# Patient Record
Sex: FEMALE | Race: WHITE | Employment: FULL TIME | URBAN - METROPOLITAN AREA
[De-identification: names, ages, dates, MRNs, and addresses within clinical notes are randomized per-mention and may not be internally consistent; named-entity substitution may affect disease eponyms.]

---

## 2017-01-13 ENCOUNTER — ANTI-COAG VISIT (OUTPATIENT)
Dept: INTERNAL MEDICINE CLINIC | Facility: CLINIC | Age: 42
End: 2017-01-13

## 2017-01-13 DIAGNOSIS — I73.9 PVD (PERIPHERAL VASCULAR DISEASE) (HCC): Primary | ICD-10-CM

## 2017-01-13 LAB — INR: 2.2 (ref 0.8–1.2)

## 2017-01-13 PROCEDURE — 85610 PROTHROMBIN TIME: CPT | Performed by: INTERNAL MEDICINE

## 2017-01-13 PROCEDURE — 36416 COLLJ CAPILLARY BLOOD SPEC: CPT | Performed by: INTERNAL MEDICINE

## 2017-01-27 ENCOUNTER — ANTI-COAG VISIT (OUTPATIENT)
Dept: INTERNAL MEDICINE CLINIC | Facility: CLINIC | Age: 42
End: 2017-01-27

## 2017-01-27 DIAGNOSIS — I73.9 PVD (PERIPHERAL VASCULAR DISEASE) (HCC): Primary | ICD-10-CM

## 2017-01-27 LAB — INR: 2.7 (ref 0.8–1.2)

## 2017-01-27 PROCEDURE — 85610 PROTHROMBIN TIME: CPT | Performed by: INTERNAL MEDICINE

## 2017-01-27 PROCEDURE — 36416 COLLJ CAPILLARY BLOOD SPEC: CPT | Performed by: INTERNAL MEDICINE

## 2017-02-10 ENCOUNTER — TELEPHONE (OUTPATIENT)
Dept: DERMATOLOGY CLINIC | Facility: CLINIC | Age: 42
End: 2017-02-10

## 2017-02-11 NOTE — TELEPHONE ENCOUNTER
Yes for acne--on since 5/2016 with improved control in acne.   Medically necessary form letter also with pa--in my box

## 2017-02-24 ENCOUNTER — ANTI-COAG VISIT (OUTPATIENT)
Dept: INTERNAL MEDICINE CLINIC | Facility: CLINIC | Age: 42
End: 2017-02-24

## 2017-02-24 DIAGNOSIS — I73.9 PVD (PERIPHERAL VASCULAR DISEASE) (HCC): Primary | ICD-10-CM

## 2017-02-24 LAB — INR: 2.9 (ref 0.8–1.2)

## 2017-02-24 PROCEDURE — 85610 PROTHROMBIN TIME: CPT | Performed by: INTERNAL MEDICINE

## 2017-02-24 PROCEDURE — 36416 COLLJ CAPILLARY BLOOD SPEC: CPT | Performed by: INTERNAL MEDICINE

## 2017-03-15 ENCOUNTER — HOSPITAL ENCOUNTER (OUTPATIENT)
Dept: ULTRASOUND IMAGING | Facility: HOSPITAL | Age: 42
Discharge: HOME | End: 2017-03-15
Attending: SURGERY
Payer: COMMERCIAL

## 2017-03-15 DIAGNOSIS — I70.221 ATHEROSCLEROSIS OF NATIVE ARTERIES OF EXTREMITIES WITH REST PAIN, RIGHT LEG (HCC): ICD-10-CM

## 2017-03-15 PROCEDURE — 93926 LOWER EXTREMITY STUDY: CPT

## 2017-03-24 ENCOUNTER — TELEPHONE (OUTPATIENT)
Dept: INTERNAL MEDICINE CLINIC | Facility: CLINIC | Age: 42
End: 2017-03-24

## 2017-03-24 ENCOUNTER — ANTI-COAG VISIT (OUTPATIENT)
Dept: INTERNAL MEDICINE CLINIC | Facility: CLINIC | Age: 42
End: 2017-03-24

## 2017-03-24 DIAGNOSIS — I73.9 PVD (PERIPHERAL VASCULAR DISEASE) (HCC): Primary | ICD-10-CM

## 2017-03-24 DIAGNOSIS — I37.9 PULMONIC VALVE DISEASE: Primary | ICD-10-CM

## 2017-03-24 LAB — INR: 2.4 (ref 0.8–1.2)

## 2017-03-24 PROCEDURE — 85610 PROTHROMBIN TIME: CPT | Performed by: INTERNAL MEDICINE

## 2017-03-24 PROCEDURE — 36416 COLLJ CAPILLARY BLOOD SPEC: CPT | Performed by: INTERNAL MEDICINE

## 2017-03-24 NOTE — PROGRESS NOTES
Pt. here today for INR. The Inr today was 2.4. Informed pt of Dr. Allyn Farris instructions and when to return to recheck. Pt verbalized she understand.

## 2017-03-24 NOTE — TELEPHONE ENCOUNTER
Pt requesting referral for cardiology Dr Odell Pitts.  Please mail a new referral to pt's home address

## 2017-04-14 ENCOUNTER — PRIOR ORIGINAL RECORDS (OUTPATIENT)
Dept: OTHER | Age: 42
End: 2017-04-14

## 2017-04-21 ENCOUNTER — ANTI-COAG VISIT (OUTPATIENT)
Dept: INTERNAL MEDICINE CLINIC | Facility: CLINIC | Age: 42
End: 2017-04-21

## 2017-04-21 DIAGNOSIS — I73.9 PVD (PERIPHERAL VASCULAR DISEASE) (HCC): Primary | ICD-10-CM

## 2017-04-21 PROCEDURE — 85610 PROTHROMBIN TIME: CPT | Performed by: INTERNAL MEDICINE

## 2017-04-21 PROCEDURE — 36416 COLLJ CAPILLARY BLOOD SPEC: CPT | Performed by: INTERNAL MEDICINE

## 2017-04-21 NOTE — PROGRESS NOTES
Pt here today for monthly INR check. Pt denies complaints of bleeding or any discomfort. Condition stable no complaints voiced. Discussed Dr. Narciso Cox instructions.

## 2017-05-24 ENCOUNTER — ANTI-COAG VISIT (OUTPATIENT)
Dept: INTERNAL MEDICINE CLINIC | Facility: CLINIC | Age: 42
End: 2017-05-24

## 2017-05-24 DIAGNOSIS — I73.9 PVD (PERIPHERAL VASCULAR DISEASE) (HCC): Primary | ICD-10-CM

## 2017-05-24 PROCEDURE — 36416 COLLJ CAPILLARY BLOOD SPEC: CPT | Performed by: INTERNAL MEDICINE

## 2017-05-24 PROCEDURE — 85610 PROTHROMBIN TIME: CPT | Performed by: INTERNAL MEDICINE

## 2017-06-23 ENCOUNTER — ANTI-COAG VISIT (OUTPATIENT)
Dept: INTERNAL MEDICINE CLINIC | Facility: CLINIC | Age: 42
End: 2017-06-23

## 2017-06-23 DIAGNOSIS — I73.9 PVD (PERIPHERAL VASCULAR DISEASE) (HCC): Primary | ICD-10-CM

## 2017-06-23 PROCEDURE — 36416 COLLJ CAPILLARY BLOOD SPEC: CPT | Performed by: INTERNAL MEDICINE

## 2017-06-23 PROCEDURE — 85610 PROTHROMBIN TIME: CPT | Performed by: INTERNAL MEDICINE

## 2017-06-23 NOTE — PROGRESS NOTES
INR today 3.4. Rechecked INR  With same results. Patient states taking Coumadin 1 mg, 1.5 mg, 1.5 mg and repeating cycle. Results reported to Dr. Hilary Glass. Instructions received for patient to hold Coumadin today.  Tomorrow(6/24/17) start 1 mg, 1 mg, 1.5

## 2017-07-07 ENCOUNTER — ANTI-COAG VISIT (OUTPATIENT)
Dept: INTERNAL MEDICINE CLINIC | Facility: CLINIC | Age: 42
End: 2017-07-07

## 2017-07-07 DIAGNOSIS — I73.9 PVD (PERIPHERAL VASCULAR DISEASE) (HCC): Primary | ICD-10-CM

## 2017-07-07 LAB — INR: 2.4 (ref 0.8–1.2)

## 2017-07-07 PROCEDURE — 85610 PROTHROMBIN TIME: CPT | Performed by: INTERNAL MEDICINE

## 2017-07-07 PROCEDURE — 36416 COLLJ CAPILLARY BLOOD SPEC: CPT | Performed by: INTERNAL MEDICINE

## 2017-07-10 ENCOUNTER — TELEPHONE (OUTPATIENT)
Dept: INTERNAL MEDICINE CLINIC | Facility: CLINIC | Age: 42
End: 2017-07-10

## 2017-07-10 DIAGNOSIS — Z79.01 ENCOUNTER FOR MONITORING COUMADIN THERAPY: Primary | ICD-10-CM

## 2017-07-10 DIAGNOSIS — Z51.81 ENCOUNTER FOR MONITORING COUMADIN THERAPY: Primary | ICD-10-CM

## 2017-07-11 ENCOUNTER — PATIENT MESSAGE (OUTPATIENT)
Dept: INTERNAL MEDICINE CLINIC | Facility: CLINIC | Age: 42
End: 2017-07-11

## 2017-07-11 DIAGNOSIS — I73.9 PVD (PERIPHERAL VASCULAR DISEASE) (HCC): Primary | ICD-10-CM

## 2017-07-11 NOTE — TELEPHONE ENCOUNTER
pls call pt; I had sent her mychart result note for her inr 3 days ago and not read it. Pt should continue same dose and recheck in 4 weeeks.

## 2017-07-13 NOTE — TELEPHONE ENCOUNTER
From: Angelique Figueroa  To:  Shane Hutchinson MD  Sent: 7/11/2017 8:24 AM CDT  Subject: Non-Urgent Medical Question    I have a question about Prothrombin time resulted on 7/7/17, 2:43 PM.    I was told that I would be most likely be making an appointment a

## 2017-07-14 NOTE — TELEPHONE ENCOUNTER
We are in process of transitioning my coumadin patients to the coumadin clinic since we dont have the staff to continue doing the inr test in the clinic. We will have to refer our patient to our coumadin clinic.  We can give her referral to go to the couma

## 2017-07-17 NOTE — TELEPHONE ENCOUNTER
Advised patient of Dr. Donny Breaux note. Patient verbalized understanding. Email sent to patient with coumadin clinic phone number.

## 2017-07-17 NOTE — TELEPHONE ENCOUNTER
Hi Dr. Williams Duong,    If you would like pt to have INR managed through [facility], please sign and fill out the requirements of anticoagulation clinic order. Thanks.

## 2017-07-18 NOTE — TELEPHONE ENCOUNTER
KHALIDA Oconnell,    If you would be able to fax us the form that I had to fill out. We will be moving our patients on coumadin to the coumadin clinic since we are not able to do them now due to lack of staff.  We will start faxing patients info so they can start

## 2017-08-08 ENCOUNTER — TELEPHONE (OUTPATIENT)
Dept: INTERNAL MEDICINE CLINIC | Facility: CLINIC | Age: 42
End: 2017-08-08

## 2017-08-08 ENCOUNTER — ANTI-COAG VISIT (OUTPATIENT)
Dept: INTERNAL MEDICINE CLINIC | Facility: CLINIC | Age: 42
End: 2017-08-08

## 2017-08-08 DIAGNOSIS — I73.9 PVD (PERIPHERAL VASCULAR DISEASE) (HCC): ICD-10-CM

## 2017-08-08 LAB — INR: 1.8 (ref 2–3)

## 2017-08-08 PROCEDURE — 85610 PROTHROMBIN TIME: CPT

## 2017-08-08 PROCEDURE — 99211 OFF/OP EST MAY X REQ PHY/QHP: CPT

## 2017-08-08 PROCEDURE — 36416 COLLJ CAPILLARY BLOOD SPEC: CPT

## 2017-08-08 NOTE — TELEPHONE ENCOUNTER
Pt has been taking 1 mg, 1 mg, 1.5 mg,..  I am having pt take 1.5 mg, no 1 mg as she should. She will be tested in 2 weeks. She is a new pt from Dr. Agustin Craft.  Would you like me to do any thing different.   Thanks tiffany

## 2017-08-22 ENCOUNTER — ANTI-COAG VISIT (OUTPATIENT)
Dept: INTERNAL MEDICINE CLINIC | Facility: CLINIC | Age: 42
End: 2017-08-22

## 2017-08-22 DIAGNOSIS — I73.9 PVD (PERIPHERAL VASCULAR DISEASE) (HCC): ICD-10-CM

## 2017-08-22 LAB — INR: 2 (ref 2–3)

## 2017-08-22 PROCEDURE — 36416 COLLJ CAPILLARY BLOOD SPEC: CPT

## 2017-08-22 PROCEDURE — 85610 PROTHROMBIN TIME: CPT

## 2017-08-29 NOTE — TELEPHONE ENCOUNTER
WARFARIN SOD 1 MG TABS #100  TAKE 1 TAB PO FOR 1 DAY, 1 1/2 TABS FOR 2 DAYS THEN 1 TAB, REPEAT CYCLE.

## 2017-08-30 NOTE — TELEPHONE ENCOUNTER
Dr Ramona Vazquez to advise on pended warfarin 1 mg refill request.     Refill Protocol Appointment Criteria  · Appointment scheduled in the past 6 months or in the next 3 months  Recent Outpatient Visits            8 months ago Annual physical exam    [place]

## 2017-08-31 RX ORDER — WARFARIN SODIUM 1 MG/1
TABLET ORAL
Qty: 120 TABLET | Refills: 2 | Status: SHIPPED | OUTPATIENT
Start: 2017-08-31 | End: 2018-05-04 | Stop reason: SDUPTHER

## 2017-08-31 RX ORDER — WARFARIN SODIUM 1 MG/1
TABLET ORAL
Qty: 100 TABLET | Refills: 2 | Status: SHIPPED | OUTPATIENT
Start: 2017-08-31 | End: 2017-08-31

## 2017-09-19 ENCOUNTER — ANTI-COAG VISIT (OUTPATIENT)
Dept: INTERNAL MEDICINE CLINIC | Facility: CLINIC | Age: 42
End: 2017-09-19

## 2017-09-19 DIAGNOSIS — I73.9 PVD (PERIPHERAL VASCULAR DISEASE) (HCC): ICD-10-CM

## 2017-09-19 LAB — INR: 2.1 (ref 2–3)

## 2017-09-19 PROCEDURE — 85610 PROTHROMBIN TIME: CPT

## 2017-09-19 PROCEDURE — 36416 COLLJ CAPILLARY BLOOD SPEC: CPT

## 2017-10-03 ENCOUNTER — HOSPITAL ENCOUNTER (OUTPATIENT)
Dept: ULTRASOUND IMAGING | Facility: HOSPITAL | Age: 42
Discharge: HOME | End: 2017-10-03
Attending: SURGERY
Payer: COMMERCIAL

## 2017-10-03 DIAGNOSIS — I70.221 ATHEROSCLEROSIS OF NATIVE ARTERIES OF EXTREMITIES WITH REST PAIN, RIGHT LEG (HCC): ICD-10-CM

## 2017-10-03 DIAGNOSIS — Z98.890 STATUS POST PERIPHERAL ARTERY BYPASS: ICD-10-CM

## 2017-10-03 PROCEDURE — 93926 LOWER EXTREMITY STUDY: CPT | Performed by: SURGERY

## 2017-10-19 ENCOUNTER — ANTI-COAG VISIT (OUTPATIENT)
Dept: INTERNAL MEDICINE CLINIC | Facility: CLINIC | Age: 42
End: 2017-10-19

## 2017-10-19 DIAGNOSIS — I73.9 PVD (PERIPHERAL VASCULAR DISEASE) (HCC): ICD-10-CM

## 2017-10-19 PROCEDURE — 36416 COLLJ CAPILLARY BLOOD SPEC: CPT

## 2017-10-19 PROCEDURE — 85610 PROTHROMBIN TIME: CPT

## 2017-11-16 ENCOUNTER — ANTI-COAG VISIT (OUTPATIENT)
Dept: INTERNAL MEDICINE CLINIC | Facility: CLINIC | Age: 42
End: 2017-11-16

## 2017-11-16 DIAGNOSIS — I73.9 PVD (PERIPHERAL VASCULAR DISEASE) (HCC): ICD-10-CM

## 2017-11-16 PROCEDURE — 36416 COLLJ CAPILLARY BLOOD SPEC: CPT

## 2017-11-16 PROCEDURE — 99211 OFF/OP EST MAY X REQ PHY/QHP: CPT

## 2017-11-16 PROCEDURE — 85610 PROTHROMBIN TIME: CPT

## 2017-11-19 ENCOUNTER — HOSPITAL ENCOUNTER (OUTPATIENT)
Dept: MAMMOGRAPHY | Facility: HOSPITAL | Age: 42
Discharge: HOME | End: 2017-11-19
Attending: OBSTETRICS & GYNECOLOGY
Payer: COMMERCIAL

## 2017-11-19 DIAGNOSIS — Z12.31 VISIT FOR SCREENING MAMMOGRAM: ICD-10-CM

## 2017-11-19 PROCEDURE — 77067 SCR MAMMO BI INCL CAD: CPT | Performed by: OBSTETRICS & GYNECOLOGY

## 2017-11-19 PROCEDURE — 77063 BREAST TOMOSYNTHESIS BI: CPT | Performed by: OBSTETRICS & GYNECOLOGY

## 2017-12-05 ENCOUNTER — TELEPHONE (OUTPATIENT)
Dept: OTHER | Age: 42
End: 2017-12-05

## 2017-12-05 DIAGNOSIS — Z79.01 CHRONIC ANTICOAGULATION: Primary | ICD-10-CM

## 2017-12-05 NOTE — TELEPHONE ENCOUNTER
Patient is asking Dr. Arabella Kim call her cell phone at  or sent via [employer],    Patient is having an Ablation on 12/14/17 and needs her Warfarin, Aspirin and Cilostrazol to be addressed. Needs to be off 7 days prior to the procedure.

## 2017-12-05 NOTE — TELEPHONE ENCOUNTER
Anti-coagulation clinic has noted date of procedure.      However, [facility] cannot give any recommendations regarding holding  Warfarin, this will need to be determined by MD.

## 2017-12-06 ENCOUNTER — LAB ENCOUNTER (OUTPATIENT)
Dept: LAB | Facility: HOSPITAL | Age: 42
End: 2017-12-06
Attending: INTERNAL MEDICINE
Payer: COMMERCIAL

## 2017-12-06 DIAGNOSIS — Z79.01 CHRONIC ANTICOAGULATION: ICD-10-CM

## 2017-12-06 PROCEDURE — 85025 COMPLETE CBC W/AUTO DIFF WBC: CPT

## 2017-12-06 PROCEDURE — 36415 COLL VENOUS BLD VENIPUNCTURE: CPT

## 2017-12-06 PROCEDURE — 80048 BASIC METABOLIC PNL TOTAL CA: CPT

## 2017-12-06 RX ORDER — ENOXAPARIN SODIUM 100 MG/ML
INJECTION SUBCUTANEOUS
Qty: 10 SYRINGE | Refills: 0 | Status: SHIPPED | OUTPATIENT
Start: 2017-12-06 | End: 2017-12-12

## 2017-12-06 NOTE — TELEPHONE ENCOUNTER
Called pt and given instructions for holding coumadin 5 days before surgery and starting lovenox briding at 50mg SQ q 12 2 days after stopping coumadin; will hold lovenox the evening dose the night before surgery day.  Restart lovenox and coumadin once ok w

## 2017-12-08 ENCOUNTER — TELEPHONE (OUTPATIENT)
Dept: INTERNAL MEDICINE CLINIC | Facility: CLINIC | Age: 42
End: 2017-12-08

## 2017-12-08 DIAGNOSIS — R94.4 DECREASED GFR: Primary | ICD-10-CM

## 2017-12-09 ENCOUNTER — APPOINTMENT (OUTPATIENT)
Dept: LAB | Age: 42
End: 2017-12-09
Attending: INTERNAL MEDICINE
Payer: COMMERCIAL

## 2017-12-09 ENCOUNTER — PRIOR ORIGINAL RECORDS (OUTPATIENT)
Dept: OTHER | Age: 42
End: 2017-12-09

## 2017-12-09 DIAGNOSIS — R94.4 DECREASED GFR: ICD-10-CM

## 2017-12-09 PROCEDURE — 36415 COLL VENOUS BLD VENIPUNCTURE: CPT

## 2017-12-09 PROCEDURE — 80048 BASIC METABOLIC PNL TOTAL CA: CPT

## 2017-12-12 ENCOUNTER — TELEPHONE (OUTPATIENT)
Dept: OTHER | Age: 42
End: 2017-12-12

## 2017-12-12 NOTE — TELEPHONE ENCOUNTER
I  Presume that it was her gyne who wanted to keep her on lovenox till the 22nd  So as not to restart and then stop again coumadin before surgery;  I am ok with it and she will need to continue same dose of lovenox as discussed before.  She will need furthe

## 2017-12-12 NOTE — TELEPHONE ENCOUNTER
Pt calling us today to inform you that her surgery has been post poned to Dec 22,2017 as she got her cycle.  She mentioned that she started on Lovenox yesterday and she was to be on it for three days but they now want her to stay on the Lovenox

## 2017-12-13 RX ORDER — ENOXAPARIN SODIUM 100 MG/ML
INJECTION SUBCUTANEOUS
Qty: 14 SYRINGE | Refills: 0 | Status: SHIPPED | OUTPATIENT
Start: 2017-12-13 | End: 2017-12-13

## 2017-12-13 RX ORDER — ENOXAPARIN SODIUM 100 MG/ML
INJECTION SUBCUTANEOUS
Qty: 30 SYRINGE | Refills: 0 | Status: SHIPPED | OUTPATIENT
Start: 2017-12-13 | End: 2017-12-29

## 2017-12-13 NOTE — TELEPHONE ENCOUNTER
We gave her 10 syringes initially which should last for  5days.  We can give her additional 30 syringes to cover at least [id number] days which I think will be enough to cover days she will need to be on lovenox. (pls change the previous script of 14 syri

## 2017-12-13 NOTE — TELEPHONE ENCOUNTER
Pt was inform of your message below and she stated that if you still want her to be on lovenox  1 week after surgery she will need more then 14 syringes as she is using 2 syringes a day. Sig states every 12 HR.  Please Advise

## 2017-12-21 NOTE — H&P
[facility]    PATIENT'S NAME: Naima Bruce   ATTENDING PHYSICIAN: Annelise Higgins MD   PATIENT ACCOUNT#:   [id number]    LOCATION:  [facility]  MEDICAL RECORD #:   [id number]       YOB: 1975  ADMISSION DATE:       12/22/2017    HIS degree laceration,  section, and bilateral tubal ligation.     MEDICATIONS:  Patient had been on Coumadin, however, was switched to Lovenox 1 week ago, which she has been taking daily and then was instructed to stop her Lovenox 24 hours prior to pro

## 2017-12-22 ENCOUNTER — ANESTHESIA (OUTPATIENT)
Dept: SURGERY | Facility: HOSPITAL | Age: 42
End: 2017-12-22
Payer: COMMERCIAL

## 2017-12-22 ENCOUNTER — ANESTHESIA EVENT (OUTPATIENT)
Dept: SURGERY | Facility: HOSPITAL | Age: 42
End: 2017-12-22
Payer: COMMERCIAL

## 2017-12-22 ENCOUNTER — SURGERY (OUTPATIENT)
Age: 42
End: 2017-12-22

## 2017-12-22 ENCOUNTER — HOSPITAL ENCOUNTER (OUTPATIENT)
Facility: HOSPITAL | Age: 42
Setting detail: HOSPITAL OUTPATIENT SURGERY
Discharge: HOME | End: 2017-12-22
Attending: OBSTETRICS & GYNECOLOGY | Admitting: OBSTETRICS & GYNECOLOGY
Payer: COMMERCIAL

## 2017-12-22 VITALS
WEIGHT: 111.88 LBS | HEIGHT: 59 IN | SYSTOLIC BLOOD PRESSURE: 92 MMHG | DIASTOLIC BLOOD PRESSURE: 51 MMHG | OXYGEN SATURATION: 100 % | RESPIRATION RATE: 16 BRPM | TEMPERATURE: 98 F | BODY MASS INDEX: 22.56 KG/M2 | HEART RATE: 64 BPM

## 2017-12-22 DIAGNOSIS — N93.9 ABNORMAL UTERINE BLEEDING: Primary | ICD-10-CM

## 2017-12-22 PROBLEM — N92.0 MENORRHAGIA: Status: ACTIVE | Noted: 2017-12-22

## 2017-12-22 PROCEDURE — 36415 COLL VENOUS BLD VENIPUNCTURE: CPT | Performed by: OBSTETRICS & GYNECOLOGY

## 2017-12-22 PROCEDURE — 0UDB7ZZ EXTRACTION OF ENDOMETRIUM, VIA NATURAL OR ARTIFICIAL OPENING: ICD-10-PCS | Performed by: OBSTETRICS & GYNECOLOGY

## 2017-12-22 PROCEDURE — 88305 TISSUE EXAM BY PATHOLOGIST: CPT | Performed by: OBSTETRICS & GYNECOLOGY

## 2017-12-22 PROCEDURE — 0UJD8ZZ INSPECTION OF UTERUS AND CERVIX, VIA NATURAL OR ARTIFICIAL OPENING ENDOSCOPIC: ICD-10-PCS | Performed by: OBSTETRICS & GYNECOLOGY

## 2017-12-22 PROCEDURE — 85610 PROTHROMBIN TIME: CPT | Performed by: OBSTETRICS & GYNECOLOGY

## 2017-12-22 PROCEDURE — 81025 URINE PREGNANCY TEST: CPT

## 2017-12-22 RX ORDER — ONDANSETRON 2 MG/ML
INJECTION INTRAMUSCULAR; INTRAVENOUS AS NEEDED
Status: DISCONTINUED | OUTPATIENT
Start: 2017-12-22 | End: 2017-12-22 | Stop reason: SURG

## 2017-12-22 RX ORDER — MIDAZOLAM HYDROCHLORIDE 1 MG/ML
INJECTION INTRAMUSCULAR; INTRAVENOUS AS NEEDED
Status: DISCONTINUED | OUTPATIENT
Start: 2017-12-22 | End: 2017-12-22 | Stop reason: SURG

## 2017-12-22 RX ORDER — MORPHINE SULFATE 4 MG/ML
4 INJECTION, SOLUTION INTRAMUSCULAR; INTRAVENOUS EVERY 10 MIN PRN
Status: DISCONTINUED | OUTPATIENT
Start: 2017-12-22 | End: 2017-12-22

## 2017-12-22 RX ORDER — DEXAMETHASONE SODIUM PHOSPHATE 4 MG/ML
VIAL (ML) INJECTION AS NEEDED
Status: DISCONTINUED | OUTPATIENT
Start: 2017-12-22 | End: 2017-12-22 | Stop reason: SURG

## 2017-12-22 RX ORDER — FAMOTIDINE 20 MG/1
20 TABLET ORAL ONCE
Status: DISCONTINUED | OUTPATIENT
Start: 2017-12-22 | End: 2017-12-22 | Stop reason: HOSPADM

## 2017-12-22 RX ORDER — SODIUM CHLORIDE, SODIUM LACTATE, POTASSIUM CHLORIDE, CALCIUM CHLORIDE 600; 310; 30; 20 MG/100ML; MG/100ML; MG/100ML; MG/100ML
INJECTION, SOLUTION INTRAVENOUS CONTINUOUS
Status: DISCONTINUED | OUTPATIENT
Start: 2017-12-22 | End: 2017-12-22

## 2017-12-22 RX ORDER — MORPHINE SULFATE 10 MG/ML
6 INJECTION, SOLUTION INTRAMUSCULAR; INTRAVENOUS EVERY 10 MIN PRN
Status: DISCONTINUED | OUTPATIENT
Start: 2017-12-22 | End: 2017-12-22

## 2017-12-22 RX ORDER — SCOLOPAMINE TRANSDERMAL SYSTEM 1 MG/1
1 PATCH, EXTENDED RELEASE TRANSDERMAL
Status: DISCONTINUED | OUTPATIENT
Start: 2017-12-22 | End: 2017-12-22 | Stop reason: HOSPADM

## 2017-12-22 RX ORDER — SODIUM CHLORIDE 0.9 % (FLUSH) 0.9 %
10 SYRINGE (ML) INJECTION AS NEEDED
Status: DISCONTINUED | OUTPATIENT
Start: 2017-12-22 | End: 2017-12-22 | Stop reason: HOSPADM

## 2017-12-22 RX ORDER — METOCLOPRAMIDE 10 MG/1
10 TABLET ORAL ONCE
Status: DISCONTINUED | OUTPATIENT
Start: 2017-12-22 | End: 2017-12-22 | Stop reason: HOSPADM

## 2017-12-22 RX ORDER — HYDROMORPHONE HYDROCHLORIDE 1 MG/ML
0.6 INJECTION, SOLUTION INTRAMUSCULAR; INTRAVENOUS; SUBCUTANEOUS EVERY 5 MIN PRN
Status: DISCONTINUED | OUTPATIENT
Start: 2017-12-22 | End: 2017-12-22

## 2017-12-22 RX ORDER — HYDROCODONE BITARTRATE AND ACETAMINOPHEN 5; 325 MG/1; MG/1
1 TABLET ORAL AS NEEDED
Status: DISCONTINUED | OUTPATIENT
Start: 2017-12-22 | End: 2017-12-22

## 2017-12-22 RX ORDER — ACETAMINOPHEN 500 MG
1000 TABLET ORAL ONCE
Status: COMPLETED | OUTPATIENT
Start: 2017-12-22 | End: 2017-12-22

## 2017-12-22 RX ORDER — DIPHENHYDRAMINE HYDROCHLORIDE 50 MG/ML
12.5 INJECTION INTRAMUSCULAR; INTRAVENOUS EVERY 4 HOURS PRN
Status: CANCELLED | OUTPATIENT
Start: 2017-12-22

## 2017-12-22 RX ORDER — ONDANSETRON 2 MG/ML
4 INJECTION INTRAMUSCULAR; INTRAVENOUS ONCE AS NEEDED
Status: DISCONTINUED | OUTPATIENT
Start: 2017-12-22 | End: 2017-12-22

## 2017-12-22 RX ORDER — HYDROMORPHONE HYDROCHLORIDE 1 MG/ML
0.4 INJECTION, SOLUTION INTRAMUSCULAR; INTRAVENOUS; SUBCUTANEOUS EVERY 5 MIN PRN
Status: DISCONTINUED | OUTPATIENT
Start: 2017-12-22 | End: 2017-12-22

## 2017-12-22 RX ORDER — HALOPERIDOL 5 MG/ML
0.25 INJECTION INTRAMUSCULAR ONCE AS NEEDED
Status: DISCONTINUED | OUTPATIENT
Start: 2017-12-22 | End: 2017-12-22

## 2017-12-22 RX ORDER — LIDOCAINE HYDROCHLORIDE 10 MG/ML
INJECTION, SOLUTION EPIDURAL; INFILTRATION; INTRACAUDAL; PERINEURAL AS NEEDED
Status: DISCONTINUED | OUTPATIENT
Start: 2017-12-22 | End: 2017-12-22 | Stop reason: SURG

## 2017-12-22 RX ORDER — HYDROCODONE BITARTRATE AND ACETAMINOPHEN 5; 325 MG/1; MG/1
2 TABLET ORAL AS NEEDED
Status: DISCONTINUED | OUTPATIENT
Start: 2017-12-22 | End: 2017-12-22

## 2017-12-22 RX ORDER — HYDROMORPHONE HYDROCHLORIDE 1 MG/ML
0.2 INJECTION, SOLUTION INTRAMUSCULAR; INTRAVENOUS; SUBCUTANEOUS EVERY 5 MIN PRN
Status: DISCONTINUED | OUTPATIENT
Start: 2017-12-22 | End: 2017-12-22

## 2017-12-22 RX ORDER — MORPHINE SULFATE 2 MG/ML
2 INJECTION, SOLUTION INTRAMUSCULAR; INTRAVENOUS EVERY 10 MIN PRN
Status: DISCONTINUED | OUTPATIENT
Start: 2017-12-22 | End: 2017-12-22

## 2017-12-22 RX ORDER — NALOXONE HYDROCHLORIDE 0.4 MG/ML
80 INJECTION, SOLUTION INTRAMUSCULAR; INTRAVENOUS; SUBCUTANEOUS AS NEEDED
Status: DISCONTINUED | OUTPATIENT
Start: 2017-12-22 | End: 2017-12-22

## 2017-12-22 RX ADMIN — LIDOCAINE HYDROCHLORIDE 40 MG: 10 INJECTION, SOLUTION EPIDURAL; INFILTRATION; INTRACAUDAL; PERINEURAL at 07:36:00

## 2017-12-22 RX ADMIN — SODIUM CHLORIDE, SODIUM LACTATE, POTASSIUM CHLORIDE, CALCIUM CHLORIDE: 600; 310; 30; 20 INJECTION, SOLUTION INTRAVENOUS at 07:32:00

## 2017-12-22 RX ADMIN — MIDAZOLAM HYDROCHLORIDE 2 MG: 1 INJECTION INTRAMUSCULAR; INTRAVENOUS at 07:33:00

## 2017-12-22 RX ADMIN — DEXAMETHASONE SODIUM PHOSPHATE 4 MG: 4 MG/ML VIAL (ML) INJECTION at 07:41:00

## 2017-12-22 RX ADMIN — SODIUM CHLORIDE, SODIUM LACTATE, POTASSIUM CHLORIDE, CALCIUM CHLORIDE: 600; 310; 30; 20 INJECTION, SOLUTION INTRAVENOUS at 08:22:00

## 2017-12-22 RX ADMIN — ONDANSETRON 4 MG: 2 INJECTION INTRAMUSCULAR; INTRAVENOUS at 07:41:00

## 2017-12-22 NOTE — ANESTHESIA POSTPROCEDURE EVALUATION
Patient:  Kristofer Diggs    Procedure Summary     Date:  12/22/17 Room / Location:  [address] [place] [address] MAIN OR  / [address] [place] [address] MAIN OR    Anesthesia Start:  0732 Anesthesia Stop:  0945    Procedure:  ENDOMETRIAL ABLATION (N/A ) Diagnosis:  (Abnormal uterine bleeding )    Surgeon:

## 2017-12-22 NOTE — ANESTHESIA PREPROCEDURE EVALUATION
Anesthesia PreOp Note    HPI:     Awilda Velasquez is a 40year old female who presents for preoperative consultation requested by: Jamal Sanchez MD    Date of Surgery: 12/22/2017    Procedure(s):  ENDOMETRIAL ABLATION  Indication: Abnormal uterine bleeding right leg arterial bypass / (fasciotomy)  1995: OTHER SURGICAL HISTORY      Comment: SX at 27 y/o / valvuloplasty (congenital                Pulmonic stenosis)      Prescriptions Prior to Admission:  Enoxaparin Sodium (LOVENOX) 60 MG/0.6ML Subcutaneous Sol mcg Intravenous Q5 Min PRN Alberteen Ganser, DO    fentaNYL citrate (SUBLIMAZE) 0.05 MG/ML injection 50 mcg 50 mcg Intravenous Q5 Min PRN Alberteen Ganser, DO    HYDROmorphone HCl PF (DILAUDID) 1 MG/ML injection 0.2 mg 0.2 mg Intravenous Q5 Min PRN Alberteen Ganser Sexual activity: Not on file     Other Topics Concern    Caffeine Concern Yes    Comment: coffee, 1-2 cups daily    Pt has a pacemaker No    Pt has a defibrillator No    Reaction to local anesthetic No     Social History Narrative   None on file       Avai analgesics  Informed Consent Plan and Risks Discussed With:  Patient  Discussed plan with:  CRNA      I have informed Mehul Aaron  of the nature of the anesthetic plan, benefits, risks, major complications, and any alternative forms of anesthetic managemen

## 2017-12-22 NOTE — INTERVAL H&P NOTE
Pre-op Diagnosis: Abnormal uterine bleeding     The above referenced H&P was reviewed by Ivy vEans MD on 12/22/2017, the patient was examined and no significant changes have occurred in the patient's condition since the H&P was performed.   I discussed w

## 2017-12-22 NOTE — BRIEF OP NOTE
Pre-Operative Diagnosis: Abnormal uterine bleeding      Post-Operative Diagnosis: Abnormal uterine bleeding      Procedure Performed:   Procedure(s):  diagnostic hysteroscopy AND CURETTAGE    Surgeon(s) and Role:     Leon Thompson MD - Primary    Assi

## 2017-12-22 NOTE — DISCHARGE SUMMARY
Outpatient Surgery Brief Discharge Summary         Patient ID:  Zora Leigh  [id number]  40year old  9/16/1975    Discharge Diagnoses: Abnormal uterine bleeding     Procedures:diagnostic hysteroscopy, D&C  Discharged Condition: stable    Disposition: home

## 2017-12-23 NOTE — OPERATIVE REPORT
[facility]    PATIENT'S NAME: Thi Whitley   ATTENDING PHYSICIAN: Annelise Rodriguez MD   OPERATING PHYSICIAN: Juliana Bentley.  Cassandra Rodriguez MD   PATIENT ACCOUNT#:   [id number]    LOCATION:  [address]  MEDICAL RECORD #:   [id number]       DATE OF BIRTH: visualized with certainty, the HTA was not performed. Curettage was done. There was some breakdown of adhesions. All instruments removed. The patient tolerated the procedure well and was sent to the recovery room in good condition.       Dictated By Tere Rodriguez

## 2017-12-27 ENCOUNTER — ANTI-COAG VISIT (OUTPATIENT)
Dept: INTERNAL MEDICINE CLINIC | Facility: CLINIC | Age: 42
End: 2017-12-27

## 2017-12-27 DIAGNOSIS — I73.9 PVD (PERIPHERAL VASCULAR DISEASE) (HCC): ICD-10-CM

## 2017-12-27 PROCEDURE — 85610 PROTHROMBIN TIME: CPT

## 2017-12-27 PROCEDURE — 36416 COLLJ CAPILLARY BLOOD SPEC: CPT

## 2017-12-29 ENCOUNTER — TELEPHONE (OUTPATIENT)
Dept: OTHER | Age: 42
End: 2017-12-29

## 2017-12-29 ENCOUNTER — ANTI-COAG VISIT (OUTPATIENT)
Dept: INTERNAL MEDICINE CLINIC | Facility: CLINIC | Age: 42
End: 2017-12-29

## 2017-12-29 DIAGNOSIS — I73.9 PVD (PERIPHERAL VASCULAR DISEASE) (HCC): ICD-10-CM

## 2017-12-29 PROCEDURE — 99211 OFF/OP EST MAY X REQ PHY/QHP: CPT

## 2017-12-29 PROCEDURE — 85610 PROTHROMBIN TIME: CPT

## 2017-12-29 PROCEDURE — 36416 COLLJ CAPILLARY BLOOD SPEC: CPT

## 2017-12-29 PROCEDURE — 99212 OFFICE O/P EST SF 10 MIN: CPT

## 2017-12-29 RX ORDER — ENOXAPARIN SODIUM 100 MG/ML
INJECTION SUBCUTANEOUS
Qty: 10 SYRINGE | Refills: 0 | Status: ON HOLD | OUTPATIENT
Start: 2017-12-29 | End: 2018-10-18

## 2017-12-29 NOTE — TELEPHONE ENCOUNTER
pt stated that she was at the coumadin office today and because her INR is not over 2 she will need to be on the Lovenox for a few more days. Her INR level today  is 1.2 . She will come back to the coumadin clinic on Tuesday to recheck the Pt/

## 2017-12-30 NOTE — TELEPHONE ENCOUNTER
Spoke with patient (identified name and ) advised that medication lovenox is ordered, verbalized understanding.

## 2018-01-02 ENCOUNTER — NURSE TRIAGE (OUTPATIENT)
Dept: OTHER | Age: 43
End: 2018-01-02

## 2018-01-02 ENCOUNTER — LAB ENCOUNTER (OUTPATIENT)
Dept: LAB | Age: 43
End: 2018-01-02
Attending: INTERNAL MEDICINE
Payer: COMMERCIAL

## 2018-01-02 DIAGNOSIS — I73.9 PVD (PERIPHERAL VASCULAR DISEASE) (HCC): ICD-10-CM

## 2018-01-02 LAB
INR BLD: 2.1 (ref 0.9–1.2)
PROTHROMBIN TIME: 22.7 SECONDS (ref 11.8–14.5)

## 2018-01-02 PROCEDURE — 85610 PROTHROMBIN TIME: CPT

## 2018-01-02 PROCEDURE — 36415 COLL VENOUS BLD VENIPUNCTURE: CPT

## 2018-01-03 ENCOUNTER — ANTI-COAG VISIT (OUTPATIENT)
Dept: INTERNAL MEDICINE CLINIC | Facility: CLINIC | Age: 43
End: 2018-01-03

## 2018-01-03 DIAGNOSIS — I73.9 PVD (PERIPHERAL VASCULAR DISEASE) (HCC): ICD-10-CM

## 2018-01-03 NOTE — TELEPHONE ENCOUNTER
Spoke with patient and she states she is very frustrated she has not heard back from the Coumadin Clinic today. Spoke with Mily at Coumadin Clinic and she states she will call patient back within the next 15-30 minutes.     Relayed message above to verónica

## 2018-01-03 NOTE — TELEPHONE ENCOUNTER
Patient indicated that she did get Dr Jennifer Robertson email. She took 2mg of coumadin last night and did not take the lovenox. Patient will contact the coumadin clinic when they open at 10am today.

## 2018-01-03 NOTE — TELEPHONE ENCOUNTER
Linda Madrigal MD   to Sumit Dameon          1/2/18 10:30 PM   KHALIDA Garsia,     Take coumadin 2mg tonight and then  Call coumadin clinic tomorrow so they can continue to manage your coumadin. You can stop your lovenox now. Thanks.  Dr Nathan De Luna read by

## 2018-01-03 NOTE — TELEPHONE ENCOUNTER
Patient did her PT/INR today and has not heard back from the coumadin clinic. PT 22.7, INR 2.1. Patient on lovenox 50mg 2 times daily and coumadin 2mg on 1/1/18, 2mg on 12/31/17, 3mg on 12/30/17, 3mg on 12/29/17, and 1mg on 12/28/17.  Trying to bridge back

## 2018-01-08 ENCOUNTER — ANTI-COAG VISIT (OUTPATIENT)
Dept: INTERNAL MEDICINE CLINIC | Facility: CLINIC | Age: 43
End: 2018-01-08

## 2018-01-08 DIAGNOSIS — I73.9 PVD (PERIPHERAL VASCULAR DISEASE) (HCC): ICD-10-CM

## 2018-01-08 LAB — INR: 2.1 (ref 2–3)

## 2018-01-08 PROCEDURE — 93793 ANTICOAG MGMT PT WARFARIN: CPT

## 2018-01-08 PROCEDURE — 85610 PROTHROMBIN TIME: CPT

## 2018-01-23 ENCOUNTER — ANTI-COAG VISIT (OUTPATIENT)
Dept: INTERNAL MEDICINE CLINIC | Facility: CLINIC | Age: 43
End: 2018-01-23

## 2018-01-23 DIAGNOSIS — I73.9 PVD (PERIPHERAL VASCULAR DISEASE) (HCC): ICD-10-CM

## 2018-01-23 LAB — INR: 1.5 (ref 2–3)

## 2018-01-23 PROCEDURE — 85610 PROTHROMBIN TIME: CPT

## 2018-01-23 PROCEDURE — 93793 ANTICOAG MGMT PT WARFARIN: CPT

## 2018-02-02 ENCOUNTER — ANTI-COAG VISIT (OUTPATIENT)
Dept: INTERNAL MEDICINE CLINIC | Facility: CLINIC | Age: 43
End: 2018-02-02

## 2018-02-02 DIAGNOSIS — I73.9 PVD (PERIPHERAL VASCULAR DISEASE) (HCC): ICD-10-CM

## 2018-02-02 LAB — INR: 1.7 (ref 2–3)

## 2018-02-02 PROCEDURE — 36416 COLLJ CAPILLARY BLOOD SPEC: CPT

## 2018-02-02 PROCEDURE — 85610 PROTHROMBIN TIME: CPT

## 2018-02-02 PROCEDURE — 93793 ANTICOAG MGMT PT WARFARIN: CPT

## 2018-02-13 ENCOUNTER — ANTI-COAG VISIT (OUTPATIENT)
Dept: INTERNAL MEDICINE CLINIC | Facility: CLINIC | Age: 43
End: 2018-02-13

## 2018-02-13 DIAGNOSIS — I73.9 PVD (PERIPHERAL VASCULAR DISEASE) (HCC): ICD-10-CM

## 2018-02-13 LAB — INR: 2.4 (ref 2–3)

## 2018-02-13 PROCEDURE — 36416 COLLJ CAPILLARY BLOOD SPEC: CPT

## 2018-02-13 PROCEDURE — 85610 PROTHROMBIN TIME: CPT

## 2018-02-19 ENCOUNTER — ANTI-COAG VISIT (OUTPATIENT)
Dept: INTERNAL MEDICINE CLINIC | Facility: CLINIC | Age: 43
End: 2018-02-19

## 2018-02-19 DIAGNOSIS — I73.9 PVD (PERIPHERAL VASCULAR DISEASE) (HCC): ICD-10-CM

## 2018-02-21 ENCOUNTER — ANTI-COAG VISIT (OUTPATIENT)
Dept: INTERNAL MEDICINE CLINIC | Facility: CLINIC | Age: 43
End: 2018-02-21

## 2018-02-21 DIAGNOSIS — I73.9 PVD (PERIPHERAL VASCULAR DISEASE) (HCC): ICD-10-CM

## 2018-02-21 LAB — INR: 2.4 (ref 2–3)

## 2018-02-21 PROCEDURE — 93793 ANTICOAG MGMT PT WARFARIN: CPT

## 2018-02-21 PROCEDURE — 36416 COLLJ CAPILLARY BLOOD SPEC: CPT

## 2018-02-21 PROCEDURE — 85610 PROTHROMBIN TIME: CPT

## 2018-02-22 ENCOUNTER — TELEPHONE (OUTPATIENT)
Dept: INTERNAL MEDICINE CLINIC | Facility: CLINIC | Age: 43
End: 2018-02-22

## 2018-02-22 NOTE — TELEPHONE ENCOUNTER
Form received from [employer] home monitoring [employer], INR physician form, placed in Dr. Osiel Singleton to review and complete.

## 2018-02-22 NOTE — TELEPHONE ENCOUNTER
Familia Costello from [facility] is calling to follow up on forms       Please advise      [phone] ext [phone]

## 2018-03-13 ENCOUNTER — ANTI-COAG VISIT (OUTPATIENT)
Dept: INTERNAL MEDICINE CLINIC | Facility: CLINIC | Age: 43
End: 2018-03-13

## 2018-03-13 DIAGNOSIS — I73.9 PVD (PERIPHERAL VASCULAR DISEASE) (HCC): ICD-10-CM

## 2018-03-13 LAB — INR: 2.8 (ref 2–3)

## 2018-03-13 PROCEDURE — 36416 COLLJ CAPILLARY BLOOD SPEC: CPT

## 2018-03-13 PROCEDURE — 93793 ANTICOAG MGMT PT WARFARIN: CPT

## 2018-03-13 PROCEDURE — 85610 PROTHROMBIN TIME: CPT

## 2018-03-15 ENCOUNTER — TELEPHONE (OUTPATIENT)
Dept: INTERNAL MEDICINE CLINIC | Facility: CLINIC | Age: 43
End: 2018-03-15

## 2018-03-15 DIAGNOSIS — Z79.01 LONG TERM (CURRENT) USE OF ANTICOAGULANTS: Primary | ICD-10-CM

## 2018-03-15 NOTE — TELEPHONE ENCOUNTER
Ryan contacted requesting PT INR meter and supplies. Pended referral.  Please review lelandois and sign off if you approve. Thank you!  Fred Section [phone] [facility]

## 2018-03-23 ENCOUNTER — TELEPHONE (OUTPATIENT)
Dept: OTHER | Age: 43
End: 2018-03-23

## 2018-03-23 NOTE — TELEPHONE ENCOUNTER
[facility] did not receive faxed referral.  Spoke with [facility] to clarify fax number.   Referral faxed a second time to [phone]

## 2018-04-06 ENCOUNTER — ANTI-COAG VISIT (OUTPATIENT)
Dept: INTERNAL MEDICINE CLINIC | Facility: CLINIC | Age: 43
End: 2018-04-06

## 2018-04-06 DIAGNOSIS — I73.9 PVD (PERIPHERAL VASCULAR DISEASE) (HCC): ICD-10-CM

## 2018-04-10 ENCOUNTER — OFFICE VISIT (OUTPATIENT)
Dept: INTERNAL MEDICINE CLINIC | Facility: CLINIC | Age: 43
End: 2018-04-10

## 2018-04-10 VITALS
RESPIRATION RATE: 12 BRPM | HEIGHT: 60 IN | DIASTOLIC BLOOD PRESSURE: 63 MMHG | SYSTOLIC BLOOD PRESSURE: 93 MMHG | TEMPERATURE: 98 F | WEIGHT: 113 LBS | HEART RATE: 71 BPM | BODY MASS INDEX: 22.19 KG/M2

## 2018-04-10 DIAGNOSIS — I73.9 PVD (PERIPHERAL VASCULAR DISEASE) (HCC): ICD-10-CM

## 2018-04-10 DIAGNOSIS — I37.9 PULMONIC VALVE DISEASE: Primary | ICD-10-CM

## 2018-04-10 PROCEDURE — 99212 OFFICE O/P EST SF 10 MIN: CPT | Performed by: INTERNAL MEDICINE

## 2018-04-10 PROCEDURE — 99214 OFFICE O/P EST MOD 30 MIN: CPT | Performed by: INTERNAL MEDICINE

## 2018-04-10 NOTE — PROGRESS NOTES
HPI:    Patient ID: Awilda Velasquez is a 40year old female.     Patient also here for ffup of her PVD of the lower extremity; she had been ff by Dr Jaguar Delaney vascular surgeon for the few years but he isnt available anymore for pt to ffup with so pt asking to see e External ear normal.   Left Ear: External ear normal.   Nose: Nose normal.   Mouth/Throat: No oropharyngeal exudate. Eyes: Conjunctivae are normal. Right eye exhibits no discharge. Left eye exhibits no discharge. No scleral icterus. Neck: Neck supple.

## 2018-04-19 ENCOUNTER — ANTI-COAG VISIT (OUTPATIENT)
Dept: INTERNAL MEDICINE CLINIC | Facility: CLINIC | Age: 43
End: 2018-04-19

## 2018-04-19 DIAGNOSIS — I73.9 PVD (PERIPHERAL VASCULAR DISEASE) (HCC): ICD-10-CM

## 2018-04-20 ENCOUNTER — MYAURORA ACCOUNT LINK (OUTPATIENT)
Dept: OTHER | Age: 43
End: 2018-04-20

## 2018-04-20 ENCOUNTER — PRIOR ORIGINAL RECORDS (OUTPATIENT)
Dept: OTHER | Age: 43
End: 2018-04-20

## 2018-04-20 LAB
BUN: 11 MG/DL
CALCIUM: 9.1 MG/DL
CHLORIDE: 106 MEQ/L
CREATININE, SERUM: 0.84 MG/DL
GLUCOSE: 80 MG/DL
POTASSIUM, SERUM: 3.5 MEQ/L
SODIUM: 138 MEQ/L

## 2018-04-24 ENCOUNTER — APPOINTMENT (OUTPATIENT)
Dept: LAB | Facility: HOSPITAL | Age: 43
End: 2018-04-24
Attending: CLINICAL NURSE SPECIALIST
Payer: COMMERCIAL

## 2018-04-24 ENCOUNTER — HOSPITAL ENCOUNTER (OUTPATIENT)
Dept: ULTRASOUND IMAGING | Facility: HOSPITAL | Age: 43
Discharge: HOME | End: 2018-04-24
Attending: CLINICAL NURSE SPECIALIST
Payer: COMMERCIAL

## 2018-04-24 ENCOUNTER — TELEPHONE (OUTPATIENT)
Dept: INTERNAL MEDICINE CLINIC | Facility: CLINIC | Age: 43
End: 2018-04-24

## 2018-04-24 DIAGNOSIS — I73.9 PVD (PERIPHERAL VASCULAR DISEASE) (HCC): ICD-10-CM

## 2018-04-24 DIAGNOSIS — I73.9 PAD (PERIPHERAL ARTERY DISEASE) (HCC): ICD-10-CM

## 2018-04-24 PROCEDURE — 80061 LIPID PANEL: CPT

## 2018-04-24 PROCEDURE — 36415 COLL VENOUS BLD VENIPUNCTURE: CPT

## 2018-04-24 PROCEDURE — 93926 LOWER EXTREMITY STUDY: CPT | Performed by: CLINICAL NURSE SPECIALIST

## 2018-04-24 PROCEDURE — 80053 COMPREHEN METABOLIC PANEL: CPT

## 2018-04-26 ENCOUNTER — TELEPHONE (OUTPATIENT)
Dept: INTERNAL MEDICINE CLINIC | Facility: CLINIC | Age: 43
End: 2018-04-26

## 2018-04-26 NOTE — TELEPHONE ENCOUNTER
4/26/18 Left message that parking placard forms are completed. Asked patient to call back and let  Staff  Know if she is going to   Form or prefers to have them mailed. Paperwork is being held at nurses station in [employer], [place].

## 2018-05-01 ENCOUNTER — TELEPHONE (OUTPATIENT)
Dept: INTERNAL MEDICINE CLINIC | Facility: CLINIC | Age: 43
End: 2018-05-01

## 2018-05-03 ENCOUNTER — TELEPHONE (OUTPATIENT)
Dept: INTERNAL MEDICINE CLINIC | Facility: CLINIC | Age: 43
End: 2018-05-03

## 2018-05-03 ENCOUNTER — ANTI-COAG VISIT (OUTPATIENT)
Dept: INTERNAL MEDICINE CLINIC | Facility: CLINIC | Age: 43
End: 2018-05-03

## 2018-05-03 DIAGNOSIS — I73.9 PVD (PERIPHERAL VASCULAR DISEASE) (HCC): ICD-10-CM

## 2018-05-03 NOTE — TELEPHONE ENCOUNTER
Pt would like to be switched from Warfarin back to Coumadin because pt states that she is having a hard time keeping her PT INR stable. Pt also states that this was a suggestion by the coumadin clinic.  Please advise

## 2018-05-04 ENCOUNTER — ANTI-COAG VISIT (OUTPATIENT)
Dept: INTERNAL MEDICINE CLINIC | Facility: CLINIC | Age: 43
End: 2018-05-04

## 2018-05-04 DIAGNOSIS — I73.9 PVD (PERIPHERAL VASCULAR DISEASE) (HCC): ICD-10-CM

## 2018-05-04 RX ORDER — WARFARIN SODIUM 1 MG
TABLET ORAL
Qty: 100 TABLET | Refills: 3 | Status: SHIPPED | OUTPATIENT
Start: 2018-05-04

## 2018-05-04 RX ORDER — WARFARIN SODIUM 1 MG/1
TABLET ORAL
Qty: 135 TABLET | Refills: 1 | Status: SHIPPED | OUTPATIENT
Start: 2018-05-04 | End: 2018-05-04 | Stop reason: DRUGHIGH

## 2018-05-04 NOTE — TELEPHONE ENCOUNTER
We are still the prescribing MD. erx sent for branded coumadin.  Coumadin clinic still managing her dosing as before

## 2018-05-04 NOTE — TELEPHONE ENCOUNTER
Pt informed of Dr message. pt agreeable to plan of care  Please advise who is ordering PCP / coumadin clinic? Please order and pt requesting a message in response via [employer].   Thank you

## 2018-05-17 ENCOUNTER — ANTI-COAG VISIT (OUTPATIENT)
Dept: INTERNAL MEDICINE CLINIC | Facility: CLINIC | Age: 43
End: 2018-05-17

## 2018-05-17 DIAGNOSIS — I73.9 PVD (PERIPHERAL VASCULAR DISEASE) (HCC): ICD-10-CM

## 2018-05-18 ENCOUNTER — MED REC SCAN ONLY (OUTPATIENT)
Dept: CARDIOLOGY CLINIC | Facility: CLINIC | Age: 43
End: 2018-05-18

## 2018-05-25 ENCOUNTER — TELEPHONE (OUTPATIENT)
Dept: OTHER | Age: 43
End: 2018-05-25

## 2018-05-25 NOTE — TELEPHONE ENCOUNTER
Patient calling requesting that RN reach out to Department of Review and Appeals for her prescription coverage provider at [phone] in regards to her Coumadin co-payment being $130 out of pocket.      Patient stated she is currently taking Warfarin and

## 2018-05-29 NOTE — TELEPHONE ENCOUNTER
Spoke with [facility] pharmacist to obtain insurance information to proceed with PA. Pharmacist states patient picked up the coumadin 1 mg tab, 90 day supply. Insurance is [address] [place] [address] ID# [id number].      A non-formulary coverage reveiw for Co

## 2018-05-31 ENCOUNTER — ANTI-COAG VISIT (OUTPATIENT)
Dept: INTERNAL MEDICINE CLINIC | Facility: CLINIC | Age: 43
End: 2018-05-31

## 2018-05-31 DIAGNOSIS — I73.9 PVD (PERIPHERAL VASCULAR DISEASE) (HCC): ICD-10-CM

## 2018-06-14 ENCOUNTER — TELEPHONE (OUTPATIENT)
Dept: OTHER | Age: 43
End: 2018-06-14

## 2018-06-14 ENCOUNTER — ANTI-COAG VISIT (OUTPATIENT)
Dept: INTERNAL MEDICINE CLINIC | Facility: CLINIC | Age: 43
End: 2018-06-14

## 2018-06-14 DIAGNOSIS — I73.9 PVD (PERIPHERAL VASCULAR DISEASE) (HCC): ICD-10-CM

## 2018-06-14 NOTE — TELEPHONE ENCOUNTER
Received call from pharmacy who reports they received the rx for coumadin, but patient's insurance will not cover the generic form of the medication. Pharmacy would like to know if rx can be switched to the Brand name. Routed to provider for review.      Pl

## 2018-06-14 NOTE — TELEPHONE ENCOUNTER
[facility] pharmacist called indicated that has patient at the pharmacy now and the brand coumadin is costing $120 and generic is only $20. Patient would like to go back to warfarin. Advised [facility] pharmacist ok to go back to warfarin.

## 2018-06-15 NOTE — TELEPHONE ENCOUNTER
Spoke with rep King at [facility] and medication was denied. A letter of medical necessity can be faxed to [phone] along with clinical documentation. Please advise.

## 2018-07-02 ENCOUNTER — ANTI-COAG VISIT (OUTPATIENT)
Dept: INTERNAL MEDICINE CLINIC | Facility: CLINIC | Age: 43
End: 2018-07-02

## 2018-07-02 DIAGNOSIS — I73.9 PVD (PERIPHERAL VASCULAR DISEASE) (HCC): ICD-10-CM

## 2018-07-02 LAB
INR: 2.7 (ref 2–3)
INR: 2.7 (ref 2–3)

## 2018-07-03 ENCOUNTER — MED REC SCAN ONLY (OUTPATIENT)
Dept: CARDIOLOGY CLINIC | Facility: CLINIC | Age: 43
End: 2018-07-03

## 2018-07-09 ENCOUNTER — ANTI-COAG VISIT (OUTPATIENT)
Dept: INTERNAL MEDICINE CLINIC | Facility: CLINIC | Age: 43
End: 2018-07-09

## 2018-07-09 DIAGNOSIS — I73.9 PVD (PERIPHERAL VASCULAR DISEASE) (HCC): ICD-10-CM

## 2018-07-09 LAB — INR: 3.9 (ref 2–3)

## 2018-07-16 LAB — INR: 2.6 (ref 2–3)

## 2018-07-18 ENCOUNTER — ANTI-COAG VISIT (OUTPATIENT)
Dept: CARDIOLOGY CLINIC | Facility: CLINIC | Age: 43
End: 2018-07-18

## 2018-07-18 ENCOUNTER — TELEPHONE (OUTPATIENT)
Dept: CARDIOLOGY CLINIC | Facility: CLINIC | Age: 43
End: 2018-07-18

## 2018-07-18 DIAGNOSIS — I73.9 PVD (PERIPHERAL VASCULAR DISEASE) (HCC): ICD-10-CM

## 2018-07-25 ENCOUNTER — ANTI-COAG VISIT (OUTPATIENT)
Dept: CARDIOLOGY CLINIC | Facility: CLINIC | Age: 43
End: 2018-07-25

## 2018-07-25 DIAGNOSIS — I73.9 PVD (PERIPHERAL VASCULAR DISEASE) (HCC): ICD-10-CM

## 2018-07-25 LAB — INR: 2.5 (ref 2–3)

## 2018-08-15 ENCOUNTER — MED REC SCAN ONLY (OUTPATIENT)
Dept: CARDIOLOGY CLINIC | Facility: CLINIC | Age: 43
End: 2018-08-15

## 2018-08-15 LAB — INR: 3.2 (ref 2–3)

## 2018-08-17 ENCOUNTER — ANTI-COAG VISIT (OUTPATIENT)
Dept: INTERNAL MEDICINE CLINIC | Facility: CLINIC | Age: 43
End: 2018-08-17

## 2018-08-17 ENCOUNTER — TELEPHONE (OUTPATIENT)
Dept: INTERNAL MEDICINE CLINIC | Facility: CLINIC | Age: 43
End: 2018-08-17

## 2018-08-17 DIAGNOSIS — I73.9 PVD (PERIPHERAL VASCULAR DISEASE) (HCC): ICD-10-CM

## 2018-08-29 ENCOUNTER — TELEPHONE (OUTPATIENT)
Dept: INTERNAL MEDICINE CLINIC | Facility: CLINIC | Age: 43
End: 2018-08-29

## 2018-08-29 ENCOUNTER — ANTI-COAG VISIT (OUTPATIENT)
Dept: INTERNAL MEDICINE CLINIC | Facility: CLINIC | Age: 43
End: 2018-08-29

## 2018-08-29 DIAGNOSIS — I73.9 PERIPHERAL VASCULAR DISEASE, UNSPECIFIED (HCC): Primary | ICD-10-CM

## 2018-08-29 DIAGNOSIS — I73.9 PVD (PERIPHERAL VASCULAR DISEASE) (HCC): ICD-10-CM

## 2018-08-29 LAB — INR: 3.8 (ref 2–3)

## 2018-09-06 LAB — INR: 2.3 (ref 2–3)

## 2018-09-12 ENCOUNTER — ANTI-COAG VISIT (OUTPATIENT)
Dept: INTERNAL MEDICINE CLINIC | Facility: CLINIC | Age: 43
End: 2018-09-12

## 2018-09-12 DIAGNOSIS — I73.9 PERIPHERAL VASCULAR DISEASE, UNSPECIFIED (HCC): ICD-10-CM

## 2018-09-12 DIAGNOSIS — I73.9 PVD (PERIPHERAL VASCULAR DISEASE) (HCC): ICD-10-CM

## 2018-09-13 ENCOUNTER — ANTI-COAG VISIT (OUTPATIENT)
Dept: INTERNAL MEDICINE CLINIC | Facility: CLINIC | Age: 43
End: 2018-09-13

## 2018-09-13 DIAGNOSIS — I73.9 PERIPHERAL VASCULAR DISEASE, UNSPECIFIED (HCC): ICD-10-CM

## 2018-09-13 DIAGNOSIS — I73.9 PVD (PERIPHERAL VASCULAR DISEASE) (HCC): ICD-10-CM

## 2018-09-13 LAB — INR: 2 (ref 2–3)

## 2018-09-27 LAB — INR: 1.7 (ref 2–3)

## 2018-09-28 ENCOUNTER — ANTI-COAG VISIT (OUTPATIENT)
Dept: INTERNAL MEDICINE CLINIC | Facility: CLINIC | Age: 43
End: 2018-09-28

## 2018-09-28 DIAGNOSIS — I73.9 PVD (PERIPHERAL VASCULAR DISEASE) (HCC): ICD-10-CM

## 2018-09-28 DIAGNOSIS — I73.9 PERIPHERAL VASCULAR DISEASE, UNSPECIFIED (HCC): ICD-10-CM

## 2018-10-03 ENCOUNTER — TELEPHONE (OUTPATIENT)
Dept: OTHER | Age: 43
End: 2018-10-03

## 2018-10-03 NOTE — TELEPHONE ENCOUNTER
Pt calling to report she is having a surgery 10/18/18 for Vag Hyst.    Pt was calling to find out if she needs OV for pre op clearance and also what are the instructions for Coumadin.     Please advise

## 2018-10-05 ENCOUNTER — TELEPHONE (OUTPATIENT)
Dept: FAMILY MEDICINE CLINIC | Facility: CLINIC | Age: 43
End: 2018-10-05

## 2018-10-05 ENCOUNTER — ANTI-COAG VISIT (OUTPATIENT)
Dept: INTERNAL MEDICINE CLINIC | Facility: CLINIC | Age: 43
End: 2018-10-05

## 2018-10-05 ENCOUNTER — OFFICE VISIT (OUTPATIENT)
Dept: INTERNAL MEDICINE CLINIC | Facility: CLINIC | Age: 43
End: 2018-10-05

## 2018-10-05 ENCOUNTER — LAB ENCOUNTER (OUTPATIENT)
Dept: LAB | Age: 43
End: 2018-10-05
Attending: INTERNAL MEDICINE
Payer: COMMERCIAL

## 2018-10-05 VITALS
DIASTOLIC BLOOD PRESSURE: 64 MMHG | WEIGHT: 115 LBS | HEART RATE: 75 BPM | SYSTOLIC BLOOD PRESSURE: 106 MMHG | TEMPERATURE: 98 F | BODY MASS INDEX: 22.58 KG/M2 | RESPIRATION RATE: 12 BRPM | HEIGHT: 60 IN

## 2018-10-05 DIAGNOSIS — Z01.818 PREOP GENERAL PHYSICAL EXAM: Primary | ICD-10-CM

## 2018-10-05 DIAGNOSIS — I73.9 PVD (PERIPHERAL VASCULAR DISEASE) (HCC): ICD-10-CM

## 2018-10-05 DIAGNOSIS — I73.9 PERIPHERAL VASCULAR DISEASE, UNSPECIFIED (HCC): ICD-10-CM

## 2018-10-05 DIAGNOSIS — Z01.818 PREOP GENERAL PHYSICAL EXAM: ICD-10-CM

## 2018-10-05 PROCEDURE — 99214 OFFICE O/P EST MOD 30 MIN: CPT | Performed by: INTERNAL MEDICINE

## 2018-10-05 PROCEDURE — 90686 IIV4 VACC NO PRSV 0.5 ML IM: CPT | Performed by: INTERNAL MEDICINE

## 2018-10-05 PROCEDURE — 99212 OFFICE O/P EST SF 10 MIN: CPT | Performed by: INTERNAL MEDICINE

## 2018-10-05 PROCEDURE — 85025 COMPLETE CBC W/AUTO DIFF WBC: CPT

## 2018-10-05 PROCEDURE — 90471 IMMUNIZATION ADMIN: CPT | Performed by: INTERNAL MEDICINE

## 2018-10-05 PROCEDURE — 80053 COMPREHEN METABOLIC PANEL: CPT

## 2018-10-05 PROCEDURE — 36415 COLL VENOUS BLD VENIPUNCTURE: CPT

## 2018-10-05 RX ORDER — ENOXAPARIN SODIUM 100 MG/ML
1 INJECTION SUBCUTANEOUS 2 TIMES DAILY
Qty: 30 SYRINGE | Refills: 0 | Status: ON HOLD | OUTPATIENT
Start: 2018-10-05 | End: 2018-10-28

## 2018-10-05 NOTE — TELEPHONE ENCOUNTER
Pt called in to provide her recent INR results. Pt is taking 1 1/2 mg of coumadin yesterday and will take 1 mg this evening. Pt stated that she is bridging the LOVENOX as she is scheduled for surgery on 10/18.     INR = 2.1

## 2018-10-05 NOTE — PROGRESS NOTES
HPI:    Patient ID: Awilda Velasquez is a 44year old female. Patient presents today for preoperative physical as requested by Dr Rosemary johnson for planned lap hysterectomy to be at [facility] on Oct 18, 2018.  Patient is on chronic anticoagulation with coumadin Mouth/Throat: Oropharynx is clear and moist. No oropharyngeal exudate. Eyes: Conjunctivae are normal. Right eye exhibits no discharge. Left eye exhibits no discharge. No scleral icterus. Neck: Normal range of motion. Neck supple.  No thyromegaly prese encounter       Imaging & Referrals:  None       #[id number]

## 2018-10-08 ENCOUNTER — TELEPHONE (OUTPATIENT)
Dept: INTERNAL MEDICINE CLINIC | Facility: CLINIC | Age: 43
End: 2018-10-08

## 2018-10-08 DIAGNOSIS — Z79.01 LONG TERM (CURRENT) USE OF ANTICOAGULANTS: ICD-10-CM

## 2018-10-08 DIAGNOSIS — I73.9 PERIPHERAL VASCULAR DISEASE, UNSPECIFIED (HCC): Primary | ICD-10-CM

## 2018-10-08 NOTE — TELEPHONE ENCOUNTER
Received a fax from [facility] requesting supplies. Please sign off on request if you agree.      Thank you, Blake

## 2018-10-11 ENCOUNTER — LAB ENCOUNTER (OUTPATIENT)
Dept: LAB | Facility: HOSPITAL | Age: 43
End: 2018-10-11
Attending: SURGERY
Payer: COMMERCIAL

## 2018-10-11 ENCOUNTER — HOSPITAL ENCOUNTER (OUTPATIENT)
Dept: ULTRASOUND IMAGING | Facility: HOSPITAL | Age: 43
Discharge: HOME | End: 2018-10-11
Attending: SURGERY
Payer: COMMERCIAL

## 2018-10-11 DIAGNOSIS — I70.221 ATHEROSCLEROSIS OF NATIVE ARTERIES OF EXTREMITIES WITH REST PAIN, RIGHT LEG (HCC): ICD-10-CM

## 2018-10-11 DIAGNOSIS — Z01.818 PRE-OP TESTING: ICD-10-CM

## 2018-10-11 PROCEDURE — 36415 COLL VENOUS BLD VENIPUNCTURE: CPT

## 2018-10-11 PROCEDURE — 86901 BLOOD TYPING SEROLOGIC RH(D): CPT

## 2018-10-11 PROCEDURE — 86900 BLOOD TYPING SEROLOGIC ABO: CPT

## 2018-10-11 PROCEDURE — 86850 RBC ANTIBODY SCREEN: CPT

## 2018-10-11 PROCEDURE — 93926 LOWER EXTREMITY STUDY: CPT | Performed by: SURGERY

## 2018-10-15 NOTE — H&P
[facility]    PATIENT'S NAME: Georoney Damonyesi   ATTENDING PHYSICIAN: Annelise Fleming MD   PATIENT ACCOUNT#:   [id number]    LOCATION:  [facility]  MEDICAL RECORD #:   [id number]       YOB: 1975  ADMISSION DATE:       10/18/2018    HIS bypass. Other surgical history includes HTA endometrial ablation in , appendectomy, valvuloplasty in ,  section, and bilateral tubal ligation. PAST OBSTETRICAL HISTORY:  The patient is a G3, P2, AB1.   Normal vaginal delivery x1, which

## 2018-10-17 ENCOUNTER — PRIOR ORIGINAL RECORDS (OUTPATIENT)
Dept: OTHER | Age: 43
End: 2018-10-17

## 2018-10-18 ENCOUNTER — ANESTHESIA EVENT (OUTPATIENT)
Dept: SURGERY | Facility: HOSPITAL | Age: 43
End: 2018-10-18
Payer: COMMERCIAL

## 2018-10-18 ENCOUNTER — ANESTHESIA (OUTPATIENT)
Dept: SURGERY | Facility: HOSPITAL | Age: 43
End: 2018-10-18
Payer: COMMERCIAL

## 2018-10-18 ENCOUNTER — HOSPITAL ENCOUNTER (OUTPATIENT)
Facility: HOSPITAL | Age: 43
Discharge: HOME | End: 2018-10-19
Attending: OBSTETRICS & GYNECOLOGY | Admitting: OBSTETRICS & GYNECOLOGY
Payer: COMMERCIAL

## 2018-10-18 ENCOUNTER — PRIOR ORIGINAL RECORDS (OUTPATIENT)
Dept: OTHER | Age: 43
End: 2018-10-18

## 2018-10-18 DIAGNOSIS — Z01.818 PRE-OP TESTING: Primary | ICD-10-CM

## 2018-10-18 PROBLEM — Z90.710 S/P LAPAROSCOPIC ASSISTED VAGINAL HYSTERECTOMY (LAVH): Status: ACTIVE | Noted: 2018-10-18

## 2018-10-18 PROCEDURE — 0UT74ZZ RESECTION OF BILATERAL FALLOPIAN TUBES, PERCUTANEOUS ENDOSCOPIC APPROACH: ICD-10-PCS | Performed by: OBSTETRICS & GYNECOLOGY

## 2018-10-18 PROCEDURE — 99214 OFFICE O/P EST MOD 30 MIN: CPT | Performed by: HOSPITALIST

## 2018-10-18 PROCEDURE — 0UT94ZZ RESECTION OF UTERUS, PERCUTANEOUS ENDOSCOPIC APPROACH: ICD-10-PCS | Performed by: OBSTETRICS & GYNECOLOGY

## 2018-10-18 RX ORDER — SODIUM CHLORIDE 0.9 % (FLUSH) 0.9 %
10 SYRINGE (ML) INJECTION AS NEEDED
Status: DISCONTINUED | OUTPATIENT
Start: 2018-10-18 | End: 2018-10-19

## 2018-10-18 RX ORDER — HYDROCODONE BITARTRATE AND ACETAMINOPHEN 5; 325 MG/1; MG/1
1 TABLET ORAL AS NEEDED
Status: DISCONTINUED | OUTPATIENT
Start: 2018-10-18 | End: 2018-10-18 | Stop reason: HOSPADM

## 2018-10-18 RX ORDER — FAMOTIDINE 20 MG/1
20 TABLET ORAL ONCE
Status: DISCONTINUED | OUTPATIENT
Start: 2018-10-18 | End: 2018-10-18 | Stop reason: HOSPADM

## 2018-10-18 RX ORDER — MORPHINE SULFATE 4 MG/ML
4 INJECTION, SOLUTION INTRAMUSCULAR; INTRAVENOUS EVERY 10 MIN PRN
Status: DISCONTINUED | OUTPATIENT
Start: 2018-10-18 | End: 2018-10-18 | Stop reason: HOSPADM

## 2018-10-18 RX ORDER — DOCUSATE SODIUM 100 MG/1
100 CAPSULE, LIQUID FILLED ORAL 2 TIMES DAILY
Status: DISCONTINUED | OUTPATIENT
Start: 2018-10-18 | End: 2018-10-19

## 2018-10-18 RX ORDER — DEXAMETHASONE SODIUM PHOSPHATE 4 MG/ML
VIAL (ML) INJECTION AS NEEDED
Status: DISCONTINUED | OUTPATIENT
Start: 2018-10-18 | End: 2018-10-18 | Stop reason: SURG

## 2018-10-18 RX ORDER — SODIUM CHLORIDE, SODIUM LACTATE, POTASSIUM CHLORIDE, CALCIUM CHLORIDE 600; 310; 30; 20 MG/100ML; MG/100ML; MG/100ML; MG/100ML
INJECTION, SOLUTION INTRAVENOUS CONTINUOUS
Status: DISCONTINUED | OUTPATIENT
Start: 2018-10-18 | End: 2018-10-18 | Stop reason: HOSPADM

## 2018-10-18 RX ORDER — MORPHINE SULFATE 4 MG/ML
1 INJECTION, SOLUTION INTRAMUSCULAR; INTRAVENOUS EVERY 2 HOUR PRN
Status: CANCELLED | OUTPATIENT
Start: 2018-10-18

## 2018-10-18 RX ORDER — ENOXAPARIN SODIUM 100 MG/ML
52 INJECTION SUBCUTANEOUS 2 TIMES DAILY
Status: DISCONTINUED | OUTPATIENT
Start: 2018-10-18 | End: 2018-10-19

## 2018-10-18 RX ORDER — METOCLOPRAMIDE 10 MG/1
10 TABLET ORAL ONCE
Status: DISCONTINUED | OUTPATIENT
Start: 2018-10-18 | End: 2018-10-18 | Stop reason: HOSPADM

## 2018-10-18 RX ORDER — GLYCOPYRROLATE 0.2 MG/ML
INJECTION INTRAMUSCULAR; INTRAVENOUS AS NEEDED
Status: DISCONTINUED | OUTPATIENT
Start: 2018-10-18 | End: 2018-10-18 | Stop reason: SURG

## 2018-10-18 RX ORDER — ONDANSETRON 2 MG/ML
INJECTION INTRAMUSCULAR; INTRAVENOUS AS NEEDED
Status: DISCONTINUED | OUTPATIENT
Start: 2018-10-18 | End: 2018-10-18 | Stop reason: SURG

## 2018-10-18 RX ORDER — MORPHINE SULFATE 4 MG/ML
4 INJECTION, SOLUTION INTRAMUSCULAR; INTRAVENOUS EVERY 2 HOUR PRN
Status: CANCELLED | OUTPATIENT
Start: 2018-10-18

## 2018-10-18 RX ORDER — ACETAMINOPHEN 500 MG
1000 TABLET ORAL ONCE
Status: DISCONTINUED | OUTPATIENT
Start: 2018-10-18 | End: 2018-10-18 | Stop reason: HOSPADM

## 2018-10-18 RX ORDER — CEFAZOLIN SODIUM/WATER 2 G/20 ML
SYRINGE (ML) INTRAVENOUS AS NEEDED
Status: DISCONTINUED | OUTPATIENT
Start: 2018-10-18 | End: 2018-10-18 | Stop reason: SURG

## 2018-10-18 RX ORDER — MORPHINE SULFATE 4 MG/ML
2 INJECTION, SOLUTION INTRAMUSCULAR; INTRAVENOUS EVERY 2 HOUR PRN
Status: CANCELLED | OUTPATIENT
Start: 2018-10-18

## 2018-10-18 RX ORDER — HALOPERIDOL 5 MG/ML
0.25 INJECTION INTRAMUSCULAR ONCE AS NEEDED
Status: DISCONTINUED | OUTPATIENT
Start: 2018-10-18 | End: 2018-10-18 | Stop reason: HOSPADM

## 2018-10-18 RX ORDER — MORPHINE SULFATE 10 MG/ML
6 INJECTION, SOLUTION INTRAMUSCULAR; INTRAVENOUS EVERY 10 MIN PRN
Status: DISCONTINUED | OUTPATIENT
Start: 2018-10-18 | End: 2018-10-18 | Stop reason: HOSPADM

## 2018-10-18 RX ORDER — ROCURONIUM BROMIDE 10 MG/ML
INJECTION, SOLUTION INTRAVENOUS AS NEEDED
Status: DISCONTINUED | OUTPATIENT
Start: 2018-10-18 | End: 2018-10-18 | Stop reason: SURG

## 2018-10-18 RX ORDER — BUPIVACAINE HYDROCHLORIDE AND EPINEPHRINE 2.5; 5 MG/ML; UG/ML
INJECTION, SOLUTION INFILTRATION; PERINEURAL AS NEEDED
Status: DISCONTINUED | OUTPATIENT
Start: 2018-10-18 | End: 2018-10-18 | Stop reason: HOSPADM

## 2018-10-18 RX ORDER — MORPHINE SULFATE 2 MG/ML
1 INJECTION, SOLUTION INTRAMUSCULAR; INTRAVENOUS EVERY 2 HOUR PRN
Status: DISCONTINUED | OUTPATIENT
Start: 2018-10-18 | End: 2018-10-19

## 2018-10-18 RX ORDER — CEFAZOLIN SODIUM/WATER 2 G/20 ML
2 SYRINGE (ML) INTRAVENOUS ONCE
Status: DISCONTINUED | OUTPATIENT
Start: 2018-10-18 | End: 2018-10-18 | Stop reason: HOSPADM

## 2018-10-18 RX ORDER — DEXTROSE, SODIUM CHLORIDE, SODIUM LACTATE, POTASSIUM CHLORIDE, AND CALCIUM CHLORIDE 5; .6; .31; .03; .02 G/100ML; G/100ML; G/100ML; G/100ML; G/100ML
INJECTION, SOLUTION INTRAVENOUS CONTINUOUS
Status: DISPENSED | OUTPATIENT
Start: 2018-10-18 | End: 2018-10-19

## 2018-10-18 RX ORDER — HYDROCODONE BITARTRATE AND ACETAMINOPHEN 5; 325 MG/1; MG/1
2 TABLET ORAL AS NEEDED
Status: DISCONTINUED | OUTPATIENT
Start: 2018-10-18 | End: 2018-10-18 | Stop reason: HOSPADM

## 2018-10-18 RX ORDER — MORPHINE SULFATE 4 MG/ML
2 INJECTION, SOLUTION INTRAMUSCULAR; INTRAVENOUS EVERY 10 MIN PRN
Status: DISCONTINUED | OUTPATIENT
Start: 2018-10-18 | End: 2018-10-18 | Stop reason: HOSPADM

## 2018-10-18 RX ORDER — HYDROCODONE BITARTRATE AND ACETAMINOPHEN 5; 325 MG/1; MG/1
1 TABLET ORAL EVERY 4 HOURS PRN
Status: DISCONTINUED | OUTPATIENT
Start: 2018-10-18 | End: 2018-10-19

## 2018-10-18 RX ORDER — HYDROCODONE BITARTRATE AND ACETAMINOPHEN 5; 325 MG/1; MG/1
2 TABLET ORAL EVERY 4 HOURS PRN
Status: DISCONTINUED | OUTPATIENT
Start: 2018-10-18 | End: 2018-10-19

## 2018-10-18 RX ORDER — SCOLOPAMINE TRANSDERMAL SYSTEM 1 MG/1
1 PATCH, EXTENDED RELEASE TRANSDERMAL
Status: DISCONTINUED | OUTPATIENT
Start: 2018-10-18 | End: 2018-10-21 | Stop reason: HOSPADM

## 2018-10-18 RX ORDER — NEOSTIGMINE METHYLSULFATE 0.5 MG/ML
INJECTION INTRAVENOUS AS NEEDED
Status: DISCONTINUED | OUTPATIENT
Start: 2018-10-18 | End: 2018-10-18 | Stop reason: SURG

## 2018-10-18 RX ORDER — ACETAMINOPHEN 325 MG/1
650 TABLET ORAL EVERY 4 HOURS PRN
Status: DISCONTINUED | OUTPATIENT
Start: 2018-10-18 | End: 2018-10-19

## 2018-10-18 RX ORDER — NALOXONE HYDROCHLORIDE 0.4 MG/ML
80 INJECTION, SOLUTION INTRAMUSCULAR; INTRAVENOUS; SUBCUTANEOUS AS NEEDED
Status: DISCONTINUED | OUTPATIENT
Start: 2018-10-18 | End: 2018-10-18 | Stop reason: HOSPADM

## 2018-10-18 RX ORDER — MORPHINE SULFATE 2 MG/ML
2 INJECTION, SOLUTION INTRAMUSCULAR; INTRAVENOUS EVERY 2 HOUR PRN
Status: DISCONTINUED | OUTPATIENT
Start: 2018-10-18 | End: 2018-10-19

## 2018-10-18 RX ORDER — LIDOCAINE HYDROCHLORIDE 10 MG/ML
INJECTION, SOLUTION EPIDURAL; INFILTRATION; INTRACAUDAL; PERINEURAL AS NEEDED
Status: DISCONTINUED | OUTPATIENT
Start: 2018-10-18 | End: 2018-10-18 | Stop reason: SURG

## 2018-10-18 RX ORDER — MIDAZOLAM HYDROCHLORIDE 1 MG/ML
INJECTION INTRAMUSCULAR; INTRAVENOUS AS NEEDED
Status: DISCONTINUED | OUTPATIENT
Start: 2018-10-18 | End: 2018-10-18 | Stop reason: SURG

## 2018-10-18 RX ORDER — ONDANSETRON 2 MG/ML
4 INJECTION INTRAMUSCULAR; INTRAVENOUS ONCE AS NEEDED
Status: DISCONTINUED | OUTPATIENT
Start: 2018-10-18 | End: 2018-10-18 | Stop reason: HOSPADM

## 2018-10-18 RX ORDER — METOCLOPRAMIDE HYDROCHLORIDE 5 MG/ML
10 INJECTION INTRAMUSCULAR; INTRAVENOUS EVERY 8 HOURS PRN
Status: DISCONTINUED | OUTPATIENT
Start: 2018-10-18 | End: 2018-10-19

## 2018-10-18 RX ADMIN — ROCURONIUM BROMIDE 10 MG: 10 INJECTION, SOLUTION INTRAVENOUS at 08:31:00

## 2018-10-18 RX ADMIN — NEOSTIGMINE METHYLSULFATE 3 MG: 0.5 INJECTION INTRAVENOUS at 09:37:00

## 2018-10-18 RX ADMIN — ONDANSETRON 4 MG: 2 INJECTION INTRAMUSCULAR; INTRAVENOUS at 09:18:00

## 2018-10-18 RX ADMIN — LIDOCAINE HYDROCHLORIDE 50 MG: 10 INJECTION, SOLUTION EPIDURAL; INFILTRATION; INTRACAUDAL; PERINEURAL at 07:37:00

## 2018-10-18 RX ADMIN — SODIUM CHLORIDE, SODIUM LACTATE, POTASSIUM CHLORIDE, CALCIUM CHLORIDE: 600; 310; 30; 20 INJECTION, SOLUTION INTRAVENOUS at 07:32:00

## 2018-10-18 RX ADMIN — MIDAZOLAM HYDROCHLORIDE 2 MG: 1 INJECTION INTRAMUSCULAR; INTRAVENOUS at 07:30:00

## 2018-10-18 RX ADMIN — DEXAMETHASONE SODIUM PHOSPHATE 8 MG: 4 MG/ML VIAL (ML) INJECTION at 07:50:00

## 2018-10-18 RX ADMIN — SODIUM CHLORIDE, SODIUM LACTATE, POTASSIUM CHLORIDE, CALCIUM CHLORIDE: 600; 310; 30; 20 INJECTION, SOLUTION INTRAVENOUS at 09:18:00

## 2018-10-18 RX ADMIN — CEFAZOLIN SODIUM/WATER 2 G: 2 G/20 ML SYRINGE (ML) INTRAVENOUS at 07:45:00

## 2018-10-18 RX ADMIN — ROCURONIUM BROMIDE 40 MG: 10 INJECTION, SOLUTION INTRAVENOUS at 07:37:00

## 2018-10-18 RX ADMIN — GLYCOPYRROLATE 0.4 MG: 0.2 INJECTION INTRAMUSCULAR; INTRAVENOUS at 09:37:00

## 2018-10-18 RX ADMIN — ROCURONIUM BROMIDE 10 MG: 10 INJECTION, SOLUTION INTRAVENOUS at 09:00:00

## 2018-10-18 NOTE — DISCHARGE SUMMARY
[facility]    Discharge Summary    Vandana Acuna Patient Status:  Outpatient in a Bed    1975 MRN [id number]   Location [address] [facility] Attending Otis Dejesus MD   Hosp Day # 0 PCP Damon Ibrahim

## 2018-10-18 NOTE — INTERVAL H&P NOTE
Pre-op Diagnosis: Abnormal uterine bleeding  Currently on 3rd day of menses.   The above referenced H&P was reviewed by Susan Junior MD on 10/18/2018, the patient was examined and no significant changes have occurred in the patient's condition since the H&P

## 2018-10-18 NOTE — ANESTHESIA POSTPROCEDURE EVALUATION
Patient:  Kaylie Wyatt    Procedure Summary     Date:  10/18/18 Room / Location:  [address] [place] [address] MAIN OR 01 / [address] [facility] MAIN OR    Anesthesia Start:  4900 Anesthesia Stop:  1000    Procedure:  LAPAROSCOPIC ASSISTED VAGINAL HYSTERECTOMY (Bilateral Uterus) Diagnosis:  (Abnorma

## 2018-10-18 NOTE — PROGRESS NOTES
[facility]    Progress Note    Kalpana Dennis Patient Status:  Outpatient in a Bed    1975 MRN [id number]   Location [facility] 4W/SW/SE Attending Tati Werner MD   Hosp Day # 0 PCP MD Cheryl Long AND STENT, S/P BYPASS. RESUME HOME MEDS WHEN OK WITH SARAHY NOEL.              Results:     Lab Results   Component Value Date    WBC 7.4 10/05/2018    HGB 14.5 10/05/2018    HCT 42.9 10/05/2018     10/05/2018    CREATSERUM 0.84 10/05/2018    BUN 16 1

## 2018-10-18 NOTE — OPERATIVE REPORT
[facility]    PATIENT'S NAME: Isai Albright   ATTENDING PHYSICIAN: Annelise Esparza MD   OPERATING PHYSICIAN: Zurdo Knight.  Faby Esparza MD   PATIENT ACCOUNT#:   [id number]    LOCATION:  [address] [place] [address] RECORD #:   [id number]       DATE OF BIRTH:  [birth year] was confirmed by the saline drop test, and low intraabdominal pressure with low-flow gas. The abdominal cavity was then insufflated with approximately 2.2 L of carbon dioxide gas, and the Veress needle was removed.   The incision was extended and a #5 troc this was cauterized with the LigaSure device. Next, the gas was allowed to escape the abdominal cavity. The instruments were removed keeping the ports in place, and the procedure continued vaginally.   A weighted speculum was placed in the posterior porti Debmarinaah Lasha Mckeon MD  d: 10/18/2018 10:52:34  t: 10/18/2018 12:26:16  Meir Kirkpatrick [id number]  [facility]/

## 2018-10-18 NOTE — ANESTHESIA PREPROCEDURE EVALUATION
Anesthesia PreOp Note    HPI:     Angelique Figueroa is a 44year old female who presents for preoperative consultation requested by: Terri Hamilton MD    Date of Surgery: 10/18/2018    Procedure(s):  LAPAROSCOPIC ASSISTED VAGINAL HYSTERECTOMY  Indication: Chata Hamilton 12/22/2017    Performed by Luzma Nowak MD at [address] [facility] MAIN OR   • LEG/ANKLE SURGERY [address] UNLISTED  2001    right leg arterial bypass / (fasciotomy)   • OTHER SURGICAL HISTORY  1995    SX at 27 y/o / valvuloplasty (congenital Pulmonic stenosis)         Medicat file    Social Needs      Financial resource strain: Not on file      Food insecurity - worry: Not on file      Food insecurity - inability: Not on file      Transportation needs - medical: Not on file      Transportation needs - non-medical: Not on file INR 2.1 (A) 10/05/2018       Vital Signs: Body mass index is 22.46 kg/m². height is 1.524 m (5') and weight is 52.2 kg (115 lb).     10/11/18  1138   Weight: 52.2 kg (115 lb)   Height: 1.524 m (5')        Anesthesia ROS/Med Hx and Physical Exam     Pa

## 2018-10-18 NOTE — BRIEF OP NOTE
Pre-Operative Diagnosis: Abnormal uterine bleeding     Post-Operative Diagnosis: Abnormal uterine bleeding      Procedure Performed:   Procedure(s):  Laparoscopic assisted vaginal hysterectomy, bilateral salpingectomy, lysis of adhesions    Surgeon(s) and

## 2018-10-18 NOTE — ANESTHESIA PROCEDURE NOTES
ANESTHESIA INTUBATION  Date/Time: 10/18/2018 7:42 AM  Urgency: elective    Airway not difficult    General Information and Staff    Patient location during procedure: OR  Anesthesiologist: Laura Bernheim, MD  Resident/CRNA: LATISHA Remy

## 2018-10-19 ENCOUNTER — APPOINTMENT (OUTPATIENT)
Dept: ULTRASOUND IMAGING | Facility: HOSPITAL | Age: 43
End: 2018-10-19
Attending: OBSTETRICS & GYNECOLOGY
Payer: COMMERCIAL

## 2018-10-19 VITALS
OXYGEN SATURATION: 98 % | WEIGHT: 118 LBS | SYSTOLIC BLOOD PRESSURE: 95 MMHG | DIASTOLIC BLOOD PRESSURE: 59 MMHG | BODY MASS INDEX: 23.79 KG/M2 | HEART RATE: 71 BPM | TEMPERATURE: 98 F | HEIGHT: 59 IN | RESPIRATION RATE: 18 BRPM

## 2018-10-19 PROCEDURE — 76705 ECHO EXAM OF ABDOMEN: CPT | Performed by: OBSTETRICS & GYNECOLOGY

## 2018-10-19 RX ORDER — PSEUDOEPHEDRINE HCL 30 MG
100 TABLET ORAL 2 TIMES DAILY
Qty: 60 CAPSULE | Refills: 0 | Status: SHIPPED | OUTPATIENT
Start: 2018-10-19 | End: 2019-12-10 | Stop reason: ALTCHOICE

## 2018-10-19 RX ORDER — HYDROCODONE BITARTRATE AND ACETAMINOPHEN 5; 325 MG/1; MG/1
1 TABLET ORAL EVERY 4 HOURS PRN
Qty: 20 TABLET | Refills: 0 | Status: ON HOLD | OUTPATIENT
Start: 2018-10-19 | End: 2018-10-28

## 2018-10-19 RX ORDER — SIMETHICONE 80 MG
40 TABLET,CHEWABLE ORAL
Status: DISCONTINUED | OUTPATIENT
Start: 2018-10-19 | End: 2018-10-19

## 2018-10-19 RX ORDER — SIMETHICONE 80 MG
80 TABLET,CHEWABLE ORAL
Status: DISCONTINUED | OUTPATIENT
Start: 2018-10-19 | End: 2018-10-19

## 2018-10-19 NOTE — PROGRESS NOTES
[facility]    OB/GYNE Progress Note      Nathanael Dumont Patient Status:  Outpatient in a Bed    1975 MRN [id number]   Location [facility] 4W/SW/SE Attending Ofe Watson MD   Hosp Day # 0 PCP Hawa Ott MD quad and Incision appearance: clean, dry and intact     Incision/Wound:  clean, dry and intact  Abd:  Tender RUQ, mild tenderness LUQ;  Lower quads non tender. Non distended.   tympanic    Cervical Papanicolaou: done within 1 year of admission    Results:

## 2018-10-19 NOTE — PLAN OF CARE
Problem: Patient Centered Care  Goal: Patient preferences are identified and integrated in the patient's plan of care  Interventions:  - What would you like us to know as we care for you?  I had complications with my other surgeries  - Provide timely, compl afebrile, on RA. IVF infusing. Norco Q6 prn to cover pain. Lovenox given. Peripad in place. Blanc to be pulled at 08am, per MDs verbal instructions. Plan is pt needs to void 2 X on own and tolerate diet to be DC'd.  Safety plan in place, calling appropriat

## 2018-10-20 NOTE — PROGRESS NOTES
[facility]    Progress Note    Jus Harman Patient Status:  Outpatient in a Bed    1975 MRN [id number]   Location [facility] 4W/SW/SE Attending Bernardino Henry MD   Hosp Day # 0 PCP MD Padmaja Segura 0.3 10/19/2018    TP 6.2 10/19/2018    AST 25 10/19/2018    ALT 19 10/19/2018    PTT 31.8 10/18/2018    INR 1.2 10/18/2018    PT 23.2 (H) 04/01/2016    TSH 1.01 11/10/2016                   Sergio Larson MD  10/19/2018

## 2018-10-23 ENCOUNTER — ANTI-COAG VISIT (OUTPATIENT)
Dept: INTERNAL MEDICINE CLINIC | Facility: CLINIC | Age: 43
End: 2018-10-23

## 2018-10-23 DIAGNOSIS — I73.9 PVD (PERIPHERAL VASCULAR DISEASE) (HCC): ICD-10-CM

## 2018-10-23 DIAGNOSIS — I73.9 PERIPHERAL VASCULAR DISEASE, UNSPECIFIED (HCC): ICD-10-CM

## 2018-10-24 ENCOUNTER — TELEPHONE (OUTPATIENT)
Dept: INTERNAL MEDICINE CLINIC | Facility: CLINIC | Age: 43
End: 2018-10-24

## 2018-10-24 ENCOUNTER — ANTI-COAG VISIT (OUTPATIENT)
Dept: INTERNAL MEDICINE CLINIC | Facility: CLINIC | Age: 43
End: 2018-10-24

## 2018-10-24 DIAGNOSIS — I73.9 PERIPHERAL VASCULAR DISEASE, UNSPECIFIED (HCC): ICD-10-CM

## 2018-10-24 DIAGNOSIS — I73.9 PVD (PERIPHERAL VASCULAR DISEASE) (HCC): ICD-10-CM

## 2018-10-24 PROCEDURE — 93793 ANTICOAG MGMT PT WARFARIN: CPT | Performed by: INTERNAL MEDICINE

## 2018-10-25 ENCOUNTER — ANTI-COAG VISIT (OUTPATIENT)
Dept: INTERNAL MEDICINE CLINIC | Facility: CLINIC | Age: 43
End: 2018-10-25

## 2018-10-25 DIAGNOSIS — I73.9 PERIPHERAL VASCULAR DISEASE, UNSPECIFIED (HCC): ICD-10-CM

## 2018-10-25 DIAGNOSIS — I73.9 PVD (PERIPHERAL VASCULAR DISEASE) (HCC): ICD-10-CM

## 2018-10-27 ENCOUNTER — ANESTHESIA EVENT (OUTPATIENT)
Dept: SURGERY | Facility: HOSPITAL | Age: 43
End: 2018-10-27
Payer: COMMERCIAL

## 2018-10-27 ENCOUNTER — HOSPITAL ENCOUNTER (OUTPATIENT)
Facility: HOSPITAL | Age: 43
Setting detail: OBSERVATION
Discharge: HOME | End: 2018-10-28
Attending: EMERGENCY MEDICINE | Admitting: OBSTETRICS & GYNECOLOGY
Payer: COMMERCIAL

## 2018-10-27 ENCOUNTER — ANESTHESIA (OUTPATIENT)
Dept: SURGERY | Facility: HOSPITAL | Age: 43
End: 2018-10-27
Payer: COMMERCIAL

## 2018-10-27 DIAGNOSIS — N99.820 POSTOPERATIVE VAGINAL BLEEDING FOLLOWING GENITOURINARY PROCEDURE: Primary | ICD-10-CM

## 2018-10-27 PROCEDURE — 96374 THER/PROPH/DIAG INJ IV PUSH: CPT

## 2018-10-27 PROCEDURE — 86850 RBC ANTIBODY SCREEN: CPT | Performed by: EMERGENCY MEDICINE

## 2018-10-27 PROCEDURE — 86900 BLOOD TYPING SEROLOGIC ABO: CPT | Performed by: EMERGENCY MEDICINE

## 2018-10-27 PROCEDURE — 99285 EMERGENCY DEPT VISIT HI MDM: CPT

## 2018-10-27 PROCEDURE — 86901 BLOOD TYPING SEROLOGIC RH(D): CPT | Performed by: EMERGENCY MEDICINE

## 2018-10-27 PROCEDURE — 85730 THROMBOPLASTIN TIME PARTIAL: CPT | Performed by: EMERGENCY MEDICINE

## 2018-10-27 PROCEDURE — 85610 PROTHROMBIN TIME: CPT | Performed by: EMERGENCY MEDICINE

## 2018-10-27 PROCEDURE — 85025 COMPLETE CBC W/AUTO DIFF WBC: CPT | Performed by: EMERGENCY MEDICINE

## 2018-10-27 PROCEDURE — 0UQG7ZZ REPAIR VAGINA, VIA NATURAL OR ARTIFICIAL OPENING: ICD-10-PCS | Performed by: OBSTETRICS & GYNECOLOGY

## 2018-10-27 RX ORDER — ACETAMINOPHEN 500 MG
500 TABLET ORAL EVERY 6 HOURS PRN
Status: CANCELLED | OUTPATIENT
Start: 2018-10-27

## 2018-10-27 RX ORDER — ONDANSETRON 2 MG/ML
4 INJECTION INTRAMUSCULAR; INTRAVENOUS ONCE AS NEEDED
Status: DISCONTINUED | OUTPATIENT
Start: 2018-10-27 | End: 2018-10-27 | Stop reason: HOSPADM

## 2018-10-27 RX ORDER — LIDOCAINE HYDROCHLORIDE 10 MG/ML
INJECTION, SOLUTION EPIDURAL; INFILTRATION; INTRACAUDAL; PERINEURAL AS NEEDED
Status: DISCONTINUED | OUTPATIENT
Start: 2018-10-27 | End: 2018-10-27 | Stop reason: SURG

## 2018-10-27 RX ORDER — DEXAMETHASONE SODIUM PHOSPHATE 4 MG/ML
VIAL (ML) INJECTION AS NEEDED
Status: DISCONTINUED | OUTPATIENT
Start: 2018-10-27 | End: 2018-10-27 | Stop reason: SURG

## 2018-10-27 RX ORDER — PSEUDOEPHEDRINE HCL 30 MG
100 TABLET ORAL 2 TIMES DAILY
Status: CANCELLED | OUTPATIENT
Start: 2018-10-27

## 2018-10-27 RX ORDER — CEFAZOLIN SODIUM/WATER 2 G/20 ML
2 SYRINGE (ML) INTRAVENOUS ONCE
Status: COMPLETED | OUTPATIENT
Start: 2018-10-27 | End: 2018-10-27

## 2018-10-27 RX ORDER — DEXTROSE, SODIUM CHLORIDE, SODIUM LACTATE, POTASSIUM CHLORIDE, AND CALCIUM CHLORIDE 5; .6; .31; .03; .02 G/100ML; G/100ML; G/100ML; G/100ML; G/100ML
INJECTION, SOLUTION INTRAVENOUS CONTINUOUS
Status: DISCONTINUED | OUTPATIENT
Start: 2018-10-28 | End: 2018-10-28

## 2018-10-27 RX ORDER — HYDROCODONE BITARTRATE AND ACETAMINOPHEN 5; 325 MG/1; MG/1
1 TABLET ORAL EVERY 4 HOURS PRN
Status: DISCONTINUED | OUTPATIENT
Start: 2018-10-27 | End: 2018-10-28

## 2018-10-27 RX ORDER — HALOPERIDOL 5 MG/ML
0.25 INJECTION INTRAMUSCULAR ONCE AS NEEDED
Status: DISCONTINUED | OUTPATIENT
Start: 2018-10-27 | End: 2018-10-27 | Stop reason: HOSPADM

## 2018-10-27 RX ORDER — SODIUM CHLORIDE 0.9 % (FLUSH) 0.9 %
10 SYRINGE (ML) INJECTION AS NEEDED
Status: DISCONTINUED | OUTPATIENT
Start: 2018-10-27 | End: 2018-10-28

## 2018-10-27 RX ORDER — DEXTROSE, SODIUM CHLORIDE, SODIUM LACTATE, POTASSIUM CHLORIDE, AND CALCIUM CHLORIDE 5; .6; .31; .03; .02 G/100ML; G/100ML; G/100ML; G/100ML; G/100ML
INJECTION, SOLUTION INTRAVENOUS CONTINUOUS
Status: CANCELLED | OUTPATIENT
Start: 2018-10-27

## 2018-10-27 RX ORDER — METOCLOPRAMIDE HYDROCHLORIDE 5 MG/ML
10 INJECTION INTRAMUSCULAR; INTRAVENOUS EVERY 8 HOURS PRN
Status: DISCONTINUED | OUTPATIENT
Start: 2018-10-27 | End: 2018-10-28

## 2018-10-27 RX ORDER — ONDANSETRON 2 MG/ML
INJECTION INTRAMUSCULAR; INTRAVENOUS AS NEEDED
Status: DISCONTINUED | OUTPATIENT
Start: 2018-10-27 | End: 2018-10-27 | Stop reason: SURG

## 2018-10-27 RX ORDER — HYDROCODONE BITARTRATE AND ACETAMINOPHEN 5; 325 MG/1; MG/1
2 TABLET ORAL AS NEEDED
Status: DISCONTINUED | OUTPATIENT
Start: 2018-10-27 | End: 2018-10-27 | Stop reason: HOSPADM

## 2018-10-27 RX ORDER — MORPHINE SULFATE 10 MG/ML
6 INJECTION, SOLUTION INTRAMUSCULAR; INTRAVENOUS EVERY 10 MIN PRN
Status: DISCONTINUED | OUTPATIENT
Start: 2018-10-27 | End: 2018-10-27 | Stop reason: HOSPADM

## 2018-10-27 RX ORDER — DOCUSATE SODIUM 100 MG/1
100 CAPSULE, LIQUID FILLED ORAL 2 TIMES DAILY
Status: DISCONTINUED | OUTPATIENT
Start: 2018-10-28 | End: 2018-10-28

## 2018-10-27 RX ORDER — HYDROCODONE BITARTRATE AND ACETAMINOPHEN 5; 325 MG/1; MG/1
1 TABLET ORAL AS NEEDED
Status: DISCONTINUED | OUTPATIENT
Start: 2018-10-27 | End: 2018-10-27 | Stop reason: HOSPADM

## 2018-10-27 RX ORDER — SODIUM CHLORIDE 9 MG/ML
INJECTION, SOLUTION INTRAVENOUS CONTINUOUS
Status: DISCONTINUED | OUTPATIENT
Start: 2018-10-27 | End: 2018-10-27

## 2018-10-27 RX ORDER — HYDROCODONE BITARTRATE AND ACETAMINOPHEN 5; 325 MG/1; MG/1
2 TABLET ORAL EVERY 4 HOURS PRN
Status: DISCONTINUED | OUTPATIENT
Start: 2018-10-27 | End: 2018-10-28

## 2018-10-27 RX ORDER — SODIUM CHLORIDE, SODIUM LACTATE, POTASSIUM CHLORIDE, CALCIUM CHLORIDE 600; 310; 30; 20 MG/100ML; MG/100ML; MG/100ML; MG/100ML
INJECTION, SOLUTION INTRAVENOUS CONTINUOUS PRN
Status: DISCONTINUED | OUTPATIENT
Start: 2018-10-27 | End: 2018-10-27 | Stop reason: SURG

## 2018-10-27 RX ORDER — SODIUM CHLORIDE 0.9 % (FLUSH) 0.9 %
10 SYRINGE (ML) INJECTION AS NEEDED
Status: CANCELLED | OUTPATIENT
Start: 2018-10-27

## 2018-10-27 RX ORDER — NALOXONE HYDROCHLORIDE 0.4 MG/ML
80 INJECTION, SOLUTION INTRAMUSCULAR; INTRAVENOUS; SUBCUTANEOUS AS NEEDED
Status: DISCONTINUED | OUTPATIENT
Start: 2018-10-27 | End: 2018-10-27 | Stop reason: HOSPADM

## 2018-10-27 RX ORDER — SODIUM CHLORIDE, SODIUM LACTATE, POTASSIUM CHLORIDE, CALCIUM CHLORIDE 600; 310; 30; 20 MG/100ML; MG/100ML; MG/100ML; MG/100ML
INJECTION, SOLUTION INTRAVENOUS CONTINUOUS
Status: DISCONTINUED | OUTPATIENT
Start: 2018-10-27 | End: 2018-10-27 | Stop reason: HOSPADM

## 2018-10-27 RX ORDER — ACETAMINOPHEN 325 MG/1
650 TABLET ORAL EVERY 4 HOURS PRN
Status: DISCONTINUED | OUTPATIENT
Start: 2018-10-27 | End: 2018-10-28

## 2018-10-27 RX ORDER — MORPHINE SULFATE 4 MG/ML
2 INJECTION, SOLUTION INTRAMUSCULAR; INTRAVENOUS EVERY 10 MIN PRN
Status: DISCONTINUED | OUTPATIENT
Start: 2018-10-27 | End: 2018-10-27 | Stop reason: HOSPADM

## 2018-10-27 RX ORDER — HYDROCODONE BITARTRATE AND ACETAMINOPHEN 5; 325 MG/1; MG/1
1 TABLET ORAL EVERY 4 HOURS PRN
Status: CANCELLED | OUTPATIENT
Start: 2018-10-27

## 2018-10-27 RX ORDER — MORPHINE SULFATE 4 MG/ML
4 INJECTION, SOLUTION INTRAMUSCULAR; INTRAVENOUS EVERY 10 MIN PRN
Status: DISCONTINUED | OUTPATIENT
Start: 2018-10-27 | End: 2018-10-27 | Stop reason: HOSPADM

## 2018-10-27 RX ADMIN — SODIUM CHLORIDE, SODIUM LACTATE, POTASSIUM CHLORIDE, CALCIUM CHLORIDE: 600; 310; 30; 20 INJECTION, SOLUTION INTRAVENOUS at 20:36:00

## 2018-10-27 RX ADMIN — SODIUM CHLORIDE, SODIUM LACTATE, POTASSIUM CHLORIDE, CALCIUM CHLORIDE: 600; 310; 30; 20 INJECTION, SOLUTION INTRAVENOUS at 21:20:00

## 2018-10-27 RX ADMIN — LIDOCAINE HYDROCHLORIDE 25 MG: 10 INJECTION, SOLUTION EPIDURAL; INFILTRATION; INTRACAUDAL; PERINEURAL at 20:41:00

## 2018-10-27 RX ADMIN — DEXAMETHASONE SODIUM PHOSPHATE 4 MG: 4 MG/ML VIAL (ML) INJECTION at 20:47:00

## 2018-10-27 RX ADMIN — ONDANSETRON 4 MG: 2 INJECTION INTRAMUSCULAR; INTRAVENOUS at 20:45:00

## 2018-10-27 NOTE — ED NOTES
Pt presents today with vaginal bleeding. Pt states she had vaginal partial hysterectomy on 10/18. Pt states she is on both Lovenox and Warfarin for hx of arterial blood clots and bypass graft in her right leg.  Pt states she called her doctor when the bleed

## 2018-10-27 NOTE — ED INITIAL ASSESSMENT (HPI)
Partial hysterectomy on 10/18. Pt states she began having vaginal bleeding today, denies any pain. States she has passed large clots today. Told by GYN to go to ER.

## 2018-10-27 NOTE — ED PROVIDER NOTES
Patient Seen in: [facility] AND [facility] Emergency Department    History   Patient presents with:  Eval-G (gynecologic)    Stated Complaint: vaginal bleeding    HPI    The patient is a 28-year-old female status post laparoscopic partial hysterectomy on 10/18 w at 27 y/o / valvuloplasty (congenital Pulmonic stenosis)           Social History    Tobacco Use      Smoking status: Never Smoker      Smokeless tobacco: Never Used    Alcohol use: No      Alcohol/week: 0.0 oz      Frequency: Never      Comment: rare    D All other components within normal limits   PTT, ACTIVATED - Abnormal; Notable for the following components:    PTT 41.1 (*)     All other components within normal limits   CBC W/ DIFFERENTIAL - Abnormal; Notable for the following components:    MCH 32.5 ( normal    Admission disposition: 10/27/2018  6:47 PM                 Disposition and Plan     Clinical Impression:  Postoperative vaginal bleeding following genitourinary procedure  (primary encounter diagnosis)    Disposition:  Admit  10/27/2018  6:47 pm

## 2018-10-28 VITALS
OXYGEN SATURATION: 97 % | SYSTOLIC BLOOD PRESSURE: 95 MMHG | DIASTOLIC BLOOD PRESSURE: 48 MMHG | TEMPERATURE: 98 F | RESPIRATION RATE: 16 BRPM | WEIGHT: 122.19 LBS | HEART RATE: 78 BPM | HEIGHT: 59 IN | BODY MASS INDEX: 24.63 KG/M2

## 2018-10-28 PROCEDURE — A4216 STERILE WATER/SALINE, 10 ML: HCPCS | Performed by: OBSTETRICS & GYNECOLOGY

## 2018-10-28 NOTE — ANESTHESIA POSTPROCEDURE EVALUATION
Patient:  Bobby Limb    Procedure Summary     Date:  10/27/18 Room / Location:  [address] [place] [address] MAIN OR 02 / 300 [facility] MAIN OR    Anesthesia Start:  2036 Anesthesia Stop:  2130    Procedure:  VAGINAL HYSTERECTOMY WITH BILATERAL [address] (N/A ) Diagnosis:       Episode of

## 2018-10-28 NOTE — PROGRESS NOTES
POD#1 s/p exploration vag cuff and repair. PT w/o c/o. Pad dry. Pack removed, few areas blood stained. Will observe for bleeding next few hours.   If stable then home

## 2018-10-28 NOTE — PLAN OF CARE
HEMATOLOGIC - ADULT    • Maintains hematologic stability Adequate for Discharge    • Free from bleeding injury Adequate for Discharge        PAIN - ADULT    • Verbalizes/displays adequate comfort level or patient's stated pain goal Adequate for Discharge

## 2018-10-28 NOTE — DISCHARGE SUMMARY
[facility]    Discharge Summary    G[place] Cassidy Patient Status:  Observation    1975 MRN [id number]   Location [facility] 4W/SW/SE Attending Ann Marie Mahoney MD   Hosp Day # 0 PCP Rafaela Malone MD     Admit Date: 10/27/2

## 2018-10-28 NOTE — BRIEF OP NOTE
Pre-Operative Diagnosis: Episode of heavy vaginal bleeding [N93.9]     Post-Operative Diagnosis: bleeding from vaginal cuff     Procedure Performed:   Procedure(s):  EXAM UNDER ANESTHESIA, REPAIR OF VAGINAL CUFF    Surgeon(s) and Role:     Lissy Bentley,

## 2018-10-28 NOTE — H&P
[facility] [address] Patient Status:  Emergency    1975 MRN [id number]   Location [address] Attending Kristi Townsend MD   Hosp Day # 0 PCP Yoon Guidry MD     Date impairment     glasses       Past Surgical History:  Past Surgical History:   Procedure Laterality Date   • APPENDECTOMY     •      • CATH BARE METAL STENT (BMS)      3 stents right leg   • ENDOMETRIAL ABLATION     • ENDOMETRIAL ABLATION N 10/05/2018     10/05/2018    CO2 25 10/05/2018    GLU 89 10/05/2018    CA 9.7 10/05/2018    ALB 3.4 (L) 10/19/2018    ALKPHO 46 10/19/2018    BILT 0.3 10/19/2018    TP 6.2 10/19/2018    AST 25 10/19/2018    ALT 19 10/19/2018    PTT 41.1 (H) 10/27/201

## 2018-10-28 NOTE — ANESTHESIA PROCEDURE NOTES
ANESTHESIA INTUBATION  Urgency: elective      General Information and Staff    Patient location during procedure: OR  Anesthesiologist: Alida Somers MD  Performed: anesthesiologist     Indications and Patient Condition  Indications for airway managemen

## 2018-10-28 NOTE — OPERATIVE REPORT
[facility]    PATIENT'S NAME: Edwardo Shaw   ATTENDING PHYSICIAN: Annelise Dolan MD   OPERATING PHYSICIAN: Etienne Hathaway.  Shaniqua Dolan MD   PATIENT ACCOUNT#:   [id number]    LOCATION:  [address] [place] [address] RECORD #:   [id number]       DATE OF BIRTH:  [birth year] tolerated the procedure well and sent to recovery room in good condition. Dictated By Sarah Almeida MD  d: 10/27/2018 22:08:50  t: 10/28/2018 08:43:37  [facility] [id number]  [facility]/

## 2018-10-28 NOTE — PLAN OF CARE
HEMATOLOGIC - ADULT    • Maintains hematologic stability Progressing    • Free from bleeding injury Progressing        PAIN - ADULT    • Verbalizes/displays adequate comfort level or patient's stated pain goal Progressing        Patient Centered Care    •

## 2018-10-28 NOTE — ED NOTES
Dr. Shaniqua Dolan with this RN and patient for vaginal packing at this time. Pt tolerated procedure well.

## 2018-10-28 NOTE — ANESTHESIA PREPROCEDURE EVALUATION
Anesthesia PreOp Note    HPI:     Kaley Hauser is a 44year old female who presents for preoperative consultation requested by: Luis Diaz MD    Date of Surgery: 10/27/2018    Procedure(s):  VAGINAL HYSTERECTOMY WITH BILATERAL SALPINGOOPH  Indication: Ep Laterality Date   • APPENDECTOMY     •      • CATH BARE METAL STENT (BMS)      3 stents right leg   • ENDOMETRIAL ABLATION     • ENDOMETRIAL ABLATION N/A 2017    Performed by Jeremiah Bunn MD at [place] [address] lung   • Other (Other) Maternal Grandmother         Fibrocystic Breast Disease   • Breast Cancer Maternal Aunt      Social History    Socioeconomic History      Marital status:       Spouse name: Not on file      Number of children: Not on fi 10/27/2018     10/27/2018    MPV 7.1 (L) 10/27/2018    URINEPREG Negative 10/18/2018     Lab Results   Component Value Date     10/05/2018    K 3.8 10/05/2018     10/05/2018    CO2 25 10/05/2018    BUN 16 10/05/2018    CREATSERUM 0.84 10

## 2018-10-29 ENCOUNTER — ANTI-COAG VISIT (OUTPATIENT)
Dept: INTERNAL MEDICINE CLINIC | Facility: CLINIC | Age: 43
End: 2018-10-29

## 2018-10-29 DIAGNOSIS — I73.9 PVD (PERIPHERAL VASCULAR DISEASE) (HCC): ICD-10-CM

## 2018-10-29 DIAGNOSIS — I73.9 PERIPHERAL VASCULAR DISEASE, UNSPECIFIED (HCC): ICD-10-CM

## 2018-10-29 PROCEDURE — 93793 ANTICOAG MGMT PT WARFARIN: CPT | Performed by: INTERNAL MEDICINE

## 2018-11-01 ENCOUNTER — ANTI-COAG VISIT (OUTPATIENT)
Dept: INTERNAL MEDICINE CLINIC | Facility: CLINIC | Age: 43
End: 2018-11-01

## 2018-11-01 DIAGNOSIS — I73.9 PERIPHERAL VASCULAR DISEASE, UNSPECIFIED (HCC): ICD-10-CM

## 2018-11-01 DIAGNOSIS — I73.9 PVD (PERIPHERAL VASCULAR DISEASE) (HCC): ICD-10-CM

## 2018-11-01 PROCEDURE — 93793 ANTICOAG MGMT PT WARFARIN: CPT | Performed by: INTERNAL MEDICINE

## 2018-11-08 ENCOUNTER — TELEPHONE (OUTPATIENT)
Dept: INTERNAL MEDICINE CLINIC | Facility: CLINIC | Age: 43
End: 2018-11-08

## 2018-11-08 ENCOUNTER — LAB ENCOUNTER (OUTPATIENT)
Dept: LAB | Age: 43
End: 2018-11-08
Attending: INTERNAL MEDICINE
Payer: COMMERCIAL

## 2018-11-08 ENCOUNTER — ANTI-COAG VISIT (OUTPATIENT)
Dept: INTERNAL MEDICINE CLINIC | Facility: CLINIC | Age: 43
End: 2018-11-08

## 2018-11-08 ENCOUNTER — TELEPHONE (OUTPATIENT)
Dept: OTHER | Age: 43
End: 2018-11-08

## 2018-11-08 DIAGNOSIS — I73.9 PERIPHERAL VASCULAR DISEASE, UNSPECIFIED (HCC): ICD-10-CM

## 2018-11-08 DIAGNOSIS — I73.9 PVD (PERIPHERAL VASCULAR DISEASE) (HCC): ICD-10-CM

## 2018-11-08 PROCEDURE — 85610 PROTHROMBIN TIME: CPT

## 2018-11-08 PROCEDURE — 36415 COLL VENOUS BLD VENIPUNCTURE: CPT

## 2018-11-08 NOTE — TELEPHONE ENCOUNTER
Addendum: patient has standing order for Pt/INR. Patient contacted and informed. Message will be routed to [facility]. Please reach out to patient and advise further.

## 2018-11-08 NOTE — TELEPHONE ENCOUNTER
Pt has a standing order for PT/INR in place. Orders need to be placed under Dr. Glenny Padilla.   Thanks [facility]

## 2018-11-08 NOTE — TELEPHONE ENCOUNTER
Dr. Megan Suarez for Arabella Connorslander:  Please advise if standing order can be done for PT/INR. Patient will need INR for accuracy. Patient uses [employer] [address] monitoring service and checks her INR at home. Patient's TEST STRIPS SHE HAS ARE RECALLED.     Last testing d

## 2018-11-09 ENCOUNTER — ANTI-COAG VISIT (OUTPATIENT)
Dept: INTERNAL MEDICINE CLINIC | Facility: CLINIC | Age: 43
End: 2018-11-09

## 2018-11-09 DIAGNOSIS — I73.9 PERIPHERAL VASCULAR DISEASE, UNSPECIFIED (HCC): ICD-10-CM

## 2018-11-09 DIAGNOSIS — I73.9 PVD (PERIPHERAL VASCULAR DISEASE) (HCC): ICD-10-CM

## 2018-11-09 PROCEDURE — 93793 ANTICOAG MGMT PT WARFARIN: CPT | Performed by: INTERNAL MEDICINE

## 2018-11-15 ENCOUNTER — LAB ENCOUNTER (OUTPATIENT)
Dept: LAB | Age: 43
End: 2018-11-15
Attending: INTERNAL MEDICINE
Payer: COMMERCIAL

## 2018-11-15 DIAGNOSIS — I73.9 PERIPHERAL VASCULAR DISEASE, UNSPECIFIED (HCC): ICD-10-CM

## 2018-11-15 PROCEDURE — 85610 PROTHROMBIN TIME: CPT

## 2018-11-15 PROCEDURE — 36415 COLL VENOUS BLD VENIPUNCTURE: CPT

## 2018-11-16 ENCOUNTER — ANTI-COAG VISIT (OUTPATIENT)
Dept: INTERNAL MEDICINE CLINIC | Facility: CLINIC | Age: 43
End: 2018-11-16

## 2018-11-16 DIAGNOSIS — I73.9 PERIPHERAL VASCULAR DISEASE, UNSPECIFIED (HCC): ICD-10-CM

## 2018-11-16 DIAGNOSIS — I73.9 PVD (PERIPHERAL VASCULAR DISEASE) (HCC): ICD-10-CM

## 2018-11-16 PROCEDURE — 93793 ANTICOAG MGMT PT WARFARIN: CPT | Performed by: INTERNAL MEDICINE

## 2018-11-26 ENCOUNTER — ANTI-COAG VISIT (OUTPATIENT)
Dept: INTERNAL MEDICINE CLINIC | Facility: CLINIC | Age: 43
End: 2018-11-26

## 2018-11-26 DIAGNOSIS — I73.9 PERIPHERAL VASCULAR DISEASE, UNSPECIFIED (HCC): ICD-10-CM

## 2018-11-26 DIAGNOSIS — I73.9 PVD (PERIPHERAL VASCULAR DISEASE) (HCC): ICD-10-CM

## 2018-11-26 PROCEDURE — 93793 ANTICOAG MGMT PT WARFARIN: CPT | Performed by: INTERNAL MEDICINE

## 2018-12-07 ENCOUNTER — ANTI-COAG VISIT (OUTPATIENT)
Dept: INTERNAL MEDICINE CLINIC | Facility: CLINIC | Age: 43
End: 2018-12-07

## 2018-12-07 DIAGNOSIS — I73.9 PERIPHERAL VASCULAR DISEASE, UNSPECIFIED (HCC): ICD-10-CM

## 2018-12-07 DIAGNOSIS — I73.9 PVD (PERIPHERAL VASCULAR DISEASE) (HCC): ICD-10-CM

## 2018-12-07 PROCEDURE — 93793 ANTICOAG MGMT PT WARFARIN: CPT | Performed by: INTERNAL MEDICINE

## 2018-12-21 ENCOUNTER — ANTI-COAG VISIT (OUTPATIENT)
Dept: INTERNAL MEDICINE CLINIC | Facility: CLINIC | Age: 43
End: 2018-12-21

## 2018-12-21 DIAGNOSIS — I73.9 PVD (PERIPHERAL VASCULAR DISEASE) (HCC): ICD-10-CM

## 2018-12-21 DIAGNOSIS — I73.9 PERIPHERAL VASCULAR DISEASE, UNSPECIFIED (HCC): ICD-10-CM

## 2018-12-21 PROCEDURE — 93793 ANTICOAG MGMT PT WARFARIN: CPT | Performed by: INTERNAL MEDICINE

## 2018-12-28 ENCOUNTER — ANTI-COAG VISIT (OUTPATIENT)
Dept: INTERNAL MEDICINE CLINIC | Facility: CLINIC | Age: 43
End: 2018-12-28

## 2018-12-28 ENCOUNTER — OFFICE VISIT (OUTPATIENT)
Dept: INTERNAL MEDICINE CLINIC | Facility: CLINIC | Age: 43
End: 2018-12-28

## 2018-12-28 ENCOUNTER — NURSE TRIAGE (OUTPATIENT)
Dept: OTHER | Age: 43
End: 2018-12-28

## 2018-12-28 VITALS
DIASTOLIC BLOOD PRESSURE: 77 MMHG | HEIGHT: 59 IN | TEMPERATURE: 99 F | WEIGHT: 118 LBS | RESPIRATION RATE: 12 BRPM | SYSTOLIC BLOOD PRESSURE: 100 MMHG | BODY MASS INDEX: 23.79 KG/M2 | HEART RATE: 69 BPM

## 2018-12-28 DIAGNOSIS — I73.9 PVD (PERIPHERAL VASCULAR DISEASE) (HCC): ICD-10-CM

## 2018-12-28 DIAGNOSIS — I73.9 PERIPHERAL VASCULAR DISEASE, UNSPECIFIED (HCC): ICD-10-CM

## 2018-12-28 DIAGNOSIS — R30.0 DYSURIA: Primary | ICD-10-CM

## 2018-12-28 DIAGNOSIS — M79.89 RIGHT LEG SWELLING: ICD-10-CM

## 2018-12-28 PROCEDURE — 99214 OFFICE O/P EST MOD 30 MIN: CPT | Performed by: INTERNAL MEDICINE

## 2018-12-28 PROCEDURE — 81003 URINALYSIS AUTO W/O SCOPE: CPT | Performed by: INTERNAL MEDICINE

## 2018-12-28 PROCEDURE — 93793 ANTICOAG MGMT PT WARFARIN: CPT | Performed by: INTERNAL MEDICINE

## 2018-12-28 PROCEDURE — 99212 OFFICE O/P EST SF 10 MIN: CPT | Performed by: INTERNAL MEDICINE

## 2018-12-28 NOTE — PROGRESS NOTES
HPI:    Patient ID: Awilda Velasquez is a 44year old female. Dysuria    This is a new problem. The current episode started in the past 7 days (4 to 5 days). The problem occurs intermittently. The problem has been waxing and waning.  The quality of the pain i Pulmonary/Chest: Effort normal and breath sounds normal. No respiratory distress. She has no wheezes. She has no rales. Abdominal: Soft. Bowel sounds are normal. She exhibits no distension and no mass. There is no tenderness.  There is no rebound and no

## 2018-12-28 NOTE — TELEPHONE ENCOUNTER
Action Requested: Summary for Provider     []  Critical Lab, Recommendations Needed  [] Need Additional Advice  []   FYI    []   Need Orders  [] Need Medications Sent to Pharmacy  []  Other     SUMMARY: sent to [facility] as no access w/ PCP  Onset 4 days ago o

## 2018-12-28 NOTE — TELEPHONE ENCOUNTER
Dr. Eddie Siegel, [facility] pt put on scheduled for today 12/28/18 at 1030 as you requested.

## 2018-12-30 ENCOUNTER — TELEPHONE (OUTPATIENT)
Dept: INTERNAL MEDICINE CLINIC | Facility: CLINIC | Age: 43
End: 2018-12-30

## 2018-12-30 RX ORDER — NITROFURANTOIN 25; 75 MG/1; MG/1
100 CAPSULE ORAL 2 TIMES DAILY
Qty: 14 CAPSULE | Refills: 0 | Status: SHIPPED | OUTPATIENT
Start: 2018-12-30 | End: 2019-03-19 | Stop reason: ALTCHOICE

## 2018-12-31 ENCOUNTER — TELEPHONE (OUTPATIENT)
Dept: INTERNAL MEDICINE CLINIC | Facility: CLINIC | Age: 43
End: 2018-12-31

## 2018-12-31 RX ORDER — CEPHALEXIN 500 MG/1
500 CAPSULE ORAL 4 TIMES DAILY
Qty: 28 CAPSULE | Refills: 0 | Status: SHIPPED | OUTPATIENT
Start: 2018-12-31 | End: 2019-03-19 | Stop reason: ALTCHOICE

## 2018-12-31 NOTE — TELEPHONE ENCOUNTER
Pt stts she started antibiotics yesterday,   Will be doing PT/INR Thursday unless it is needed sooner.

## 2019-01-02 ENCOUNTER — HOSPITAL ENCOUNTER (OUTPATIENT)
Dept: MAMMOGRAPHY | Age: 44
Discharge: HOME | End: 2019-01-02
Attending: OBSTETRICS & GYNECOLOGY
Payer: COMMERCIAL

## 2019-01-02 DIAGNOSIS — Z12.31 ENCOUNTER FOR SCREENING MAMMOGRAM FOR MALIGNANT NEOPLASM OF BREAST: ICD-10-CM

## 2019-01-02 PROCEDURE — 77067 SCR MAMMO BI INCL CAD: CPT | Performed by: OBSTETRICS & GYNECOLOGY

## 2019-01-02 PROCEDURE — 77063 BREAST TOMOSYNTHESIS BI: CPT | Performed by: OBSTETRICS & GYNECOLOGY

## 2019-01-02 NOTE — TELEPHONE ENCOUNTER
Message # [id number]         2018 01:31p   [[email or web address]]  To:  From:  CAROLINE Ruiz MD:  Phone#:  ----------------------------------------------------------------------  Amy Velazquez  [phone]   77  RE  HAS A FEVER   Paged at number :  PAG

## 2019-01-02 NOTE — TELEPHONE ENCOUNTER
Already addressed on same day; pt was switched to keflex and stop macrodantin; pt told to call back if fever persist.

## 2019-01-03 ENCOUNTER — TELEPHONE (OUTPATIENT)
Dept: OTHER | Age: 44
End: 2019-01-03

## 2019-01-03 ENCOUNTER — ANTI-COAG VISIT (OUTPATIENT)
Dept: INTERNAL MEDICINE CLINIC | Facility: CLINIC | Age: 44
End: 2019-01-03

## 2019-01-03 DIAGNOSIS — I73.9 PERIPHERAL VASCULAR DISEASE, UNSPECIFIED (HCC): ICD-10-CM

## 2019-01-03 DIAGNOSIS — I73.9 PVD (PERIPHERAL VASCULAR DISEASE) (HCC): ICD-10-CM

## 2019-01-03 LAB — INR: 2.5 (ref 2–3)

## 2019-01-03 PROCEDURE — 93793 ANTICOAG MGMT PT WARFARIN: CPT | Performed by: INTERNAL MEDICINE

## 2019-01-03 NOTE — TELEPHONE ENCOUNTER
Her fever appears to be resolving since max temp over the weekend was 102F; and now down to <100 F; however would want her to come back in clinic for reeval since she said she is coughing. Have her come in tomorrow to see me.

## 2019-01-03 NOTE — TELEPHONE ENCOUNTER
Patient calling with an update per Dr. Iris Santos request.  She wants to know if every thing is ok? The e patient stated she continues to have a fever. Today was 99.5 (tympanic) and 99.7 (temporal) and continues to be fatigued with the chills.       Has

## 2019-01-04 ENCOUNTER — OFFICE VISIT (OUTPATIENT)
Dept: INTERNAL MEDICINE CLINIC | Facility: CLINIC | Age: 44
End: 2019-01-04

## 2019-01-04 VITALS
BODY MASS INDEX: 23.16 KG/M2 | WEIGHT: 118 LBS | SYSTOLIC BLOOD PRESSURE: 113 MMHG | HEIGHT: 60 IN | TEMPERATURE: 98 F | RESPIRATION RATE: 12 BRPM | DIASTOLIC BLOOD PRESSURE: 75 MMHG

## 2019-01-04 DIAGNOSIS — N39.0 URINARY TRACT INFECTION WITHOUT HEMATURIA, SITE UNSPECIFIED: Primary | ICD-10-CM

## 2019-01-04 PROCEDURE — 99212 OFFICE O/P EST SF 10 MIN: CPT | Performed by: INTERNAL MEDICINE

## 2019-01-04 PROCEDURE — 99214 OFFICE O/P EST MOD 30 MIN: CPT | Performed by: INTERNAL MEDICINE

## 2019-01-04 NOTE — PROGRESS NOTES
HPI:    Patient ID: Easton Gonzalez is a 44year old female. Fever    This is a new problem. The current episode started in the past 7 days. The problem occurs intermittently.  The problem has been gradually improving (last time she had fever of 100 F was 5 HENT:   Head: Normocephalic and atraumatic. Right Ear: External ear normal.   Left Ear: External ear normal.   Nose: Nose normal.   Mouth/Throat: Oropharynx is clear and moist. No oropharyngeal exudate.    Eyes: Conjunctivae and EOM are normal. Pupils a

## 2019-01-07 ENCOUNTER — ANTI-COAG VISIT (OUTPATIENT)
Dept: INTERNAL MEDICINE CLINIC | Facility: CLINIC | Age: 44
End: 2019-01-07

## 2019-01-07 DIAGNOSIS — I73.9 PERIPHERAL VASCULAR DISEASE, UNSPECIFIED (HCC): ICD-10-CM

## 2019-01-07 DIAGNOSIS — I73.9 PVD (PERIPHERAL VASCULAR DISEASE) (HCC): ICD-10-CM

## 2019-01-07 LAB — INR: 3.1 (ref 2–3)

## 2019-01-07 PROCEDURE — 93793 ANTICOAG MGMT PT WARFARIN: CPT | Performed by: INTERNAL MEDICINE

## 2019-01-09 ENCOUNTER — MED REC SCAN ONLY (OUTPATIENT)
Dept: INTERNAL MEDICINE CLINIC | Facility: CLINIC | Age: 44
End: 2019-01-09

## 2019-01-10 LAB — INR: 2.9 (ref 2–3)

## 2019-01-11 ENCOUNTER — ANTI-COAG VISIT (OUTPATIENT)
Dept: INTERNAL MEDICINE CLINIC | Facility: CLINIC | Age: 44
End: 2019-01-11

## 2019-01-11 DIAGNOSIS — I73.9 PERIPHERAL VASCULAR DISEASE, UNSPECIFIED (HCC): ICD-10-CM

## 2019-01-11 DIAGNOSIS — I73.9 PVD (PERIPHERAL VASCULAR DISEASE) (HCC): ICD-10-CM

## 2019-01-11 PROCEDURE — 93793 ANTICOAG MGMT PT WARFARIN: CPT | Performed by: INTERNAL MEDICINE

## 2019-01-13 ENCOUNTER — HOSPITAL ENCOUNTER (OUTPATIENT)
Age: 44
Discharge: HOME | End: 2019-01-13
Attending: EMERGENCY MEDICINE
Payer: COMMERCIAL

## 2019-01-13 ENCOUNTER — APPOINTMENT (OUTPATIENT)
Dept: GENERAL RADIOLOGY | Age: 44
End: 2019-01-13
Attending: EMERGENCY MEDICINE
Payer: COMMERCIAL

## 2019-01-13 VITALS
BODY MASS INDEX: 23 KG/M2 | DIASTOLIC BLOOD PRESSURE: 65 MMHG | RESPIRATION RATE: 18 BRPM | TEMPERATURE: 99 F | OXYGEN SATURATION: 100 % | SYSTOLIC BLOOD PRESSURE: 104 MMHG | WEIGHT: 116.5 LBS | HEART RATE: 73 BPM

## 2019-01-13 DIAGNOSIS — S60.022A CONTUSION OF LEFT INDEX FINGER WITHOUT DAMAGE TO NAIL, INITIAL ENCOUNTER: Primary | ICD-10-CM

## 2019-01-13 PROCEDURE — 99213 OFFICE O/P EST LOW 20 MIN: CPT

## 2019-01-13 PROCEDURE — 99203 OFFICE O/P NEW LOW 30 MIN: CPT

## 2019-01-13 PROCEDURE — 73130 X-RAY EXAM OF HAND: CPT | Performed by: EMERGENCY MEDICINE

## 2019-01-13 NOTE — ED PROVIDER NOTES
Patient Seen in: [facility] Care In [address] [place] [address]    History   Patient presents with:  Upper Extremity Injury (musculoskeletal)    Stated Complaint: lt hand injury    HPI    Patient is a 42-year-old female complains of left hand pain after fa • OTHER SURGICAL HISTORY  1995    SX at 27 y/o / valvuloplasty (congenital Pulmonic stenosis)   • VAGINAL HYSTERECTOMY WITH BILATERAL [address] N/A 10/27/2018    Performed by Chris Hutton MD at [facility] MAIN OR       Family history reviewed and is not pert

## 2019-01-17 ENCOUNTER — HOSPITAL ENCOUNTER (OUTPATIENT)
Dept: ULTRASOUND IMAGING | Facility: HOSPITAL | Age: 44
Discharge: HOME | End: 2019-01-17
Attending: OBSTETRICS & GYNECOLOGY
Payer: COMMERCIAL

## 2019-01-17 ENCOUNTER — APPOINTMENT (OUTPATIENT)
Dept: LAB | Facility: HOSPITAL | Age: 44
End: 2019-01-17
Attending: OBSTETRICS & GYNECOLOGY
Payer: COMMERCIAL

## 2019-01-17 ENCOUNTER — HOSPITAL ENCOUNTER (OUTPATIENT)
Dept: MAMMOGRAPHY | Facility: HOSPITAL | Age: 44
Discharge: HOME | End: 2019-01-17
Attending: OBSTETRICS & GYNECOLOGY
Payer: COMMERCIAL

## 2019-01-17 DIAGNOSIS — R92.8 ABNORMAL MAMMOGRAM: ICD-10-CM

## 2019-01-17 DIAGNOSIS — N39.0 URINARY TRACT INFECTION WITHOUT HEMATURIA, SITE UNSPECIFIED: ICD-10-CM

## 2019-01-17 LAB
BILIRUB UR QL: NEGATIVE
CLARITY UR: CLEAR
COLOR UR: YELLOW
GLUCOSE UR-MCNC: NEGATIVE MG/DL
HGB UR QL STRIP.AUTO: NEGATIVE
KETONES UR-MCNC: NEGATIVE MG/DL
LEUKOCYTE ESTERASE UR QL STRIP.AUTO: NEGATIVE
NITRITE UR QL STRIP.AUTO: NEGATIVE
PH UR: 7 [PH] (ref 5–8)
PROT UR-MCNC: NEGATIVE MG/DL
SP GR UR STRIP: 1.01 (ref 1–1.03)
UROBILINOGEN UR STRIP-ACNC: <2
VIT C UR-MCNC: NEGATIVE MG/DL

## 2019-01-17 PROCEDURE — 77061 BREAST TOMOSYNTHESIS UNI: CPT | Performed by: OBSTETRICS & GYNECOLOGY

## 2019-01-17 PROCEDURE — 81003 URINALYSIS AUTO W/O SCOPE: CPT

## 2019-01-17 PROCEDURE — 77065 DX MAMMO INCL CAD UNI: CPT | Performed by: OBSTETRICS & GYNECOLOGY

## 2019-01-17 PROCEDURE — 76642 ULTRASOUND BREAST LIMITED: CPT | Performed by: OBSTETRICS & GYNECOLOGY

## 2019-01-20 NOTE — TELEPHONE ENCOUNTER
No Protocol on this med.      Requested Prescriptions     Pending Prescriptions Disp Refills   • Warfarin Sodium 1 MG Oral Tab [Pharmacy Med Name: WARFARIN SOD 1MG TABLETS (PINK)] 135 tablet 0     Sig: TAKE 1 AND 1/2 TABLETS BY MOUTH DAILY IN THE EVENING AS

## 2019-01-21 RX ORDER — WARFARIN SODIUM 1 MG/1
TABLET ORAL
Qty: 135 TABLET | Refills: 0 | Status: SHIPPED | OUTPATIENT
Start: 2019-01-21 | End: 2019-05-07

## 2019-01-24 ENCOUNTER — ANTI-COAG VISIT (OUTPATIENT)
Dept: INTERNAL MEDICINE CLINIC | Facility: CLINIC | Age: 44
End: 2019-01-24

## 2019-01-24 DIAGNOSIS — I73.9 PVD (PERIPHERAL VASCULAR DISEASE) (HCC): ICD-10-CM

## 2019-01-24 DIAGNOSIS — I73.9 PERIPHERAL VASCULAR DISEASE, UNSPECIFIED (HCC): ICD-10-CM

## 2019-01-24 LAB — INR: 3.5 (ref 2–3)

## 2019-02-07 ENCOUNTER — ANTI-COAG VISIT (OUTPATIENT)
Dept: INTERNAL MEDICINE CLINIC | Facility: CLINIC | Age: 44
End: 2019-02-07

## 2019-02-07 DIAGNOSIS — I73.9 PERIPHERAL VASCULAR DISEASE, UNSPECIFIED (HCC): ICD-10-CM

## 2019-02-07 DIAGNOSIS — I73.9 PVD (PERIPHERAL VASCULAR DISEASE) (HCC): ICD-10-CM

## 2019-02-07 LAB — INR: 2.8 (ref 2–3)

## 2019-02-07 PROCEDURE — 93793 ANTICOAG MGMT PT WARFARIN: CPT | Performed by: INTERNAL MEDICINE

## 2019-02-11 ENCOUNTER — MED REC SCAN ONLY (OUTPATIENT)
Dept: INTERNAL MEDICINE CLINIC | Facility: CLINIC | Age: 44
End: 2019-02-11

## 2019-02-15 ENCOUNTER — ANTI-COAG VISIT (OUTPATIENT)
Dept: INTERNAL MEDICINE CLINIC | Facility: CLINIC | Age: 44
End: 2019-02-15

## 2019-02-15 DIAGNOSIS — I73.9 PERIPHERAL VASCULAR DISEASE, UNSPECIFIED (HCC): ICD-10-CM

## 2019-02-15 DIAGNOSIS — I73.9 PVD (PERIPHERAL VASCULAR DISEASE) (HCC): ICD-10-CM

## 2019-02-15 LAB — INR: 2.6 (ref 2–3)

## 2019-02-15 PROCEDURE — 93793 ANTICOAG MGMT PT WARFARIN: CPT | Performed by: INTERNAL MEDICINE

## 2019-02-28 ENCOUNTER — ANTI-COAG VISIT (OUTPATIENT)
Dept: INTERNAL MEDICINE CLINIC | Facility: CLINIC | Age: 44
End: 2019-02-28

## 2019-02-28 VITALS
OXYGEN SATURATION: 98 % | WEIGHT: 113 LBS | HEIGHT: 62 IN | RESPIRATION RATE: 18 BRPM | HEART RATE: 83 BPM | DIASTOLIC BLOOD PRESSURE: 57 MMHG | SYSTOLIC BLOOD PRESSURE: 98 MMHG | BODY MASS INDEX: 20.8 KG/M2

## 2019-02-28 DIAGNOSIS — I73.9 PVD (PERIPHERAL VASCULAR DISEASE) (HCC): ICD-10-CM

## 2019-02-28 DIAGNOSIS — I73.9 PERIPHERAL VASCULAR DISEASE, UNSPECIFIED (HCC): ICD-10-CM

## 2019-02-28 LAB — INR: 2.3 (ref 2–3)

## 2019-02-28 PROCEDURE — 93793 ANTICOAG MGMT PT WARFARIN: CPT | Performed by: INTERNAL MEDICINE

## 2019-03-01 VITALS
WEIGHT: 113 LBS | BODY MASS INDEX: 20.8 KG/M2 | RESPIRATION RATE: 18 BRPM | HEIGHT: 62 IN | SYSTOLIC BLOOD PRESSURE: 110 MMHG | HEART RATE: 82 BPM | DIASTOLIC BLOOD PRESSURE: 60 MMHG

## 2019-03-14 ENCOUNTER — ANTI-COAG VISIT (OUTPATIENT)
Dept: INTERNAL MEDICINE CLINIC | Facility: CLINIC | Age: 44
End: 2019-03-14

## 2019-03-14 DIAGNOSIS — I73.9 PVD (PERIPHERAL VASCULAR DISEASE) (HCC): ICD-10-CM

## 2019-03-14 DIAGNOSIS — I73.9 PERIPHERAL VASCULAR DISEASE, UNSPECIFIED (HCC): ICD-10-CM

## 2019-03-14 LAB — INR: 2.6 (ref 2–3)

## 2019-03-14 PROCEDURE — 93793 ANTICOAG MGMT PT WARFARIN: CPT | Performed by: INTERNAL MEDICINE

## 2019-03-19 ENCOUNTER — NURSE TRIAGE (OUTPATIENT)
Dept: OTHER | Age: 44
End: 2019-03-19

## 2019-03-19 ENCOUNTER — OFFICE VISIT (OUTPATIENT)
Dept: INTERNAL MEDICINE CLINIC | Facility: CLINIC | Age: 44
End: 2019-03-19

## 2019-03-19 VITALS
SYSTOLIC BLOOD PRESSURE: 144 MMHG | BODY MASS INDEX: 21.6 KG/M2 | DIASTOLIC BLOOD PRESSURE: 74 MMHG | RESPIRATION RATE: 20 BRPM | TEMPERATURE: 100 F | HEART RATE: 105 BPM | HEIGHT: 60 IN | WEIGHT: 110 LBS

## 2019-03-19 DIAGNOSIS — J11.1 INFLUENZA-LIKE ILLNESS: Primary | ICD-10-CM

## 2019-03-19 LAB
FLUAV + FLUBV RNA SPEC NAA+PROBE: NEGATIVE
FLUAV + FLUBV RNA SPEC NAA+PROBE: NEGATIVE
FLUAV + FLUBV RNA SPEC NAA+PROBE: POSITIVE

## 2019-03-19 PROCEDURE — 99212 OFFICE O/P EST SF 10 MIN: CPT | Performed by: INTERNAL MEDICINE

## 2019-03-19 PROCEDURE — 99213 OFFICE O/P EST LOW 20 MIN: CPT | Performed by: INTERNAL MEDICINE

## 2019-03-19 RX ORDER — OSELTAMIVIR PHOSPHATE 75 MG/1
75 CAPSULE ORAL 2 TIMES DAILY
Qty: 10 CAPSULE | Refills: 0 | Status: SHIPPED | OUTPATIENT
Start: 2019-03-19 | End: 2019-03-24

## 2019-03-19 NOTE — TELEPHONE ENCOUNTER
Action Requested: Summary for Provider     []  Critical Lab, Recommendations Needed  [] Need Additional Advice  []   FYI    []   Need Orders  [] Need Medications Sent to Pharmacy  []  Other     SUMMARY: Pt stated that her s/sx started yesterday she started

## 2019-03-19 NOTE — PROGRESS NOTES
HPI:    Patient ID: Pablito Zuleta is a 44year old female. Chief Complaint: Cough (Patient present with cough and fever x 1 day, Headache and congestion - Tylenol 1 hour agoo)      Flu   This is a new problem. The current episode started yesterday.  The prob Past Medical History:     Past Medical History:   Diagnosis Date   • Congenital pulmonary stenosis     congenital pulmonary aa stenosis   • Congenital pulmonary stenosis     SX at 27 y/o / valvuloplasty 1995   • Disease of vein 2009    vein/artery disease cilostazol 100 MG Oral Tab Take 1 tablet (100 mg total) by mouth 2 (two) times daily.  Disp: 180 tablet Rfl: 4   COUMADIN 1 MG Oral Tab Take 1 1/2 tab for 2 days then 1 tab after, then repeat cycle (Patient taking differently: 1 mg. 1mg daily on Monday and Neurological: She is alert and oriented to person, place, and time. Skin: Skin is warm. She is not diaphoretic. Psychiatric: She has a normal mood and affect.          Assessment/Plan:   Diagnoses and all orders for this visit:    Influenza-like illness Influenza is also called the flu. It is a viral illness that affects the air passages of your lungs. It is different from the common cold. The flu can easily be passed from one to person to another.  It may be spread through the air by coughing and sneezing If you are age 72 or older, talk with your provider about getting a pneumococcal vaccine every 5 years. You should also get this vaccine if you have chronic asthma or COPD. All adults should get a flu vaccine every fall. Ask your provider about this.   When The flu is caused by a virus. The virus spreads through the air in droplets when someone who has the flu coughs, sneezes, laughs, or talks. You can become infected when you inhale these viruses directly.  You can also become infected when you touch a surfac · Wash your hands often, especially after coughing or sneezing. Or clean your hands with an alcohol-based hand  containing at least 60% alcohol. · Cough or sneeze into a tissue. Then throw the tissue away and wash your hands.  If you don’t have a ti · Use warm water and plenty of soap. Rub your hands together well. · Clean the whole hand, including under your nails, between your fingers, and up the wrists. · Wash for at least 15 seconds.   · Rinse, letting the water run down your fingers, not up your

## 2019-03-22 RX ORDER — WARFARIN SODIUM 1 MG/1
TABLET ORAL
COMMUNITY

## 2019-03-22 RX ORDER — CILOSTAZOL 100 MG/1
1 TABLET ORAL 2 TIMES DAILY
COMMUNITY
Start: 2012-03-23

## 2019-03-28 ENCOUNTER — ANTI-COAG VISIT (OUTPATIENT)
Dept: INTERNAL MEDICINE CLINIC | Facility: CLINIC | Age: 44
End: 2019-03-28

## 2019-03-28 DIAGNOSIS — I73.9 PVD (PERIPHERAL VASCULAR DISEASE) (HCC): ICD-10-CM

## 2019-03-28 DIAGNOSIS — I73.9 PERIPHERAL VASCULAR DISEASE, UNSPECIFIED (HCC): ICD-10-CM

## 2019-03-28 LAB — INR: 4.2 (ref 2–3)

## 2019-03-28 PROCEDURE — 93793 ANTICOAG MGMT PT WARFARIN: CPT | Performed by: INTERNAL MEDICINE

## 2019-04-03 ENCOUNTER — TELEPHONE (OUTPATIENT)
Dept: INTERNAL MEDICINE CLINIC | Facility: CLINIC | Age: 44
End: 2019-04-03

## 2019-04-03 DIAGNOSIS — Z79.01 ENCOUNTER FOR CURRENT LONG-TERM USE OF ANTICOAGULANTS: ICD-10-CM

## 2019-04-03 DIAGNOSIS — I73.9 PVD (PERIPHERAL VASCULAR DISEASE) (HCC): Primary | ICD-10-CM

## 2019-04-03 NOTE — TELEPHONE ENCOUNTER
Called [facility], need to find out pend the referral. Need to know the qty they need the referral for. What is being billed, is it for the strips= ?

## 2019-04-03 NOTE — TELEPHONE ENCOUNTER
Spoke with Reece Thomas who states they are requesting the following and requesting referral x 1 year. Both lancets and strips are billed under the same code.     AntiCoag strips [id number]- 1 strip per week asking for a year supply  Lancets - come in box of 16 a

## 2019-04-10 ENCOUNTER — ANTI-COAG VISIT (OUTPATIENT)
Dept: INTERNAL MEDICINE CLINIC | Facility: CLINIC | Age: 44
End: 2019-04-10

## 2019-04-10 DIAGNOSIS — I73.9 PVD (PERIPHERAL VASCULAR DISEASE) (HCC): ICD-10-CM

## 2019-04-10 DIAGNOSIS — I73.9 PERIPHERAL VASCULAR DISEASE, UNSPECIFIED (HCC): ICD-10-CM

## 2019-04-10 PROCEDURE — 93793 ANTICOAG MGMT PT WARFARIN: CPT | Performed by: FAMILY MEDICINE

## 2019-04-18 ENCOUNTER — HOSPITAL ENCOUNTER (OUTPATIENT)
Dept: ULTRASOUND IMAGING | Facility: HOSPITAL | Age: 44
Discharge: HOME | End: 2019-04-18
Attending: SURGERY
Payer: COMMERCIAL

## 2019-04-18 DIAGNOSIS — I73.9 PAD (PERIPHERAL ARTERY DISEASE) (HCC): ICD-10-CM

## 2019-04-18 PROCEDURE — 93925 LOWER EXTREMITY STUDY: CPT | Performed by: SURGERY

## 2019-04-19 ENCOUNTER — TELEPHONE (OUTPATIENT)
Dept: INTERNAL MEDICINE CLINIC | Facility: CLINIC | Age: 44
End: 2019-04-19

## 2019-04-19 DIAGNOSIS — I37.9 PULMONIC VALVE DISEASE: Primary | ICD-10-CM

## 2019-04-19 NOTE — TELEPHONE ENCOUNTER
Patient requesting new referral to Dr. Nayan Toribio for follow up visit on May 10th.     Please approve referral.    Thanks,    Matt

## 2019-04-23 ENCOUNTER — ANTI-COAG VISIT (OUTPATIENT)
Dept: INTERNAL MEDICINE CLINIC | Facility: CLINIC | Age: 44
End: 2019-04-23

## 2019-04-23 DIAGNOSIS — I73.9 PVD (PERIPHERAL VASCULAR DISEASE) (HCC): ICD-10-CM

## 2019-04-23 DIAGNOSIS — I73.9 PERIPHERAL VASCULAR DISEASE, UNSPECIFIED (HCC): ICD-10-CM

## 2019-05-06 ENCOUNTER — ANTI-COAG VISIT (OUTPATIENT)
Dept: INTERNAL MEDICINE CLINIC | Facility: CLINIC | Age: 44
End: 2019-05-06

## 2019-05-06 ENCOUNTER — MED REC SCAN ONLY (OUTPATIENT)
Dept: INTERNAL MEDICINE CLINIC | Facility: CLINIC | Age: 44
End: 2019-05-06

## 2019-05-06 DIAGNOSIS — I73.9 PVD (PERIPHERAL VASCULAR DISEASE) (HCC): ICD-10-CM

## 2019-05-06 DIAGNOSIS — I73.9 PERIPHERAL VASCULAR DISEASE, UNSPECIFIED (HCC): ICD-10-CM

## 2019-05-06 PROCEDURE — 93793 ANTICOAG MGMT PT WARFARIN: CPT | Performed by: INTERNAL MEDICINE

## 2019-05-08 PROBLEM — I37.0 PULMONARY STENOSIS: Status: ACTIVE | Noted: 2019-05-08

## 2019-05-08 RX ORDER — WARFARIN SODIUM 1 MG/1
TABLET ORAL
Qty: 135 TABLET | Refills: 1 | Status: SHIPPED | OUTPATIENT
Start: 2019-05-08 | End: 2019-11-05

## 2019-05-08 RX ORDER — PSEUDOEPHEDRINE HCL 30 MG
100 TABLET ORAL DAILY
COMMUNITY
Start: 2018-10-19 | End: 2020-07-22

## 2019-05-08 NOTE — TELEPHONE ENCOUNTER
Review pended refill request as it does not fall under a protocol.   Requested Prescriptions     Pending Prescriptions Disp Refills   • WARFARIN SODIUM 1 MG Oral Tab [Pharmacy Med Name: WARFARIN SOD 1MG TABLETS (PINK)] 135 tablet 0     Sig: TAKE 1 AND 1/2 T

## 2019-05-10 ENCOUNTER — OFFICE VISIT (OUTPATIENT)
Dept: CARDIOLOGY | Age: 44
End: 2019-05-10

## 2019-05-10 VITALS
HEART RATE: 77 BPM | BODY MASS INDEX: 21.2 KG/M2 | DIASTOLIC BLOOD PRESSURE: 54 MMHG | WEIGHT: 108 LBS | OXYGEN SATURATION: 98 % | SYSTOLIC BLOOD PRESSURE: 100 MMHG | HEIGHT: 60 IN | RESPIRATION RATE: 18 BRPM

## 2019-05-10 DIAGNOSIS — I37.0 NONRHEUMATIC PULMONARY VALVE STENOSIS: Primary | ICD-10-CM

## 2019-05-10 DIAGNOSIS — I73.9 PERIPHERAL ARTERIAL DISEASE (CMD): ICD-10-CM

## 2019-05-10 DIAGNOSIS — Z95.828 S/P FEMORAL-POPLITEAL BYPASS SURGERY: ICD-10-CM

## 2019-05-10 DIAGNOSIS — I36.1 NON-RHEUMATIC TRICUSPID VALVE INSUFFICIENCY: ICD-10-CM

## 2019-05-10 PROBLEM — I07.1 TRICUSPID INSUFFICIENCY: Status: ACTIVE | Noted: 2019-05-10

## 2019-05-10 PROCEDURE — 99214 OFFICE O/P EST MOD 30 MIN: CPT | Performed by: INTERNAL MEDICINE

## 2019-05-21 ENCOUNTER — ANTI-COAG VISIT (OUTPATIENT)
Dept: INTERNAL MEDICINE CLINIC | Facility: CLINIC | Age: 44
End: 2019-05-21

## 2019-05-21 DIAGNOSIS — I73.9 PVD (PERIPHERAL VASCULAR DISEASE) (HCC): ICD-10-CM

## 2019-05-21 DIAGNOSIS — I73.9 PERIPHERAL VASCULAR DISEASE, UNSPECIFIED (HCC): ICD-10-CM

## 2019-05-21 PROCEDURE — 93793 ANTICOAG MGMT PT WARFARIN: CPT | Performed by: INTERNAL MEDICINE

## 2019-05-29 ENCOUNTER — HOSPITAL ENCOUNTER (OUTPATIENT)
Dept: CV DIAGNOSTICS | Facility: HOSPITAL | Age: 44
Discharge: HOME | End: 2019-05-29
Attending: INTERNAL MEDICINE
Payer: COMMERCIAL

## 2019-05-29 DIAGNOSIS — I36.1 NONRHEUMATIC TRICUSPID (VALVE) INSUFFICIENCY: ICD-10-CM

## 2019-05-29 DIAGNOSIS — I37.0 NONRHEUMATIC PULMONARY VALVE STENOSIS: ICD-10-CM

## 2019-05-29 LAB
% SHORTENING: NORMAL (ref 28–41)
A' LATERAL: NORMAL
A' MEDIAL: NORMAL
ACQUISITION  TIME: NORMAL
AO CUSP SEP: NORMAL MM (ref 15–26)
AO ROOT: NORMAL MM (ref 20–37)
AORTIC VALVE: NORMAL
AV INSUFFIENCY: NORMAL
AV MEAN GRADE: NORMAL
AV PEAK GRADE: NORMAL
AV VMAX: NORMAL
AVA: NORMAL CM2 (ref 2–?)
DIMENSIONS: NORMAL
DOPPLER: NORMAL
DTI: NORMAL
E' LATERAL: NORMAL
E' MEDIAL: NORMAL
E/A: NORMAL
E/E': NORMAL
EDV: NORMAL ML
EPSS: NORMAL MM (ref 2–10)
FUNCTION: NORMAL
IVC: NORMAL MM (ref 11–25)
IVSD: NORMAL MM (ref 6–11)
L ATRIAL VOLUME: NORMAL CC/M2
L ATRIUM: NORMAL MM (ref 19–40)
LIVDD: NORMAL MM (ref 35–57)
LV EF: 60 %
LVIDS: NORMAL MM (ref 20–38)
LVOT DIAMETER: NORMAL MM
LVPWD: NORMAL MM (ref 6–11)
MITRAL VALVE: NORMAL CM2
MV DT: NORMAL
MV INSUFFIENCY: NORMAL
MV MAX A: NORMAL
MV MEAN GRADE: NORMAL
MV PK GRADE: NORMAL
MVA: NORMAL CM2 (ref 4–5)
MVMAX E: NORMAL VELOCITIES M/SEC
PULMONIC VALVE: NORMAL
PV INSUFFIENCY: NORMAL
PV MEAN GRADE: NORMAL
PV PK GRADE: NORMAL
PVMAX: NORMAL
RA: NORMAL
RVIDD: NORMAL MM (ref 9–26)
RVSP: NORMAL
SV: NORMAL ML
TRICUSPID VALVE: NORMAL
TV INSUFFIENCY: NORMAL
TV PK GRADE: NORMAL
TVMAX: NORMAL

## 2019-05-29 PROCEDURE — 93306 TTE W/DOPPLER COMPLETE: CPT | Performed by: INTERNAL MEDICINE

## 2019-05-30 ENCOUNTER — CLINICAL ABSTRACT (OUTPATIENT)
Dept: CARDIOLOGY | Age: 44
End: 2019-05-30

## 2019-05-31 ENCOUNTER — ANTI-COAG VISIT (OUTPATIENT)
Dept: INTERNAL MEDICINE CLINIC | Facility: CLINIC | Age: 44
End: 2019-05-31

## 2019-05-31 ENCOUNTER — TELEPHONE (OUTPATIENT)
Dept: CARDIOLOGY | Age: 44
End: 2019-05-31

## 2019-05-31 DIAGNOSIS — I73.9 PERIPHERAL VASCULAR DISEASE, UNSPECIFIED (HCC): ICD-10-CM

## 2019-05-31 DIAGNOSIS — I73.9 PVD (PERIPHERAL VASCULAR DISEASE) (HCC): ICD-10-CM

## 2019-05-31 PROCEDURE — 93793 ANTICOAG MGMT PT WARFARIN: CPT | Performed by: INTERNAL MEDICINE

## 2019-06-05 ENCOUNTER — TELEPHONE (OUTPATIENT)
Dept: CARDIOLOGY CLINIC | Facility: CLINIC | Age: 44
End: 2019-06-05

## 2019-06-05 NOTE — TELEPHONE ENCOUNTER
Letter mailed regarding new change in communication for INR Home Testing      Will call back in week to follow up

## 2019-06-12 NOTE — TELEPHONE ENCOUNTER
S/w pt and states she received the letter. Pt verbalized understanding regarding new communication changes with [employer]. Pt aware of current INR range. Pt is in agreement with change and is aware to call [facility] with any questions or changes.

## 2019-06-14 ENCOUNTER — ANTI-COAG VISIT (OUTPATIENT)
Dept: INTERNAL MEDICINE CLINIC | Facility: CLINIC | Age: 44
End: 2019-06-14

## 2019-06-14 DIAGNOSIS — I73.9 PERIPHERAL VASCULAR DISEASE, UNSPECIFIED (HCC): ICD-10-CM

## 2019-06-14 DIAGNOSIS — I73.9 PVD (PERIPHERAL VASCULAR DISEASE) (HCC): ICD-10-CM

## 2019-06-17 ENCOUNTER — MED REC SCAN ONLY (OUTPATIENT)
Dept: INTERNAL MEDICINE CLINIC | Facility: CLINIC | Age: 44
End: 2019-06-17

## 2019-06-25 ENCOUNTER — TELEPHONE (OUTPATIENT)
Dept: CARDIOLOGY | Age: 44
End: 2019-06-25

## 2019-06-28 ENCOUNTER — ANTI-COAG VISIT (OUTPATIENT)
Dept: INTERNAL MEDICINE CLINIC | Facility: CLINIC | Age: 44
End: 2019-06-28

## 2019-06-28 DIAGNOSIS — I73.9 PVD (PERIPHERAL VASCULAR DISEASE) (HCC): ICD-10-CM

## 2019-06-28 DIAGNOSIS — I73.9 PERIPHERAL VASCULAR DISEASE, UNSPECIFIED (HCC): ICD-10-CM

## 2019-07-12 LAB — INR: 2.3 (ref 2–3)

## 2019-07-15 ENCOUNTER — ANTI-COAG VISIT (OUTPATIENT)
Dept: INTERNAL MEDICINE CLINIC | Facility: CLINIC | Age: 44
End: 2019-07-15

## 2019-07-15 DIAGNOSIS — I73.9 PERIPHERAL VASCULAR DISEASE, UNSPECIFIED (HCC): ICD-10-CM

## 2019-07-15 DIAGNOSIS — I73.9 PVD (PERIPHERAL VASCULAR DISEASE) (HCC): ICD-10-CM

## 2019-07-19 ENCOUNTER — MED REC SCAN ONLY (OUTPATIENT)
Dept: INTERNAL MEDICINE CLINIC | Facility: CLINIC | Age: 44
End: 2019-07-19

## 2019-07-25 ENCOUNTER — ANTI-COAG VISIT (OUTPATIENT)
Dept: INTERNAL MEDICINE CLINIC | Facility: CLINIC | Age: 44
End: 2019-07-25

## 2019-07-25 DIAGNOSIS — I73.9 PVD (PERIPHERAL VASCULAR DISEASE) (HCC): ICD-10-CM

## 2019-07-25 DIAGNOSIS — I73.9 PERIPHERAL VASCULAR DISEASE, UNSPECIFIED (HCC): ICD-10-CM

## 2019-07-25 LAB — INR: 3.1 (ref 2–3)

## 2019-07-25 PROCEDURE — 93793 ANTICOAG MGMT PT WARFARIN: CPT | Performed by: INTERNAL MEDICINE

## 2019-08-09 LAB — INR: 2.9 (ref 2–3)

## 2019-08-12 ENCOUNTER — MED REC SCAN ONLY (OUTPATIENT)
Dept: INTERNAL MEDICINE CLINIC | Facility: CLINIC | Age: 44
End: 2019-08-12

## 2019-08-12 ENCOUNTER — ANTI-COAG VISIT (OUTPATIENT)
Dept: INTERNAL MEDICINE CLINIC | Facility: CLINIC | Age: 44
End: 2019-08-12

## 2019-08-12 DIAGNOSIS — I73.9 PERIPHERAL VASCULAR DISEASE, UNSPECIFIED (HCC): ICD-10-CM

## 2019-08-12 DIAGNOSIS — I73.9 PVD (PERIPHERAL VASCULAR DISEASE) (HCC): ICD-10-CM

## 2019-08-12 PROCEDURE — 93793 ANTICOAG MGMT PT WARFARIN: CPT | Performed by: INTERNAL MEDICINE

## 2019-08-23 ENCOUNTER — ANTI-COAG VISIT (OUTPATIENT)
Dept: INTERNAL MEDICINE CLINIC | Facility: CLINIC | Age: 44
End: 2019-08-23

## 2019-08-23 DIAGNOSIS — I73.9 PERIPHERAL VASCULAR DISEASE, UNSPECIFIED (HCC): ICD-10-CM

## 2019-08-23 DIAGNOSIS — I73.9 PVD (PERIPHERAL VASCULAR DISEASE) (HCC): ICD-10-CM

## 2019-08-23 LAB — INR: 2.3 (ref 2–3)

## 2019-09-09 ENCOUNTER — ANTI-COAG VISIT (OUTPATIENT)
Dept: INTERNAL MEDICINE CLINIC | Facility: CLINIC | Age: 44
End: 2019-09-09

## 2019-09-09 DIAGNOSIS — I73.9 PVD (PERIPHERAL VASCULAR DISEASE) (HCC): ICD-10-CM

## 2019-09-09 DIAGNOSIS — I73.9 PERIPHERAL VASCULAR DISEASE, UNSPECIFIED (HCC): ICD-10-CM

## 2019-09-09 LAB — INR: 3.4 (ref 2–3)

## 2019-09-09 PROCEDURE — 93793 ANTICOAG MGMT PT WARFARIN: CPT | Performed by: INTERNAL MEDICINE

## 2019-09-20 LAB — INR: 2.8 (ref 2–3)

## 2019-09-24 ENCOUNTER — ANTI-COAG VISIT (OUTPATIENT)
Dept: INTERNAL MEDICINE CLINIC | Facility: CLINIC | Age: 44
End: 2019-09-24

## 2019-09-24 DIAGNOSIS — I73.9 PERIPHERAL VASCULAR DISEASE, UNSPECIFIED (HCC): ICD-10-CM

## 2019-09-24 DIAGNOSIS — I73.9 PVD (PERIPHERAL VASCULAR DISEASE) (HCC): ICD-10-CM

## 2019-09-24 PROCEDURE — 93793 ANTICOAG MGMT PT WARFARIN: CPT | Performed by: INTERNAL MEDICINE

## 2019-10-08 ENCOUNTER — TELEPHONE (OUTPATIENT)
Dept: INTERNAL MEDICINE CLINIC | Facility: CLINIC | Age: 44
End: 2019-10-08

## 2019-10-08 ENCOUNTER — ANTI-COAG VISIT (OUTPATIENT)
Dept: INTERNAL MEDICINE CLINIC | Facility: CLINIC | Age: 44
End: 2019-10-08

## 2019-10-08 DIAGNOSIS — I73.9 PVD (PERIPHERAL VASCULAR DISEASE) (HCC): ICD-10-CM

## 2019-10-08 DIAGNOSIS — Z79.01 LONG TERM (CURRENT) USE OF ANTICOAGULANTS: ICD-10-CM

## 2019-10-08 DIAGNOSIS — I73.9 PERIPHERAL VASCULAR DISEASE, UNSPECIFIED (HCC): ICD-10-CM

## 2019-10-08 DIAGNOSIS — Z51.81 ENCOUNTER FOR THERAPEUTIC DRUG MONITORING: ICD-10-CM

## 2019-10-08 DIAGNOSIS — I73.9 PERIPHERAL VASCULAR DISEASE (HCC): Primary | ICD-10-CM

## 2019-10-08 PROCEDURE — 93793 ANTICOAG MGMT PT WARFARIN: CPT | Performed by: INTERNAL MEDICINE

## 2019-10-08 NOTE — TELEPHONE ENCOUNTER
Anticoag referral . Order pended. Please sign, thank you.     Peripheral vascular disease, unspecified

## 2019-10-21 ENCOUNTER — ANTI-COAG VISIT (OUTPATIENT)
Dept: INTERNAL MEDICINE CLINIC | Facility: CLINIC | Age: 44
End: 2019-10-21

## 2019-10-21 DIAGNOSIS — I73.9 PERIPHERAL VASCULAR DISEASE, UNSPECIFIED (HCC): ICD-10-CM

## 2019-10-21 DIAGNOSIS — Z51.81 ENCOUNTER FOR THERAPEUTIC DRUG MONITORING: ICD-10-CM

## 2019-10-21 DIAGNOSIS — I73.9 PERIPHERAL VASCULAR DISEASE (HCC): ICD-10-CM

## 2019-10-21 DIAGNOSIS — Z79.01 LONG TERM (CURRENT) USE OF ANTICOAGULANTS: ICD-10-CM

## 2019-10-21 DIAGNOSIS — I73.9 PVD (PERIPHERAL VASCULAR DISEASE) (HCC): ICD-10-CM

## 2019-10-22 ENCOUNTER — HOSPITAL ENCOUNTER (OUTPATIENT)
Dept: ULTRASOUND IMAGING | Facility: HOSPITAL | Age: 44
Discharge: HOME | End: 2019-10-22
Attending: SURGERY
Payer: COMMERCIAL

## 2019-10-22 DIAGNOSIS — I70.403: ICD-10-CM

## 2019-10-22 PROCEDURE — 93925 LOWER EXTREMITY STUDY: CPT | Performed by: SURGERY

## 2019-10-23 RX ORDER — CILOSTAZOL 100 MG/1
TABLET ORAL
Qty: 180 TABLET | Refills: 0 | Status: SHIPPED | OUTPATIENT
Start: 2019-10-23 | End: 2020-01-29

## 2019-11-01 ENCOUNTER — OFFICE VISIT (OUTPATIENT)
Dept: INTERNAL MEDICINE CLINIC | Facility: CLINIC | Age: 44
End: 2019-11-01

## 2019-11-01 VITALS
HEIGHT: 59 IN | HEART RATE: 65 BPM | SYSTOLIC BLOOD PRESSURE: 104 MMHG | BODY MASS INDEX: 23.22 KG/M2 | TEMPERATURE: 99 F | DIASTOLIC BLOOD PRESSURE: 58 MMHG | WEIGHT: 115.19 LBS

## 2019-11-01 DIAGNOSIS — B07.0 PLANTAR WART OF RIGHT FOOT: Primary | ICD-10-CM

## 2019-11-01 PROCEDURE — 99213 OFFICE O/P EST LOW 20 MIN: CPT | Performed by: INTERNAL MEDICINE

## 2019-11-01 NOTE — PROGRESS NOTES
History of Present Illness   Patient ID: Bobby Potts is a 38year old female. Chief Complaint: Foot Pain (right )      Warts   This is a new problem. The current episode started 1 to 4 weeks ago. The problem occurs daily.  The problem has been gradually wo ALT 19 10/19/2018    ALKPHOS 46 07/11/2013    BILT 0.3 10/19/2018    TP 6.2 10/19/2018    ALB 3.4 (L) 10/19/2018    GLOBULIN 3.2 10/05/2018    AGRATIO 1.2 07/11/2013     10/05/2018    K 3.8 10/05/2018     10/05/2018    CO2 25 10/05/2018     No congenital pulmonary aa stenosis   • Congenital pulmonary stenosis     SX at 25 y/o / valvuloplasty 1995   • Disease of vein 2009    vein/artery disease   • Hematologic disorder     Thromboembolic Event   • History of blood clots    • History of blood tra Alcohol use: No      Alcohol/week: 0.0 standard drinks      Frequency: Never      Comment: rare    Drug use: No     Reviewed:    Current Outpatient Medications:   •  Salicylic Acid 17 % External Gel, Apply 1 Application topically daily for 10 days. , Disp Patient understands and agrees to follow directions and advice. Call office with any questions. Seek emergency care if necessary.          ----------------------------------------- END NOTE-----------------------------------------     Patient Instructions · Warts often come back, even after successful treatment. Return promptly for treatment of any new warts. Follow-up care  Follow up with your healthcare provider, or as advised.   When to seek medical advice  · Signs of infection (red streaks, pus, smelly · Common warts. These have a raised, rough surface. Enlarged blood vessels in the warts look like dots on the warts’ surface. Common warts form mainly on the hands, but can appear on other parts of the body. · Plantar warts.  These are warts on the soles o · Cryotherapy (liquid nitrogen). This kills skin cells by freezing them. It kills the warts and destroys skin infected by the wart-causing virus. This is done in your healthcare provider’s office and will cause some discomfort.  It may take several treatmen After having your warts treated, new warts may still appear. Don’t be discouraged. Warts often come back. See your healthcare provider again to discuss this.  Your provider can tell you about the treatments that most likely will help clear your skin of wart

## 2019-11-01 NOTE — PATIENT INSTRUCTIONS
Plantar Warts  Warts are common skin growths that can appear anywhere on the body. Warts on the soles of the feet are called plantar warts. These warts are not a serious health problem. They usually go away without treatment.  But plantar warts can be kamar · Signs of infection (red streaks, pus, smelly or colored discharge, or fever) appear  · You have heavy bleeding or bleeding that won’t stop with light pressure  · The wart doesn’t go away after several weeks of self-care  · New warts appear on feet, hands · Plantar warts. These are warts on the soles of the feet. When you stand or walk, pressure makes plantar warts painful. When they form in clusters, plantar warts are called mosaic warts. · Periungual warts. These form under and around fingernails.  People · Topical medicines. Prescribed topical medicines can be put on the skin. These are usually applied in the healthcare provider's office. But some prescriptions may be applied at home. · Over-the-counter (OTC) topical treatments.  OTC medicines that most of © 2170-2716 [employer] [address] [facility] [place]. All rights reserved. This information is not intended as a substitute for professional medical care. Always follow your healthcare professional's instructions.

## 2019-11-02 PROBLEM — B07.0 PLANTAR WART OF RIGHT FOOT: Status: ACTIVE | Noted: 2019-11-02

## 2019-11-04 ENCOUNTER — ANTI-COAG VISIT (OUTPATIENT)
Dept: INTERNAL MEDICINE CLINIC | Facility: CLINIC | Age: 44
End: 2019-11-04

## 2019-11-04 DIAGNOSIS — I73.9 PERIPHERAL VASCULAR DISEASE, UNSPECIFIED (HCC): ICD-10-CM

## 2019-11-04 DIAGNOSIS — I73.9 PVD (PERIPHERAL VASCULAR DISEASE) (HCC): ICD-10-CM

## 2019-11-04 DIAGNOSIS — Z51.81 ENCOUNTER FOR THERAPEUTIC DRUG MONITORING: ICD-10-CM

## 2019-11-04 DIAGNOSIS — I73.9 PERIPHERAL VASCULAR DISEASE (HCC): ICD-10-CM

## 2019-11-04 DIAGNOSIS — Z79.01 LONG TERM (CURRENT) USE OF ANTICOAGULANTS: ICD-10-CM

## 2019-11-04 PROCEDURE — 93793 ANTICOAG MGMT PT WARFARIN: CPT | Performed by: INTERNAL MEDICINE

## 2019-11-05 ENCOUNTER — OFFICE VISIT (OUTPATIENT)
Dept: PODIATRY CLINIC | Facility: CLINIC | Age: 44
End: 2019-11-05

## 2019-11-05 DIAGNOSIS — B07.0 PLANTAR WART OF RIGHT FOOT: Primary | ICD-10-CM

## 2019-11-05 PROCEDURE — 99242 OFF/OP CONSLTJ NEW/EST SF 20: CPT | Performed by: PODIATRIST

## 2019-11-05 NOTE — PROGRESS NOTES
HPI:    Patient ID: Vandana Acuna is a 38year old female. 56-year-old female presents to the office today as a new patient on consult from [address]. Patient has 2 plantar calluses first metatarsophalangeal joint of the right foot.   She is quite certain th this encounter.       Meds This Visit:  Requested Prescriptions      No prescriptions requested or ordered in this encounter       Imaging & Referrals:  None       TZ#[id number]

## 2019-11-19 ENCOUNTER — ANTI-COAG VISIT (OUTPATIENT)
Dept: INTERNAL MEDICINE CLINIC | Facility: CLINIC | Age: 44
End: 2019-11-19

## 2019-11-19 DIAGNOSIS — I73.9 PERIPHERAL VASCULAR DISEASE, UNSPECIFIED (HCC): ICD-10-CM

## 2019-11-19 DIAGNOSIS — I73.9 PERIPHERAL VASCULAR DISEASE (HCC): ICD-10-CM

## 2019-11-19 DIAGNOSIS — I73.9 PVD (PERIPHERAL VASCULAR DISEASE) (HCC): ICD-10-CM

## 2019-11-19 DIAGNOSIS — Z51.81 ENCOUNTER FOR THERAPEUTIC DRUG MONITORING: ICD-10-CM

## 2019-11-19 DIAGNOSIS — Z79.01 LONG TERM (CURRENT) USE OF ANTICOAGULANTS: ICD-10-CM

## 2019-11-25 RX ORDER — WARFARIN SODIUM 1 MG/1
TABLET ORAL
Qty: 135 TABLET | Refills: 1 | Status: SHIPPED | OUTPATIENT
Start: 2019-11-25 | End: 2020-06-24

## 2019-11-26 ENCOUNTER — ANTI-COAG VISIT (OUTPATIENT)
Dept: INTERNAL MEDICINE CLINIC | Facility: CLINIC | Age: 44
End: 2019-11-26

## 2019-11-26 DIAGNOSIS — I73.9 PERIPHERAL VASCULAR DISEASE, UNSPECIFIED (HCC): ICD-10-CM

## 2019-11-26 DIAGNOSIS — I73.9 PVD (PERIPHERAL VASCULAR DISEASE) (HCC): ICD-10-CM

## 2019-11-26 DIAGNOSIS — Z51.81 ENCOUNTER FOR THERAPEUTIC DRUG MONITORING: ICD-10-CM

## 2019-11-26 DIAGNOSIS — I73.9 PERIPHERAL VASCULAR DISEASE (HCC): ICD-10-CM

## 2019-11-26 DIAGNOSIS — Z79.01 LONG TERM (CURRENT) USE OF ANTICOAGULANTS: ICD-10-CM

## 2019-11-26 PROCEDURE — 93793 ANTICOAG MGMT PT WARFARIN: CPT | Performed by: INTERNAL MEDICINE

## 2019-12-10 ENCOUNTER — OFFICE VISIT (OUTPATIENT)
Dept: PODIATRY CLINIC | Facility: CLINIC | Age: 44
End: 2019-12-10

## 2019-12-10 DIAGNOSIS — B07.0 PLANTAR WART OF RIGHT FOOT: Primary | ICD-10-CM

## 2019-12-10 PROCEDURE — 99212 OFFICE O/P EST SF 10 MIN: CPT | Performed by: PODIATRIST

## 2019-12-10 RX ORDER — AMOXICILLIN 500 MG/1
CAPSULE ORAL
Refills: 0 | COMMUNITY
Start: 2019-11-18 | End: 2020-01-14 | Stop reason: ALTCHOICE

## 2019-12-10 NOTE — PROGRESS NOTES
HPI:    Patient ID: Angelique Figueroa is a 38year old female. This 32-year-old female presents for follow-up care and treatment in reference to the warts on the plantar aspect of the first metatarsophalangeal joint of the right foot.   It caused her discomfor

## 2019-12-11 ENCOUNTER — ANTI-COAG VISIT (OUTPATIENT)
Dept: INTERNAL MEDICINE CLINIC | Facility: CLINIC | Age: 44
End: 2019-12-11

## 2019-12-11 DIAGNOSIS — I73.9 PERIPHERAL VASCULAR DISEASE, UNSPECIFIED (HCC): ICD-10-CM

## 2019-12-11 DIAGNOSIS — I73.9 PERIPHERAL VASCULAR DISEASE (HCC): ICD-10-CM

## 2019-12-11 DIAGNOSIS — Z51.81 ENCOUNTER FOR THERAPEUTIC DRUG MONITORING: ICD-10-CM

## 2019-12-11 DIAGNOSIS — I73.9 PVD (PERIPHERAL VASCULAR DISEASE) (HCC): ICD-10-CM

## 2019-12-11 DIAGNOSIS — Z79.01 LONG TERM (CURRENT) USE OF ANTICOAGULANTS: ICD-10-CM

## 2019-12-11 PROCEDURE — 93793 ANTICOAG MGMT PT WARFARIN: CPT | Performed by: INTERNAL MEDICINE

## 2019-12-24 ENCOUNTER — ANTI-COAG VISIT (OUTPATIENT)
Dept: INTERNAL MEDICINE CLINIC | Facility: CLINIC | Age: 44
End: 2019-12-24

## 2019-12-24 DIAGNOSIS — I73.9 PVD (PERIPHERAL VASCULAR DISEASE) (HCC): ICD-10-CM

## 2019-12-24 DIAGNOSIS — Z51.81 ENCOUNTER FOR THERAPEUTIC DRUG MONITORING: ICD-10-CM

## 2019-12-24 DIAGNOSIS — I73.9 PERIPHERAL VASCULAR DISEASE (HCC): ICD-10-CM

## 2019-12-24 DIAGNOSIS — I73.9 PERIPHERAL VASCULAR DISEASE, UNSPECIFIED (HCC): ICD-10-CM

## 2019-12-24 DIAGNOSIS — Z79.01 LONG TERM (CURRENT) USE OF ANTICOAGULANTS: ICD-10-CM

## 2020-01-07 LAB — INR: 2.5 (ref 2–3)

## 2020-01-08 ENCOUNTER — TELEPHONE (OUTPATIENT)
Dept: INTERNAL MEDICINE CLINIC | Facility: CLINIC | Age: 45
End: 2020-01-08

## 2020-01-08 ENCOUNTER — ANTI-COAG VISIT (OUTPATIENT)
Dept: INTERNAL MEDICINE CLINIC | Facility: CLINIC | Age: 45
End: 2020-01-08

## 2020-01-08 DIAGNOSIS — Z79.01 LONG TERM (CURRENT) USE OF ANTICOAGULANTS: ICD-10-CM

## 2020-01-08 DIAGNOSIS — I73.9 PVD (PERIPHERAL VASCULAR DISEASE) (HCC): ICD-10-CM

## 2020-01-08 DIAGNOSIS — Z51.81 ENCOUNTER FOR THERAPEUTIC DRUG MONITORING: ICD-10-CM

## 2020-01-08 DIAGNOSIS — I73.9 PERIPHERAL VASCULAR DISEASE (HCC): ICD-10-CM

## 2020-01-08 DIAGNOSIS — I73.9 PERIPHERAL VASCULAR DISEASE, UNSPECIFIED (HCC): ICD-10-CM

## 2020-01-08 NOTE — TELEPHONE ENCOUNTER
S/w pt and clarified why we will say do lab in 4 weeks and [employer] will say 2 weeks. Explained difference in our protocols and the company want s 2 weeks. Pt states understanding.

## 2020-01-08 NOTE — TELEPHONE ENCOUNTER
Patient has a question in regards to the instruction that was given to her. Please call her back. Thank you.

## 2020-01-14 ENCOUNTER — OFFICE VISIT (OUTPATIENT)
Dept: PODIATRY CLINIC | Facility: CLINIC | Age: 45
End: 2020-01-14

## 2020-01-14 DIAGNOSIS — B07.0 PLANTAR WART OF RIGHT FOOT: Primary | ICD-10-CM

## 2020-01-14 PROCEDURE — 99212 OFFICE O/P EST SF 10 MIN: CPT | Performed by: PODIATRIST

## 2020-01-14 NOTE — PROGRESS NOTES
HPI:    Patient ID: Zora Leigh is a 38year old female. This 46-year-old female presents for follow-up care in reference to warts on the right foot.   Patient thinks they are almost gone she has no symptom    ROS:              Current Outpatient Medicat

## 2020-01-21 LAB — INR: 2.5 (ref 0.8–1.2)

## 2020-01-22 ENCOUNTER — ANTI-COAG VISIT (OUTPATIENT)
Dept: INTERNAL MEDICINE CLINIC | Facility: CLINIC | Age: 45
End: 2020-01-22

## 2020-01-22 DIAGNOSIS — I73.9 PERIPHERAL VASCULAR DISEASE, UNSPECIFIED (HCC): ICD-10-CM

## 2020-01-22 DIAGNOSIS — Z79.01 LONG TERM (CURRENT) USE OF ANTICOAGULANTS: ICD-10-CM

## 2020-01-22 DIAGNOSIS — I73.9 PERIPHERAL VASCULAR DISEASE (HCC): ICD-10-CM

## 2020-01-22 DIAGNOSIS — I73.9 PVD (PERIPHERAL VASCULAR DISEASE) (HCC): ICD-10-CM

## 2020-01-22 DIAGNOSIS — Z51.81 ENCOUNTER FOR THERAPEUTIC DRUG MONITORING: ICD-10-CM

## 2020-01-22 PROCEDURE — 93793 ANTICOAG MGMT PT WARFARIN: CPT | Performed by: INTERNAL MEDICINE

## 2020-01-23 ENCOUNTER — TELEPHONE (OUTPATIENT)
Dept: INTERNAL MEDICINE CLINIC | Facility: CLINIC | Age: 45
End: 2020-01-23

## 2020-01-23 DIAGNOSIS — Z79.01 LONG TERM (CURRENT) USE OF ANTICOAGULANTS: Primary | ICD-10-CM

## 2020-01-23 DIAGNOSIS — I73.9 PERIPHERAL VASCULAR DISEASE (HCC): ICD-10-CM

## 2020-01-23 NOTE — TELEPHONE ENCOUNTER
Request from [facility] Mckenzie was received requesting PT/INR test strips for one year supply.  Qty box 6 each x 42 [id number] Coaguchek xs 6 patient test strip     Contact info with radha is Jose Antonio HONG:[phone] ext [phone]    Fwd to Dr. Ara Myers for approval.

## 2020-01-27 ENCOUNTER — HOSPITAL ENCOUNTER (OUTPATIENT)
Dept: MAMMOGRAPHY | Age: 45
Discharge: HOME | End: 2020-01-27
Attending: OBSTETRICS & GYNECOLOGY
Payer: COMMERCIAL

## 2020-01-27 DIAGNOSIS — Z12.31 ENCOUNTER FOR SCREENING MAMMOGRAM FOR MALIGNANT NEOPLASM OF BREAST: ICD-10-CM

## 2020-01-27 PROCEDURE — 77067 SCR MAMMO BI INCL CAD: CPT | Performed by: OBSTETRICS & GYNECOLOGY

## 2020-01-27 PROCEDURE — 77063 BREAST TOMOSYNTHESIS BI: CPT | Performed by: OBSTETRICS & GYNECOLOGY

## 2020-01-29 RX ORDER — CILOSTAZOL 100 MG/1
TABLET ORAL
Qty: 180 TABLET | Refills: 0 | Status: SHIPPED | OUTPATIENT
Start: 2020-01-29 | End: 2020-05-04

## 2020-02-04 ENCOUNTER — ANTI-COAG VISIT (OUTPATIENT)
Dept: INTERNAL MEDICINE CLINIC | Facility: CLINIC | Age: 45
End: 2020-02-04

## 2020-02-04 DIAGNOSIS — I73.9 PERIPHERAL VASCULAR DISEASE, UNSPECIFIED (HCC): ICD-10-CM

## 2020-02-04 DIAGNOSIS — Z51.81 ENCOUNTER FOR THERAPEUTIC DRUG MONITORING: ICD-10-CM

## 2020-02-04 DIAGNOSIS — I73.9 PVD (PERIPHERAL VASCULAR DISEASE) (HCC): ICD-10-CM

## 2020-02-04 DIAGNOSIS — I73.9 PERIPHERAL VASCULAR DISEASE (HCC): ICD-10-CM

## 2020-02-04 DIAGNOSIS — Z79.01 LONG TERM (CURRENT) USE OF ANTICOAGULANTS: ICD-10-CM

## 2020-02-04 LAB — INR: 3.4 (ref 0.8–1.2)

## 2020-02-04 PROCEDURE — 93793 ANTICOAG MGMT PT WARFARIN: CPT | Performed by: INTERNAL MEDICINE

## 2020-02-18 ENCOUNTER — OFFICE VISIT (OUTPATIENT)
Dept: PODIATRY CLINIC | Facility: CLINIC | Age: 45
End: 2020-02-18

## 2020-02-18 DIAGNOSIS — B07.0 PLANTAR WART OF RIGHT FOOT: Primary | ICD-10-CM

## 2020-02-18 PROCEDURE — 99212 OFFICE O/P EST SF 10 MIN: CPT | Performed by: PODIATRIST

## 2020-02-18 NOTE — PROGRESS NOTES
HPI:    Patient ID: Glen Cassidy is a 38year old female. This 45-year-old female presents for follow-up and further considerations of care in reference to the wart sub-first metatarsal right foot.   It is apparent that she has been consistent with local ca

## 2020-02-19 ENCOUNTER — ANTI-COAG VISIT (OUTPATIENT)
Dept: INTERNAL MEDICINE CLINIC | Facility: CLINIC | Age: 45
End: 2020-02-19

## 2020-02-19 DIAGNOSIS — I73.9 PVD (PERIPHERAL VASCULAR DISEASE) (HCC): ICD-10-CM

## 2020-02-19 DIAGNOSIS — Z79.01 LONG TERM (CURRENT) USE OF ANTICOAGULANTS: ICD-10-CM

## 2020-02-19 DIAGNOSIS — Z51.81 ENCOUNTER FOR THERAPEUTIC DRUG MONITORING: ICD-10-CM

## 2020-02-19 DIAGNOSIS — I73.9 PERIPHERAL VASCULAR DISEASE (HCC): ICD-10-CM

## 2020-02-19 DIAGNOSIS — I73.9 PERIPHERAL VASCULAR DISEASE, UNSPECIFIED (HCC): ICD-10-CM

## 2020-02-19 LAB — INR: 2.2 (ref 0.8–1.2)

## 2020-02-19 PROCEDURE — 93793 ANTICOAG MGMT PT WARFARIN: CPT | Performed by: INTERNAL MEDICINE

## 2020-02-25 ENCOUNTER — HOSPITAL ENCOUNTER (OUTPATIENT)
Dept: ULTRASOUND IMAGING | Facility: HOSPITAL | Age: 45
Discharge: HOME | End: 2020-02-25
Attending: OBSTETRICS & GYNECOLOGY
Payer: COMMERCIAL

## 2020-02-25 DIAGNOSIS — R92.2 BREAST DENSITY: ICD-10-CM

## 2020-02-25 PROCEDURE — 76641 ULTRASOUND BREAST COMPLETE: CPT | Performed by: OBSTETRICS & GYNECOLOGY

## 2020-02-28 ENCOUNTER — OFFICE VISIT (OUTPATIENT)
Dept: PODIATRY CLINIC | Facility: CLINIC | Age: 45
End: 2020-02-28

## 2020-02-28 VITALS — DIASTOLIC BLOOD PRESSURE: 61 MMHG | HEART RATE: 78 BPM | SYSTOLIC BLOOD PRESSURE: 111 MMHG | RESPIRATION RATE: 18 BRPM

## 2020-02-28 DIAGNOSIS — B07.0 PLANTAR WART OF RIGHT FOOT: Primary | ICD-10-CM

## 2020-02-28 PROCEDURE — 99212 OFFICE O/P EST SF 10 MIN: CPT | Performed by: PODIATRIST

## 2020-02-28 NOTE — PROGRESS NOTES
HPI:    Patient ID: Mago Corea is a 38year old female. This 44-year-old female presents for scheduled wart removal on the plantar aspect of the right first metatarsal phalangeal joint.   She has continued to use acid applications and it was removed today

## 2020-03-04 ENCOUNTER — ANTI-COAG VISIT (OUTPATIENT)
Dept: INTERNAL MEDICINE CLINIC | Facility: CLINIC | Age: 45
End: 2020-03-04

## 2020-03-04 DIAGNOSIS — Z51.81 ENCOUNTER FOR THERAPEUTIC DRUG MONITORING: ICD-10-CM

## 2020-03-04 DIAGNOSIS — I73.9 PVD (PERIPHERAL VASCULAR DISEASE) (HCC): ICD-10-CM

## 2020-03-04 DIAGNOSIS — I73.9 PERIPHERAL VASCULAR DISEASE (HCC): ICD-10-CM

## 2020-03-04 DIAGNOSIS — Z79.01 LONG TERM (CURRENT) USE OF ANTICOAGULANTS: ICD-10-CM

## 2020-03-04 DIAGNOSIS — I73.9 PERIPHERAL VASCULAR DISEASE, UNSPECIFIED (HCC): ICD-10-CM

## 2020-03-04 LAB — INR: 2.6 (ref 2–3)

## 2020-03-20 ENCOUNTER — ANTI-COAG VISIT (OUTPATIENT)
Dept: INTERNAL MEDICINE CLINIC | Facility: CLINIC | Age: 45
End: 2020-03-20

## 2020-03-20 DIAGNOSIS — Z79.01 LONG TERM (CURRENT) USE OF ANTICOAGULANTS: ICD-10-CM

## 2020-03-20 DIAGNOSIS — I73.9 PVD (PERIPHERAL VASCULAR DISEASE) (HCC): ICD-10-CM

## 2020-03-20 DIAGNOSIS — I73.9 PERIPHERAL VASCULAR DISEASE, UNSPECIFIED (HCC): ICD-10-CM

## 2020-03-20 DIAGNOSIS — Z51.81 ENCOUNTER FOR THERAPEUTIC DRUG MONITORING: ICD-10-CM

## 2020-03-20 DIAGNOSIS — I73.9 PERIPHERAL VASCULAR DISEASE (HCC): ICD-10-CM

## 2020-03-20 LAB — INR: 2.3 (ref 0.8–1.2)

## 2020-03-20 PROCEDURE — 93793 ANTICOAG MGMT PT WARFARIN: CPT | Performed by: INTERNAL MEDICINE

## 2020-04-03 ENCOUNTER — ANTI-COAG VISIT (OUTPATIENT)
Dept: INTERNAL MEDICINE CLINIC | Facility: CLINIC | Age: 45
End: 2020-04-03

## 2020-04-03 DIAGNOSIS — I73.9 PERIPHERAL VASCULAR DISEASE, UNSPECIFIED (HCC): ICD-10-CM

## 2020-04-03 DIAGNOSIS — Z79.01 LONG TERM (CURRENT) USE OF ANTICOAGULANTS: ICD-10-CM

## 2020-04-03 DIAGNOSIS — I73.9 PVD (PERIPHERAL VASCULAR DISEASE) (HCC): ICD-10-CM

## 2020-04-03 DIAGNOSIS — I73.9 PERIPHERAL VASCULAR DISEASE (HCC): ICD-10-CM

## 2020-04-03 DIAGNOSIS — Z51.81 ENCOUNTER FOR THERAPEUTIC DRUG MONITORING: ICD-10-CM

## 2020-04-03 PROCEDURE — 93793 ANTICOAG MGMT PT WARFARIN: CPT | Performed by: INTERNAL MEDICINE

## 2020-04-17 ENCOUNTER — ANTI-COAG VISIT (OUTPATIENT)
Dept: INTERNAL MEDICINE CLINIC | Facility: CLINIC | Age: 45
End: 2020-04-17

## 2020-04-17 DIAGNOSIS — I73.9 PERIPHERAL VASCULAR DISEASE (HCC): ICD-10-CM

## 2020-04-17 DIAGNOSIS — I73.9 PVD (PERIPHERAL VASCULAR DISEASE) (HCC): ICD-10-CM

## 2020-04-17 DIAGNOSIS — I73.9 PERIPHERAL VASCULAR DISEASE, UNSPECIFIED (HCC): ICD-10-CM

## 2020-04-17 DIAGNOSIS — Z79.01 LONG TERM (CURRENT) USE OF ANTICOAGULANTS: ICD-10-CM

## 2020-04-17 DIAGNOSIS — Z51.81 ENCOUNTER FOR THERAPEUTIC DRUG MONITORING: ICD-10-CM

## 2020-04-17 PROCEDURE — 93793 ANTICOAG MGMT PT WARFARIN: CPT | Performed by: INTERNAL MEDICINE

## 2020-05-01 ENCOUNTER — TELEPHONE (OUTPATIENT)
Dept: INTERNAL MEDICINE CLINIC | Facility: CLINIC | Age: 45
End: 2020-05-01

## 2020-05-01 DIAGNOSIS — I37.9 PULMONIC VALVE DISEASE: Primary | ICD-10-CM

## 2020-05-04 ENCOUNTER — ANTI-COAG VISIT (OUTPATIENT)
Dept: INTERNAL MEDICINE CLINIC | Facility: CLINIC | Age: 45
End: 2020-05-04

## 2020-05-04 DIAGNOSIS — I73.9 PVD (PERIPHERAL VASCULAR DISEASE) (HCC): ICD-10-CM

## 2020-05-04 DIAGNOSIS — Z79.01 LONG TERM (CURRENT) USE OF ANTICOAGULANTS: ICD-10-CM

## 2020-05-04 DIAGNOSIS — Z51.81 ENCOUNTER FOR THERAPEUTIC DRUG MONITORING: ICD-10-CM

## 2020-05-04 DIAGNOSIS — I73.9 PERIPHERAL VASCULAR DISEASE (HCC): ICD-10-CM

## 2020-05-04 DIAGNOSIS — I73.9 PERIPHERAL VASCULAR DISEASE, UNSPECIFIED (HCC): ICD-10-CM

## 2020-05-04 RX ORDER — CILOSTAZOL 100 MG/1
TABLET ORAL
Qty: 180 TABLET | Refills: 0 | Status: SHIPPED | OUTPATIENT
Start: 2020-05-04 | End: 2022-07-15

## 2020-05-11 ENCOUNTER — HOSPITAL ENCOUNTER (OUTPATIENT)
Dept: ULTRASOUND IMAGING | Facility: HOSPITAL | Age: 45
Discharge: HOME | End: 2020-05-11
Attending: SURGERY
Payer: COMMERCIAL

## 2020-05-11 DIAGNOSIS — I73.9 PAD (PERIPHERAL ARTERY DISEASE) (HCC): ICD-10-CM

## 2020-05-11 PROCEDURE — 93925 LOWER EXTREMITY STUDY: CPT | Performed by: SURGERY

## 2020-05-14 ENCOUNTER — TELEPHONE (OUTPATIENT)
Dept: CARDIOLOGY | Age: 45
End: 2020-05-14

## 2020-05-15 ENCOUNTER — APPOINTMENT (OUTPATIENT)
Dept: CARDIOLOGY | Age: 45
End: 2020-05-15

## 2020-05-15 ENCOUNTER — TELEPHONE (OUTPATIENT)
Dept: CARDIOLOGY | Age: 45
End: 2020-05-15

## 2020-05-18 ENCOUNTER — ANTI-COAG VISIT (OUTPATIENT)
Dept: INTERNAL MEDICINE CLINIC | Facility: CLINIC | Age: 45
End: 2020-05-18

## 2020-05-18 DIAGNOSIS — I73.9 PERIPHERAL VASCULAR DISEASE (HCC): ICD-10-CM

## 2020-05-18 DIAGNOSIS — Z79.01 LONG TERM (CURRENT) USE OF ANTICOAGULANTS: ICD-10-CM

## 2020-05-18 DIAGNOSIS — I73.9 PVD (PERIPHERAL VASCULAR DISEASE) (HCC): ICD-10-CM

## 2020-05-18 DIAGNOSIS — Z51.81 ENCOUNTER FOR THERAPEUTIC DRUG MONITORING: ICD-10-CM

## 2020-05-18 DIAGNOSIS — I73.9 PERIPHERAL VASCULAR DISEASE, UNSPECIFIED (HCC): ICD-10-CM

## 2020-05-18 PROCEDURE — 93793 ANTICOAG MGMT PT WARFARIN: CPT | Performed by: INTERNAL MEDICINE

## 2020-06-03 ENCOUNTER — ANTI-COAG VISIT (OUTPATIENT)
Dept: INTERNAL MEDICINE CLINIC | Facility: CLINIC | Age: 45
End: 2020-06-03

## 2020-06-03 DIAGNOSIS — I73.9 PVD (PERIPHERAL VASCULAR DISEASE) (HCC): ICD-10-CM

## 2020-06-03 DIAGNOSIS — Z79.01 LONG TERM (CURRENT) USE OF ANTICOAGULANTS: ICD-10-CM

## 2020-06-03 DIAGNOSIS — I73.9 PERIPHERAL VASCULAR DISEASE (HCC): ICD-10-CM

## 2020-06-03 DIAGNOSIS — I73.9 PERIPHERAL VASCULAR DISEASE, UNSPECIFIED (HCC): ICD-10-CM

## 2020-06-03 DIAGNOSIS — Z51.81 ENCOUNTER FOR THERAPEUTIC DRUG MONITORING: ICD-10-CM

## 2020-06-03 PROCEDURE — 93793 ANTICOAG MGMT PT WARFARIN: CPT | Performed by: INTERNAL MEDICINE

## 2020-06-17 ENCOUNTER — TELEPHONE (OUTPATIENT)
Dept: CARDIOLOGY | Age: 45
End: 2020-06-17

## 2020-06-18 ENCOUNTER — ANTI-COAG VISIT (OUTPATIENT)
Dept: INTERNAL MEDICINE CLINIC | Facility: CLINIC | Age: 45
End: 2020-06-18

## 2020-06-18 DIAGNOSIS — Z79.01 LONG TERM (CURRENT) USE OF ANTICOAGULANTS: ICD-10-CM

## 2020-06-18 DIAGNOSIS — I73.9 PERIPHERAL VASCULAR DISEASE (HCC): ICD-10-CM

## 2020-06-18 DIAGNOSIS — I73.9 PERIPHERAL VASCULAR DISEASE, UNSPECIFIED (HCC): ICD-10-CM

## 2020-06-18 DIAGNOSIS — I73.9 PVD (PERIPHERAL VASCULAR DISEASE) (HCC): ICD-10-CM

## 2020-06-18 DIAGNOSIS — Z51.81 ENCOUNTER FOR THERAPEUTIC DRUG MONITORING: ICD-10-CM

## 2020-06-18 PROCEDURE — 93793 ANTICOAG MGMT PT WARFARIN: CPT | Performed by: INTERNAL MEDICINE

## 2020-06-24 RX ORDER — WARFARIN SODIUM 1 MG/1
TABLET ORAL
Qty: 135 TABLET | Refills: 1 | Status: SHIPPED | OUTPATIENT
Start: 2020-06-24 | End: 2021-01-18

## 2020-07-01 LAB — INR: 2.5 (ref 0.8–1.2)

## 2020-07-02 ENCOUNTER — ANTI-COAG VISIT (OUTPATIENT)
Dept: INTERNAL MEDICINE CLINIC | Facility: CLINIC | Age: 45
End: 2020-07-02

## 2020-07-02 DIAGNOSIS — Z79.01 LONG TERM (CURRENT) USE OF ANTICOAGULANTS: ICD-10-CM

## 2020-07-02 DIAGNOSIS — I73.9 PERIPHERAL VASCULAR DISEASE, UNSPECIFIED (HCC): ICD-10-CM

## 2020-07-02 DIAGNOSIS — Z51.81 ENCOUNTER FOR THERAPEUTIC DRUG MONITORING: ICD-10-CM

## 2020-07-02 DIAGNOSIS — I73.9 PERIPHERAL VASCULAR DISEASE (HCC): ICD-10-CM

## 2020-07-02 DIAGNOSIS — I73.9 PVD (PERIPHERAL VASCULAR DISEASE) (HCC): ICD-10-CM

## 2020-07-02 PROCEDURE — 93793 ANTICOAG MGMT PT WARFARIN: CPT | Performed by: INTERNAL MEDICINE

## 2020-07-20 ENCOUNTER — ANTI-COAG VISIT (OUTPATIENT)
Dept: INTERNAL MEDICINE CLINIC | Facility: CLINIC | Age: 45
End: 2020-07-20

## 2020-07-20 DIAGNOSIS — I73.9 PERIPHERAL VASCULAR DISEASE, UNSPECIFIED (HCC): ICD-10-CM

## 2020-07-20 DIAGNOSIS — I73.9 PVD (PERIPHERAL VASCULAR DISEASE) (HCC): ICD-10-CM

## 2020-07-20 DIAGNOSIS — Z79.01 LONG TERM (CURRENT) USE OF ANTICOAGULANTS: ICD-10-CM

## 2020-07-20 DIAGNOSIS — Z51.81 ENCOUNTER FOR THERAPEUTIC DRUG MONITORING: ICD-10-CM

## 2020-07-20 DIAGNOSIS — I73.9 PERIPHERAL VASCULAR DISEASE (HCC): ICD-10-CM

## 2020-07-20 LAB — INR: 2.9 (ref 0.8–1.2)

## 2020-07-20 PROCEDURE — 93793 ANTICOAG MGMT PT WARFARIN: CPT | Performed by: INTERNAL MEDICINE

## 2020-07-22 SDOH — HEALTH STABILITY: MENTAL HEALTH: HOW OFTEN DO YOU HAVE A DRINK CONTAINING ALCOHOL?: NEVER

## 2020-07-24 ENCOUNTER — OFFICE VISIT (OUTPATIENT)
Dept: CARDIOLOGY | Age: 45
End: 2020-07-24

## 2020-07-24 VITALS
BODY MASS INDEX: 23.56 KG/M2 | HEART RATE: 73 BPM | SYSTOLIC BLOOD PRESSURE: 110 MMHG | WEIGHT: 120 LBS | OXYGEN SATURATION: 97 % | HEIGHT: 60 IN | DIASTOLIC BLOOD PRESSURE: 62 MMHG

## 2020-07-24 DIAGNOSIS — I36.1 NONRHEUMATIC TRICUSPID VALVE REGURGITATION: Primary | ICD-10-CM

## 2020-07-24 DIAGNOSIS — I37.0 NONRHEUMATIC PULMONARY VALVE STENOSIS: ICD-10-CM

## 2020-07-24 DIAGNOSIS — Z95.828 S/P FEMORAL-POPLITEAL BYPASS SURGERY: ICD-10-CM

## 2020-07-24 PROCEDURE — 99213 OFFICE O/P EST LOW 20 MIN: CPT | Performed by: INTERNAL MEDICINE

## 2020-07-24 SDOH — HEALTH STABILITY: MENTAL HEALTH: HOW OFTEN DO YOU HAVE A DRINK CONTAINING ALCOHOL?: NEVER

## 2020-07-24 ASSESSMENT — PATIENT HEALTH QUESTIONNAIRE - PHQ9
CLINICAL INTERPRETATION OF PHQ9 SCORE: NO FURTHER SCREENING NEEDED
SUM OF ALL RESPONSES TO PHQ9 QUESTIONS 1 AND 2: 0
1. LITTLE INTEREST OR PLEASURE IN DOING THINGS: NOT AT ALL
2. FEELING DOWN, DEPRESSED OR HOPELESS: NOT AT ALL
SUM OF ALL RESPONSES TO PHQ9 QUESTIONS 1 AND 2: 0
CLINICAL INTERPRETATION OF PHQ2 SCORE: NO FURTHER SCREENING NEEDED

## 2020-08-03 ENCOUNTER — ANTI-COAG VISIT (OUTPATIENT)
Dept: INTERNAL MEDICINE CLINIC | Facility: CLINIC | Age: 45
End: 2020-08-03

## 2020-08-03 LAB — INR: 2.4 (ref 0.8–1.2)

## 2020-08-03 RX ORDER — CILOSTAZOL 100 MG/1
TABLET ORAL
Qty: 180 TABLET | Refills: 0 | OUTPATIENT
Start: 2020-08-03

## 2020-08-11 RX ORDER — CILOSTAZOL 100 MG/1
100 TABLET ORAL 2 TIMES DAILY
Qty: 60 TABLET | Refills: 11 | Status: SHIPPED | OUTPATIENT
Start: 2020-08-11 | End: 2022-08-17

## 2020-08-18 LAB — INR: 2.9 (ref 2–3)

## 2020-08-19 ENCOUNTER — ANTI-COAG VISIT (OUTPATIENT)
Dept: INTERNAL MEDICINE CLINIC | Facility: CLINIC | Age: 45
End: 2020-08-19

## 2020-08-22 ENCOUNTER — TELEPHONE (OUTPATIENT)
Dept: INTERNAL MEDICINE CLINIC | Facility: CLINIC | Age: 45
End: 2020-08-22

## 2020-08-22 DIAGNOSIS — I73.9 PERIPHERAL VASCULAR DISEASE, UNSPECIFIED (HCC): Primary | ICD-10-CM

## 2020-08-22 DIAGNOSIS — Z79.01 LONG TERM (CURRENT) USE OF ANTICOAGULANTS: ICD-10-CM

## 2020-08-22 NOTE — TELEPHONE ENCOUNTER
Received fax from [facility] requesting a referral for Enrique Best 5798 softclix PT/INR LA qty 25 and Enrique Hidalgo24 6 patient test ST qty 12    25 lancets/12 stripts (each-not boxes)

## 2020-08-26 ENCOUNTER — HOSPITAL ENCOUNTER (OUTPATIENT)
Dept: ULTRASOUND IMAGING | Facility: HOSPITAL | Age: 45
Discharge: HOME | End: 2020-08-26
Attending: OBSTETRICS & GYNECOLOGY
Payer: COMMERCIAL

## 2020-08-26 DIAGNOSIS — N60.19 DIFFUSE CYSTIC MASTOPATHY OF BREAST: ICD-10-CM

## 2020-08-26 PROCEDURE — 76642 ULTRASOUND BREAST LIMITED: CPT | Performed by: OBSTETRICS & GYNECOLOGY

## 2020-08-28 ENCOUNTER — TELEPHONE (OUTPATIENT)
Dept: INTERNAL MEDICINE CLINIC | Facility: CLINIC | Age: 45
End: 2020-08-28

## 2020-08-28 NOTE — TELEPHONE ENCOUNTER
Ever from [address] [place] [address] requesting for referral to be faxed to them. Please add account number so it is easy to identify.  # [id number]    Fax [phone]

## 2020-09-01 LAB — INR: 2.2 (ref 0.8–1.2)

## 2020-09-02 ENCOUNTER — ANTI-COAG VISIT (OUTPATIENT)
Dept: INTERNAL MEDICINE CLINIC | Facility: CLINIC | Age: 45
End: 2020-09-02

## 2020-09-02 PROCEDURE — 93793 ANTICOAG MGMT PT WARFARIN: CPT | Performed by: INTERNAL MEDICINE

## 2020-09-16 ENCOUNTER — ANTI-COAG VISIT (OUTPATIENT)
Dept: INTERNAL MEDICINE CLINIC | Facility: CLINIC | Age: 45
End: 2020-09-16

## 2020-09-16 LAB — INR: 2.3 (ref 0.8–1.2)

## 2020-09-30 ENCOUNTER — ANTI-COAG VISIT (OUTPATIENT)
Dept: INTERNAL MEDICINE CLINIC | Facility: CLINIC | Age: 45
End: 2020-09-30

## 2020-09-30 LAB — INR: 2.2 (ref 0.8–1.2)

## 2020-09-30 PROCEDURE — 93793 ANTICOAG MGMT PT WARFARIN: CPT | Performed by: INTERNAL MEDICINE

## 2020-10-15 ENCOUNTER — ANTI-COAG VISIT (OUTPATIENT)
Dept: INTERNAL MEDICINE CLINIC | Facility: CLINIC | Age: 45
End: 2020-10-15

## 2020-10-15 PROCEDURE — 93793 ANTICOAG MGMT PT WARFARIN: CPT | Performed by: INTERNAL MEDICINE

## 2020-10-29 ENCOUNTER — ANTI-COAG VISIT (OUTPATIENT)
Dept: INTERNAL MEDICINE CLINIC | Facility: CLINIC | Age: 45
End: 2020-10-29

## 2020-11-12 ENCOUNTER — TELEPHONE (OUTPATIENT)
Dept: INTERNAL MEDICINE CLINIC | Facility: CLINIC | Age: 45
End: 2020-11-12

## 2020-11-12 ENCOUNTER — ANTI-COAG VISIT (OUTPATIENT)
Dept: INTERNAL MEDICINE CLINIC | Facility: CLINIC | Age: 45
End: 2020-11-12

## 2020-11-12 DIAGNOSIS — I73.9 PERIPHERAL VASCULAR DISEASE, UNSPECIFIED (HCC): Primary | ICD-10-CM

## 2020-11-12 DIAGNOSIS — Z79.01 LONG TERM (CURRENT) USE OF ANTICOAGULANTS: ICD-10-CM

## 2020-11-12 NOTE — TELEPHONE ENCOUNTER
Hi Dr. Janki Spears,    The Anticoagulation referral has . I have pended a new order. Please sign.  Thank you

## 2020-11-30 ENCOUNTER — ANTI-COAG VISIT (OUTPATIENT)
Dept: INTERNAL MEDICINE CLINIC | Facility: CLINIC | Age: 45
End: 2020-11-30

## 2020-11-30 DIAGNOSIS — I73.9 PERIPHERAL VASCULAR DISEASE, UNSPECIFIED (HCC): ICD-10-CM

## 2020-11-30 DIAGNOSIS — Z79.01 LONG TERM (CURRENT) USE OF ANTICOAGULANTS: ICD-10-CM

## 2020-12-02 ENCOUNTER — HOSPITAL ENCOUNTER (OUTPATIENT)
Dept: ULTRASOUND IMAGING | Facility: HOSPITAL | Age: 45
Discharge: HOME | End: 2020-12-02
Attending: SURGERY
Payer: COMMERCIAL

## 2020-12-02 DIAGNOSIS — I73.9 PVD (PERIPHERAL VASCULAR DISEASE) (HCC): ICD-10-CM

## 2020-12-02 PROCEDURE — 93925 LOWER EXTREMITY STUDY: CPT | Performed by: SURGERY

## 2020-12-03 ENCOUNTER — TELEPHONE (OUTPATIENT)
Dept: FAMILY MEDICINE CLINIC | Facility: CLINIC | Age: 45
End: 2020-12-03

## 2020-12-03 NOTE — TELEPHONE ENCOUNTER
S/w [employer] and reviewed last coumadin instructions. States she did hold her dose as instructed, and continue her normal dose and will recheck in 1-2 weeks. No further questions.

## 2020-12-08 ENCOUNTER — ANTI-COAG VISIT (OUTPATIENT)
Dept: INTERNAL MEDICINE CLINIC | Facility: CLINIC | Age: 45
End: 2020-12-08

## 2020-12-08 DIAGNOSIS — I73.9 PERIPHERAL VASCULAR DISEASE, UNSPECIFIED (HCC): ICD-10-CM

## 2020-12-08 DIAGNOSIS — Z79.01 LONG TERM (CURRENT) USE OF ANTICOAGULANTS: ICD-10-CM

## 2020-12-08 PROCEDURE — 93793 ANTICOAG MGMT PT WARFARIN: CPT | Performed by: INTERNAL MEDICINE

## 2020-12-17 ENCOUNTER — ANTI-COAG VISIT (OUTPATIENT)
Dept: INTERNAL MEDICINE CLINIC | Facility: CLINIC | Age: 45
End: 2020-12-17

## 2020-12-17 DIAGNOSIS — I73.9 PERIPHERAL VASCULAR DISEASE, UNSPECIFIED (HCC): ICD-10-CM

## 2020-12-17 DIAGNOSIS — Z79.01 LONG TERM (CURRENT) USE OF ANTICOAGULANTS: ICD-10-CM

## 2020-12-17 PROCEDURE — 93793 ANTICOAG MGMT PT WARFARIN: CPT | Performed by: INTERNAL MEDICINE

## 2020-12-31 ENCOUNTER — ANTI-COAG VISIT (OUTPATIENT)
Dept: INTERNAL MEDICINE CLINIC | Facility: CLINIC | Age: 45
End: 2020-12-31

## 2020-12-31 DIAGNOSIS — I73.9 PERIPHERAL VASCULAR DISEASE, UNSPECIFIED (HCC): ICD-10-CM

## 2020-12-31 DIAGNOSIS — Z79.01 LONG TERM (CURRENT) USE OF ANTICOAGULANTS: ICD-10-CM

## 2020-12-31 PROCEDURE — 93793 ANTICOAG MGMT PT WARFARIN: CPT | Performed by: INTERNAL MEDICINE

## 2021-01-13 ENCOUNTER — ANTI-COAG VISIT (OUTPATIENT)
Dept: INTERNAL MEDICINE CLINIC | Facility: CLINIC | Age: 46
End: 2021-01-13

## 2021-01-13 DIAGNOSIS — Z79.01 LONG TERM (CURRENT) USE OF ANTICOAGULANTS: ICD-10-CM

## 2021-01-13 DIAGNOSIS — I73.9 PERIPHERAL VASCULAR DISEASE, UNSPECIFIED (HCC): ICD-10-CM

## 2021-01-13 LAB — INR: 2.3 (ref 0.8–1.2)

## 2021-01-13 PROCEDURE — 93793 ANTICOAG MGMT PT WARFARIN: CPT | Performed by: INTERNAL MEDICINE

## 2021-01-18 RX ORDER — WARFARIN SODIUM 1 MG/1
1.5 TABLET ORAL EVERY EVENING
Qty: 135 TABLET | Refills: 1 | Status: SHIPPED | OUTPATIENT
Start: 2021-01-18 | End: 2021-10-02

## 2021-01-18 NOTE — TELEPHONE ENCOUNTER
•  WARFARIN SODIUM 1 MG Oral Tab, TAKE 1 AND 1/2 TABLETS BY MOUTH DAILY IN THE EVENING AS DIRECTED BY COUMADIN CLINIC, Disp: 135 tablet, Rfl: 1

## 2021-01-28 ENCOUNTER — HOSPITAL ENCOUNTER (OUTPATIENT)
Dept: MAMMOGRAPHY | Facility: HOSPITAL | Age: 46
Discharge: HOME | End: 2021-01-28
Attending: OBSTETRICS & GYNECOLOGY
Payer: COMMERCIAL

## 2021-01-28 DIAGNOSIS — N60.19 FIBROCYSTIC BREAST DISEASE (FCBD), UNSPECIFIED LATERALITY: ICD-10-CM

## 2021-01-28 PROCEDURE — 77062 BREAST TOMOSYNTHESIS BI: CPT | Performed by: OBSTETRICS & GYNECOLOGY

## 2021-01-28 PROCEDURE — 76641 ULTRASOUND BREAST COMPLETE: CPT | Performed by: OBSTETRICS & GYNECOLOGY

## 2021-01-28 PROCEDURE — 77066 DX MAMMO INCL CAD BI: CPT | Performed by: OBSTETRICS & GYNECOLOGY

## 2021-01-29 ENCOUNTER — ANTI-COAG VISIT (OUTPATIENT)
Dept: INTERNAL MEDICINE CLINIC | Facility: CLINIC | Age: 46
End: 2021-01-29

## 2021-01-29 ENCOUNTER — APPOINTMENT (OUTPATIENT)
Dept: GENERAL RADIOLOGY | Age: 46
End: 2021-01-29
Attending: NURSE PRACTITIONER
Payer: COMMERCIAL

## 2021-01-29 ENCOUNTER — HOSPITAL ENCOUNTER (OUTPATIENT)
Age: 46
Discharge: HOME | End: 2021-01-29
Payer: COMMERCIAL

## 2021-01-29 VITALS
WEIGHT: 118 LBS | TEMPERATURE: 99 F | HEIGHT: 59 IN | RESPIRATION RATE: 16 BRPM | BODY MASS INDEX: 23.79 KG/M2 | OXYGEN SATURATION: 100 % | SYSTOLIC BLOOD PRESSURE: 121 MMHG | HEART RATE: 87 BPM | DIASTOLIC BLOOD PRESSURE: 74 MMHG

## 2021-01-29 DIAGNOSIS — S69.90XA FINGER INJURY: Primary | ICD-10-CM

## 2021-01-29 DIAGNOSIS — S62.663A CLOSED NONDISPLACED FRACTURE OF DISTAL PHALANX OF LEFT MIDDLE FINGER, INITIAL ENCOUNTER: ICD-10-CM

## 2021-01-29 DIAGNOSIS — Z79.01 LONG TERM (CURRENT) USE OF ANTICOAGULANTS: ICD-10-CM

## 2021-01-29 DIAGNOSIS — I73.9 PERIPHERAL VASCULAR DISEASE, UNSPECIFIED (HCC): ICD-10-CM

## 2021-01-29 LAB — INR: 2.4 (ref 0.8–1.2)

## 2021-01-29 PROCEDURE — 93793 ANTICOAG MGMT PT WARFARIN: CPT | Performed by: INTERNAL MEDICINE

## 2021-01-29 PROCEDURE — 99203 OFFICE O/P NEW LOW 30 MIN: CPT | Performed by: NURSE PRACTITIONER

## 2021-01-29 PROCEDURE — 73140 X-RAY EXAM OF FINGER(S): CPT | Performed by: NURSE PRACTITIONER

## 2021-01-29 PROCEDURE — A4570 SPLINT: HCPCS | Performed by: NURSE PRACTITIONER

## 2021-01-29 NOTE — ED INITIAL ASSESSMENT (HPI)
Right 3rd finger with swelling/subungual hematoma from a fall last night while taking out the garbage, The garbage can fell on top of her finger. She reports right knee pain, but able to move her knee without difficulty.

## 2021-01-29 NOTE — ED PROVIDER NOTES
Patient Seen in: [facility] [place]      History   Patient presents with:  Finger Injury    Stated Complaint: TL-Right 3rd finger-- DIP joint and distal phalanx-- injury yesterday.  Now swol*    HPI/Subjective:   HPI    45-year-old female with right Pulmonic stenosis)   • VAGINAL HYSTERECTOMY WITH BILATERAL [address] N/A 10/27/2018    Performed by Luzma Nowak MD at [facility] MAIN OR                Social History    Tobacco Use      Smoking status: Never Smoker      Smokeless tobacco: Never Used    Meinotjeannie Anchors Course   Labs Reviewed - No data to display                MDM      Xr Finger(s) (min 2 Views), Right 3rd (cpt=73140)    Result Date: 1/29/2021  CONCLUSION:  1.  There is an incomplete nondisplaced lucency through the distal bony tuft of the distal phalanx There is an incomplete nondisplaced lucency through the tuft of the middle digit. Patient was placed in a finger splint, she is neurovascularly intact before and after splint application. Patient discharged with information to follow-up with hand.     Rec

## 2021-02-04 ENCOUNTER — TELEPHONE (OUTPATIENT)
Dept: INTERNAL MEDICINE CLINIC | Facility: CLINIC | Age: 46
End: 2021-02-04

## 2021-02-04 ENCOUNTER — OFFICE VISIT (OUTPATIENT)
Dept: INTERNAL MEDICINE CLINIC | Facility: CLINIC | Age: 46
End: 2021-02-04

## 2021-02-04 VITALS
DIASTOLIC BLOOD PRESSURE: 76 MMHG | SYSTOLIC BLOOD PRESSURE: 109 MMHG | BODY MASS INDEX: 24.6 KG/M2 | HEIGHT: 59 IN | WEIGHT: 122 LBS | TEMPERATURE: 98 F | HEART RATE: 77 BPM

## 2021-02-04 DIAGNOSIS — S62.662A CLOSED NONDISPLACED FRACTURE OF DISTAL PHALANX OF RIGHT MIDDLE FINGER, INITIAL ENCOUNTER: Primary | ICD-10-CM

## 2021-02-04 PROCEDURE — 99213 OFFICE O/P EST LOW 20 MIN: CPT | Performed by: INTERNAL MEDICINE

## 2021-02-04 PROCEDURE — 3008F BODY MASS INDEX DOCD: CPT | Performed by: INTERNAL MEDICINE

## 2021-02-04 PROCEDURE — 3074F SYST BP LT 130 MM HG: CPT | Performed by: INTERNAL MEDICINE

## 2021-02-04 PROCEDURE — 3078F DIAST BP <80 MM HG: CPT | Performed by: INTERNAL MEDICINE

## 2021-02-04 NOTE — TELEPHONE ENCOUNTER
Patient was seen in urgent care on 01/29/21 for broken finger. She is calling to schedule a follow up appointment per her discharge instructions.  Appointment scheduled:  Future Appointments   Date Time Provider Rolan Garay   2/4/2021  4:30 PM Yee Perez

## 2021-02-05 NOTE — PROGRESS NOTES
HPI:    Patient ID: Pablito Zuleta is a 41year old female. Fracture  This is a new (pt fractured distal phalanx 3rd finger right) problem.  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  w/u negative for coagulopathy but was anticoagulated during pregnancie Neurological: She is alert. Skin: No rash noted. She is not diaphoretic. No erythema. No pallor.             ASSESSMENT/PLAN:   (N73.457V) Closed nondisplaced fracture of distal phalanx of right middle finger, initial encounter  (primary encounter diagnos

## 2021-02-16 ENCOUNTER — ANTI-COAG VISIT (OUTPATIENT)
Dept: INTERNAL MEDICINE CLINIC | Facility: CLINIC | Age: 46
End: 2021-02-16

## 2021-02-16 DIAGNOSIS — Z79.01 LONG TERM (CURRENT) USE OF ANTICOAGULANTS: ICD-10-CM

## 2021-02-16 DIAGNOSIS — I73.9 PERIPHERAL VASCULAR DISEASE, UNSPECIFIED (HCC): ICD-10-CM

## 2021-02-16 LAB — INR: 3.1 (ref 0.8–1.2)

## 2021-03-04 ENCOUNTER — ANTI-COAG VISIT (OUTPATIENT)
Dept: INTERNAL MEDICINE CLINIC | Facility: CLINIC | Age: 46
End: 2021-03-04

## 2021-03-04 DIAGNOSIS — Z79.01 LONG TERM (CURRENT) USE OF ANTICOAGULANTS: ICD-10-CM

## 2021-03-04 DIAGNOSIS — I73.9 PERIPHERAL VASCULAR DISEASE, UNSPECIFIED (HCC): ICD-10-CM

## 2021-03-04 LAB — INR: 2 (ref 0.8–1.2)

## 2021-03-04 PROCEDURE — 93793 ANTICOAG MGMT PT WARFARIN: CPT | Performed by: INTERNAL MEDICINE

## 2021-03-18 ENCOUNTER — ANTI-COAG VISIT (OUTPATIENT)
Dept: INTERNAL MEDICINE CLINIC | Facility: CLINIC | Age: 46
End: 2021-03-18

## 2021-03-18 DIAGNOSIS — I73.9 PERIPHERAL VASCULAR DISEASE, UNSPECIFIED (HCC): ICD-10-CM

## 2021-03-18 DIAGNOSIS — Z79.01 LONG TERM (CURRENT) USE OF ANTICOAGULANTS: ICD-10-CM

## 2021-03-18 LAB — INR: 2.1 (ref 2–3)

## 2021-03-18 PROCEDURE — 93793 ANTICOAG MGMT PT WARFARIN: CPT | Performed by: INTERNAL MEDICINE

## 2021-04-01 ENCOUNTER — ANTI-COAG VISIT (OUTPATIENT)
Dept: INTERNAL MEDICINE CLINIC | Facility: CLINIC | Age: 46
End: 2021-04-01

## 2021-04-01 DIAGNOSIS — I73.9 PERIPHERAL VASCULAR DISEASE, UNSPECIFIED (HCC): ICD-10-CM

## 2021-04-01 DIAGNOSIS — Z79.01 LONG TERM (CURRENT) USE OF ANTICOAGULANTS: ICD-10-CM

## 2021-04-01 PROCEDURE — 93793 ANTICOAG MGMT PT WARFARIN: CPT | Performed by: INTERNAL MEDICINE

## 2021-04-16 ENCOUNTER — ANTI-COAG VISIT (OUTPATIENT)
Dept: INTERNAL MEDICINE CLINIC | Facility: CLINIC | Age: 46
End: 2021-04-16

## 2021-04-16 DIAGNOSIS — I73.9 PERIPHERAL VASCULAR DISEASE, UNSPECIFIED (HCC): ICD-10-CM

## 2021-04-16 DIAGNOSIS — Z79.01 LONG TERM (CURRENT) USE OF ANTICOAGULANTS: ICD-10-CM

## 2021-04-16 PROCEDURE — 93793 ANTICOAG MGMT PT WARFARIN: CPT | Performed by: INTERNAL MEDICINE

## 2021-04-30 ENCOUNTER — ANTI-COAG VISIT (OUTPATIENT)
Dept: INTERNAL MEDICINE CLINIC | Facility: CLINIC | Age: 46
End: 2021-04-30

## 2021-04-30 DIAGNOSIS — Z79.01 LONG TERM (CURRENT) USE OF ANTICOAGULANTS: ICD-10-CM

## 2021-04-30 DIAGNOSIS — I73.9 PERIPHERAL VASCULAR DISEASE, UNSPECIFIED (HCC): ICD-10-CM

## 2021-04-30 PROCEDURE — 93793 ANTICOAG MGMT PT WARFARIN: CPT | Performed by: INTERNAL MEDICINE

## 2021-05-14 ENCOUNTER — ANTI-COAG VISIT (OUTPATIENT)
Dept: INTERNAL MEDICINE CLINIC | Facility: CLINIC | Age: 46
End: 2021-05-14

## 2021-05-14 DIAGNOSIS — I73.9 PERIPHERAL VASCULAR DISEASE, UNSPECIFIED (HCC): ICD-10-CM

## 2021-05-14 DIAGNOSIS — Z79.01 LONG TERM (CURRENT) USE OF ANTICOAGULANTS: ICD-10-CM

## 2021-05-14 PROCEDURE — 93793 ANTICOAG MGMT PT WARFARIN: CPT | Performed by: INTERNAL MEDICINE

## 2021-05-21 ENCOUNTER — TELEPHONE (OUTPATIENT)
Dept: INTERNAL MEDICINE CLINIC | Facility: CLINIC | Age: 46
End: 2021-05-21

## 2021-05-21 NOTE — TELEPHONE ENCOUNTER
Patient is requesting a referral to see the cardiologist, Dr. Christofer Carbajal for yearly appointment, this a new Dr due to Dr. Bud Metcalf retired and her appointment is in July 2021, please advise

## 2021-05-27 ENCOUNTER — ANTI-COAG VISIT (OUTPATIENT)
Dept: INTERNAL MEDICINE CLINIC | Facility: CLINIC | Age: 46
End: 2021-05-27

## 2021-05-27 DIAGNOSIS — I73.9 PERIPHERAL VASCULAR DISEASE, UNSPECIFIED (HCC): ICD-10-CM

## 2021-05-27 DIAGNOSIS — Z79.01 LONG TERM (CURRENT) USE OF ANTICOAGULANTS: ICD-10-CM

## 2021-05-27 PROCEDURE — 93793 ANTICOAG MGMT PT WARFARIN: CPT | Performed by: INTERNAL MEDICINE

## 2021-06-04 ENCOUNTER — TELEPHONE (OUTPATIENT)
Dept: INTERNAL MEDICINE CLINIC | Facility: CLINIC | Age: 46
End: 2021-06-04

## 2021-06-04 DIAGNOSIS — I73.9 PERIPHERAL VASCULAR DISEASE, UNSPECIFIED (HCC): Primary | ICD-10-CM

## 2021-06-04 NOTE — TELEPHONE ENCOUNTER
Patient requesting referral to see [facility] surgery, Dr. Lew Reyes. Patient is due for visit. Please advise.

## 2021-06-10 ENCOUNTER — ANTI-COAG VISIT (OUTPATIENT)
Dept: INTERNAL MEDICINE CLINIC | Facility: CLINIC | Age: 46
End: 2021-06-10

## 2021-06-10 DIAGNOSIS — I73.9 PERIPHERAL VASCULAR DISEASE, UNSPECIFIED (HCC): ICD-10-CM

## 2021-06-10 DIAGNOSIS — Z79.01 LONG TERM (CURRENT) USE OF ANTICOAGULANTS: ICD-10-CM

## 2021-06-14 NOTE — TELEPHONE ENCOUNTER
From   Rupa Irvin To   Maria Teresa Deras and Delivered   6/11/2021  8:41 AM   Last Read in MyChart   6/11/2021  8:44 AM by Loreto De Souza

## 2021-06-24 ENCOUNTER — HOSPITAL ENCOUNTER (OUTPATIENT)
Dept: ULTRASOUND IMAGING | Facility: HOSPITAL | Age: 46
Discharge: HOME | End: 2021-06-24
Attending: SURGERY
Payer: COMMERCIAL

## 2021-06-24 DIAGNOSIS — I73.9 PVD (PERIPHERAL VASCULAR DISEASE) (HCC): ICD-10-CM

## 2021-06-24 PROCEDURE — 93925 LOWER EXTREMITY STUDY: CPT | Performed by: SURGERY

## 2021-06-30 ENCOUNTER — ANTI-COAG VISIT (OUTPATIENT)
Dept: INTERNAL MEDICINE CLINIC | Facility: CLINIC | Age: 46
End: 2021-06-30

## 2021-06-30 DIAGNOSIS — Z79.01 LONG TERM (CURRENT) USE OF ANTICOAGULANTS: ICD-10-CM

## 2021-06-30 DIAGNOSIS — I73.9 PERIPHERAL VASCULAR DISEASE, UNSPECIFIED (HCC): ICD-10-CM

## 2021-06-30 PROCEDURE — 93793 ANTICOAG MGMT PT WARFARIN: CPT | Performed by: INTERNAL MEDICINE

## 2021-07-15 ENCOUNTER — TELEPHONE (OUTPATIENT)
Dept: INTERNAL MEDICINE CLINIC | Facility: CLINIC | Age: 46
End: 2021-07-15

## 2021-07-15 ENCOUNTER — MED REC SCAN ONLY (OUTPATIENT)
Dept: INTERNAL MEDICINE CLINIC | Facility: CLINIC | Age: 46
End: 2021-07-15

## 2021-07-15 NOTE — TELEPHONE ENCOUNTER
Prior Authorization Referral Request [facility] in Dr Chai Roberson office [facility], faxed to Triage Support at [phone], confirmation received.

## 2021-07-16 ENCOUNTER — HOSPITAL ENCOUNTER (OUTPATIENT)
Dept: MAMMOGRAPHY | Facility: HOSPITAL | Age: 46
Discharge: HOME | End: 2021-07-16
Attending: OBSTETRICS & GYNECOLOGY
Payer: COMMERCIAL

## 2021-07-16 DIAGNOSIS — R92.8 ABNORMAL MAMMOGRAM: ICD-10-CM

## 2021-07-16 PROCEDURE — 77061 BREAST TOMOSYNTHESIS UNI: CPT | Performed by: OBSTETRICS & GYNECOLOGY

## 2021-07-16 PROCEDURE — 76642 ULTRASOUND BREAST LIMITED: CPT | Performed by: OBSTETRICS & GYNECOLOGY

## 2021-07-16 PROCEDURE — 77065 DX MAMMO INCL CAD UNI: CPT | Performed by: OBSTETRICS & GYNECOLOGY

## 2021-07-16 PROCEDURE — 76641 ULTRASOUND BREAST COMPLETE: CPT | Performed by: OBSTETRICS & GYNECOLOGY

## 2021-07-20 LAB — INR: 2.5 (ref 0.8–1.2)

## 2021-07-22 ENCOUNTER — ANTI-COAG VISIT (OUTPATIENT)
Dept: INTERNAL MEDICINE CLINIC | Facility: CLINIC | Age: 46
End: 2021-07-22

## 2021-07-22 DIAGNOSIS — Z79.01 LONG TERM (CURRENT) USE OF ANTICOAGULANTS: ICD-10-CM

## 2021-07-22 DIAGNOSIS — I73.9 PERIPHERAL VASCULAR DISEASE, UNSPECIFIED (HCC): ICD-10-CM

## 2021-07-22 PROCEDURE — 93793 ANTICOAG MGMT PT WARFARIN: CPT | Performed by: INTERNAL MEDICINE

## 2021-07-30 ENCOUNTER — APPOINTMENT (OUTPATIENT)
Dept: CARDIOLOGY | Age: 46
End: 2021-07-30

## 2021-08-03 LAB — INR: 2.3 (ref 2–3)

## 2021-08-04 ENCOUNTER — ANTI-COAG VISIT (OUTPATIENT)
Dept: INTERNAL MEDICINE CLINIC | Facility: CLINIC | Age: 46
End: 2021-08-04

## 2021-08-04 DIAGNOSIS — Z79.01 LONG TERM (CURRENT) USE OF ANTICOAGULANTS: ICD-10-CM

## 2021-08-04 DIAGNOSIS — I73.9 PERIPHERAL VASCULAR DISEASE, UNSPECIFIED (HCC): ICD-10-CM

## 2021-08-20 ENCOUNTER — ANTI-COAG VISIT (OUTPATIENT)
Dept: INTERNAL MEDICINE CLINIC | Facility: CLINIC | Age: 46
End: 2021-08-20

## 2021-08-20 DIAGNOSIS — I73.9 PERIPHERAL VASCULAR DISEASE, UNSPECIFIED (HCC): ICD-10-CM

## 2021-08-20 DIAGNOSIS — Z79.01 LONG TERM (CURRENT) USE OF ANTICOAGULANTS: ICD-10-CM

## 2021-08-20 LAB — INR: 2.8 (ref 0.8–1.2)

## 2021-08-20 PROCEDURE — 93793 ANTICOAG MGMT PT WARFARIN: CPT | Performed by: INTERNAL MEDICINE

## 2021-08-23 RX ORDER — CILOSTAZOL 100 MG/1
TABLET ORAL
Qty: 60 TABLET | Refills: 11 | OUTPATIENT
Start: 2021-08-23

## 2021-08-25 RX ORDER — CILOSTAZOL 100 MG/1
100 TABLET ORAL 2 TIMES DAILY
Qty: 60 TABLET | Refills: 11 | Status: SHIPPED | OUTPATIENT
Start: 2021-08-25 | End: 2022-07-15

## 2021-09-03 ENCOUNTER — ANTI-COAG VISIT (OUTPATIENT)
Dept: INTERNAL MEDICINE CLINIC | Facility: CLINIC | Age: 46
End: 2021-09-03

## 2021-09-03 DIAGNOSIS — Z79.01 LONG TERM (CURRENT) USE OF ANTICOAGULANTS: ICD-10-CM

## 2021-09-03 DIAGNOSIS — I73.9 PERIPHERAL VASCULAR DISEASE, UNSPECIFIED (HCC): ICD-10-CM

## 2021-09-03 LAB — INR: 2.9 (ref 0.8–1.2)

## 2021-09-03 PROCEDURE — 93793 ANTICOAG MGMT PT WARFARIN: CPT | Performed by: INTERNAL MEDICINE

## 2021-09-21 ENCOUNTER — ANTI-COAG VISIT (OUTPATIENT)
Dept: INTERNAL MEDICINE CLINIC | Facility: CLINIC | Age: 46
End: 2021-09-21

## 2021-09-21 DIAGNOSIS — Z79.01 LONG TERM (CURRENT) USE OF ANTICOAGULANTS: ICD-10-CM

## 2021-09-21 DIAGNOSIS — I73.9 PERIPHERAL VASCULAR DISEASE, UNSPECIFIED (HCC): ICD-10-CM

## 2021-09-21 LAB — INR: 2.1 (ref 0.8–1.2)

## 2021-09-21 PROCEDURE — 93793 ANTICOAG MGMT PT WARFARIN: CPT | Performed by: INTERNAL MEDICINE

## 2021-10-02 RX ORDER — WARFARIN SODIUM 1 MG/1
TABLET ORAL
Qty: 135 TABLET | Refills: 1 | Status: SHIPPED | OUTPATIENT
Start: 2021-10-02 | End: 2021-10-04

## 2021-10-05 RX ORDER — WARFARIN SODIUM 1 MG/1
TABLET ORAL
Qty: 135 TABLET | Refills: 1 | Status: SHIPPED | OUTPATIENT
Start: 2021-10-05

## 2021-10-05 NOTE — TELEPHONE ENCOUNTER
Patient advised.      Pharmacy    [facility], [place] - [address], [phone], [phone]       Outpatient Medication Detail     Disp Refills Start End    warfarin 1 MG Oral Tab 1

## 2021-10-06 ENCOUNTER — ANTI-COAG VISIT (OUTPATIENT)
Dept: INTERNAL MEDICINE CLINIC | Facility: CLINIC | Age: 46
End: 2021-10-06

## 2021-10-06 DIAGNOSIS — Z79.01 LONG TERM (CURRENT) USE OF ANTICOAGULANTS: ICD-10-CM

## 2021-10-06 DIAGNOSIS — I73.9 PERIPHERAL VASCULAR DISEASE, UNSPECIFIED (HCC): ICD-10-CM

## 2021-10-06 PROCEDURE — 93793 ANTICOAG MGMT PT WARFARIN: CPT | Performed by: INTERNAL MEDICINE

## 2021-10-21 ENCOUNTER — ANTI-COAG VISIT (OUTPATIENT)
Dept: INTERNAL MEDICINE CLINIC | Facility: CLINIC | Age: 46
End: 2021-10-21

## 2021-10-21 DIAGNOSIS — I73.9 PERIPHERAL VASCULAR DISEASE, UNSPECIFIED (HCC): ICD-10-CM

## 2021-10-21 DIAGNOSIS — Z79.01 LONG TERM (CURRENT) USE OF ANTICOAGULANTS: ICD-10-CM

## 2021-10-21 PROCEDURE — 93793 ANTICOAG MGMT PT WARFARIN: CPT | Performed by: INTERNAL MEDICINE

## 2021-11-05 ENCOUNTER — ANTI-COAG VISIT (OUTPATIENT)
Dept: INTERNAL MEDICINE CLINIC | Facility: CLINIC | Age: 46
End: 2021-11-05

## 2021-11-05 ENCOUNTER — TELEPHONE (OUTPATIENT)
Dept: INTERNAL MEDICINE CLINIC | Facility: CLINIC | Age: 46
End: 2021-11-05

## 2021-11-05 DIAGNOSIS — I73.9 PERIPHERAL VASCULAR DISEASE (HCC): Primary | ICD-10-CM

## 2021-11-05 DIAGNOSIS — Z51.81 ENCOUNTER FOR THERAPEUTIC DRUG MONITORING: ICD-10-CM

## 2021-11-05 DIAGNOSIS — I73.9 PERIPHERAL VASCULAR DISEASE, UNSPECIFIED (HCC): ICD-10-CM

## 2021-11-05 DIAGNOSIS — Z79.01 LONG TERM (CURRENT) USE OF ANTICOAGULANTS: ICD-10-CM

## 2021-11-05 PROCEDURE — 93793 ANTICOAG MGMT PT WARFARIN: CPT | Performed by: INTERNAL MEDICINE

## 2021-11-05 NOTE — TELEPHONE ENCOUNTER
Anticoag referral expires soon. Order pended. Please sign, thank you.       Dx: Peripheral vascular disease ([facility] [address] (I73.9)  Encounter for therapeutic drug monitoring (Z51.81)  Long term (current) use of anticoagulants (Z79.01)

## 2021-11-19 ENCOUNTER — ANTI-COAG VISIT (OUTPATIENT)
Dept: INTERNAL MEDICINE CLINIC | Facility: CLINIC | Age: 46
End: 2021-11-19

## 2021-11-19 DIAGNOSIS — I73.9 PERIPHERAL VASCULAR DISEASE (HCC): ICD-10-CM

## 2021-11-19 DIAGNOSIS — Z79.01 LONG TERM (CURRENT) USE OF ANTICOAGULANTS: ICD-10-CM

## 2021-11-19 DIAGNOSIS — I73.9 PERIPHERAL VASCULAR DISEASE, UNSPECIFIED (HCC): ICD-10-CM

## 2021-11-19 DIAGNOSIS — Z51.81 ENCOUNTER FOR THERAPEUTIC DRUG MONITORING: ICD-10-CM

## 2021-11-19 PROCEDURE — 93793 ANTICOAG MGMT PT WARFARIN: CPT | Performed by: INTERNAL MEDICINE

## 2021-11-20 ENCOUNTER — TELEPHONE (OUTPATIENT)
Dept: OTHER | Facility: HOSPITAL | Age: 46
End: 2021-11-20

## 2021-11-20 NOTE — PROGRESS NOTES
Returned call to the patient; message left on the RN Line for approval of CT Heart Scan that is scheduled for 12/28 @[place]. Patient has a history of PVD and stents in the right leg. Patient denies any stenting of the coronary arteries.   Patient states

## 2021-12-08 ENCOUNTER — ANTI-COAG VISIT (OUTPATIENT)
Dept: INTERNAL MEDICINE CLINIC | Facility: CLINIC | Age: 46
End: 2021-12-08

## 2021-12-08 DIAGNOSIS — I73.9 PERIPHERAL VASCULAR DISEASE, UNSPECIFIED (HCC): ICD-10-CM

## 2021-12-08 DIAGNOSIS — I73.9 PERIPHERAL VASCULAR DISEASE (HCC): ICD-10-CM

## 2021-12-08 DIAGNOSIS — Z51.81 ENCOUNTER FOR THERAPEUTIC DRUG MONITORING: ICD-10-CM

## 2021-12-08 DIAGNOSIS — Z79.01 LONG TERM (CURRENT) USE OF ANTICOAGULANTS: ICD-10-CM

## 2021-12-08 PROCEDURE — 93793 ANTICOAG MGMT PT WARFARIN: CPT | Performed by: INTERNAL MEDICINE

## 2021-12-17 ENCOUNTER — HOSPITAL ENCOUNTER (OUTPATIENT)
Dept: ULTRASOUND IMAGING | Facility: HOSPITAL | Age: 46
Discharge: HOME | End: 2021-12-17
Attending: SURGERY
Payer: COMMERCIAL

## 2021-12-17 ENCOUNTER — ANTI-COAG VISIT (OUTPATIENT)
Dept: INTERNAL MEDICINE CLINIC | Facility: CLINIC | Age: 46
End: 2021-12-17

## 2021-12-17 DIAGNOSIS — I73.9 PERIPHERAL VASCULAR DISEASE (HCC): ICD-10-CM

## 2021-12-17 DIAGNOSIS — Z79.01 LONG TERM (CURRENT) USE OF ANTICOAGULANTS: ICD-10-CM

## 2021-12-17 DIAGNOSIS — Z51.81 ENCOUNTER FOR THERAPEUTIC DRUG MONITORING: ICD-10-CM

## 2021-12-17 DIAGNOSIS — I73.9 PERIPHERAL VASCULAR DISEASE, UNSPECIFIED (HCC): ICD-10-CM

## 2021-12-17 DIAGNOSIS — I73.9 PVD (PERIPHERAL VASCULAR DISEASE) (HCC): ICD-10-CM

## 2021-12-17 DIAGNOSIS — Z98.890 STATUS POST POPLITEAL-TIBIAL BYPASS: ICD-10-CM

## 2021-12-17 PROCEDURE — 93793 ANTICOAG MGMT PT WARFARIN: CPT | Performed by: INTERNAL MEDICINE

## 2021-12-17 PROCEDURE — 93925 LOWER EXTREMITY STUDY: CPT | Performed by: SURGERY

## 2021-12-21 ENCOUNTER — OFFICE VISIT (OUTPATIENT)
Dept: INTERNAL MEDICINE CLINIC | Facility: CLINIC | Age: 46
End: 2021-12-21

## 2021-12-21 VITALS
BODY MASS INDEX: 24.39 KG/M2 | HEIGHT: 59 IN | OXYGEN SATURATION: 97 % | WEIGHT: 121 LBS | DIASTOLIC BLOOD PRESSURE: 70 MMHG | SYSTOLIC BLOOD PRESSURE: 100 MMHG | HEART RATE: 82 BPM

## 2021-12-21 DIAGNOSIS — E78.00 HYPERCHOLESTEREMIA: ICD-10-CM

## 2021-12-21 DIAGNOSIS — Z12.11 COLON CANCER SCREENING: ICD-10-CM

## 2021-12-21 DIAGNOSIS — Z00.00 ANNUAL PHYSICAL EXAM: Primary | ICD-10-CM

## 2021-12-21 DIAGNOSIS — H91.93 BILATERAL HEARING LOSS, UNSPECIFIED HEARING LOSS TYPE: ICD-10-CM

## 2021-12-21 DIAGNOSIS — I73.9 PERIPHERAL VASCULAR DISEASE, UNSPECIFIED (HCC): ICD-10-CM

## 2021-12-21 DIAGNOSIS — I37.9 PULMONIC VALVE DISEASE: ICD-10-CM

## 2021-12-21 PROCEDURE — 3078F DIAST BP <80 MM HG: CPT | Performed by: INTERNAL MEDICINE

## 2021-12-21 PROCEDURE — 3074F SYST BP LT 130 MM HG: CPT | Performed by: INTERNAL MEDICINE

## 2021-12-21 PROCEDURE — 3008F BODY MASS INDEX DOCD: CPT | Performed by: INTERNAL MEDICINE

## 2021-12-21 PROCEDURE — 99396 PREV VISIT EST AGE 40-64: CPT | Performed by: INTERNAL MEDICINE

## 2021-12-21 NOTE — PROGRESS NOTES
Subjective:     Patient ID: Leona Gurrola is a 40year old female. Patient present today for her annual physical.       History/Other:   Review of Systems   Constitutional: Negative. HENT: Positive for hearing loss. Eyes: Negative.     Respiratory: N plavix and aggrenox until 2007.  w/u negative for coagulopathy but was anticoagulated during pregnancies    • Tendinitis 7/21/2009    discontinue insole / rx ibuprofen   • Valvular disease     Pulmonic stenosis   • Visual impairment     glasses      Past Monge discharge. Left eye: No discharge. Conjunctiva/sclera: Conjunctivae normal.      Pupils: Pupils are equal, round, and reactive to light. Neck:      Vascular: No JVD. Cardiovascular:      Rate and Rhythm: Normal rate and regular rhythm. INTERNAL        Refer to ENT .         Orders Placed This Encounter      Occult Blood, Fecal, FIT Immunoassay      Meds This Visit:  Requested Prescriptions      No prescriptions requested or ordered in this encounter       Imaging & Referrals:  None

## 2021-12-28 ENCOUNTER — HOSPITAL ENCOUNTER (OUTPATIENT)
Dept: CT IMAGING | Age: 46
Discharge: HOME | End: 2021-12-28
Attending: INTERNAL MEDICINE

## 2021-12-28 DIAGNOSIS — Z13.9 ENCOUNTER FOR SCREENING: ICD-10-CM

## 2022-01-01 LAB — INR: 2.2 (ref 2–3)

## 2022-01-04 ENCOUNTER — ANTI-COAG VISIT (OUTPATIENT)
Dept: INTERNAL MEDICINE CLINIC | Facility: CLINIC | Age: 47
End: 2022-01-04

## 2022-01-04 DIAGNOSIS — I73.9 PERIPHERAL VASCULAR DISEASE (HCC): ICD-10-CM

## 2022-01-04 DIAGNOSIS — Z51.81 ENCOUNTER FOR THERAPEUTIC DRUG MONITORING: ICD-10-CM

## 2022-01-04 DIAGNOSIS — I73.9 PERIPHERAL VASCULAR DISEASE, UNSPECIFIED (HCC): ICD-10-CM

## 2022-01-04 DIAGNOSIS — Z79.01 LONG TERM (CURRENT) USE OF ANTICOAGULANTS: ICD-10-CM

## 2022-01-19 ENCOUNTER — ANTI-COAG VISIT (OUTPATIENT)
Dept: INTERNAL MEDICINE CLINIC | Facility: CLINIC | Age: 47
End: 2022-01-19

## 2022-01-19 DIAGNOSIS — I73.9 PERIPHERAL VASCULAR DISEASE, UNSPECIFIED (HCC): ICD-10-CM

## 2022-01-19 DIAGNOSIS — I73.9 PERIPHERAL VASCULAR DISEASE (HCC): ICD-10-CM

## 2022-01-19 DIAGNOSIS — Z79.01 LONG TERM (CURRENT) USE OF ANTICOAGULANTS: ICD-10-CM

## 2022-01-19 DIAGNOSIS — Z51.81 ENCOUNTER FOR THERAPEUTIC DRUG MONITORING: ICD-10-CM

## 2022-01-19 LAB — INR: 2.5 (ref 0.8–1.2)

## 2022-01-19 PROCEDURE — 93793 ANTICOAG MGMT PT WARFARIN: CPT | Performed by: INTERNAL MEDICINE

## 2022-01-28 ENCOUNTER — HOSPITAL ENCOUNTER (OUTPATIENT)
Dept: MAMMOGRAPHY | Facility: HOSPITAL | Age: 47
Discharge: HOME | End: 2022-01-28
Attending: OBSTETRICS & GYNECOLOGY
Payer: COMMERCIAL

## 2022-01-28 DIAGNOSIS — Z12.31 ENCOUNTER FOR SCREENING MAMMOGRAM FOR MALIGNANT NEOPLASM OF BREAST: ICD-10-CM

## 2022-01-28 PROCEDURE — 77063 BREAST TOMOSYNTHESIS BI: CPT | Performed by: OBSTETRICS & GYNECOLOGY

## 2022-01-28 PROCEDURE — 77067 SCR MAMMO BI INCL CAD: CPT | Performed by: OBSTETRICS & GYNECOLOGY

## 2022-02-23 ENCOUNTER — ANTI-COAG VISIT (OUTPATIENT)
Dept: INTERNAL MEDICINE CLINIC | Facility: CLINIC | Age: 47
End: 2022-02-23

## 2022-02-23 LAB — INR: 2.2 (ref 0.8–1.2)

## 2022-02-23 PROCEDURE — 93793 ANTICOAG MGMT PT WARFARIN: CPT | Performed by: INTERNAL MEDICINE

## 2022-03-05 LAB — INR: 2.4 (ref 2–3)

## 2022-03-10 ENCOUNTER — ANTI-COAG VISIT (OUTPATIENT)
Dept: INTERNAL MEDICINE CLINIC | Facility: CLINIC | Age: 47
End: 2022-03-10

## 2022-03-10 PROCEDURE — 93793 ANTICOAG MGMT PT WARFARIN: CPT | Performed by: INTERNAL MEDICINE

## 2022-03-19 LAB — INR: 2.3 (ref 2–3)

## 2022-03-21 ENCOUNTER — ANTI-COAG VISIT (OUTPATIENT)
Dept: INTERNAL MEDICINE CLINIC | Facility: CLINIC | Age: 47
End: 2022-03-21

## 2022-03-21 PROCEDURE — 93793 ANTICOAG MGMT PT WARFARIN: CPT | Performed by: INTERNAL MEDICINE

## 2022-04-04 ENCOUNTER — ANTI-COAG VISIT (OUTPATIENT)
Dept: INTERNAL MEDICINE CLINIC | Facility: CLINIC | Age: 47
End: 2022-04-04

## 2022-04-04 LAB — INR: 2.4 (ref 0.8–1.2)

## 2022-04-04 PROCEDURE — 93793 ANTICOAG MGMT PT WARFARIN: CPT | Performed by: INTERNAL MEDICINE

## 2022-04-18 ENCOUNTER — ANTI-COAG VISIT (OUTPATIENT)
Dept: INTERNAL MEDICINE CLINIC | Facility: CLINIC | Age: 47
End: 2022-04-18

## 2022-04-18 LAB
INR: 2.2 (ref 0.8–1.2)
INR: 2.2 (ref 0.8–1.2)

## 2022-04-18 PROCEDURE — 93793 ANTICOAG MGMT PT WARFARIN: CPT | Performed by: INTERNAL MEDICINE

## 2022-05-01 LAB — INR: 2.2 (ref 0.8–1.2)

## 2022-05-02 ENCOUNTER — ANTI-COAG VISIT (OUTPATIENT)
Dept: INTERNAL MEDICINE CLINIC | Facility: CLINIC | Age: 47
End: 2022-05-02

## 2022-05-05 ENCOUNTER — TELEPHONE (OUTPATIENT)
Dept: INTERNAL MEDICINE CLINIC | Facility: CLINIC | Age: 47
End: 2022-05-05

## 2022-05-05 ENCOUNTER — MED REC SCAN ONLY (OUTPATIENT)
Dept: INTERNAL MEDICINE CLINIC | Facility: CLINIC | Age: 47
End: 2022-05-05

## 2022-05-05 DIAGNOSIS — I73.9 PERIPHERAL VASCULAR DISEASE (HCC): Primary | ICD-10-CM

## 2022-05-05 DIAGNOSIS — Z79.01 LONG TERM (CURRENT) USE OF ANTICOAGULANTS: ICD-10-CM

## 2022-05-05 NOTE — TELEPHONE ENCOUNTER
Referral request completed and faxed to [facility] at [phone], confirmation received.

## 2022-05-05 NOTE — TELEPHONE ENCOUNTER
Onsite clinical Staff - Please assist, thank you! Received call from [facility]  Phone [phone] ext [phone]  Fax [phone]    Asking if referral request has been received by our office.    For: Diabetic test strips  [facility]

## 2022-05-18 ENCOUNTER — ANTI-COAG VISIT (OUTPATIENT)
Dept: ANTICOAGULATION | Facility: CLINIC | Age: 47
End: 2022-05-18

## 2022-05-18 DIAGNOSIS — Z51.81 ENCOUNTER FOR THERAPEUTIC DRUG MONITORING: ICD-10-CM

## 2022-05-18 DIAGNOSIS — Z79.01 LONG TERM (CURRENT) USE OF ANTICOAGULANTS: ICD-10-CM

## 2022-05-18 DIAGNOSIS — I73.9 PERIPHERAL VASCULAR DISEASE (HCC): ICD-10-CM

## 2022-05-18 DIAGNOSIS — I73.9 PERIPHERAL VASCULAR DISEASE, UNSPECIFIED (HCC): ICD-10-CM

## 2022-05-18 LAB — INR: 2.1 (ref 0.8–1.2)

## 2022-05-18 PROCEDURE — 93793 ANTICOAG MGMT PT WARFARIN: CPT | Performed by: INTERNAL MEDICINE

## 2022-06-01 ENCOUNTER — ANTI-COAG VISIT (OUTPATIENT)
Dept: ANTICOAGULATION | Facility: CLINIC | Age: 47
End: 2022-06-01

## 2022-06-01 DIAGNOSIS — I73.9 PERIPHERAL VASCULAR DISEASE (HCC): ICD-10-CM

## 2022-06-01 DIAGNOSIS — Z51.81 ENCOUNTER FOR THERAPEUTIC DRUG MONITORING: ICD-10-CM

## 2022-06-01 DIAGNOSIS — Z79.01 LONG TERM (CURRENT) USE OF ANTICOAGULANTS: ICD-10-CM

## 2022-06-01 DIAGNOSIS — I73.9 PERIPHERAL VASCULAR DISEASE, UNSPECIFIED (HCC): ICD-10-CM

## 2022-06-01 LAB — INR: 1.5 (ref 0.8–1.2)

## 2022-06-01 PROCEDURE — 93793 ANTICOAG MGMT PT WARFARIN: CPT | Performed by: INTERNAL MEDICINE

## 2022-06-14 ENCOUNTER — ANTI-COAG VISIT (OUTPATIENT)
Dept: ANTICOAGULATION | Facility: CLINIC | Age: 47
End: 2022-06-14

## 2022-06-14 DIAGNOSIS — Z79.01 LONG TERM (CURRENT) USE OF ANTICOAGULANTS: ICD-10-CM

## 2022-06-14 DIAGNOSIS — Z51.81 ENCOUNTER FOR THERAPEUTIC DRUG MONITORING: ICD-10-CM

## 2022-06-14 DIAGNOSIS — I73.9 PERIPHERAL VASCULAR DISEASE (HCC): ICD-10-CM

## 2022-06-14 DIAGNOSIS — I73.9 PERIPHERAL VASCULAR DISEASE, UNSPECIFIED (HCC): ICD-10-CM

## 2022-06-14 LAB — INR: 1.7 (ref 2–3)

## 2022-06-24 ENCOUNTER — PATIENT MESSAGE (OUTPATIENT)
Dept: INTERNAL MEDICINE CLINIC | Facility: CLINIC | Age: 47
End: 2022-06-24

## 2022-06-24 DIAGNOSIS — I37.9 PULMONIC VALVE DISEASE: Primary | ICD-10-CM

## 2022-07-01 ENCOUNTER — ANTI-COAG VISIT (OUTPATIENT)
Dept: ANTICOAGULATION | Facility: CLINIC | Age: 47
End: 2022-07-01

## 2022-07-01 DIAGNOSIS — Z79.01 LONG TERM (CURRENT) USE OF ANTICOAGULANTS: ICD-10-CM

## 2022-07-01 DIAGNOSIS — I73.9 PERIPHERAL VASCULAR DISEASE (HCC): ICD-10-CM

## 2022-07-01 DIAGNOSIS — Z51.81 ENCOUNTER FOR THERAPEUTIC DRUG MONITORING: ICD-10-CM

## 2022-07-01 DIAGNOSIS — I73.9 PERIPHERAL VASCULAR DISEASE, UNSPECIFIED (HCC): ICD-10-CM

## 2022-07-01 LAB — INR: 2.1 (ref 0.8–1.2)

## 2022-07-15 ENCOUNTER — LAB ENCOUNTER (OUTPATIENT)
Dept: LAB | Facility: HOSPITAL | Age: 47
End: 2022-07-15
Attending: SURGERY
Payer: COMMERCIAL

## 2022-07-15 ENCOUNTER — HOSPITAL ENCOUNTER (OUTPATIENT)
Dept: ULTRASOUND IMAGING | Facility: HOSPITAL | Age: 47
Discharge: HOME | End: 2022-07-15
Attending: SURGERY
Payer: COMMERCIAL

## 2022-07-15 ENCOUNTER — NURSE TRIAGE (OUTPATIENT)
Dept: INTERNAL MEDICINE CLINIC | Facility: CLINIC | Age: 47
End: 2022-07-15

## 2022-07-15 ENCOUNTER — ANTI-COAG VISIT (OUTPATIENT)
Dept: ANTICOAGULATION | Facility: CLINIC | Age: 47
End: 2022-07-15

## 2022-07-15 ENCOUNTER — OFFICE VISIT (OUTPATIENT)
Dept: INTERNAL MEDICINE CLINIC | Facility: CLINIC | Age: 47
End: 2022-07-15
Payer: COMMERCIAL

## 2022-07-15 VITALS
TEMPERATURE: 99 F | HEIGHT: 59 IN | HEART RATE: 75 BPM | BODY MASS INDEX: 24.39 KG/M2 | WEIGHT: 121 LBS | SYSTOLIC BLOOD PRESSURE: 112 MMHG | OXYGEN SATURATION: 100 % | DIASTOLIC BLOOD PRESSURE: 74 MMHG

## 2022-07-15 DIAGNOSIS — I73.9 PERIPHERAL VASCULAR DISEASE (HCC): ICD-10-CM

## 2022-07-15 DIAGNOSIS — I73.9 PERIPHERAL VASCULAR DISEASE, UNSPECIFIED (HCC): ICD-10-CM

## 2022-07-15 DIAGNOSIS — M25.50 POLYARTHRALGIA: Primary | ICD-10-CM

## 2022-07-15 DIAGNOSIS — Z79.01 LONG TERM (CURRENT) USE OF ANTICOAGULANTS: ICD-10-CM

## 2022-07-15 DIAGNOSIS — Z51.81 ENCOUNTER FOR THERAPEUTIC DRUG MONITORING: ICD-10-CM

## 2022-07-15 DIAGNOSIS — M25.50 POLYARTHRALGIA: ICD-10-CM

## 2022-07-15 DIAGNOSIS — Z98.890 STATUS POST POPLITEAL-TIBIAL BYPASS: ICD-10-CM

## 2022-07-15 DIAGNOSIS — I73.9 PVD (PERIPHERAL VASCULAR DISEASE) (HCC): ICD-10-CM

## 2022-07-15 LAB
CRP SERPL-MCNC: 1.05 MG/DL (ref ?–0.3)
ERYTHROCYTE [SEDIMENTATION RATE] IN BLOOD: 14 MM/HR
INR: 2.1 (ref 0.8–1.2)
RHEUMATOID FACT SERPL-ACNC: <10 IU/ML (ref ?–15)

## 2022-07-15 PROCEDURE — 86038 ANTINUCLEAR ANTIBODIES: CPT

## 2022-07-15 PROCEDURE — 85652 RBC SED RATE AUTOMATED: CPT

## 2022-07-15 PROCEDURE — 3074F SYST BP LT 130 MM HG: CPT | Performed by: INTERNAL MEDICINE

## 2022-07-15 PROCEDURE — 93925 LOWER EXTREMITY STUDY: CPT | Performed by: SURGERY

## 2022-07-15 PROCEDURE — 99213 OFFICE O/P EST LOW 20 MIN: CPT | Performed by: INTERNAL MEDICINE

## 2022-07-15 PROCEDURE — 86431 RHEUMATOID FACTOR QUANT: CPT

## 2022-07-15 PROCEDURE — 36415 COLL VENOUS BLD VENIPUNCTURE: CPT

## 2022-07-15 PROCEDURE — 3008F BODY MASS INDEX DOCD: CPT | Performed by: INTERNAL MEDICINE

## 2022-07-15 PROCEDURE — 3078F DIAST BP <80 MM HG: CPT | Performed by: INTERNAL MEDICINE

## 2022-07-15 PROCEDURE — 86140 C-REACTIVE PROTEIN: CPT

## 2022-07-18 LAB — NUCLEAR IGG TITR SER IF: NEGATIVE {TITER}

## 2022-07-26 ENCOUNTER — OFFICE VISIT (OUTPATIENT)
Dept: INTERNAL MEDICINE CLINIC | Facility: CLINIC | Age: 47
End: 2022-07-26
Payer: COMMERCIAL

## 2022-07-26 ENCOUNTER — LAB ENCOUNTER (OUTPATIENT)
Dept: LAB | Age: 47
End: 2022-07-26
Attending: INTERNAL MEDICINE
Payer: COMMERCIAL

## 2022-07-26 ENCOUNTER — HOSPITAL ENCOUNTER (OUTPATIENT)
Dept: GENERAL RADIOLOGY | Age: 47
Discharge: HOME | End: 2022-07-26
Attending: INTERNAL MEDICINE
Payer: COMMERCIAL

## 2022-07-26 VITALS
WEIGHT: 123 LBS | DIASTOLIC BLOOD PRESSURE: 73 MMHG | SYSTOLIC BLOOD PRESSURE: 112 MMHG | HEART RATE: 75 BPM | BODY MASS INDEX: 24.8 KG/M2 | HEIGHT: 59 IN

## 2022-07-26 DIAGNOSIS — M25.472 LEFT ANKLE SWELLING: ICD-10-CM

## 2022-07-26 DIAGNOSIS — Z79.01 LONG TERM CURRENT USE OF ANTICOAGULANT THERAPY: ICD-10-CM

## 2022-07-26 DIAGNOSIS — M25.472 LEFT ANKLE SWELLING: Primary | ICD-10-CM

## 2022-07-26 DIAGNOSIS — Z51.81 ENCOUNTER FOR THERAPEUTIC DRUG MONITORING: ICD-10-CM

## 2022-07-26 DIAGNOSIS — I73.9 PERIPHERAL VASCULAR DISEASE (HCC): ICD-10-CM

## 2022-07-26 LAB
ALBUMIN SERPL-MCNC: 4 G/DL (ref 3.4–5)
ALBUMIN/GLOB SERPL: 1.1 {RATIO} (ref 1–2)
ALP LIVER SERPL-CCNC: 62 U/L
ALT SERPL-CCNC: 24 U/L
ANION GAP SERPL CALC-SCNC: 9 MMOL/L (ref 0–18)
AST SERPL-CCNC: 20 U/L (ref 15–37)
BASOPHILS # BLD AUTO: 0.06 X10(3) UL (ref 0–0.2)
BASOPHILS NFR BLD AUTO: 0.6 %
BILIRUB SERPL-MCNC: 0.4 MG/DL (ref 0.1–2)
BUN BLD-MCNC: 28 MG/DL (ref 7–18)
BUN/CREAT SERPL: 36.4 (ref 10–20)
CALCIUM BLD-MCNC: 9.7 MG/DL (ref 8.5–10.1)
CHLORIDE SERPL-SCNC: 105 MMOL/L (ref 98–112)
CO2 SERPL-SCNC: 25 MMOL/L (ref 21–32)
CREAT BLD-MCNC: 0.77 MG/DL
CRP SERPL-MCNC: 0.38 MG/DL (ref ?–0.3)
DEPRECATED RDW RBC AUTO: 44.1 FL (ref 35.1–46.3)
EOSINOPHIL # BLD AUTO: 0.12 X10(3) UL (ref 0–0.7)
EOSINOPHIL NFR BLD AUTO: 1.3 %
ERYTHROCYTE [DISTWIDTH] IN BLOOD BY AUTOMATED COUNT: 12.2 % (ref 11–15)
FASTING STATUS PATIENT QL REPORTED: NO
GLOBULIN PLAS-MCNC: 3.8 G/DL (ref 2.8–4.4)
GLUCOSE BLD-MCNC: 81 MG/DL (ref 70–99)
HCT VFR BLD AUTO: 39 %
HGB BLD-MCNC: 12.8 G/DL
IMM GRANULOCYTES # BLD AUTO: 0.02 X10(3) UL (ref 0–1)
IMM GRANULOCYTES NFR BLD: 0.2 %
INR BLD: 2.12 (ref 0.8–1.2)
LYMPHOCYTES # BLD AUTO: 2.72 X10(3) UL (ref 1–4)
LYMPHOCYTES NFR BLD AUTO: 29.2 %
MCH RBC QN AUTO: 32.1 PG (ref 26–34)
MCHC RBC AUTO-ENTMCNC: 32.8 G/DL (ref 31–37)
MCV RBC AUTO: 97.7 FL
MONOCYTES # BLD AUTO: 0.66 X10(3) UL (ref 0.1–1)
MONOCYTES NFR BLD AUTO: 7.1 %
NEUTROPHILS # BLD AUTO: 5.74 X10 (3) UL (ref 1.5–7.7)
NEUTROPHILS # BLD AUTO: 5.74 X10(3) UL (ref 1.5–7.7)
NEUTROPHILS NFR BLD AUTO: 61.6 %
OSMOLALITY SERPL CALC.SUM OF ELEC: 293 MOSM/KG (ref 275–295)
PLATELET # BLD AUTO: 351 10(3)UL (ref 150–450)
POTASSIUM SERPL-SCNC: 3.6 MMOL/L (ref 3.5–5.1)
PROT SERPL-MCNC: 7.8 G/DL (ref 6.4–8.2)
PROTHROMBIN TIME: 24 SECONDS (ref 11.6–14.8)
RBC # BLD AUTO: 3.99 X10(6)UL
SODIUM SERPL-SCNC: 139 MMOL/L (ref 136–145)
URATE SERPL-MCNC: 3.4 MG/DL
WBC # BLD AUTO: 9.3 X10(3) UL (ref 4–11)

## 2022-07-26 PROCEDURE — 99214 OFFICE O/P EST MOD 30 MIN: CPT | Performed by: INTERNAL MEDICINE

## 2022-07-26 PROCEDURE — 80053 COMPREHEN METABOLIC PANEL: CPT

## 2022-07-26 PROCEDURE — 3078F DIAST BP <80 MM HG: CPT | Performed by: INTERNAL MEDICINE

## 2022-07-26 PROCEDURE — 85610 PROTHROMBIN TIME: CPT

## 2022-07-26 PROCEDURE — 3008F BODY MASS INDEX DOCD: CPT | Performed by: INTERNAL MEDICINE

## 2022-07-26 PROCEDURE — 73610 X-RAY EXAM OF ANKLE: CPT | Performed by: INTERNAL MEDICINE

## 2022-07-26 PROCEDURE — 86140 C-REACTIVE PROTEIN: CPT

## 2022-07-26 PROCEDURE — 36415 COLL VENOUS BLD VENIPUNCTURE: CPT

## 2022-07-26 PROCEDURE — 84550 ASSAY OF BLOOD/URIC ACID: CPT

## 2022-07-26 PROCEDURE — 3074F SYST BP LT 130 MM HG: CPT | Performed by: INTERNAL MEDICINE

## 2022-07-26 PROCEDURE — 85025 COMPLETE CBC W/AUTO DIFF WBC: CPT

## 2022-07-27 ENCOUNTER — PATIENT MESSAGE (OUTPATIENT)
Dept: INTERNAL MEDICINE CLINIC | Facility: CLINIC | Age: 47
End: 2022-07-27

## 2022-07-27 ENCOUNTER — ANTI-COAG VISIT (OUTPATIENT)
Dept: ANTICOAGULATION | Facility: CLINIC | Age: 47
End: 2022-07-27

## 2022-07-27 DIAGNOSIS — I73.9 PERIPHERAL VASCULAR DISEASE, UNSPECIFIED (HCC): ICD-10-CM

## 2022-07-27 DIAGNOSIS — I73.9 PERIPHERAL VASCULAR DISEASE (HCC): ICD-10-CM

## 2022-07-27 DIAGNOSIS — Z51.81 ENCOUNTER FOR THERAPEUTIC DRUG MONITORING: ICD-10-CM

## 2022-07-27 PROBLEM — M25.472 LEFT ANKLE SWELLING: Status: ACTIVE | Noted: 2022-07-27

## 2022-07-27 NOTE — TELEPHONE ENCOUNTER
From: Taj Siegel  To: Poppy Araujo MD  Sent: 7/27/2022 10:47 AM CDT  Subject: Question regarding COMP METABOLIC PANEL (14)    I will change shoes, and will follow up in 2 weeks of nothing changes   Thank you so much!!

## 2022-08-11 LAB — INR: 2 (ref 2–3)

## 2022-08-12 ENCOUNTER — ANTI-COAG VISIT (OUTPATIENT)
Dept: ANTICOAGULATION | Facility: CLINIC | Age: 47
End: 2022-08-12

## 2022-08-12 ENCOUNTER — PATIENT MESSAGE (OUTPATIENT)
Dept: INTERNAL MEDICINE CLINIC | Facility: CLINIC | Age: 47
End: 2022-08-12

## 2022-08-12 DIAGNOSIS — I73.9 PERIPHERAL VASCULAR DISEASE, UNSPECIFIED (HCC): ICD-10-CM

## 2022-08-12 DIAGNOSIS — Z51.81 ENCOUNTER FOR THERAPEUTIC DRUG MONITORING: ICD-10-CM

## 2022-08-12 DIAGNOSIS — Z79.01 LONG TERM (CURRENT) USE OF ANTICOAGULANTS: ICD-10-CM

## 2022-08-12 DIAGNOSIS — I73.9 PERIPHERAL VASCULAR DISEASE (HCC): ICD-10-CM

## 2022-08-12 PROCEDURE — 93793 ANTICOAG MGMT PT WARFARIN: CPT | Performed by: INTERNAL MEDICINE

## 2022-08-13 NOTE — TELEPHONE ENCOUNTER
From: Jasmin Esparza  To: Gonzalo Ivey MD  Sent: 8/12/2022 5:53 PM CDT  Subject: Disability Certification parking Placard Renewal     Good afternoon Dr Carl Agosto,  I have forgotten to talk to you about renewing my placard (it expires in September)   How do I go about it? Should I make an appointment to see you? Or come to the office to drop it off? Any input would be great!   Thank you so much!!!

## 2022-08-15 RX ORDER — WARFARIN SODIUM 1 MG/1
TABLET ORAL
Qty: 135 TABLET | Refills: 1 | Status: SHIPPED | OUTPATIENT
Start: 2022-08-15

## 2022-08-16 ENCOUNTER — TELEPHONE (OUTPATIENT)
Dept: INTERNAL MEDICINE CLINIC | Facility: CLINIC | Age: 47
End: 2022-08-16

## 2022-08-16 NOTE — TELEPHONE ENCOUNTER
Person's with Disabilities Certification for Parking Placard Renewal form placed in Dr. Marni Mathis rm [address] office to review and complete.

## 2022-08-16 NOTE — TELEPHONE ENCOUNTER
Patient dropped off a copy of a parking placard renewal to be completed by dr Deborah Lindquist. Please call when completed.

## 2022-08-17 RX ORDER — CILOSTAZOL 100 MG/1
TABLET ORAL
Qty: 60 TABLET | Refills: 11 | Status: SHIPPED | OUTPATIENT
Start: 2022-08-17

## 2022-08-18 ENCOUNTER — PATIENT MESSAGE (OUTPATIENT)
Dept: ADMINISTRATIVE | Age: 47
End: 2022-08-18

## 2022-08-18 ENCOUNTER — MED REC SCAN ONLY (OUTPATIENT)
Dept: INTERNAL MEDICINE CLINIC | Facility: CLINIC | Age: 47
End: 2022-08-18

## 2022-08-18 ENCOUNTER — TELEPHONE (OUTPATIENT)
Dept: INTERNAL MEDICINE CLINIC | Facility: CLINIC | Age: 47
End: 2022-08-18

## 2022-08-18 NOTE — TELEPHONE ENCOUNTER
Spoke with pt, verified , informed pt that parking placard form has been completed and ready for  at the [address] [place] [address] office , pt verbalized understanding. Copy sent to scanning dept.

## 2022-08-18 NOTE — TELEPHONE ENCOUNTER
Per patient, the swelling seemed an edema, she wears compression stockings and gym shoes and it goes away. But she already has an upcoming appointment to see podiatry on 8/29/22.            Future Appointments   Date Time Provider Rolan Garay   8/29/2022 10:00 AM Andrew Snider DPM [facility]

## 2022-08-18 NOTE — TELEPHONE ENCOUNTER
Patient called back, wants to follow up her PLACARD forms,she just want to make sure that it is  not lost.  Informed that Parking placard form was received and sent to Dr Charity Borrero  and we are just waiting for completion, RN instructed patient that we will call her back once it is completed,stated  understanding.

## 2022-08-18 NOTE — TELEPHONE ENCOUNTER
Per patient, she received a message that she missed an appointment,,states that she does not have any appointment and she just recently saw Dr Yuri Camejo. Informed to disregard the message. Last office visit was 7/26/22 with podiatry recommendation  and she has an upcoming appointment on 8/29/22.        Future Appointments   Date Time Provider Rolan Garay   8/29/2022 10:00 AM Alexandra Snider DPM [facility]

## 2022-08-29 ENCOUNTER — OFFICE VISIT (OUTPATIENT)
Dept: PODIATRY CLINIC | Facility: CLINIC | Age: 47
End: 2022-08-29
Payer: COMMERCIAL

## 2022-08-29 ENCOUNTER — ANTI-COAG VISIT (OUTPATIENT)
Dept: ANTICOAGULATION | Facility: CLINIC | Age: 47
End: 2022-08-29

## 2022-08-29 VITALS — HEART RATE: 76 BPM | DIASTOLIC BLOOD PRESSURE: 69 MMHG | SYSTOLIC BLOOD PRESSURE: 118 MMHG

## 2022-08-29 DIAGNOSIS — Z79.01 LONG TERM (CURRENT) USE OF ANTICOAGULANTS: ICD-10-CM

## 2022-08-29 DIAGNOSIS — G57.62 PLANTAR NEUROMA OF LEFT FOOT: Primary | ICD-10-CM

## 2022-08-29 DIAGNOSIS — M21.6X2 ACQUIRED EQUINUS DEFORMITY OF LEFT FOOT: ICD-10-CM

## 2022-08-29 DIAGNOSIS — Z51.81 ENCOUNTER FOR THERAPEUTIC DRUG MONITORING: ICD-10-CM

## 2022-08-29 DIAGNOSIS — I73.9 PERIPHERAL ARTERIAL DISEASE WITH HISTORY OF REVASCULARIZATION (HCC): ICD-10-CM

## 2022-08-29 DIAGNOSIS — I73.9 PERIPHERAL VASCULAR DISEASE, UNSPECIFIED (HCC): ICD-10-CM

## 2022-08-29 DIAGNOSIS — I73.9 PERIPHERAL VASCULAR DISEASE (HCC): ICD-10-CM

## 2022-08-29 DIAGNOSIS — Z98.890 PERIPHERAL ARTERIAL DISEASE WITH HISTORY OF REVASCULARIZATION (HCC): ICD-10-CM

## 2022-08-29 DIAGNOSIS — M79.672 LEFT FOOT PAIN: ICD-10-CM

## 2022-08-29 LAB — INR: 2.5 (ref 0.8–1.2)

## 2022-08-29 PROCEDURE — 99243 OFF/OP CNSLTJ NEW/EST LOW 30: CPT | Performed by: PODIATRIST

## 2022-08-29 PROCEDURE — 64455 NJX AA&/STRD PLTR COM DG NRV: CPT | Performed by: PODIATRIST

## 2022-08-29 PROCEDURE — 3074F SYST BP LT 130 MM HG: CPT | Performed by: PODIATRIST

## 2022-08-29 PROCEDURE — 3078F DIAST BP <80 MM HG: CPT | Performed by: PODIATRIST

## 2022-08-29 RX ORDER — TRIAMCINOLONE ACETONIDE 40 MG/ML
40 INJECTION, SUSPENSION INTRA-ARTICULAR; INTRAMUSCULAR ONCE
Status: COMPLETED | OUTPATIENT
Start: 2022-08-29 | End: 2022-08-29

## 2022-08-29 RX ADMIN — TRIAMCINOLONE ACETONIDE 40 MG: 40 INJECTION, SUSPENSION INTRA-ARTICULAR; INTRAMUSCULAR at 11:27:00

## 2022-08-29 NOTE — PROGRESS NOTES
Per verbal order from Dr. Zackary Zamora, draw up 1ml of 0.5% Marcaine and 1ml of Kenalog 40 for cortisone injection to Left foot.   [place]

## 2022-09-12 ENCOUNTER — ANTI-COAG VISIT (OUTPATIENT)
Dept: ANTICOAGULATION | Facility: CLINIC | Age: 47
End: 2022-09-12

## 2022-09-12 DIAGNOSIS — Z79.01 LONG TERM (CURRENT) USE OF ANTICOAGULANTS: ICD-10-CM

## 2022-09-12 DIAGNOSIS — I73.9 PERIPHERAL VASCULAR DISEASE (HCC): ICD-10-CM

## 2022-09-12 DIAGNOSIS — I73.9 PERIPHERAL VASCULAR DISEASE, UNSPECIFIED (HCC): ICD-10-CM

## 2022-09-12 DIAGNOSIS — Z51.81 ENCOUNTER FOR THERAPEUTIC DRUG MONITORING: ICD-10-CM

## 2022-09-12 LAB — INR: 2.3 (ref 0.8–1.2)

## 2022-09-13 ENCOUNTER — TELEPHONE (OUTPATIENT)
Dept: INTERNAL MEDICINE CLINIC | Facility: CLINIC | Age: 47
End: 2022-09-13

## 2022-09-13 ENCOUNTER — PATIENT MESSAGE (OUTPATIENT)
Dept: INTERNAL MEDICINE CLINIC | Facility: CLINIC | Age: 47
End: 2022-09-13

## 2022-09-13 ENCOUNTER — TELEMEDICINE (OUTPATIENT)
Dept: INTERNAL MEDICINE CLINIC | Facility: CLINIC | Age: 47
End: 2022-09-13

## 2022-09-13 DIAGNOSIS — U07.1 COVID-19: Primary | ICD-10-CM

## 2022-09-13 DIAGNOSIS — R19.7 DIARRHEA, UNSPECIFIED TYPE: ICD-10-CM

## 2022-09-13 LAB — AMB EXT COVID-19 RESULT: DETECTED

## 2022-09-13 PROCEDURE — 99213 OFFICE O/P EST LOW 20 MIN: CPT | Performed by: NURSE PRACTITIONER

## 2022-09-13 NOTE — TELEPHONE ENCOUNTER
Pt started to have symptoms yesterday a mild fever of 101.5, body ache, chills cough and congestion. Pt denied any chest pain or sob. Pt had a covid test at home and she has tested positive. Pt will like to know if she can have the Paxlovid. Pt had made a appt on line for tomorrow but would like a video appt today. Pt was given a video appt with [facility] for today.    Future Appointments   Date Time Provider Rolan Garay   9/13/2022 10:00 AM YORDAN Herman   9/27/2022 11:00 AM Meshulam, Jobe Look, DPM [facility] [place]

## 2022-09-13 NOTE — TELEPHONE ENCOUNTER
Televisit today: Paxlovid not recommended due to on warfarin. Jordan GARCIA, please note patient's condition report, any additional orders?

## 2022-09-14 ENCOUNTER — TELEMEDICINE (OUTPATIENT)
Dept: INTERNAL MEDICINE CLINIC | Facility: CLINIC | Age: 47
End: 2022-09-14

## 2022-09-14 DIAGNOSIS — U07.1 COVID-19: Primary | ICD-10-CM

## 2022-09-14 PROCEDURE — 99213 OFFICE O/P EST LOW 20 MIN: CPT | Performed by: INTERNAL MEDICINE

## 2022-09-14 RX ORDER — BENZONATATE 100 MG/1
100 CAPSULE ORAL 3 TIMES DAILY PRN
Qty: 90 CAPSULE | Refills: 1 | Status: SHIPPED | OUTPATIENT
Start: 2022-09-14

## 2022-09-14 NOTE — TELEPHONE ENCOUNTER
Please follow up with pt. Pt may try advil 400mg but would still alternate w/ tylenol every 4-6 hours as needed. Will need to monitor as she is on blood thinner and at increase risk of bleeding.

## 2022-09-15 ENCOUNTER — LAB ENCOUNTER (OUTPATIENT)
Dept: LAB | Age: 47
End: 2022-09-15
Attending: INTERNAL MEDICINE
Payer: COMMERCIAL

## 2022-09-15 DIAGNOSIS — U07.1 COVID-19: ICD-10-CM

## 2022-09-16 LAB — SARS-COV-2 RNA RESP QL NAA+PROBE: DETECTED

## 2022-09-26 ENCOUNTER — ANTI-COAG VISIT (OUTPATIENT)
Dept: ANTICOAGULATION | Facility: CLINIC | Age: 47
End: 2022-09-26

## 2022-09-26 ENCOUNTER — TELEPHONE (OUTPATIENT)
Dept: ANTICOAGULATION | Facility: CLINIC | Age: 47
End: 2022-09-26

## 2022-09-26 DIAGNOSIS — I73.9 PERIPHERAL VASCULAR DISEASE, UNSPECIFIED (HCC): ICD-10-CM

## 2022-09-26 DIAGNOSIS — Z51.81 ENCOUNTER FOR THERAPEUTIC DRUG MONITORING: ICD-10-CM

## 2022-09-26 DIAGNOSIS — Z79.01 LONG TERM (CURRENT) USE OF ANTICOAGULANTS: ICD-10-CM

## 2022-09-26 DIAGNOSIS — I73.9 PERIPHERAL VASCULAR DISEASE (HCC): ICD-10-CM

## 2022-09-26 LAB — INR: 3.9 (ref 0.8–1.2)

## 2022-09-26 NOTE — TELEPHONE ENCOUNTER
Yesterday's INR= 3.9  Goal= 2.0-3.0    Recent COVID positive. Reports that she is no longer taking Advil or cough syrup. Her appetite is ok but not back to normal yet, reports diarrhea stopped 2 days ago. States she held warfarin dose last night after taking her INR reading. Per protocol, decrease weekly dose. Outside of protocol- as there is a known reason for elevated INR- acute illness- hold one more dose of warfarin tonight, then resume current dose and recheck INR within 1 week.

## 2022-09-27 ENCOUNTER — OFFICE VISIT (OUTPATIENT)
Dept: PODIATRY CLINIC | Facility: CLINIC | Age: 47
End: 2022-09-27

## 2022-09-27 DIAGNOSIS — G57.62 PLANTAR NEUROMA OF LEFT FOOT: Primary | ICD-10-CM

## 2022-09-27 DIAGNOSIS — M21.6X2 ACQUIRED EQUINUS DEFORMITY OF LEFT FOOT: ICD-10-CM

## 2022-09-27 DIAGNOSIS — I73.9 PERIPHERAL ARTERIAL DISEASE WITH HISTORY OF REVASCULARIZATION (HCC): ICD-10-CM

## 2022-09-27 DIAGNOSIS — M79.672 LEFT FOOT PAIN: ICD-10-CM

## 2022-09-27 DIAGNOSIS — Z98.890 PERIPHERAL ARTERIAL DISEASE WITH HISTORY OF REVASCULARIZATION (HCC): ICD-10-CM

## 2022-09-27 PROCEDURE — 99212 OFFICE O/P EST SF 10 MIN: CPT | Performed by: PODIATRIST

## 2022-09-27 RX ORDER — COVID-19 ANTIGEN TEST
KIT MISCELLANEOUS
COMMUNITY
Start: 2022-09-19

## 2022-10-03 ENCOUNTER — ANTI-COAG VISIT (OUTPATIENT)
Dept: ANTICOAGULATION | Facility: CLINIC | Age: 47
End: 2022-10-03

## 2022-10-03 DIAGNOSIS — I73.9 PERIPHERAL VASCULAR DISEASE (HCC): ICD-10-CM

## 2022-10-03 DIAGNOSIS — Z51.81 ENCOUNTER FOR THERAPEUTIC DRUG MONITORING: ICD-10-CM

## 2022-10-03 DIAGNOSIS — I73.9 PERIPHERAL VASCULAR DISEASE, UNSPECIFIED (HCC): ICD-10-CM

## 2022-10-03 DIAGNOSIS — Z79.01 LONG TERM (CURRENT) USE OF ANTICOAGULANTS: ICD-10-CM

## 2022-10-03 LAB — INR: 2 (ref 0.8–1.2)

## 2022-10-18 ENCOUNTER — ANTI-COAG VISIT (OUTPATIENT)
Dept: ANTICOAGULATION | Facility: CLINIC | Age: 47
End: 2022-10-18

## 2022-10-18 DIAGNOSIS — I73.9 PERIPHERAL VASCULAR DISEASE (HCC): ICD-10-CM

## 2022-10-18 DIAGNOSIS — Z51.81 ENCOUNTER FOR THERAPEUTIC DRUG MONITORING: ICD-10-CM

## 2022-10-18 DIAGNOSIS — Z79.01 LONG TERM (CURRENT) USE OF ANTICOAGULANTS: ICD-10-CM

## 2022-10-18 DIAGNOSIS — I73.9 PERIPHERAL VASCULAR DISEASE, UNSPECIFIED (HCC): ICD-10-CM

## 2022-10-18 LAB — INR: 2.1 (ref 0.8–1.2)

## 2022-10-18 PROCEDURE — 93793 ANTICOAG MGMT PT WARFARIN: CPT | Performed by: INTERNAL MEDICINE

## 2022-10-25 ENCOUNTER — OFFICE VISIT (OUTPATIENT)
Dept: PODIATRY CLINIC | Facility: CLINIC | Age: 47
End: 2022-10-25
Payer: COMMERCIAL

## 2022-10-25 DIAGNOSIS — M79.672 LEFT FOOT PAIN: ICD-10-CM

## 2022-10-25 DIAGNOSIS — M21.6X2 ACQUIRED EQUINUS DEFORMITY OF LEFT FOOT: ICD-10-CM

## 2022-10-25 DIAGNOSIS — Z98.890 PERIPHERAL ARTERIAL DISEASE WITH HISTORY OF REVASCULARIZATION (HCC): ICD-10-CM

## 2022-10-25 DIAGNOSIS — G57.62 PLANTAR NEUROMA OF LEFT FOOT: Primary | ICD-10-CM

## 2022-10-25 DIAGNOSIS — I73.9 PERIPHERAL ARTERIAL DISEASE WITH HISTORY OF REVASCULARIZATION (HCC): ICD-10-CM

## 2022-10-25 PROCEDURE — L3000 FT INSERT UCB BERKELEY SHELL: HCPCS | Performed by: PODIATRIST

## 2022-10-25 PROCEDURE — 29799 UNLISTED PX CASTING/STRPG: CPT | Performed by: PODIATRIST

## 2022-11-02 LAB — INR: 2.1 (ref 2–3)

## 2022-11-03 ENCOUNTER — ANTI-COAG VISIT (OUTPATIENT)
Dept: ANTICOAGULATION | Facility: CLINIC | Age: 47
End: 2022-11-03

## 2022-11-03 DIAGNOSIS — I73.9 PERIPHERAL VASCULAR DISEASE (HCC): ICD-10-CM

## 2022-11-03 DIAGNOSIS — Z51.81 ENCOUNTER FOR THERAPEUTIC DRUG MONITORING: ICD-10-CM

## 2022-11-03 DIAGNOSIS — Z79.01 LONG TERM (CURRENT) USE OF ANTICOAGULANTS: ICD-10-CM

## 2022-11-03 DIAGNOSIS — I73.9 PERIPHERAL VASCULAR DISEASE, UNSPECIFIED (HCC): ICD-10-CM

## 2022-11-03 PROCEDURE — 93793 ANTICOAG MGMT PT WARFARIN: CPT | Performed by: INTERNAL MEDICINE

## 2022-11-16 ENCOUNTER — ANTI-COAG VISIT (OUTPATIENT)
Dept: ANTICOAGULATION | Facility: CLINIC | Age: 47
End: 2022-11-16

## 2022-11-16 ENCOUNTER — TELEPHONE (OUTPATIENT)
Dept: ANTICOAGULATION | Facility: CLINIC | Age: 47
End: 2022-11-16

## 2022-11-16 DIAGNOSIS — Z51.81 MONITORING FOR LONG-TERM ANTICOAGULANT USE: ICD-10-CM

## 2022-11-16 DIAGNOSIS — I73.9 PERIPHERAL VASCULAR DISEASE (HCC): ICD-10-CM

## 2022-11-16 DIAGNOSIS — Z51.81 ENCOUNTER FOR THERAPEUTIC DRUG MONITORING: ICD-10-CM

## 2022-11-16 DIAGNOSIS — I73.9 PERIPHERAL VASCULAR DISEASE, UNSPECIFIED (HCC): ICD-10-CM

## 2022-11-16 DIAGNOSIS — Z79.01 LONG TERM (CURRENT) USE OF ANTICOAGULANTS: ICD-10-CM

## 2022-11-16 DIAGNOSIS — I73.9 PERIPHERAL VASCULAR DISEASE (HCC): Primary | ICD-10-CM

## 2022-11-16 DIAGNOSIS — Z79.01 MONITORING FOR LONG-TERM ANTICOAGULANT USE: ICD-10-CM

## 2022-11-16 LAB — INR: 2.3 (ref 0.8–1.2)

## 2022-11-16 PROCEDURE — 93793 ANTICOAG MGMT PT WARFARIN: CPT | Performed by: INTERNAL MEDICINE

## 2022-11-16 NOTE — TELEPHONE ENCOUNTER
Anticoag referral expires soon. Order pended. Please sign, thank you.       Dx: Peripheral vascular disease ([facility] [address] (I73.9)  Encounter for therapeutic drug monitoring (Z51.81)  Long term (current) use of anticoagulants (Z79.01)

## 2022-11-30 ENCOUNTER — ANTI-COAG VISIT (OUTPATIENT)
Dept: ANTICOAGULATION | Facility: CLINIC | Age: 47
End: 2022-11-30

## 2022-11-30 DIAGNOSIS — Z51.81 MONITORING FOR LONG-TERM ANTICOAGULANT USE: ICD-10-CM

## 2022-11-30 DIAGNOSIS — Z79.01 MONITORING FOR LONG-TERM ANTICOAGULANT USE: ICD-10-CM

## 2022-11-30 DIAGNOSIS — Z79.01 LONG TERM (CURRENT) USE OF ANTICOAGULANTS: ICD-10-CM

## 2022-11-30 DIAGNOSIS — I73.9 PERIPHERAL VASCULAR DISEASE (HCC): ICD-10-CM

## 2022-11-30 DIAGNOSIS — I73.9 PERIPHERAL VASCULAR DISEASE, UNSPECIFIED (HCC): ICD-10-CM

## 2022-11-30 DIAGNOSIS — Z51.81 ENCOUNTER FOR THERAPEUTIC DRUG MONITORING: ICD-10-CM

## 2022-11-30 LAB — INR: 2.3 (ref 0.8–1.2)

## 2022-11-30 PROCEDURE — 93793 ANTICOAG MGMT PT WARFARIN: CPT | Performed by: INTERNAL MEDICINE

## 2022-12-08 ENCOUNTER — PATIENT MESSAGE (OUTPATIENT)
Dept: INTERNAL MEDICINE CLINIC | Facility: CLINIC | Age: 47
End: 2022-12-08

## 2022-12-08 ENCOUNTER — OFFICE VISIT (OUTPATIENT)
Dept: INTERNAL MEDICINE CLINIC | Facility: CLINIC | Age: 47
End: 2022-12-08
Payer: COMMERCIAL

## 2022-12-08 VITALS
DIASTOLIC BLOOD PRESSURE: 66 MMHG | WEIGHT: 126 LBS | HEIGHT: 59 IN | BODY MASS INDEX: 25.4 KG/M2 | HEART RATE: 76 BPM | SYSTOLIC BLOOD PRESSURE: 105 MMHG

## 2022-12-08 DIAGNOSIS — E78.2 MIXED HYPERLIPIDEMIA: Primary | ICD-10-CM

## 2022-12-08 PROCEDURE — 3078F DIAST BP <80 MM HG: CPT | Performed by: INTERNAL MEDICINE

## 2022-12-08 PROCEDURE — 99214 OFFICE O/P EST MOD 30 MIN: CPT | Performed by: INTERNAL MEDICINE

## 2022-12-08 PROCEDURE — 3074F SYST BP LT 130 MM HG: CPT | Performed by: INTERNAL MEDICINE

## 2022-12-08 PROCEDURE — 3008F BODY MASS INDEX DOCD: CPT | Performed by: INTERNAL MEDICINE

## 2022-12-08 RX ORDER — ROSUVASTATIN CALCIUM 5 MG/1
5 TABLET, COATED ORAL NIGHTLY
Qty: 90 TABLET | Refills: 9 | Status: SHIPPED | OUTPATIENT
Start: 2022-12-08

## 2022-12-08 NOTE — PATIENT INSTRUCTIONS
This 1. It is reasonable to start rosuvastatin 5 mg as primary prevention however this is 100% your decision because your calcium score is 0 indicating no current evidence of heart disease and your 10-year ASCVD risk score is only 0.5% which is less than the 5% cut off. Please continue to focus on diet and exercise, I will place a cholesterol lab order that you can get done in the next 2 to 3 months. If you would like to wait that timeframe to see how you do and then we can check your cholesterols that is fine but if at any point in time he said I want to start the rosuvastatin 5 mg we will go ahead and do so. I will send it to your pharmacy today and leave it fully up to you whether or not you want to pick it up.

## 2022-12-13 ENCOUNTER — OFFICE VISIT (OUTPATIENT)
Dept: PODIATRY CLINIC | Facility: CLINIC | Age: 47
End: 2022-12-13
Payer: COMMERCIAL

## 2022-12-13 ENCOUNTER — OFFICE VISIT (OUTPATIENT)
Dept: OBGYN CLINIC | Facility: CLINIC | Age: 47
End: 2022-12-13
Payer: COMMERCIAL

## 2022-12-13 VITALS — DIASTOLIC BLOOD PRESSURE: 75 MMHG | SYSTOLIC BLOOD PRESSURE: 116 MMHG | BODY MASS INDEX: 25 KG/M2 | WEIGHT: 125 LBS

## 2022-12-13 DIAGNOSIS — Z01.419 WOMEN'S ANNUAL ROUTINE GYNECOLOGICAL EXAMINATION: Primary | ICD-10-CM

## 2022-12-13 DIAGNOSIS — G57.62 PLANTAR NEUROMA OF LEFT FOOT: Primary | ICD-10-CM

## 2022-12-13 DIAGNOSIS — Z90.710 HISTORY OF LAPAROSCOPIC-ASSISTED VAGINAL HYSTERECTOMY: ICD-10-CM

## 2022-12-13 DIAGNOSIS — I73.9 PERIPHERAL ARTERIAL DISEASE WITH HISTORY OF REVASCULARIZATION (HCC): ICD-10-CM

## 2022-12-13 DIAGNOSIS — Z98.890 PERIPHERAL ARTERIAL DISEASE WITH HISTORY OF REVASCULARIZATION (HCC): ICD-10-CM

## 2022-12-13 DIAGNOSIS — M79.672 LEFT FOOT PAIN: ICD-10-CM

## 2022-12-13 DIAGNOSIS — M21.6X2 ACQUIRED EQUINUS DEFORMITY OF LEFT FOOT: ICD-10-CM

## 2022-12-13 PROCEDURE — 99386 PREV VISIT NEW AGE 40-64: CPT | Performed by: OBSTETRICS & GYNECOLOGY

## 2022-12-13 PROCEDURE — 3074F SYST BP LT 130 MM HG: CPT | Performed by: OBSTETRICS & GYNECOLOGY

## 2022-12-13 PROCEDURE — 99212 OFFICE O/P EST SF 10 MIN: CPT | Performed by: PODIATRIST

## 2022-12-13 PROCEDURE — 3078F DIAST BP <80 MM HG: CPT | Performed by: OBSTETRICS & GYNECOLOGY

## 2022-12-15 LAB — INR: 2.1 (ref 0.8–1.2)

## 2022-12-19 ENCOUNTER — ANTI-COAG VISIT (OUTPATIENT)
Dept: ANTICOAGULATION | Facility: CLINIC | Age: 47
End: 2022-12-19

## 2022-12-19 DIAGNOSIS — Z51.81 ENCOUNTER FOR THERAPEUTIC DRUG MONITORING: ICD-10-CM

## 2022-12-19 DIAGNOSIS — Z51.81 MONITORING FOR LONG-TERM ANTICOAGULANT USE: ICD-10-CM

## 2022-12-19 DIAGNOSIS — I73.9 PERIPHERAL VASCULAR DISEASE (HCC): ICD-10-CM

## 2022-12-19 DIAGNOSIS — Z79.01 MONITORING FOR LONG-TERM ANTICOAGULANT USE: ICD-10-CM

## 2022-12-19 DIAGNOSIS — Z79.01 LONG TERM (CURRENT) USE OF ANTICOAGULANTS: ICD-10-CM

## 2022-12-19 DIAGNOSIS — I73.9 PERIPHERAL VASCULAR DISEASE, UNSPECIFIED (HCC): ICD-10-CM

## 2023-01-06 ENCOUNTER — ANTI-COAG VISIT (OUTPATIENT)
Dept: ANTICOAGULATION | Facility: CLINIC | Age: 48
End: 2023-01-06

## 2023-01-06 DIAGNOSIS — Z51.81 MONITORING FOR LONG-TERM ANTICOAGULANT USE: ICD-10-CM

## 2023-01-06 DIAGNOSIS — I73.9 PERIPHERAL VASCULAR DISEASE, UNSPECIFIED (HCC): ICD-10-CM

## 2023-01-06 DIAGNOSIS — Z79.01 LONG TERM (CURRENT) USE OF ANTICOAGULANTS: ICD-10-CM

## 2023-01-06 DIAGNOSIS — Z51.81 ENCOUNTER FOR THERAPEUTIC DRUG MONITORING: ICD-10-CM

## 2023-01-06 DIAGNOSIS — I73.9 PERIPHERAL VASCULAR DISEASE (HCC): ICD-10-CM

## 2023-01-06 DIAGNOSIS — Z79.01 MONITORING FOR LONG-TERM ANTICOAGULANT USE: ICD-10-CM

## 2023-01-06 LAB — INR: 3.6 (ref 0.8–1.2)

## 2023-01-06 NOTE — PROGRESS NOTES
INR result received from [employer] home monitoring. Spoke with Maritza Boone. States that had a GI illness last week with nausea and vomiting, she missed dose of warfarin on Thursday and did not eat very much for several days. Reports she is starting to feel better this week. INR is minimally above therapeutic goal range and there is a known reason for elevated INR. Per protocol, patient to continue current dose and recheck INR in 2 weeks.

## 2023-01-17 ENCOUNTER — OFFICE VISIT (OUTPATIENT)
Dept: PODIATRY CLINIC | Facility: CLINIC | Age: 48
End: 2023-01-17

## 2023-01-17 ENCOUNTER — ANTI-COAG VISIT (OUTPATIENT)
Dept: ANTICOAGULATION | Facility: CLINIC | Age: 48
End: 2023-01-17

## 2023-01-17 ENCOUNTER — HOSPITAL ENCOUNTER (OUTPATIENT)
Dept: GENERAL RADIOLOGY | Age: 48
Discharge: HOME | End: 2023-01-17
Attending: PODIATRIST
Payer: COMMERCIAL

## 2023-01-17 DIAGNOSIS — Z79.01 LONG TERM (CURRENT) USE OF ANTICOAGULANTS: ICD-10-CM

## 2023-01-17 DIAGNOSIS — Z79.01 MONITORING FOR LONG-TERM ANTICOAGULANT USE: ICD-10-CM

## 2023-01-17 DIAGNOSIS — M79.672 LEFT FOOT PAIN: ICD-10-CM

## 2023-01-17 DIAGNOSIS — Z98.890 PERIPHERAL ARTERIAL DISEASE WITH HISTORY OF REVASCULARIZATION (HCC): ICD-10-CM

## 2023-01-17 DIAGNOSIS — I73.9 PERIPHERAL ARTERIAL DISEASE WITH HISTORY OF REVASCULARIZATION (HCC): ICD-10-CM

## 2023-01-17 DIAGNOSIS — M21.6X2 ACQUIRED EQUINUS DEFORMITY OF LEFT FOOT: ICD-10-CM

## 2023-01-17 DIAGNOSIS — G57.62 PLANTAR NEUROMA OF LEFT FOOT: ICD-10-CM

## 2023-01-17 DIAGNOSIS — I73.9 PERIPHERAL VASCULAR DISEASE (HCC): ICD-10-CM

## 2023-01-17 DIAGNOSIS — Z51.81 ENCOUNTER FOR THERAPEUTIC DRUG MONITORING: ICD-10-CM

## 2023-01-17 DIAGNOSIS — Z51.81 MONITORING FOR LONG-TERM ANTICOAGULANT USE: ICD-10-CM

## 2023-01-17 DIAGNOSIS — I73.9 PERIPHERAL VASCULAR DISEASE, UNSPECIFIED (HCC): ICD-10-CM

## 2023-01-17 DIAGNOSIS — G57.62 PLANTAR NEUROMA OF LEFT FOOT: Primary | ICD-10-CM

## 2023-01-17 LAB — INR: 4 (ref 0.8–1.2)

## 2023-01-17 PROCEDURE — 99212 OFFICE O/P EST SF 10 MIN: CPT | Performed by: PODIATRIST

## 2023-01-17 PROCEDURE — 73630 X-RAY EXAM OF FOOT: CPT | Performed by: PODIATRIST

## 2023-01-17 PROCEDURE — 93793 ANTICOAG MGMT PT WARFARIN: CPT | Performed by: INTERNAL MEDICINE

## 2023-01-17 NOTE — PROGRESS NOTES
INR result received from [employer] home monitoring. Spoke with Rahat Tian. States that she is feeling better and the GI illness has resolved. Reports that she started taking a new med in December- rosuvastatin- per [employer], there is a major drug interaction when taken with warfarin to increase INR. States that her physician told her that she can take the med three times a week instead of daily and she will start doing that this week. Reports she held one dose of warfarin last Friday when she received her INR result. Per protocol, decrease weekly dose 5-10%- actual decrease 11% and recheck INR this Friday 1/20.

## 2023-01-20 ENCOUNTER — ANTI-COAG VISIT (OUTPATIENT)
Dept: ANTICOAGULATION | Facility: CLINIC | Age: 48
End: 2023-01-20

## 2023-01-20 DIAGNOSIS — Z79.01 LONG TERM (CURRENT) USE OF ANTICOAGULANTS: ICD-10-CM

## 2023-01-20 DIAGNOSIS — I73.9 PERIPHERAL VASCULAR DISEASE, UNSPECIFIED (HCC): ICD-10-CM

## 2023-01-20 DIAGNOSIS — Z51.81 MONITORING FOR LONG-TERM ANTICOAGULANT USE: ICD-10-CM

## 2023-01-20 DIAGNOSIS — Z79.01 MONITORING FOR LONG-TERM ANTICOAGULANT USE: ICD-10-CM

## 2023-01-20 DIAGNOSIS — I73.9 PERIPHERAL VASCULAR DISEASE (HCC): ICD-10-CM

## 2023-01-20 DIAGNOSIS — Z51.81 ENCOUNTER FOR THERAPEUTIC DRUG MONITORING: ICD-10-CM

## 2023-01-20 LAB — INR: 2.8 (ref 0.8–1.2)

## 2023-01-20 NOTE — PROGRESS NOTES
INR result received from [employer] home monitoring. Per protocol, patient to continue current dose and recheck INR in 2 weeks. Subjective:       Dm Villa presents to the clinic 2 months after colectomy    HPI: Doing well. Wound slowly healing. Family helping with packing. Appetite good. Objective:      /78 (Site: Left Upper Arm, Position: Sitting, Cuff Size: Medium Adult)   Pulse 83   Temp 98.5 °F (36.9 °C) (Oral)   Ht 6' (1.829 m)   Wt 176 lb (79.8 kg)   BMI 23.87 kg/m²     General:  alert, appears stated age and cooperative   Abdomen: soft, bowel sounds active, non-tender   Incision:   healing well, no drainage, trace erythema along left side, small amount of tunneling in this area, no abscess       Assessment:      Doing well postoperatively. Plan:      1. Continue any current medications. 2. Wound care discussed. 3. Per family wound gets worse when off antibiotics. Will give additional antibiotic.   4. See me 2 weeks     Xochitl Shukla M.D.  5/8/20   1:58 PM

## 2023-01-30 ENCOUNTER — HOSPITAL ENCOUNTER (OUTPATIENT)
Dept: MAMMOGRAPHY | Age: 48
Discharge: HOME | End: 2023-01-30
Attending: OBSTETRICS & GYNECOLOGY
Payer: COMMERCIAL

## 2023-01-30 DIAGNOSIS — Z01.419 WOMEN'S ANNUAL ROUTINE GYNECOLOGICAL EXAMINATION: ICD-10-CM

## 2023-01-30 PROCEDURE — 77067 SCR MAMMO BI INCL CAD: CPT | Performed by: OBSTETRICS & GYNECOLOGY

## 2023-01-30 PROCEDURE — 77063 BREAST TOMOSYNTHESIS BI: CPT | Performed by: OBSTETRICS & GYNECOLOGY

## 2023-02-06 ENCOUNTER — ANTI-COAG VISIT (OUTPATIENT)
Dept: ANTICOAGULATION | Facility: CLINIC | Age: 48
End: 2023-02-06

## 2023-02-06 DIAGNOSIS — Z79.01 MONITORING FOR LONG-TERM ANTICOAGULANT USE: ICD-10-CM

## 2023-02-06 DIAGNOSIS — I73.9 PERIPHERAL VASCULAR DISEASE (HCC): ICD-10-CM

## 2023-02-06 DIAGNOSIS — Z79.01 LONG TERM (CURRENT) USE OF ANTICOAGULANTS: ICD-10-CM

## 2023-02-06 DIAGNOSIS — Z51.81 ENCOUNTER FOR THERAPEUTIC DRUG MONITORING: ICD-10-CM

## 2023-02-06 DIAGNOSIS — Z51.81 MONITORING FOR LONG-TERM ANTICOAGULANT USE: ICD-10-CM

## 2023-02-06 DIAGNOSIS — I73.9 PERIPHERAL VASCULAR DISEASE, UNSPECIFIED (HCC): ICD-10-CM

## 2023-02-06 LAB — INR: 2.5 (ref 0.8–1.2)

## 2023-02-08 ENCOUNTER — OFFICE VISIT (OUTPATIENT)
Dept: INTERNAL MEDICINE CLINIC | Facility: CLINIC | Age: 48
End: 2023-02-08

## 2023-02-08 ENCOUNTER — LAB ENCOUNTER (OUTPATIENT)
Dept: LAB | Age: 48
End: 2023-02-08
Attending: INTERNAL MEDICINE
Payer: COMMERCIAL

## 2023-02-08 VITALS
BODY MASS INDEX: 25.2 KG/M2 | HEART RATE: 98 BPM | SYSTOLIC BLOOD PRESSURE: 111 MMHG | HEIGHT: 59 IN | WEIGHT: 125 LBS | DIASTOLIC BLOOD PRESSURE: 67 MMHG

## 2023-02-08 DIAGNOSIS — Z00.00 ANNUAL PHYSICAL EXAM: Primary | ICD-10-CM

## 2023-02-08 DIAGNOSIS — E55.9 VITAMIN D DEFICIENCY: ICD-10-CM

## 2023-02-08 DIAGNOSIS — Z12.31 ENCOUNTER FOR SCREENING MAMMOGRAM FOR BREAST CANCER: ICD-10-CM

## 2023-02-08 DIAGNOSIS — Z23 NEED FOR VACCINATION: ICD-10-CM

## 2023-02-08 DIAGNOSIS — I37.9 PULMONIC VALVE DISEASE: ICD-10-CM

## 2023-02-08 DIAGNOSIS — Z79.01 LONG TERM (CURRENT) USE OF ANTICOAGULANTS: ICD-10-CM

## 2023-02-08 DIAGNOSIS — Z12.11 COLON CANCER SCREENING: ICD-10-CM

## 2023-02-08 PROBLEM — M25.472 LEFT ANKLE SWELLING: Status: RESOLVED | Noted: 2022-07-27 | Resolved: 2023-02-08

## 2023-02-08 PROBLEM — I73.9 PERIPHERAL VASCULAR DISEASE, UNSPECIFIED (HCC): Status: RESOLVED | Noted: 2018-08-29 | Resolved: 2023-02-08

## 2023-02-08 PROBLEM — N99.820 POSTOPERATIVE VAGINAL BLEEDING FOLLOWING GENITOURINARY PROCEDURE: Status: RESOLVED | Noted: 2018-10-27 | Resolved: 2023-02-08

## 2023-02-08 PROBLEM — S62.662A CLOSED NONDISPLACED FRACTURE OF DISTAL PHALANX OF RIGHT MIDDLE FINGER: Status: RESOLVED | Noted: 2021-02-04 | Resolved: 2023-02-08

## 2023-02-08 PROBLEM — B07.0 PLANTAR WART OF RIGHT FOOT: Status: RESOLVED | Noted: 2019-11-02 | Resolved: 2023-02-08

## 2023-02-08 PROBLEM — I73.9 PERIPHERAL VASCULAR DISEASE, UNSPECIFIED: Status: RESOLVED | Noted: 2018-08-29 | Resolved: 2023-02-08

## 2023-02-08 PROBLEM — R30.0 DYSURIA: Status: RESOLVED | Noted: 2018-12-28 | Resolved: 2023-02-08

## 2023-02-08 PROBLEM — M79.89 RIGHT LEG SWELLING: Status: RESOLVED | Noted: 2018-12-28 | Resolved: 2023-02-08

## 2023-02-08 PROBLEM — Z51.81 MONITORING FOR LONG-TERM ANTICOAGULANT USE: Status: RESOLVED | Noted: 2022-11-16 | Resolved: 2023-02-08

## 2023-02-08 LAB
ALBUMIN SERPL-MCNC: 3.9 G/DL (ref 3.4–5)
ALBUMIN/GLOB SERPL: 1.1 {RATIO} (ref 1–2)
ALP LIVER SERPL-CCNC: 64 U/L
ALT SERPL-CCNC: 22 U/L
ANION GAP SERPL CALC-SCNC: 6 MMOL/L (ref 0–18)
AST SERPL-CCNC: 22 U/L (ref 15–37)
BILIRUB SERPL-MCNC: 0.4 MG/DL (ref 0.1–2)
BUN BLD-MCNC: 16 MG/DL (ref 7–18)
BUN/CREAT SERPL: 19.3 (ref 10–20)
CALCIUM BLD-MCNC: 9.3 MG/DL (ref 8.5–10.1)
CHLORIDE SERPL-SCNC: 109 MMOL/L (ref 98–112)
CHOLEST SERPL-MCNC: 176 MG/DL (ref ?–200)
CO2 SERPL-SCNC: 28 MMOL/L (ref 21–32)
CREAT BLD-MCNC: 0.83 MG/DL
DEPRECATED RDW RBC AUTO: 42.3 FL (ref 35.1–46.3)
ERYTHROCYTE [DISTWIDTH] IN BLOOD BY AUTOMATED COUNT: 11.9 % (ref 11–15)
EST. AVERAGE GLUCOSE BLD GHB EST-MCNC: 105 MG/DL (ref 68–126)
FASTING PATIENT LIPID ANSWER: NO
FASTING STATUS PATIENT QL REPORTED: NO
GFR SERPLBLD BASED ON 1.73 SQ M-ARVRAT: 88 ML/MIN/1.73M2 (ref 60–?)
GLOBULIN PLAS-MCNC: 3.6 G/DL (ref 2.8–4.4)
GLUCOSE BLD-MCNC: 86 MG/DL (ref 70–99)
HBA1C MFR BLD: 5.3 % (ref ?–5.7)
HCT VFR BLD AUTO: 37.5 %
HDLC SERPL-MCNC: 71 MG/DL (ref 40–59)
HGB BLD-MCNC: 12.6 G/DL
LDLC SERPL CALC-MCNC: 81 MG/DL (ref ?–100)
MCH RBC QN AUTO: 32.5 PG (ref 26–34)
MCHC RBC AUTO-ENTMCNC: 33.6 G/DL (ref 31–37)
MCV RBC AUTO: 96.6 FL
NONHDLC SERPL-MCNC: 105 MG/DL (ref ?–130)
OSMOLALITY SERPL CALC.SUM OF ELEC: 296 MOSM/KG (ref 275–295)
PLATELET # BLD AUTO: 295 10(3)UL (ref 150–450)
POTASSIUM SERPL-SCNC: 3.9 MMOL/L (ref 3.5–5.1)
PROT SERPL-MCNC: 7.5 G/DL (ref 6.4–8.2)
RBC # BLD AUTO: 3.88 X10(6)UL
SODIUM SERPL-SCNC: 143 MMOL/L (ref 136–145)
TRIGL SERPL-MCNC: 143 MG/DL (ref 30–149)
TSI SER-ACNC: 1.32 MIU/ML (ref 0.36–3.74)
VIT D+METAB SERPL-MCNC: 23.3 NG/ML (ref 30–100)
VLDLC SERPL CALC-MCNC: 22 MG/DL (ref 0–30)
WBC # BLD AUTO: 6.5 X10(3) UL (ref 4–11)

## 2023-02-08 PROCEDURE — 36415 COLL VENOUS BLD VENIPUNCTURE: CPT | Performed by: INTERNAL MEDICINE

## 2023-02-08 PROCEDURE — 84443 ASSAY THYROID STIM HORMONE: CPT | Performed by: INTERNAL MEDICINE

## 2023-02-08 PROCEDURE — 83036 HEMOGLOBIN GLYCOSYLATED A1C: CPT | Performed by: INTERNAL MEDICINE

## 2023-02-08 PROCEDURE — 90471 IMMUNIZATION ADMIN: CPT | Performed by: INTERNAL MEDICINE

## 2023-02-08 PROCEDURE — 99396 PREV VISIT EST AGE 40-64: CPT | Performed by: INTERNAL MEDICINE

## 2023-02-08 PROCEDURE — 80061 LIPID PANEL: CPT | Performed by: INTERNAL MEDICINE

## 2023-02-08 PROCEDURE — 3074F SYST BP LT 130 MM HG: CPT | Performed by: INTERNAL MEDICINE

## 2023-02-08 PROCEDURE — 82306 VITAMIN D 25 HYDROXY: CPT | Performed by: INTERNAL MEDICINE

## 2023-02-08 PROCEDURE — 3008F BODY MASS INDEX DOCD: CPT | Performed by: INTERNAL MEDICINE

## 2023-02-08 PROCEDURE — 90715 TDAP VACCINE 7 YRS/> IM: CPT | Performed by: INTERNAL MEDICINE

## 2023-02-08 PROCEDURE — 85027 COMPLETE CBC AUTOMATED: CPT | Performed by: INTERNAL MEDICINE

## 2023-02-08 PROCEDURE — 80053 COMPREHEN METABOLIC PANEL: CPT | Performed by: INTERNAL MEDICINE

## 2023-02-08 PROCEDURE — 3078F DIAST BP <80 MM HG: CPT | Performed by: INTERNAL MEDICINE

## 2023-02-09 ENCOUNTER — PATIENT MESSAGE (OUTPATIENT)
Dept: INTERNAL MEDICINE CLINIC | Facility: CLINIC | Age: 48
End: 2023-02-09

## 2023-02-09 ENCOUNTER — TELEPHONE (OUTPATIENT)
Dept: PHYSICAL THERAPY | Facility: HOSPITAL | Age: 48
End: 2023-02-09

## 2023-02-09 PROBLEM — I37.9: Status: ACTIVE | Noted: 2023-02-09

## 2023-02-13 ENCOUNTER — OFFICE VISIT (OUTPATIENT)
Dept: PHYSICAL THERAPY | Age: 48
End: 2023-02-13
Attending: PODIATRIST
Payer: COMMERCIAL

## 2023-02-13 DIAGNOSIS — Z98.890 PERIPHERAL ARTERIAL DISEASE WITH HISTORY OF REVASCULARIZATION (HCC): ICD-10-CM

## 2023-02-13 DIAGNOSIS — M79.672 LEFT FOOT PAIN: ICD-10-CM

## 2023-02-13 DIAGNOSIS — I73.9 PERIPHERAL ARTERIAL DISEASE WITH HISTORY OF REVASCULARIZATION (HCC): ICD-10-CM

## 2023-02-13 DIAGNOSIS — M21.6X2 ACQUIRED EQUINUS DEFORMITY OF LEFT FOOT: ICD-10-CM

## 2023-02-13 DIAGNOSIS — G57.62 PLANTAR NEUROMA OF LEFT FOOT: ICD-10-CM

## 2023-02-13 PROCEDURE — 97110 THERAPEUTIC EXERCISES: CPT | Performed by: PHYSICAL THERAPIST

## 2023-02-13 PROCEDURE — 97161 PT EVAL LOW COMPLEX 20 MIN: CPT | Performed by: PHYSICAL THERAPIST

## 2023-02-13 PROCEDURE — 97035 APP MDLTY 1+ULTRASOUND EA 15: CPT | Performed by: PHYSICAL THERAPIST

## 2023-02-15 ENCOUNTER — OFFICE VISIT (OUTPATIENT)
Dept: PHYSICAL THERAPY | Age: 48
End: 2023-02-15
Attending: PODIATRIST
Payer: COMMERCIAL

## 2023-02-15 PROCEDURE — 97035 APP MDLTY 1+ULTRASOUND EA 15: CPT | Performed by: PHYSICAL THERAPIST

## 2023-02-15 PROCEDURE — 97140 MANUAL THERAPY 1/> REGIONS: CPT | Performed by: PHYSICAL THERAPIST

## 2023-02-15 NOTE — PROGRESS NOTES
Diagnosis: Plantar neuroma of left foot (G57.62)  Left foot pain (A88.018)  Acquired equinus deformity of left foot (M21.6X2)  Peripheral arterial disease with history of revascularization (HCC) (I73.9,Z98.890)         Next MD visit: none scheduled  Fall Risk: standard         Precautions: n/a          Medication Changes since last visit?: No      Subjective: Patient complains of some foot soreness today. Pt describes pain level 2/10. Objective:  (+) tenderness over dorsum of L foot   (+) swelling on middle 3 toes of left foot    Assessment: Demonstrates decreased swelling on L foot after manual PT and decreased pain after ultrasound treatment. Patient and PT goals in progress. Plan: Continue PT.  Anticipate     2/15/2023  Visit#: 2/B [facility]   Modalities   Ultrasound to dorsum of L foot 1.2 w/cm x 10min   Manual PT   x25min  - soft tissue mobilization to middle 3 toes and dorsum of L foot                     Charges: Manual PT2, Ultrasound       Total Timed Treatment: 35 min  Total Treatment Time: 40 min

## 2023-02-16 ENCOUNTER — HOSPITAL ENCOUNTER (OUTPATIENT)
Dept: ULTRASOUND IMAGING | Facility: HOSPITAL | Age: 48
Discharge: HOME | End: 2023-02-16
Attending: SURGERY
Payer: COMMERCIAL

## 2023-02-16 DIAGNOSIS — I73.9 PVD (PERIPHERAL VASCULAR DISEASE) (HCC): ICD-10-CM

## 2023-02-16 DIAGNOSIS — Z98.890 STATUS POST FEMOROTIBIAL BYPASS: ICD-10-CM

## 2023-02-16 PROCEDURE — 93926 LOWER EXTREMITY STUDY: CPT | Performed by: SURGERY

## 2023-02-20 ENCOUNTER — OFFICE VISIT (OUTPATIENT)
Dept: PHYSICAL THERAPY | Age: 48
End: 2023-02-20
Attending: PODIATRIST
Payer: COMMERCIAL

## 2023-02-20 PROCEDURE — 97110 THERAPEUTIC EXERCISES: CPT | Performed by: PHYSICAL THERAPIST

## 2023-02-20 PROCEDURE — 97035 APP MDLTY 1+ULTRASOUND EA 15: CPT | Performed by: PHYSICAL THERAPIST

## 2023-02-20 NOTE — PROGRESS NOTES
Diagnosis: Plantar neuroma of left foot (G57.62)  Left foot pain (K71.129)  Acquired equinus deformity of left foot (M21.6X2)  Peripheral arterial disease with history of revascularization (HCC) (I73.9,Z98.890)         Next MD visit: none scheduled  Fall Risk: standard         Precautions: n/a          Medication Changes since last visit?: No      Subjective: Patient reports no pain and swelling on her foot today. Pt describes pain level 0/10. Objective:  (-) tenderness over dorsum of L foot   (-) swelling on middle 3 toes of left foot    Assessment: Demonstrates abolished swelling on L foot today. Patient is doing well with home program. Patient and PT goals in progress. Plan: Continue PT. Anticipate dishcarge after 1 more visit if swelling and pain does not return.      2/20/2023  [facility]  Visit[facility] 2[facility]   Modalities   - Manual PT  - Ultrasound to dorsum of L foot 1.2 w/cm x 10min Ultrasound to dorsum of L foot 1.2 w/cm x 10min   Manual PT   x25min  - soft tissue mobilization to middle 3 toes and dorsum of L foot x25min  - soft tissue mobilization to middle 3 toes and dorsum of L foot                       Charges: Manual PT2, Ultrasound       Total Timed Treatment: 35 min  Total Treatment Time: 40 min

## 2023-02-22 ENCOUNTER — ANTI-COAG VISIT (OUTPATIENT)
Dept: ANTICOAGULATION | Facility: CLINIC | Age: 48
End: 2023-02-22

## 2023-02-22 DIAGNOSIS — Z51.81 ENCOUNTER FOR THERAPEUTIC DRUG MONITORING: ICD-10-CM

## 2023-02-22 DIAGNOSIS — Z79.01 LONG TERM (CURRENT) USE OF ANTICOAGULANTS: ICD-10-CM

## 2023-02-22 DIAGNOSIS — I73.9 PERIPHERAL VASCULAR DISEASE (HCC): ICD-10-CM

## 2023-02-22 LAB — INR: 2 (ref 0.8–1.2)

## 2023-02-22 PROCEDURE — 93793 ANTICOAG MGMT PT WARFARIN: CPT | Performed by: INTERNAL MEDICINE

## 2023-02-28 ENCOUNTER — OFFICE VISIT (OUTPATIENT)
Dept: PODIATRY CLINIC | Facility: CLINIC | Age: 48
End: 2023-02-28

## 2023-02-28 DIAGNOSIS — Z98.890 PERIPHERAL ARTERIAL DISEASE WITH HISTORY OF REVASCULARIZATION (HCC): ICD-10-CM

## 2023-02-28 DIAGNOSIS — M21.6X2 ACQUIRED EQUINUS DEFORMITY OF LEFT FOOT: ICD-10-CM

## 2023-02-28 DIAGNOSIS — M79.672 LEFT FOOT PAIN: ICD-10-CM

## 2023-02-28 DIAGNOSIS — I73.9 PERIPHERAL ARTERIAL DISEASE WITH HISTORY OF REVASCULARIZATION (HCC): ICD-10-CM

## 2023-02-28 DIAGNOSIS — G57.62 PLANTAR NEUROMA OF LEFT FOOT: Primary | ICD-10-CM

## 2023-02-28 PROCEDURE — 99213 OFFICE O/P EST LOW 20 MIN: CPT | Performed by: PODIATRIST

## 2023-03-01 ENCOUNTER — NURSE TRIAGE (OUTPATIENT)
Dept: INTERNAL MEDICINE CLINIC | Facility: CLINIC | Age: 48
End: 2023-03-01

## 2023-03-01 ENCOUNTER — APPOINTMENT (OUTPATIENT)
Dept: GENERAL RADIOLOGY | Age: 48
End: 2023-03-01
Attending: NURSE PRACTITIONER
Payer: COMMERCIAL

## 2023-03-01 ENCOUNTER — HOSPITAL ENCOUNTER (OUTPATIENT)
Age: 48
Discharge: HOME | End: 2023-03-01
Payer: COMMERCIAL

## 2023-03-01 VITALS
SYSTOLIC BLOOD PRESSURE: 116 MMHG | HEART RATE: 67 BPM | DIASTOLIC BLOOD PRESSURE: 79 MMHG | RESPIRATION RATE: 18 BRPM | TEMPERATURE: 98 F | OXYGEN SATURATION: 100 %

## 2023-03-01 DIAGNOSIS — S92.424A CLOSED NONDISPLACED FRACTURE OF DISTAL PHALANX OF RIGHT GREAT TOE, INITIAL ENCOUNTER: Primary | ICD-10-CM

## 2023-03-01 PROCEDURE — 28490 TREAT BIG TOE FRACTURE: CPT

## 2023-03-01 PROCEDURE — 99213 OFFICE O/P EST LOW 20 MIN: CPT

## 2023-03-01 PROCEDURE — 73660 X-RAY EXAM OF TOE(S): CPT | Performed by: NURSE PRACTITIONER

## 2023-03-01 NOTE — ED INITIAL ASSESSMENT (HPI)
r 1st toe injury, pain, bruising and swelling s/p tripped while doing a gymnastic routine last night

## 2023-03-02 NOTE — DISCHARGE INSTRUCTIONS
Rest  Ice over a sock or towel 20 minutes at a time a few times per day  Elevate extremity on 2 pillows when at rest  Tylenol 650 mg every 4 hours for pain  Please call and schedule an appointment with your podiatrist as discussed

## 2023-03-03 ENCOUNTER — OFFICE VISIT (OUTPATIENT)
Dept: PODIATRY CLINIC | Facility: CLINIC | Age: 48
End: 2023-03-03

## 2023-03-03 DIAGNOSIS — S92.424A NONDISPLACED FRACTURE OF DISTAL PHALANX OF RIGHT GREAT TOE, INITIAL ENCOUNTER FOR CLOSED FRACTURE: Primary | ICD-10-CM

## 2023-03-03 DIAGNOSIS — M79.674 GREAT TOE PAIN, RIGHT: ICD-10-CM

## 2023-03-03 PROCEDURE — 99213 OFFICE O/P EST LOW 20 MIN: CPT | Performed by: PODIATRIST

## 2023-03-06 ENCOUNTER — TELEPHONE (OUTPATIENT)
Dept: INTERNAL MEDICINE CLINIC | Facility: CLINIC | Age: 48
End: 2023-03-06

## 2023-03-06 DIAGNOSIS — I73.9 PERIPHERAL VASCULAR DISEASE (HCC): Primary | ICD-10-CM

## 2023-03-06 DIAGNOSIS — Z79.01 LONG TERM (CURRENT) USE OF ANTICOAGULANTS: ICD-10-CM

## 2023-03-06 LAB — INR: 2.5 (ref 2–3)

## 2023-03-08 ENCOUNTER — OFFICE VISIT (OUTPATIENT)
Dept: PHYSICAL THERAPY | Age: 48
End: 2023-03-08
Attending: PODIATRIST
Payer: COMMERCIAL

## 2023-03-08 PROCEDURE — 97140 MANUAL THERAPY 1/> REGIONS: CPT | Performed by: PHYSICAL THERAPIST

## 2023-03-08 PROCEDURE — 97035 APP MDLTY 1+ULTRASOUND EA 15: CPT | Performed by: PHYSICAL THERAPIST

## 2023-03-08 NOTE — PROGRESS NOTES
Diagnosis: Plantar neuroma of left foot (G57.62)  Left foot pain (E56.570)  Acquired equinus deformity of left foot (M21.6X2)  Peripheral arterial disease with history of revascularization ([facility]) (I73.9,Z98.890)         Next MD visit: none scheduled  Fall Risk: standard         Precautions: n/a          Medication Changes since last visit?: No      Subjective: Patient reports no pain just swelling on L foot. Complains of pain on big toe of R foot. States she fractured her R foot 2/28 while doing gymnastics. Pt describes L foot pain level 0/10. Objective:  (-) tenderness over dorsum of L foot   (+) swelling on middle 3 toes of left foot    Assessment: Demonstrates decreased swelling of L foot after today's visit. Patient is doing well with home program. Patient and PT goals in progress. Plan: Continue PT x 2 more visits to manage swelling of L foot.      3/8/2023  Visit#: 4/8 [facility] 2/20/2023  Visit#: 3/8 [facility] 2/15/2023  Visit#: 2/[facility]   Modalities   - Manual PT  - Ultrasound to dorsum of L foot 1.2 w/cm x 10min - Manual PT  - Ultrasound to dorsum of L foot 1.2 w/cm x 10min Ultrasound to dorsum of L foot 1.2 w/cm x 10min   Manual PT   x25min  - soft tissue mobilization to middle 3 toes and dorsum of L foot x25min  - soft tissue mobilization to middle 3 toes and dorsum of L foot x25min  - soft tissue mobilization to middle 3 toes and dorsum of L foot                         Charges: Manual PT2, Ultrasound       Total Timed Treatment: 35 min  Total Treatment Time: 40 min

## 2023-03-09 ENCOUNTER — ANTI-COAG VISIT (OUTPATIENT)
Dept: ANTICOAGULATION | Facility: CLINIC | Age: 48
End: 2023-03-09

## 2023-03-09 ENCOUNTER — MED REC SCAN ONLY (OUTPATIENT)
Dept: INTERNAL MEDICINE CLINIC | Facility: CLINIC | Age: 48
End: 2023-03-09

## 2023-03-09 DIAGNOSIS — Z51.81 ENCOUNTER FOR THERAPEUTIC DRUG MONITORING: ICD-10-CM

## 2023-03-09 DIAGNOSIS — I73.9 PERIPHERAL VASCULAR DISEASE (HCC): ICD-10-CM

## 2023-03-09 DIAGNOSIS — Z79.01 LONG TERM (CURRENT) USE OF ANTICOAGULANTS: ICD-10-CM

## 2023-03-09 PROCEDURE — 93793 ANTICOAG MGMT PT WARFARIN: CPT | Performed by: INTERNAL MEDICINE

## 2023-03-09 NOTE — PROGRESS NOTES
3/9/23: Result received from [employer] home monitoring, INR completed 3/6. INR within goal range. Per protocol, continue current dose and recheck INR 2-4 weeks.

## 2023-03-15 ENCOUNTER — OFFICE VISIT (OUTPATIENT)
Dept: PHYSICAL THERAPY | Age: 48
End: 2023-03-15
Attending: PODIATRIST
Payer: COMMERCIAL

## 2023-03-15 PROCEDURE — 97035 APP MDLTY 1+ULTRASOUND EA 15: CPT | Performed by: PHYSICAL THERAPIST

## 2023-03-15 PROCEDURE — 97140 MANUAL THERAPY 1/> REGIONS: CPT | Performed by: PHYSICAL THERAPIST

## 2023-03-15 NOTE — PROGRESS NOTES
Diagnosis: Plantar neuroma of left foot (G57.62)  Left foot pain (O59.431)  Acquired equinus deformity of left foot (M21.6X2)  Peripheral arterial disease with history of revascularization (HCC) (I73.9,Z98.890)         Next MD visit: none scheduled  Fall Risk: standard         Precautions: n/a          Medication Changes since last visit?: No      Subjective: Patient reports no pain just swelling on L foot. Pt describes L foot pain level 0/10. Objective:  (-) tenderness over dorsum of L foot   (+) swelling on middle 3 toes of left foot    Assessment:  Patient is doing well with home program. Patient and PT goals in progress. Plan: Continue PT x 1 more visit to manage swelling of L foot.      3/15/2023  [facility]  Visit#: 4/8 [facility] 2/20/2023  Visit#: 3/8 [facility]   Modalities   - Manual PT  - Ultrasound to dorsum of L foot 1.2 w/cm x 10min - Manual PT  - Ultrasound to dorsum of L foot 1.2 w/cm x 10min - Manual PT  - Ultrasound to dorsum of L foot 1.2 w/cm x 10min   Manual PT   x25min  - soft tissue mobilization to middle 3 toes and dorsum of L foot x25min  - soft tissue mobilization to middle 3 toes and dorsum of L foot x25min  - soft tissue mobilization to middle 3 toes and dorsum of L foot                     Charges: Manual PT2, Ultrasound       Total Timed Treatment: 35 min  Total Treatment Time: 35 min

## 2023-03-20 ENCOUNTER — OFFICE VISIT (OUTPATIENT)
Dept: PHYSICAL THERAPY | Age: 48
End: 2023-03-20
Attending: PODIATRIST
Payer: COMMERCIAL

## 2023-03-20 PROCEDURE — 97140 MANUAL THERAPY 1/> REGIONS: CPT | Performed by: PHYSICAL THERAPIST

## 2023-03-20 PROCEDURE — 97035 APP MDLTY 1+ULTRASOUND EA 15: CPT | Performed by: PHYSICAL THERAPIST

## 2023-03-20 NOTE — PROGRESS NOTES
Progress Summary  Pt has attended 6/8 [facility] visits in Physical Therapy. Diagnosis: Plantar neuroma of left foot (G57.62)  Left foot pain (G66.316)  Acquired equinus deformity of left foot (M21.6X2)  Peripheral arterial disease with history of revascularization (HCC) (I73.9,Z98.890)         Next MD visit: none scheduled  Fall Risk: standard         Precautions: n/a          Medication Changes since last visit?: No      Subjective: Patient reports no pain just swelling on L foot. Pt describes L foot pain level 0/10. Objective:  (-) tenderness over dorsum of L foot   (+) swelling on middle 3 toes of left foot    Assessment:  Referred to PT due to initial complaints of swelling on L foot. Patient has completed 6 PT visits. Reports no pain on L foot. ROM and strength are WFL on L foot. Reports occasional swelling on dorsum of L foot and middle three toe. No pain when swelling is present. Patient is doing well with home program. She is independent with its progression and management of residual symptoms. 3/20/2023  Visit#: 6/8 [facility] 3/15/2023  Visit#: 5/8 [facility] 3/8/2023  Visit#: 4/8 [facility]   Modalities   - Manual PT  - Ultrasound to dorsum of L foot 1.2 w/cm x 10min   - Manual PT  - Ultrasound to dorsum of L foot 1.2 w/cm x 10min - Manual PT  - Ultrasound to dorsum of L foot 1.2 w/cm x 10min   Manual PT   x25min  - soft tissue mobilization to middle 3 toes and dorsum of L foot x25min  - soft tissue mobilization to middle 3 toes and dorsum of L foot   x25min  - soft tissue mobilization to middle 3 toes and dorsum of L foot                       Charges: Manual PT2, Ultrasound       Total Timed Treatment: 35 min  Total Treatment Time: 35 min    Plan: Discharged from PT. Patient will continue with her home program and follow up with MD due to residual swelling on L foot.       Patient/Family/Caregiver was advised of these findings, precautions, and treatment options and has agreed to actively participate in planning and for this course of care. Thank you for your referral. If you have any questions, please contact me at Dept: [phone].     Sincerely,  Electronically signed by therapist: Stephen Marshall, PT

## 2023-03-22 ENCOUNTER — ANTI-COAG VISIT (OUTPATIENT)
Dept: ANTICOAGULATION | Facility: CLINIC | Age: 48
End: 2023-03-22

## 2023-03-22 DIAGNOSIS — I73.9 PERIPHERAL VASCULAR DISEASE (HCC): ICD-10-CM

## 2023-03-22 DIAGNOSIS — Z79.01 LONG TERM (CURRENT) USE OF ANTICOAGULANTS: ICD-10-CM

## 2023-03-22 DIAGNOSIS — Z51.81 ENCOUNTER FOR THERAPEUTIC DRUG MONITORING: ICD-10-CM

## 2023-03-22 LAB — INR: 2.2 (ref 0.8–1.2)

## 2023-03-22 PROCEDURE — 93793 ANTICOAG MGMT PT WARFARIN: CPT | Performed by: INTERNAL MEDICINE

## 2023-04-04 ENCOUNTER — TELEPHONE (OUTPATIENT)
Dept: CASE MANAGEMENT | Age: 48
End: 2023-04-04

## 2023-04-04 DIAGNOSIS — S92.919A CLOSED FRACTURE OF PHALANX OF TOE, PHYSEAL INVOLVEMENT UNSPECIFIED, UNSPECIFIED LATERALITY, UNSPECIFIED TOE, INITIAL ENCOUNTER: Primary | ICD-10-CM

## 2023-04-04 DIAGNOSIS — M79.89 FOOT SWELLING: ICD-10-CM

## 2023-04-04 NOTE — TELEPHONE ENCOUNTER
Dr. Laura Simon,     Patient requesting a referral to Dr. Antonio Neves for a fractured toe and swollen foot. Pended referral please review diagnosis and sign off if you agree. Thank you.   Cesar Bowen

## 2023-04-11 ENCOUNTER — OFFICE VISIT (OUTPATIENT)
Dept: PODIATRY CLINIC | Facility: CLINIC | Age: 48
End: 2023-04-11

## 2023-04-11 DIAGNOSIS — Z98.890 PERIPHERAL ARTERIAL DISEASE WITH HISTORY OF REVASCULARIZATION (HCC): ICD-10-CM

## 2023-04-11 DIAGNOSIS — M25.872 SESAMOIDITIS OF LEFT FOOT: Primary | ICD-10-CM

## 2023-04-11 DIAGNOSIS — I73.9 PERIPHERAL ARTERIAL DISEASE WITH HISTORY OF REVASCULARIZATION (HCC): ICD-10-CM

## 2023-04-11 DIAGNOSIS — M79.672 LEFT FOOT PAIN: ICD-10-CM

## 2023-04-11 DIAGNOSIS — S92.424D NONDISPLACED FRACTURE OF DISTAL PHALANX OF RIGHT GREAT TOE, SUBSEQUENT ENCOUNTER FOR FRACTURE WITH ROUTINE HEALING: ICD-10-CM

## 2023-04-11 PROCEDURE — 99213 OFFICE O/P EST LOW 20 MIN: CPT | Performed by: PODIATRIST

## 2023-04-12 ENCOUNTER — ANTI-COAG VISIT (OUTPATIENT)
Dept: ANTICOAGULATION | Facility: CLINIC | Age: 48
End: 2023-04-12

## 2023-04-12 DIAGNOSIS — Z51.81 ENCOUNTER FOR THERAPEUTIC DRUG MONITORING: ICD-10-CM

## 2023-04-12 DIAGNOSIS — Z79.01 LONG TERM (CURRENT) USE OF ANTICOAGULANTS: ICD-10-CM

## 2023-04-12 DIAGNOSIS — I73.9 PERIPHERAL VASCULAR DISEASE (HCC): ICD-10-CM

## 2023-04-12 LAB — INR: 2.3 (ref 0.8–1.2)

## 2023-04-12 PROCEDURE — 93793 ANTICOAG MGMT PT WARFARIN: CPT | Performed by: INTERNAL MEDICINE

## 2023-04-19 LAB — INR: 1.9 (ref 2–3)

## 2023-04-20 ENCOUNTER — ANTI-COAG VISIT (OUTPATIENT)
Dept: ANTICOAGULATION | Facility: CLINIC | Age: 48
End: 2023-04-20

## 2023-04-20 DIAGNOSIS — I73.9 PERIPHERAL VASCULAR DISEASE (HCC): ICD-10-CM

## 2023-04-20 DIAGNOSIS — Z79.01 LONG TERM (CURRENT) USE OF ANTICOAGULANTS: ICD-10-CM

## 2023-04-20 DIAGNOSIS — Z51.81 ENCOUNTER FOR THERAPEUTIC DRUG MONITORING: ICD-10-CM

## 2023-04-20 NOTE — PROGRESS NOTES
Result received from [employer] home monitoring. INR minimally below goal range. [facility] advising to contact us with any changes with medications/diet, or missed doses. Otherwise, per protocol, continue current dose and recheck INR 2-4 weeks.     1.5 mg every Sun; 1 mg all other days

## 2023-05-02 ENCOUNTER — OFFICE VISIT (OUTPATIENT)
Dept: PODIATRY CLINIC | Facility: CLINIC | Age: 48
End: 2023-05-02

## 2023-05-02 ENCOUNTER — HOSPITAL ENCOUNTER (OUTPATIENT)
Dept: GENERAL RADIOLOGY | Age: 48
Discharge: HOME | End: 2023-05-02
Attending: PODIATRIST
Payer: COMMERCIAL

## 2023-05-02 DIAGNOSIS — M25.872 SESAMOIDITIS OF LEFT FOOT: ICD-10-CM

## 2023-05-02 DIAGNOSIS — M79.672 LEFT FOOT PAIN: ICD-10-CM

## 2023-05-02 DIAGNOSIS — M25.872 SESAMOIDITIS OF LEFT FOOT: Primary | ICD-10-CM

## 2023-05-02 DIAGNOSIS — Z98.890 PERIPHERAL ARTERIAL DISEASE WITH HISTORY OF REVASCULARIZATION (HCC): ICD-10-CM

## 2023-05-02 DIAGNOSIS — I73.9 PERIPHERAL ARTERIAL DISEASE WITH HISTORY OF REVASCULARIZATION (HCC): ICD-10-CM

## 2023-05-02 PROCEDURE — 73630 X-RAY EXAM OF FOOT: CPT | Performed by: PODIATRIST

## 2023-05-02 PROCEDURE — 99213 OFFICE O/P EST LOW 20 MIN: CPT | Performed by: PODIATRIST

## 2023-05-02 RX ORDER — METHYLPREDNISOLONE 4 MG/1
TABLET ORAL
Qty: 1 EACH | Refills: 0 | Status: SHIPPED | OUTPATIENT
Start: 2023-05-02

## 2023-05-11 ENCOUNTER — ANTI-COAG VISIT (OUTPATIENT)
Dept: ANTICOAGULATION | Facility: CLINIC | Age: 48
End: 2023-05-11

## 2023-05-11 DIAGNOSIS — Z51.81 ENCOUNTER FOR THERAPEUTIC DRUG MONITORING: ICD-10-CM

## 2023-05-11 DIAGNOSIS — Z79.01 LONG TERM (CURRENT) USE OF ANTICOAGULANTS: ICD-10-CM

## 2023-05-11 DIAGNOSIS — I73.9 PERIPHERAL VASCULAR DISEASE (HCC): ICD-10-CM

## 2023-05-11 LAB — INR: 3.2 (ref 0.8–1.2)

## 2023-05-11 NOTE — PROGRESS NOTES
INR result received from [employer] home monitoring. INR is minimally above therapeutic goal range. Per protocol, continue current dose and recheck INR in 2 weeks. My chart message sent to Cleve Shah- asked her to call or reply to my chart message for any additional questions.

## 2023-05-24 ENCOUNTER — ANTI-COAG VISIT (OUTPATIENT)
Dept: ANTICOAGULATION | Facility: CLINIC | Age: 48
End: 2023-05-24

## 2023-05-24 DIAGNOSIS — Z79.01 LONG TERM (CURRENT) USE OF ANTICOAGULANTS: ICD-10-CM

## 2023-05-24 DIAGNOSIS — Z51.81 ENCOUNTER FOR THERAPEUTIC DRUG MONITORING: ICD-10-CM

## 2023-05-24 DIAGNOSIS — I73.9 PERIPHERAL VASCULAR DISEASE (HCC): ICD-10-CM

## 2023-05-24 LAB — INR: 2.5 (ref 0.8–1.2)

## 2023-05-24 PROCEDURE — 93793 ANTICOAG MGMT PT WARFARIN: CPT | Performed by: INTERNAL MEDICINE

## 2023-06-01 ENCOUNTER — ANTI-COAG VISIT (OUTPATIENT)
Dept: ANTICOAGULATION | Facility: CLINIC | Age: 48
End: 2023-06-01

## 2023-06-01 LAB — INR: 2.2 (ref 2–3)

## 2023-06-01 PROCEDURE — 93793 ANTICOAG MGMT PT WARFARIN: CPT | Performed by: INTERNAL MEDICINE

## 2023-06-01 NOTE — PROGRESS NOTES
Result received from [employer] home monitoring. INR within goal range. Per protocol, continue current dose and recheck INR 2-4 weeks.     1.5 mg every Sun; 1 mg all other days

## 2023-06-06 ENCOUNTER — TELEPHONE (OUTPATIENT)
Dept: INTERNAL MEDICINE CLINIC | Facility: CLINIC | Age: 48
End: 2023-06-06

## 2023-06-06 DIAGNOSIS — Z12.11 COLON CANCER SCREENING: Primary | ICD-10-CM

## 2023-06-06 NOTE — TELEPHONE ENCOUNTER
Patient is due for colorectal screening. Referral for GI consult written 2/8/23. No appt scheduled. Please assist pt in scheduling GI consult. Left message to call back.

## 2023-06-06 NOTE — TELEPHONE ENCOUNTER
I am not sure if cologuard is covered by her HMO insurance so pt will need to check with her insurance(its' $600 out of pocket if insurance doesn't cover) ; another option for stool based colon screening test is the fit stool test whch is also accepted form of  colon screening test and it checks for human blood in stools,  is covered by insurance for sure. It's done yearly.    Pt should have normal bowel movements, no blood in stools, no previous abnormal coclnoscopies, nor family history of colon cancer for her to be candidate for cologuard test.

## 2023-06-06 NOTE — TELEPHONE ENCOUNTER
Patient asking if Dr. Demetrio Jackson would order Cologuard. Patient does not want to do colonoscopy. Patient willing to make an appointment if necessary. Please advise.

## 2023-06-09 NOTE — TELEPHONE ENCOUNTER
Pt was called and notified of Dr. Zaheer Prabhakar message, message was left for pt to call back if any questions.

## 2023-06-13 ENCOUNTER — OFFICE VISIT (OUTPATIENT)
Dept: PODIATRY CLINIC | Facility: CLINIC | Age: 48
End: 2023-06-13

## 2023-06-13 VITALS — SYSTOLIC BLOOD PRESSURE: 107 MMHG | HEART RATE: 72 BPM | DIASTOLIC BLOOD PRESSURE: 65 MMHG

## 2023-06-13 DIAGNOSIS — Z98.890 PERIPHERAL ARTERIAL DISEASE WITH HISTORY OF REVASCULARIZATION (HCC): ICD-10-CM

## 2023-06-13 DIAGNOSIS — I73.9 PERIPHERAL ARTERIAL DISEASE WITH HISTORY OF REVASCULARIZATION (HCC): ICD-10-CM

## 2023-06-13 DIAGNOSIS — M25.872 SESAMOIDITIS OF LEFT FOOT: Primary | ICD-10-CM

## 2023-06-13 DIAGNOSIS — M79.672 LEFT FOOT PAIN: ICD-10-CM

## 2023-06-13 PROCEDURE — 3074F SYST BP LT 130 MM HG: CPT | Performed by: PODIATRIST

## 2023-06-13 PROCEDURE — 20600 DRAIN/INJ JOINT/BURSA W/O US: CPT | Performed by: PODIATRIST

## 2023-06-13 PROCEDURE — 3078F DIAST BP <80 MM HG: CPT | Performed by: PODIATRIST

## 2023-06-13 RX ORDER — TRIAMCINOLONE ACETONIDE 40 MG/ML
40 INJECTION, SUSPENSION INTRA-ARTICULAR; INTRAMUSCULAR ONCE
Status: COMPLETED | OUTPATIENT
Start: 2023-06-13 | End: 2023-06-13

## 2023-06-13 RX ADMIN — TRIAMCINOLONE ACETONIDE 40 MG: 40 INJECTION, SUSPENSION INTRA-ARTICULAR; INTRAMUSCULAR at 17:11:00

## 2023-06-13 NOTE — PROGRESS NOTES
Per verbal order from Dr. Galo Hahn, draw up 1ml of 0.5% Marcaine and 1ml of Kenalog 40 for cortisone injection to left foot. Emani Frazier  Patient provided education handout for cortisone injection.

## 2023-06-16 NOTE — TELEPHONE ENCOUNTER
Please advise on refill request due to drug warning. Very High  Drug-Drug: warfarin and Aspirin EC Low Dose  The risk of bleeding, particularly gastrointestinal, may be increased by coadministration of Anticoagulants with Salicylates. However, use of low-dose aspirin with Anticoagulants may provide benefit that outweighs the risk of minor bleeding. Refill passed per [facility] protocol.     Requested Prescriptions   Pending Prescriptions Disp Refills    warfarin 1 MG Oral Tab 135 tablet 1     Sig: TAKE 1 AND 1/2 TABLETS(1.5 MG) BY MOUTH EVERY EVENING       Rx Em Warfarin Protocol Passed - 6/15/2023  1:37 PM        Passed - Appointment in past 12 or next 3 months     Recent Outpatient Visits              3 days ago Sesamoiditis of left foot    [facility], [place], [place]    Office Visit    1 month ago Sesamoiditis of left foot    [facility] [employer], [address], [place] Snider, [facility]    Office Visit    2 months ago Sesamoiditis of left foot    [employer] [facility], [address], [place], [place]    Office Visit    2 months ago     [facility] Rosa in [place] [address]., Emily Nickerson, PT    Office Visit    3 months ago     [facility] Rosa in [place] [address]., Emily Nickerson, PT    Office Visit          Future Appointments         Provider Department Appt Notes    In 1 month Nima Snider, DPM [facility], [address], [place] 6 week f/u               Passed - INR 3.9 or less past 6 weeks     INR   Date Value Ref Range Status   06/01/2023 2.2 (A) 2.0 - 3.0 Final                   Passed - CMP within past 12 months     Recent Results (from the past 4380 hour(s))   COMP METABOLIC PANEL (14)    Collection Time: 02/08/23  4:50 PM   Result Value Ref Range    Glucose 86 70 - 99 mg/dL    Sodium 143 136 - 145 mmol/L    Potassium 3.9 3.5 - 5.1 mmol/L    Chloride 109 98 - 112 mmol/L CO2 28.0 21.0 - 32.0 mmol/L    Anion Gap 6 0 - 18 mmol/L    BUN 16 7 - 18 mg/dL    Creatinine 0.83 0.55 - 1.02 mg/dL    BUN/CREA Ratio 19.3 10.0 - 20.0    Calcium, Total 9.3 8.5 - 10.1 mg/dL    Calculated Osmolality 296 (H) 275 - 295 mOsm/kg    eGFR-Cr 88 >=60 mL/min/1.73m2    ALT 22 13 - 56 U/L    AST 22 15 - 37 U/L    Alkaline Phosphatase 64 39 - 100 U/L    Bilirubin, Total 0.4 0.1 - 2.0 mg/dL    Total Protein 7.5 6.4 - 8.2 g/dL    Albumin 3.9 3.4 - 5.0 g/dL    Globulin  3.6 2.8 - 4.4 g/dL    A/G Ratio 1.1 1.0 - 2.0    Patient Fasting for CMP? No                    Passed - AST < 111 (less than 3 Xs the upper limit)     Lab Results   Component Value Date    AST 22 02/08/2023                     Passed - ALT < 168 (less than 3Xs the upper limit)     Lab Results   Component Value Date    ALT 22 02/08/2023                     Passed - CBC within the past 12 months     Recent Results (from the past 8760 hour(s))   CBC, PLATELET; NO DIFFERENTIAL    Collection Time: 02/08/23  4:50 PM   Result Value Ref Range    WBC 6.5 4.0 - 11.0 x10(3) uL    RBC 3.88 3.80 - 5.30 x10(6)uL    HGB 12.6 12.0 - 16.0 g/dL    HCT 37.5 35.0 - 48.0 %    MCV 96.6 80.0 - 100.0 fL    MCH 32.5 26.0 - 34.0 pg    MCHC 33.6 31.0 - 37.0 g/dL    RDW 11.9 11.0 - 15.0 %    RDW-SD 42.3 35.1 - 46.3 fL    .0 150.0 - 450.0 10(3)uL     *Note: Due to a large number of results and/or encounters for the requested time period, some results have not been displayed. A complete set of results can be found in Results Review.                  Passed - Hgb >/= 10g/dl within past 12 months     HGB   Date Value Ref Range Status   02/08/2023 12.6 12.0 - 16.0 g/dL Final                   Passed - Platelets >/= 317 past 12 months     PLT   Date Value Ref Range Status   02/08/2023 295.0 150.0 - 450.0 10(3)uL Final                        Future Appointments         Provider Department Appt Notes    In 1 month Amol Snider, DPM [facility], [address], [place] 6 week f/u          Recent Outpatient Visits              3 days ago [employer] of left foot    [facility], [address], [place], Sinda Breath, [place]    Office Visit    1 month ago [employer] of left foot    [facility], [address], [place], Sinda Breath, [place]    Office Visit    2 months ago [employer] of left foot    [facility] [facility], [address], [place], Sinda Breath, DPM    Office Visit    2 months ago     [facility] Rosa in [place] [address]., Daniel Weber, PT    Office Visit    3 months ago     [facility] Rosa in [place] [address]., Daniel Weber, PT    Office Visit

## 2023-06-17 RX ORDER — WARFARIN SODIUM 1 MG/1
TABLET ORAL
Qty: 120 TABLET | Refills: 0 | Status: SHIPPED | OUTPATIENT
Start: 2023-06-17

## 2023-06-19 ENCOUNTER — ANTI-COAG VISIT (OUTPATIENT)
Dept: ANTICOAGULATION | Facility: CLINIC | Age: 48
End: 2023-06-19

## 2023-06-19 LAB — INR: 2.7 (ref 0.8–1.2)

## 2023-07-03 ENCOUNTER — ANTI-COAG VISIT (OUTPATIENT)
Dept: ANTICOAGULATION | Facility: CLINIC | Age: 48
End: 2023-07-03

## 2023-07-03 LAB — INR: 2.1 (ref 0.8–1.2)

## 2023-07-11 ENCOUNTER — HOSPITAL ENCOUNTER (OUTPATIENT)
Dept: CV DIAGNOSTICS | Facility: HOSPITAL | Age: 48
Discharge: HOME | End: 2023-07-11
Attending: INTERNAL MEDICINE
Payer: COMMERCIAL

## 2023-07-11 DIAGNOSIS — I37.9 PULMONIC VALVE DISEASE: ICD-10-CM

## 2023-07-11 PROCEDURE — 93306 TTE W/DOPPLER COMPLETE: CPT | Performed by: INTERNAL MEDICINE

## 2023-07-12 ENCOUNTER — TELEPHONE (OUTPATIENT)
Dept: INTERNAL MEDICINE CLINIC | Facility: CLINIC | Age: 48
End: 2023-07-12

## 2023-07-12 DIAGNOSIS — I37.9 PULMONIC VALVE DISEASE: Primary | ICD-10-CM

## 2023-07-12 NOTE — TELEPHONE ENCOUNTER
Spoke with patient, (  Name and  verified ) informed that new referral was placed and I sent a copy to her MyChart     Patient verbalizes understanding and agrees with plan.

## 2023-07-12 NOTE — TELEPHONE ENCOUNTER
Patient calling ( identified name and  ) [facility] has  Cardiology appointment tomorrow at [facility]  with Dr. Haider Held and was told referral was  in May       Called [facility], spoke with Caryle Jesus,  call transferred to [facility] will need a new referral for Cardiology,   in May       Referral pended for your review, completion and approval.

## 2023-07-17 ENCOUNTER — ANTI-COAG VISIT (OUTPATIENT)
Dept: ANTICOAGULATION | Facility: CLINIC | Age: 48
End: 2023-07-17

## 2023-07-17 ENCOUNTER — TELEPHONE (OUTPATIENT)
Dept: OBGYN CLINIC | Facility: CLINIC | Age: 48
End: 2023-07-17

## 2023-07-17 DIAGNOSIS — I73.9 PERIPHERAL VASCULAR DISEASE (HCC): Primary | ICD-10-CM

## 2023-07-17 LAB — INR: 2.6 (ref 0.8–1.2)

## 2023-07-17 NOTE — TELEPHONE ENCOUNTER
Patient has left breast pain and indicates you can visible see the redness. Please call at [phone],[facility].

## 2023-07-17 NOTE — TELEPHONE ENCOUNTER
Patient name and  verified. Patient complaining of L breast pain from a breast cyst x2 weeks. Warm to touch and tender. Advised patient appt in office. Patient scheduled with TA. Aware of scheduling details.

## 2023-07-20 ENCOUNTER — OFFICE VISIT (OUTPATIENT)
Dept: OBGYN CLINIC | Facility: CLINIC | Age: 48
End: 2023-07-20

## 2023-07-20 VITALS — SYSTOLIC BLOOD PRESSURE: 108 MMHG | DIASTOLIC BLOOD PRESSURE: 72 MMHG

## 2023-07-20 DIAGNOSIS — N60.02 BREAST CYST, LEFT: Primary | ICD-10-CM

## 2023-07-20 PROCEDURE — 3078F DIAST BP <80 MM HG: CPT | Performed by: OBSTETRICS & GYNECOLOGY

## 2023-07-20 PROCEDURE — 3074F SYST BP LT 130 MM HG: CPT | Performed by: OBSTETRICS & GYNECOLOGY

## 2023-07-20 PROCEDURE — 99213 OFFICE O/P EST LOW 20 MIN: CPT | Performed by: OBSTETRICS & GYNECOLOGY

## 2023-07-25 ENCOUNTER — OFFICE VISIT (OUTPATIENT)
Dept: PODIATRY CLINIC | Facility: CLINIC | Age: 48
End: 2023-07-25

## 2023-07-25 DIAGNOSIS — Z98.890 PERIPHERAL ARTERIAL DISEASE WITH HISTORY OF REVASCULARIZATION: ICD-10-CM

## 2023-07-25 DIAGNOSIS — M25.872 SESAMOIDITIS OF LEFT FOOT: Primary | ICD-10-CM

## 2023-07-25 DIAGNOSIS — I73.9 PERIPHERAL ARTERIAL DISEASE WITH HISTORY OF REVASCULARIZATION: ICD-10-CM

## 2023-07-25 PROCEDURE — 99213 OFFICE O/P EST LOW 20 MIN: CPT | Performed by: PODIATRIST

## 2023-07-25 NOTE — PROGRESS NOTES
[facility], [facility] Podiatry  Progress Note    Tedra Goltz is a 56year old female. Patient presents with: Foot Pain: Left f/u - had cortisone inj on 6/13/23 - states she is fine - has no pain at all        HPI:     This is a pleasant female with Right LE PAD from popliteal entrapment on warfarin. She does have PMH of Right LE bypass by Dr. Thomas Adhikari. She presents to clinic today due to left foot pain f/u. She states the injection helped greatly and denies any pain. Allergies: Levaquin [Levofloxacin]   Current Outpatient Medications   Medication Sig Dispense Refill    warfarin 1 MG Oral Tab Take 1.5 tabs (1.5 mg) by mouth every Sun and 1 tab (1mg) all other days or as directed by coumadin clinic 120 tablet 0    methylPREDNISolone 4 MG Oral Tablet Therapy Pack Take as directed on pack 1 each 0    rosuvastatin 5 MG Oral Tab Take 1 tablet (5 mg total) by mouth nightly. FOR CHOLESTEROL. 90 tablet 9    CILOSTAZOL 100 MG Oral Tab TAKE 1 TABLET(100 MG) BY MOUTH TWICE DAILY 60 tablet 11    ASPIRIN EC LOW DOSE 81 MG Oral Tab EC Take 1 tablet (81 mg total) by mouth daily. 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  w/u negative for coagulopathy but was anticoagulated during pregnancies     Tendinitis 7/21/2009    discontinue insole / rx ibuprofen    Valvular disease     Pulmonic stenosis    Visual impairment glasses      Past Surgical History:   Procedure Laterality Date    APPENDECTOMY      APPENDECTOMY        2006    CATH BARE METAL STENT (BMS)      3 stents right leg    ENDOMETRIAL ABLATION  2015    HYSTERECTOMY  10/2018    Laparoscopic hysterectomy with Dr. Delmi Marshall. Had post op vaginal bleeding and repeat suturing of vaginal cuff. LEG/ANKLE SURGERY PROC UNLISTED      right leg arterial bypass / (fasciotomy)    OTHER SURGICAL HISTORY      SX at 25 y/o / valvuloplasty (congenital Pulmonic stenosis)      Family History   Problem Relation Age of Onset    No Known Problems Father     No Known Problems Mother     Other (Other) Maternal Grandmother         Fibrocystic Breast Disease    Cancer Paternal Grandfather         lung    No Known Problems Sister     Bipolar Disorder Brother     Breast Cancer Maternal Aunt         40's,  BRCA negative    Breast Cancer Maternal Aunt         mat great great aunt breast ca unknown age    Ovarian Cancer Neg     Uterine Cancer Neg     Colon Cancer Neg       Social History    Socioeconomic History      Marital status:     Tobacco Use      Smoking status: Never      Smokeless tobacco: Never    Vaping Use      Vaping Use: Never used    Substance and Sexual Activity      Alcohol use: Yes        Comment: rare      Drug use: No    Other Topics      Concerns:        Caffeine Concern: Yes          coffee, 1-2 cups daily        Pt has a pacemaker: No        Pt has a defibrillator: No        Reaction to local anesthetic: No          REVIEW OF SYSTEMS:   Denies nausea, fever, chills  No calf pain  No other muscle or joint aches  Denies chest pain or shortness of breath. EXAM:   LMP 2018     Constitution: Well-developed and well-nourished. Gait appears normal. No apparent distress. Alert and oriented to person, place, and time. Integument: There are no varicosities. Skin appears moist, warm, and supple with negative hair growth.        Vascular examination:  Left pedal pulses 2/4  Right foot NP DP, 2/4 PT    Neurological Sensorium: Grossly intact to sharp/dull. Vibratory: Intact. Musculoskeletal:   5/5 pedal muscle strength b/l  Pain on compression to Left 3rd inner metatarsal spaces with positive jasbir's click indicative of neuroma, improved  Decreased left ankle DF with knee extended approx 0 degrees     No Pain with left hallux ROM at the MPJ  POP to left sub 1st met head along sesamoid apparatus, resolved      LABS & IMAGING:     Lab Results   Component Value Date    GLU 86 02/08/2023    BUN 16 02/08/2023    CREATSERUM 0.83 02/08/2023    BUNCREA 19.3 02/08/2023    ANIONGAP 6 02/08/2023    GFRAA 108 07/26/2022    GFRNAA 94 07/26/2022    CA 9.3 02/08/2023     02/08/2023    K 3.9 02/08/2023     02/08/2023    CO2 28.0 02/08/2023    OSMOCALC 296 (H) 02/08/2023        Lab Results   Component Value Date     02/08/2023    A1C 5.3 02/08/2023        No results found. ASSESSMENT AND PLAN:   Diagnoses and all orders for this visit:    Sesamoiditis of left foot    Peripheral arterial disease with history of revascularization           Plan:     Evaluated the patient and discussed treatment options. Recommend cryotherapy/icing, rest, and elevation. US arterial LE: 7/15/22  CONCLUSION:   1. Widely patent right superficial femoral to posterior tibial artery bypass graft. No change since the prior study of 12/17/2021.   2. Widely patent left lower extremity arterial vasculature. Xray Left foot full WB: 5/2/23  BONES: Very minimal osteoarthritic change is seen about the first metatarsophalangeal joint. Minimal bunionette is noted. Pes planus noted. Discussed with patient that her left sub 1st met head pain is most likely due to sesamoiditis from increased pressure. Will hold off on 2nd Left foot sesamoid injection due to resolution of pain        Pt unable to wear orthotics due to causing increased pain      RTC PRN. If still painful will consider MRI. No follow-ups on file.     Jenny López DPM  7/25/23

## 2023-07-30 LAB — INR: 3 (ref 2–3)

## 2023-08-01 ENCOUNTER — HOSPITAL ENCOUNTER (OUTPATIENT)
Dept: ULTRASOUND IMAGING | Facility: HOSPITAL | Age: 48
Discharge: HOME | End: 2023-08-01
Attending: OBSTETRICS & GYNECOLOGY
Payer: COMMERCIAL

## 2023-08-01 ENCOUNTER — HOSPITAL ENCOUNTER (OUTPATIENT)
Dept: MAMMOGRAPHY | Facility: HOSPITAL | Age: 48
Discharge: HOME | End: 2023-08-01
Attending: OBSTETRICS & GYNECOLOGY
Payer: COMMERCIAL

## 2023-08-01 DIAGNOSIS — N60.02 BREAST CYST, LEFT: ICD-10-CM

## 2023-08-01 PROCEDURE — 76642 ULTRASOUND BREAST LIMITED: CPT | Performed by: OBSTETRICS & GYNECOLOGY

## 2023-08-01 PROCEDURE — 77065 DX MAMMO INCL CAD UNI: CPT | Performed by: OBSTETRICS & GYNECOLOGY

## 2023-08-01 PROCEDURE — 77061 BREAST TOMOSYNTHESIS UNI: CPT | Performed by: OBSTETRICS & GYNECOLOGY

## 2023-08-03 ENCOUNTER — TELEPHONE (OUTPATIENT)
Dept: OBGYN CLINIC | Facility: CLINIC | Age: 48
End: 2023-08-03

## 2023-08-03 ENCOUNTER — ANTI-COAG VISIT (OUTPATIENT)
Dept: ANTICOAGULATION | Facility: CLINIC | Age: 48
End: 2023-08-03

## 2023-08-03 DIAGNOSIS — Z79.01 LONG TERM (CURRENT) USE OF ANTICOAGULANTS: Primary | ICD-10-CM

## 2023-08-03 DIAGNOSIS — Z51.81 ENCOUNTER FOR THERAPEUTIC DRUG MONITORING: ICD-10-CM

## 2023-08-03 DIAGNOSIS — N60.02 CYST OF LEFT BREAST: Primary | ICD-10-CM

## 2023-08-03 PROCEDURE — 93793 ANTICOAG MGMT PT WARFARIN: CPT | Performed by: FAMILY MEDICINE

## 2023-08-03 NOTE — PROGRESS NOTES
Result received from [employer] home monitoring. INR within goal range. Per protocol, continue current dose and recheck INR 1-2 weeks.     1.5 mg every Sun; 1 mg all other days

## 2023-08-03 NOTE — TELEPHONE ENCOUNTER
Pt had diagnostic mammogram and pt now needs order for cyst aspiration. Pls advise what next steps are. Either phone call or Mychart.

## 2023-08-04 NOTE — IMAGING NOTE
Spoke with patient regarding plan to have left breast cyst aspiration. Patient currently takes warfarin, cilostazol and aspirin 81 mg. This RN left message for Dr. Delia Dawson to see what, if any, anticoagulants can be safely held. Patient aware we will reach back out to discuss procedure and scheduling once medications have been addressed.

## 2023-08-07 ENCOUNTER — HOSPITAL ENCOUNTER (OUTPATIENT)
Dept: ULTRASOUND IMAGING | Facility: HOSPITAL | Age: 48
Discharge: HOME | End: 2023-08-07
Attending: SURGERY
Payer: COMMERCIAL

## 2023-08-07 DIAGNOSIS — I73.9 PERIPHERAL VASCULAR DISEASE, UNSPECIFIED (HCC): ICD-10-CM

## 2023-08-07 PROCEDURE — 93926 LOWER EXTREMITY STUDY: CPT | Performed by: SURGERY

## 2023-08-09 ENCOUNTER — TELEPHONE (OUTPATIENT)
Dept: MAMMOGRAPHY | Facility: HOSPITAL | Age: 48
End: 2023-08-09

## 2023-08-09 NOTE — TELEPHONE ENCOUNTER
Follow up call regarding cyst aspiration and blood thinning meds. Sujit Mandel   Informed me that Dr Ana Patel is going to bridge her warfarin. She is waiting to hear back from Dr Della Allen regarding other meds . I requested stat pt inr to be done of procedure. Sujit Mandel ws informed that she would need stat labs day of procedure.

## 2023-08-13 LAB — INR: 2.5 (ref 2–3)

## 2023-08-14 ENCOUNTER — ANTI-COAG VISIT (OUTPATIENT)
Dept: ANTICOAGULATION | Facility: CLINIC | Age: 48
End: 2023-08-14

## 2023-08-14 DIAGNOSIS — Z51.81 ENCOUNTER FOR THERAPEUTIC DRUG MONITORING: Primary | ICD-10-CM

## 2023-08-14 PROCEDURE — 93793 ANTICOAG MGMT PT WARFARIN: CPT | Performed by: FAMILY MEDICINE

## 2023-08-14 NOTE — PROGRESS NOTES
Result received from [employer] home monitoring. INR within goal range. Per protocol, continue current dose and recheck INR 2 weeks.     1.5 mg every Sun; 1 mg all other days

## 2023-08-18 ENCOUNTER — TELEPHONE (OUTPATIENT)
Dept: MAMMOGRAPHY | Facility: HOSPITAL | Age: 48
End: 2023-08-18

## 2023-08-18 NOTE — TELEPHONE ENCOUNTER
Returned call according to md notes she is suppose to follow up with Dr Malik Cox for guidelines regarding coag no warfarin guidelines in chart yet [place]   To contact Dr Malik Cox for follow up

## 2023-08-29 ENCOUNTER — TELEPHONE (OUTPATIENT)
Dept: MAMMOGRAPHY | Facility: HOSPITAL | Age: 48
End: 2023-08-29

## 2023-08-29 ENCOUNTER — ANTI-COAG VISIT (OUTPATIENT)
Dept: ANTICOAGULATION | Facility: CLINIC | Age: 48
End: 2023-08-29

## 2023-08-29 LAB — INR: 1.9 (ref 0.8–1.2)

## 2023-08-31 RX ORDER — CILOSTAZOL 100 MG/1
100 TABLET ORAL 2 TIMES DAILY
Qty: 180 TABLET | Refills: 3 | Status: SHIPPED | OUTPATIENT
Start: 2023-08-31

## 2023-09-08 ENCOUNTER — TELEPHONE (OUTPATIENT)
Dept: ULTRASOUND IMAGING | Facility: HOSPITAL | Age: 48
End: 2023-09-08

## 2023-09-08 NOTE — TELEPHONE ENCOUNTER
Called no answer Ieft a detailed message to return call to [phone] to return call so I can help expedite getting her scheudled. . Orders received  regarding blood thinner guidelines

## 2023-09-11 ENCOUNTER — TELEPHONE (OUTPATIENT)
Dept: ANTICOAGULATION | Facility: CLINIC | Age: 48
End: 2023-09-11

## 2023-09-11 ENCOUNTER — TELEPHONE (OUTPATIENT)
Dept: ULTRASOUND IMAGING | Facility: HOSPITAL | Age: 48
End: 2023-09-11

## 2023-09-11 RX ORDER — WARFARIN SODIUM 1 MG/1
TABLET ORAL
Qty: 120 TABLET | Refills: 1 | Status: SHIPPED | OUTPATIENT
Start: 2023-09-11

## 2023-09-11 NOTE — TELEPHONE ENCOUNTER
Call received from Augusto. Reports that her upcoming biopsy procedure has been scheduled for 9/26. She had an office visit with Dr. Domitila Samaniego last week- he provided instructions for holding coumadin, bridging with lovenox and a lovenox prescription. Reports that she spoke with Sherene Kayser RN regarding the procedure- she reinforced the instructions provided and advised that an INR will be drawn the morning of the procedure to ensure INR is low enough to go ahead with the procedure. Augusto is familiar with using lovenox injections as she used them throughout two pregnancies. Coumadin clinic RN advised that we will watch for her INR result the day of the procedure and will contact her with updated dose instructions/extra doses of medication. She will continue lovenox until her INR is therapeutic and has a home INR meter so she can check her INR daily. No additional questions at this time- she understands that she may contact the coumadin clinic for any additional questions.

## 2023-09-11 NOTE — TELEPHONE ENCOUNTER
Refill passed per [facility] protocol. Requested Prescriptions   Pending Prescriptions Disp Refills    WARFARIN 1 MG Oral Tab [Pharmacy Med Name: WARFARIN SOD 1MG TABLETS (PINK)] 120 tablet 0     Sig: TAKE 1 AND 1/2 TABLETS BY MOUTH EVERY SUNDAY AND 1 TABLET ON ALL OTHER DAYS OR AS DIRECTED BY COUMADIN CLINIC. Rx Em Warfarin Protocol Passed - 9/11/2023  7:51 AM        Passed - Appointment in past 12 or next 3 months     Recent Outpatient Visits              6 days ago PVD (peripheral vascular disease) [facility]    [facility], Abad Power MD    Office Visit    1 month ago Sesamoiditis of left foot    [facility], [facility], [place], Gabriela [place], [address]    Office Visit    1 month ago Breast cyst, left    [address], [address] [place] [address] Debi Villalpando MD    Office Visit    3 months ago [employer] of left foot    [address],[place] [address], [facility], [place], [place], DPM    Office Visit    4 months ago [employer] of left foot    [facility], [facility] [address], [place], [place]endra Stewart, [address]    Office Visit                      Passed - INR 3.9 or less past 6 weeks     INR   Date Value Ref Range Status   08/27/2023 1.9 (A) 0.8 - 1.2 Final                   Passed - CMP within past 12 months     No results found for this or any previous visit (from the past 4380 hour(s)).                 Passed - AST < 111 (less than 3 Xs the upper limit)     Lab Results   Component Value Date    AST 22 02/08/2023                     Passed - ALT < 168 (less than 3Xs the upper limit)     Lab Results   Component Value Date    ALT 22 02/08/2023                     Passed - CBC within the past 12 months     Recent Results (from the past 8760 hour(s))   CBC, PLATELET; NO DIFFERENTIAL    Collection Time: 02/08/23  4:50 PM   Result Value Ref Range    WBC 6.5 4.0 - 11.0 x10(3) uL    RBC 3.88 3.80 - 5.30 x10(6)uL    HGB 12.6 12.0 - 16.0 g/dL    HCT 37.5 35.0 - 48.0 %    MCV 96.6 80.0 - 100.0 fL    MCH 32.5 26.0 - 34.0 pg    MCHC 33.6 31.0 - 37.0 g/dL    RDW 11.9 11.0 - 15.0 %    RDW-SD 42.3 35.1 - 46.3 fL    .0 150.0 - 450.0 10(3)uL     *Note: Due to a large number of results and/or encounters for the requested time period, some results have not been displayed. A complete set of results can be found in Results Review.                  Passed - Hgb >/= 10g/dl within past 12 months     HGB   Date Value Ref Range Status   02/08/2023 12.6 12.0 - 16.0 g/dL Final                   Passed - Platelets >/= 601 past 12 months     PLT   Date Value Ref Range Status   02/08/2023 295.0 150.0 - 450.0 10(3)uL Final                          Recent Outpatient Visits              6 days ago PVD (peripheral vascular disease) [facility])    [facility] [place] Geovanni Jaffe MD    Office Visit    1 month ago Sesamoiditis of left foot    [facility], [address], [place], Liisa [place], [place]    Office Visit    1 month ago Breast cyst, left    [address] [facility],[address], [address] [place] [address] Martha Carvajal MD    Office Visit    3 months ago [employer] of left foot    [facility] [facility], [address], [place]    Office Visit    4 months ago [employer] of left foot    [facility] [address], [place], Li[place], [place]    Office Visit

## 2023-09-11 NOTE — TELEPHONE ENCOUNTER
Returned call wants to schedule bx reviewed by Dr Betty Mcdonald to be done Sept 26 Tuesday procedure explained questions answered she will stop her aspirin on Tuesday 9/19 and her last dose warfarin will be on Friday 9/2/23 She was informed to be at [facility] for stat labs at 7 am on Tuesday 9/26 and to come to [facility] after labs are drawn. She was informed that if inr was too we may not be able to do Bx due tos afety issue. Adrian alcazar understanding  and agreement.

## 2023-09-12 ENCOUNTER — ANTI-COAG VISIT (OUTPATIENT)
Dept: ANTICOAGULATION | Facility: CLINIC | Age: 48
End: 2023-09-12

## 2023-09-12 DIAGNOSIS — Z79.01 LONG TERM (CURRENT) USE OF ANTICOAGULANTS: Primary | ICD-10-CM

## 2023-09-12 DIAGNOSIS — I37.9 PULMONIC VALVE DISEASE: ICD-10-CM

## 2023-09-12 LAB — INR: 2.3 (ref 0.8–1.2)

## 2023-09-12 PROCEDURE — 93793 ANTICOAG MGMT PT WARFARIN: CPT | Performed by: FAMILY MEDICINE

## 2023-09-13 NOTE — DISCHARGE INSTRUCTIONS
The Doctor (Radiologist) who performed your procedure was: DR Gonzalez Monday an ice pack over the biopsy site on top of your bra or on top of the ACE wrap (never apply ice directly over skin) for 10-15 minutes of every hour until bedtime for your comfort and to decrease bleeding. Keep your sports bra or the ACE wrap (stereotactic and MRI biopsy) in place for 24 hours after your biopsy. This compression decreases bleeding and breast movement for your comfort. Wear a supportive bra for the next couple of days for comfort (sports bra for sleep). Continue to wear, preferably, a sports bra or good supportive bra for 1 week and take off only to shower. No baths or showers during the first 24 hours after biopsy. After this time you may take a shower. It's okay if the strips get wet but do not soak them. NO saunas, hot tubs or swimming until steri-strips fall off (approx. 5 days). This prevents infection and allows time for them to completely close and heal.  DO NOT remove the steri-strips. They will fall off in 5 days. If any type of irritation (redness, itching or blisters) develops in the area around the steri-strips, remove them gently. If the steri-strips do not fall off after 5 days, gently remove them. Keep the area clean and dry. It is normal to have mild discomfort and bruising at the biopsy site. You may take Tylenol as needed for discomfort, as long as you have no allergies to Tylenol. Do not take aspirin, motrin, ibuprofen or any medication containing NSAID (non-steroidal anti-inflammatory drug) product for 48 hours. DO NOT participate in strenuous activity (aerobics, heavy lifting, housework, gardening, etc.) 48 hours after your biopsy to prevent bleeding. You will receive results in 2-3 business days. If you are having an MRI breast biopsy or an Ultrasound guided breast biopsy, you will be billed for the biopsy and unilateral mammogram separately.   If you have any questions about the procedure or your results, please contact the Breast Care Coordinator Nurse at [phone]. Notify your ordering physician or primary physician for increased bleeding, pain or fever over 100. Or contact a Radiology Nurse at [phone] between 8am-4pm (after 4pm, your call will be directed to the [place] [address] Emergency Room).

## 2023-09-26 ENCOUNTER — ANTI-COAG VISIT (OUTPATIENT)
Dept: ANTICOAGULATION | Facility: CLINIC | Age: 48
End: 2023-09-26

## 2023-09-26 ENCOUNTER — HOSPITAL ENCOUNTER (OUTPATIENT)
Dept: MAMMOGRAPHY | Facility: HOSPITAL | Age: 48
Discharge: HOME | End: 2023-09-26
Attending: OBSTETRICS & GYNECOLOGY
Payer: COMMERCIAL

## 2023-09-26 ENCOUNTER — LAB ENCOUNTER (OUTPATIENT)
Dept: LAB | Facility: HOSPITAL | Age: 48
End: 2023-09-26
Attending: OBSTETRICS & GYNECOLOGY
Payer: COMMERCIAL

## 2023-09-26 ENCOUNTER — HOSPITAL ENCOUNTER (OUTPATIENT)
Dept: ULTRASOUND IMAGING | Facility: HOSPITAL | Age: 48
Discharge: HOME | End: 2023-09-26
Attending: OBSTETRICS & GYNECOLOGY
Payer: COMMERCIAL

## 2023-09-26 DIAGNOSIS — N60.02 CYST OF LEFT BREAST: ICD-10-CM

## 2023-09-26 DIAGNOSIS — Z79.01 LONG TERM (CURRENT) USE OF ANTICOAGULANTS: ICD-10-CM

## 2023-09-26 DIAGNOSIS — Z79.01 LONG TERM (CURRENT) USE OF ANTICOAGULANTS: Primary | ICD-10-CM

## 2023-09-26 DIAGNOSIS — I37.9 PULMONIC VALVE DISEASE: ICD-10-CM

## 2023-09-26 DIAGNOSIS — N60.09 CYST OF BREAST, UNSPECIFIED LATERALITY: ICD-10-CM

## 2023-09-26 LAB
INR BLD: 1.46 (ref 0.85–1.16)
PROTHROMBIN TIME: 17.5 SECONDS (ref 11.6–14.8)

## 2023-09-26 PROCEDURE — 85610 PROTHROMBIN TIME: CPT

## 2023-09-26 PROCEDURE — 76942 ECHO GUIDE FOR BIOPSY: CPT | Performed by: OBSTETRICS & GYNECOLOGY

## 2023-09-26 PROCEDURE — 87070 CULTURE OTHR SPECIMN AEROBIC: CPT | Performed by: OBSTETRICS & GYNECOLOGY

## 2023-09-26 PROCEDURE — 77065 DX MAMMO INCL CAD UNI: CPT | Performed by: OBSTETRICS & GYNECOLOGY

## 2023-09-26 PROCEDURE — 87075 CULTR BACTERIA EXCEPT BLOOD: CPT | Performed by: OBSTETRICS & GYNECOLOGY

## 2023-09-26 PROCEDURE — 36415 COLL VENOUS BLD VENIPUNCTURE: CPT

## 2023-09-26 PROCEDURE — 87205 SMEAR GRAM STAIN: CPT | Performed by: OBSTETRICS & GYNECOLOGY

## 2023-09-26 PROCEDURE — 88305 TISSUE EXAM BY PATHOLOGIST: CPT | Performed by: OBSTETRICS & GYNECOLOGY

## 2023-09-26 PROCEDURE — 93793 ANTICOAG MGMT PT WARFARIN: CPT | Performed by: FAMILY MEDICINE

## 2023-09-26 PROCEDURE — 88173 CYTOPATH EVAL FNA REPORT: CPT | Performed by: OBSTETRICS & GYNECOLOGY

## 2023-09-26 PROCEDURE — 19000 PUNCTURE ASPIR CYST BREAST: CPT | Performed by: OBSTETRICS & GYNECOLOGY

## 2023-09-26 NOTE — IMAGING NOTE
Pt arrived to room #4.  scans taken by [facility] ultrasound technologist    Hx taken and is as follows: 3.1 cm and 1.2 cm benign cysts in the left breast at 3 o'clock 2 centimeters from the nipple correspond to the patient's palpable area of concern. If clinically desired, cyst aspiration can be performed for symptomatic relief. PT INR <2. STOPPED ALL BLOOD THINNERS WAS BRIDGED TO LOVENOX.     0840 Post instructions given verbal et written. Avs summary sheet provided to patient. Patient verbalizes understanding and agreement. UNDERSTANDS SHE SHOULD NOT PARTICIPATE IN HER [place] TOURNAMENT TONIGHT FOR RISK OF HEMATOMA    [id number] Consent verified and obtained     0846 Imaging Completed by Dr Lucretia Medina    5086 Time out taken     0847  Skin prep with chloro prep sterile towels to site. Site marked LEFT    0849 Lidocaine 1% 10 milligrams per ml given from kit  total amount  3 ml given. 0849 Lidocaine 1% with epinephrine  1:100,000 units 200 milligrams per  20 ml given total amount 5 ml given. 0850 22G SPINAL NEEDLE USED FOR ASPIRATION. 10ML ASPIRATED. CLOUDY ORANGE BRIANNE COLOR FLUID    [id number]    VISION Clip placed      [id number] Procedure completed. Pressure to site . No active bleeding noted. Area cleaned steri strips to site. Ice pack to site. 0900 Specimen taken to pathology by [facility]    [address] To mammography department  for post clip images. Mammography department to discharge patient after images completed.

## 2023-09-26 NOTE — IMAGING NOTE
I called reference lab and I spoke to Lindsey . Pt had labs drawn this morning I noticed was entered as routine needs to be stat.  Lisette Augustin blood is on centrifuge now and will change to stat

## 2023-09-26 NOTE — PROGRESS NOTES
INR result received from both [employer] home monitoring and the lab- as patient had a biopsy procedure done this morning. [employer] INR= 1.6  Lab INR= 1.4    RN will provide dose instructions based on lab result. Patient held coumadin for the past 4 days and has been bridging with lovenox since Saturday 9/23. Per protocol, instructed to take an extra dose of coumadin tonight, resume lovenox injection tonight and recheck INR tomorrow. Once INR is greater than 2.0, she may discontinue lovenox injections. My chart message sent to Vineet Wynn as she is active using my chart. Asked her to reply to the message or call the clinic for any additional questions. 9/26: 2 mg;  Otherwise 1.5 mg every Sun; 1 mg all other days

## 2023-09-28 ENCOUNTER — ANTI-COAG VISIT (OUTPATIENT)
Dept: ANTICOAGULATION | Facility: CLINIC | Age: 48
End: 2023-09-28

## 2023-09-28 DIAGNOSIS — Z79.01 LONG TERM (CURRENT) USE OF ANTICOAGULANTS: Primary | ICD-10-CM

## 2023-09-28 DIAGNOSIS — I37.9 PULMONIC VALVE DISEASE: ICD-10-CM

## 2023-09-28 LAB — INR: 1.3 (ref 2–3)

## 2023-09-28 PROCEDURE — 93793 ANTICOAG MGMT PT WARFARIN: CPT | Performed by: FAMILY MEDICINE

## 2023-09-28 NOTE — PROGRESS NOTES
[facility] / INR 1.3, sub therapeutic. (Goal 2.0-3.0)    Etiology: Pt is post Hold and on Lovenox. She states a history of 2 bi -passes, and on Triple Therapy. ASA, Cilostazol, & warfarin. Stay on Lovenox until INR is > 2.0    Recheck INR tomorrow. Double warfarin today. Pt reports: Any missed doses: No, forgot her Lovenox this morning. Take ASAP. Medications changes: Yes. Pt verbalized understanding and agreement. Plan per protocol: 9/28: 2 mg;  Otherwise 1.5 mg every Sun; 1 mg all other days

## 2023-09-28 NOTE — PROCEDURES
[facility]  Procedure Note    Shannon Casarez Patient Status:  Outpatient    1975 MRN [id number]   Location [address] Attending No att. providers found   Hosp Day # 0 PCP Josselyn Wyatt MD     Procedure: ultrasound guided aspiration of the left breast    Pre-Procedure Diagnosis:  left breast cyst aspiration    Post-Procedure Diagnosis: left breast cyst aspiration    Anesthesia:  Local    Findings:  left breast cyst aspiration    Specimens: 8 ml      Esdras, DO  2023

## 2023-09-29 ENCOUNTER — TELEPHONE (OUTPATIENT)
Dept: ANTICOAGULATION | Facility: CLINIC | Age: 48
End: 2023-09-29

## 2023-09-29 ENCOUNTER — ANTI-COAG VISIT (OUTPATIENT)
Dept: ANTICOAGULATION | Facility: CLINIC | Age: 48
End: 2023-09-29

## 2023-09-29 DIAGNOSIS — Z79.01 LONG TERM (CURRENT) USE OF ANTICOAGULANTS: Primary | ICD-10-CM

## 2023-09-29 DIAGNOSIS — N60.09 CYST OF BREAST, UNSPECIFIED LATERALITY: Primary | ICD-10-CM

## 2023-09-29 DIAGNOSIS — I37.9 PULMONIC VALVE DISEASE: ICD-10-CM

## 2023-09-29 LAB — INR: 1.8 (ref 0.8–1.2)

## 2023-09-29 PROCEDURE — 93793 ANTICOAG MGMT PT WARFARIN: CPT | Performed by: FAMILY MEDICINE

## 2023-09-29 NOTE — PROGRESS NOTES
INR result received from [facility]. My chart message sent to Asuncion Mcclellan as she is active using my chart. Asked her to reply to the message or call the clinic for any additional questions. Per protocol, instructed to take an extra dose of coumadin tonight and recheck INR tomorrow. Instructed to continue lovenox injections twice a day. Once INR is greater than 2.0, she may discontinue lovenox injections and resume current coumadin dose. If INR is 2.0 or greater on Saturday, she may discontinue lovenox injections, continue current coumadin dose and recheck INR in 1 week. If INR is less than 2.0 on Saturday, instructed to take an extra dose of coumadin and recheck INR on Sunday. Continue current coumadin dose and continue lovenox injections, then recheck INR on Monday. 9/29: 2 mg;  Otherwise 1.5 mg every Sun; 1 mg all other days

## 2023-09-29 NOTE — TELEPHONE ENCOUNTER
Patient calling Coumadin clinic with her PT results from today. She says it's urgent and needs medication adjustment today. [phone] ok to lv a voice message. PT result 1.8  *pt took Lovenox this a.m.

## 2023-10-02 ENCOUNTER — ANTI-COAG VISIT (OUTPATIENT)
Dept: ANTICOAGULATION | Facility: CLINIC | Age: 48
End: 2023-10-02

## 2023-10-02 ENCOUNTER — TELEPHONE (OUTPATIENT)
Dept: ULTRASOUND IMAGING | Facility: HOSPITAL | Age: 48
End: 2023-10-02

## 2023-10-02 DIAGNOSIS — Z79.01 LONG TERM (CURRENT) USE OF ANTICOAGULANTS: Primary | ICD-10-CM

## 2023-10-02 DIAGNOSIS — I37.9 PULMONIC VALVE DISEASE: ICD-10-CM

## 2023-10-02 LAB
INR: 1.8 (ref 2–3)
INR: 2.5 (ref 2–3)

## 2023-10-02 PROCEDURE — 93793 ANTICOAG MGMT PT WARFARIN: CPT | Performed by: FAMILY MEDICINE

## 2023-10-02 NOTE — PROGRESS NOTES
INR received from [address] [place] [address]. INR subtherapeutic.  continue doses as noted below. 2mg warfarin. Late Entry: INR from 2 days ago. Pt continued to bridge w Lovenox. See calendar.

## 2023-10-02 NOTE — TELEPHONE ENCOUNTER
Terri Bravo is s/p biopsy . Phoned and introduced myself as breast coordinator . Reinforced to patient  post biopsy care and instructions . C/o soreness is bruised instructed  pt to continue to monitor and follow up Md AGUIRRE . Sasha Weir verbalizes  understanding  AND agreement      Informed  and shared the pathology results as well as the recommendations from Dr Annabelle Segura for her breast imaging  as follows:      Pathology results shared (see epic for dictated pathology and radiology procedure report)  and recommendations are as follows:             Pathology results are benign and concordant. Patient may return to annual screening mammogram.          Jose Bustillo the above and denies questions. Sang Nelson was also instructed to perform breast self exams and if any changes  develops any changes to contact ordering  physician immediately  for re evaluation. .    verbalizes understanding and agreement.

## 2023-10-02 NOTE — PROGRESS NOTES
[facility] / INR 2.5, (Sunday)  therapeutic. (Goal 2.5 )    Etiology: Post biopsy hold, bridged with Lovenox. She ran out of Lovenox over the weekend. No other changes. Go back to your previous dose. Recheck INR 1-2 weeks. Pt reports: Any missed doses: Extra doses getting Inr therapeutic. Medications changes: Finished Lovenox. Spoke to: POLY, by [employer]. who verbalized understanding and agreement.     Plan per protocol: 1.5 mg every Sun; 1 mg all other days

## 2023-10-06 ENCOUNTER — ANTI-COAG VISIT (OUTPATIENT)
Dept: ANTICOAGULATION | Facility: CLINIC | Age: 48
End: 2023-10-06

## 2023-10-06 DIAGNOSIS — Z79.01 LONG TERM (CURRENT) USE OF ANTICOAGULANTS: Primary | ICD-10-CM

## 2023-10-06 DIAGNOSIS — I37.9 PULMONIC VALVE DISEASE: ICD-10-CM

## 2023-10-06 LAB — INR: 2.7 (ref 2–3)

## 2023-10-06 PROCEDURE — 93793 ANTICOAG MGMT PT WARFARIN: CPT | Performed by: FAMILY MEDICINE

## 2023-10-06 NOTE — PROGRESS NOTES
[facility] / INR 2.7,  therapeutic. (Goal 2.5 )    Etiology: stable at this dose. Recheck INR 1 or 2 weeks. Pt reports: Any missed doses: No   Medications changes: No    Spoke to: Maria Isabel Souza who verbalized understanding and agreement.     Plan per protocol: 1.5 mg every Sun; 1 mg all other days

## 2023-10-20 ENCOUNTER — ANTI-COAG VISIT (OUTPATIENT)
Dept: ANTICOAGULATION | Facility: CLINIC | Age: 48
End: 2023-10-20

## 2023-10-20 DIAGNOSIS — I37.9 PULMONIC VALVE DISEASE: ICD-10-CM

## 2023-10-20 DIAGNOSIS — Z79.01 LONG TERM (CURRENT) USE OF ANTICOAGULANTS: Primary | ICD-10-CM

## 2023-10-20 LAB — INR: 2.4 (ref 2–3)

## 2023-10-20 PROCEDURE — 93793 ANTICOAG MGMT PT WARFARIN: CPT | Performed by: FAMILY MEDICINE

## 2023-10-20 NOTE — PROGRESS NOTES
[facility] / INR 2.4,  therapeutic. (Goal 2.5 )    [facility] Patient. Meredith Cruz [facility] MD   Dissection of right popliteal artery, 2001  H/O valvuloplasty, Congenital pulmonary stenosis, 1995  PAD right leg       Etiology: Stable. Recheck INR every 2 weeks.     WARFARIN Plan per protocol: 1.5 mg every Sun; 1 mg all other days

## 2023-11-06 ENCOUNTER — ANTI-COAG VISIT (OUTPATIENT)
Dept: ANTICOAGULATION | Facility: CLINIC | Age: 48
End: 2023-11-06

## 2023-11-06 DIAGNOSIS — Z79.01 LONG TERM (CURRENT) USE OF ANTICOAGULANTS: Primary | ICD-10-CM

## 2023-11-06 DIAGNOSIS — I37.9 PULMONIC VALVE DISEASE: ICD-10-CM

## 2023-11-06 LAB — INR: 2 (ref 2–3)

## 2023-11-06 PROCEDURE — 93793 ANTICOAG MGMT PT WARFARIN: CPT | Performed by: FAMILY MEDICINE

## 2023-11-06 NOTE — PROGRESS NOTES
[facility] / INR 2.0,  therapeutic. (Goal 2.5 )    Etiology: stable. Recheck INR every 2 weeks.     WARFARIN Plan per protocol: 1.5 mg every Sun; 1 mg all other days

## 2023-11-20 ENCOUNTER — ANTI-COAG VISIT (OUTPATIENT)
Dept: ANTICOAGULATION | Facility: CLINIC | Age: 48
End: 2023-11-20

## 2023-11-20 DIAGNOSIS — Z79.01 LONG TERM (CURRENT) USE OF ANTICOAGULANTS: Primary | ICD-10-CM

## 2023-11-20 DIAGNOSIS — I37.9 PULMONIC VALVE DISEASE: ICD-10-CM

## 2023-11-20 LAB — INR: 2 (ref 2–3)

## 2023-11-20 PROCEDURE — 93793 ANTICOAG MGMT PT WARFARIN: CPT | Performed by: FAMILY MEDICINE

## 2023-11-20 NOTE — PROGRESS NOTES
[facility] / INR 2.0,  therapeutic. (Goal 2.5 )    Etiology: denies missing a dose. Plan: increase to 1.5mg Sun & Wed, 1mg all other days. Recheck INR 2 weeks. Pt reports: Any missed doses: No   Medications changes: No    Contacted  Manjit by phone  who verbalized understanding and agreement.     WARFARIN Plan per protocol: 1.5 mg every Sun, Wed; 1 mg all other days

## 2023-12-04 ENCOUNTER — ANTI-COAG VISIT (OUTPATIENT)
Dept: ANTICOAGULATION | Facility: CLINIC | Age: 48
End: 2023-12-04

## 2023-12-04 DIAGNOSIS — Z79.01 LONG TERM (CURRENT) USE OF ANTICOAGULANTS: Primary | ICD-10-CM

## 2023-12-04 DIAGNOSIS — I37.9 PULMONIC VALVE DISEASE: ICD-10-CM

## 2023-12-04 LAB — INR: 1.8 (ref 2–3)

## 2023-12-04 NOTE — PROGRESS NOTES
[facility] / INR 1.8, sub therapeutic. (Goal 2.5 ) TTR 82.1 %     Etiology: running in the low end already. No missed doses or med changes. PLAN: increase to 1.5mg MWF 1mg all other days. Recheck INR 2 weeks. Pt reports: Any missed doses: No   Medications changes: No    Contacted  Manjit by phone  who verbalized understanding and agreement.     WARFARIN Plan per protocol: 1.5 mg every Mon, Wed, Fri; 1 mg all other days

## 2023-12-06 ENCOUNTER — MED REC SCAN ONLY (OUTPATIENT)
Dept: INTERNAL MEDICINE CLINIC | Facility: CLINIC | Age: 48
End: 2023-12-06

## 2023-12-18 ENCOUNTER — ANTI-COAG VISIT (OUTPATIENT)
Dept: ANTICOAGULATION | Facility: CLINIC | Age: 48
End: 2023-12-18

## 2023-12-18 DIAGNOSIS — Z79.01 LONG TERM (CURRENT) USE OF ANTICOAGULANTS: Primary | ICD-10-CM

## 2023-12-18 DIAGNOSIS — I37.9 PULMONIC VALVE DISEASE: ICD-10-CM

## 2023-12-18 LAB — INR: 1.8 (ref 2–3)

## 2023-12-18 PROCEDURE — 93793 ANTICOAG MGMT PT WARFARIN: CPT | Performed by: FAMILY MEDICINE

## 2023-12-18 NOTE — PROGRESS NOTES
[facility] / INR 1.8, sub therapeutic. (Goal 2.5 ) TTR 76 %     Etiology: stable usually. Pt also has triple therapy. PLAN: take 2mg today then try adding 0.5mg to Saturdays. Recheck INR 2 weeks. Pt reports: Any missed doses: No   Medications changes: No    Contacted  Manjit by phone  who verbalized understanding and agreement. WARFARIN Plan per protocol: 12/18: 2 mg;  Otherwise 1 mg every Sun, Tue, Thu; 1.5 mg all other days

## 2023-12-26 ENCOUNTER — OFFICE VISIT (OUTPATIENT)
Dept: INTERNAL MEDICINE CLINIC | Facility: CLINIC | Age: 48
End: 2023-12-26

## 2023-12-26 VITALS
DIASTOLIC BLOOD PRESSURE: 71 MMHG | SYSTOLIC BLOOD PRESSURE: 111 MMHG | TEMPERATURE: 99 F | BODY MASS INDEX: 25.4 KG/M2 | HEART RATE: 70 BPM | WEIGHT: 126 LBS | HEIGHT: 59 IN

## 2023-12-26 DIAGNOSIS — S86.002A INJURY OF LEFT ACHILLES TENDON, INITIAL ENCOUNTER: Primary | ICD-10-CM

## 2023-12-26 DIAGNOSIS — Z12.11 COLON CANCER SCREENING: ICD-10-CM

## 2023-12-26 PROCEDURE — 3078F DIAST BP <80 MM HG: CPT | Performed by: INTERNAL MEDICINE

## 2023-12-26 PROCEDURE — 99213 OFFICE O/P EST LOW 20 MIN: CPT | Performed by: INTERNAL MEDICINE

## 2023-12-26 PROCEDURE — 3074F SYST BP LT 130 MM HG: CPT | Performed by: INTERNAL MEDICINE

## 2023-12-26 PROCEDURE — 3008F BODY MASS INDEX DOCD: CPT | Performed by: INTERNAL MEDICINE

## 2023-12-26 NOTE — PROGRESS NOTES
Subjective:     Patient ID: Jeremi Kessler is a 53year old female. Left achilles tendon/foot swelling; started this summer, on and off, playing pickleball . Pt states feels achilles tendon is tender on palpation; there is a bump of swelling on the mid achilles tendon and this was tender to palpation. Still able to move her ankle and foot. There was direct trauma recalled by pt. History/Other:   Review of Systems   Constitutional: Negative. Musculoskeletal:         Pain and tenderness left achilles tendon for 6mos     Current Outpatient Medications   Medication Sig Dispense Refill    warfarin 1 MG Oral Tab TAKE 1 AND 1/2 TABLETS BY MOUTH EVERY SUNDAY AND 1 TABLET ON ALL OTHER DAYS OR AS DIRECTED BY COUMADIN CLINIC. 120 tablet 1    cilostazol 100 MG Oral Tab Take 1 tablet (100 mg total) by mouth 2 (two) times daily. 180 tablet 3    rosuvastatin 5 MG Oral Tab Take 1 tablet (5 mg total) by mouth nightly. FOR CHOLESTEROL. (Patient taking differently: Take 1 tablet (5 mg total) by mouth nightly. FOR CHOLESTEROL. PT Is only taking three times a week, Monday Wednesday and friday) 90 tablet 9    ASPIRIN EC LOW DOSE 81 MG Oral Tab EC Take 1 tablet (81 mg total) by mouth daily. 5    methylPREDNISolone 4 MG Oral Tablet Therapy Pack Take as directed on pack 1 each 0    CILOSTAZOL 100 MG Oral Tab TAKE 1 TABLET(100 MG) BY MOUTH TWICE DAILY 60 tablet 11     Allergies:   Allergies   Allergen Reactions    Levaquin [Levofloxacin] HIVES and SWELLING     Other reaction(s): LEVOFLOXACIN       Past Medical History:   Diagnosis Date    Congenital pulmonary stenosis     congenital pulmonary aa stenosis    Congenital pulmonary stenosis     SX at 25 y/o / valvuloplasty 1995    Disease of vein 2009    vein/artery disease    Hematologic disorder     Thromboembolic Event    History of blood clots     History of blood transfusion 2012    Peripheral vascular disease ([facility] [address]     Right leg arterial bypass 2001,2012    PONV (postoperative nausea and vomiting)     Popliteal artery thrombosis ([facility] [address]     Rt popliteal artery thrombosis - 15cm; Right Lower Extremity thrombosis; was on coumadin for 6 months than on plavix and aggrenox until . w/u negative for coagulopathy but was anticoagulated during pregnancies     Tendinitis 2009    discontinue insole / rx ibuprofen    Valvular disease     Pulmonic stenosis    Visual impairment     glasses      Past Surgical History:   Procedure Laterality Date    APPENDECTOMY      APPENDECTOMY            CATH BARE METAL STENT (BMS)      3 stents right leg    CYST ASPIRATION LEFT Left 2023    US bx    ENDOMETRIAL ABLATION      HYSTERECTOMY  10/2018    Laparoscopic hysterectomy with Dr. Maxim Bailey. Had post op vaginal bleeding and repeat suturing of vaginal cuff.     LEG/ANKLE SURGERY PROC UNLISTED      right leg arterial bypass / (fasciotomy)    NEEDLE BIOPSY LEFT Left 2023    US bx    OTHER SURGICAL HISTORY      SX at 27 y/o / valvuloplasty (congenital Pulmonic stenosis)      Family History   Problem Relation Age of Onset    No Known Problems Father     No Known Problems Mother     Other (Other) Maternal Grandmother         Fibrocystic Breast Disease    Cancer Paternal Grandfather         lung    No Known Problems Sister     Bipolar Disorder Brother     Breast Cancer Maternal Aunt         40's,  BRCA negative    Breast Cancer Maternal Aunt         mat great great aunt breast ca unknown age    Ovarian Cancer Neg     Uterine Cancer Neg     Colon Cancer Neg       Social History:   Social History     Socioeconomic History    Marital status:    Tobacco Use    Smoking status: Never    Smokeless tobacco: Never   Vaping Use    Vaping Use: Never used   Substance and Sexual Activity    Alcohol use: Yes     Comment: rare    Drug use: No   Other Topics Concern    Caffeine Concern Yes     Comment: coffee, 1-2 cups daily    Pt has a pacemaker No    Pt has a defibrillator No Reaction to local anesthetic No        Objective:   Physical Exam  Constitutional:       General: She is not in acute distress. Appearance: She is not ill-appearing, toxic-appearing or diaphoretic. Musculoskeletal:      Right lower leg: No edema. Left lower leg: No edema. Feet:       Comments: Some tenderness noted on the left mid achilles tendon with a bump noted;  able to dorsiflex and plantarflex foot  There was also some swelling on the dorsal aspect of the left foot, doughy sensation?lymphedema   Neurological:      Mental Status: She is alert. Assessment & Plan:   (E20.254L) Injury of left Achilles tendon, initial encounter  (primary encounter diagnosis)  Plan: Ortho Referral - In Network        Pt has pain/tenderness on the left mid achilles tendon with some swelling noted, doesn't appear to be complete tear. I told her will need to see ortho so referral given. Pt also had seen Dr Rosa Johns vascular surgeon regards to the dorsal swelling of left foot? lymphedema    (Z12.11) Colon cancer screening  Plan: Gastro Referral - In Network        Pt reminded to do her colon screening; referral done . No orders of the defined types were placed in this encounter.       Meds This Visit:  Requested Prescriptions      No prescriptions requested or ordered in this encounter       Imaging & Referrals:  None

## 2023-12-29 ENCOUNTER — ANTI-COAG VISIT (OUTPATIENT)
Dept: ANTICOAGULATION | Facility: CLINIC | Age: 48
End: 2023-12-29

## 2023-12-29 DIAGNOSIS — I37.9 PULMONIC VALVE DISEASE: ICD-10-CM

## 2023-12-29 DIAGNOSIS — Z79.01 LONG TERM (CURRENT) USE OF ANTICOAGULANTS: Primary | ICD-10-CM

## 2023-12-29 LAB — INR: 2.6 (ref 2–3)

## 2023-12-29 PROCEDURE — 93793 ANTICOAG MGMT PT WARFARIN: CPT | Performed by: FAMILY MEDICINE

## 2023-12-29 NOTE — PROGRESS NOTES
[facility] / INR 2.6,  therapeutic. (Goal 2.5 ) TTR 75.9 %     Etiology: better after recent dose increase. PLAN: continue the current dose. Recheck INR every 2 weeks.     WARFARIN Plan per protocol: 1 mg every Sun, Tue, Thu; 1.5 mg all other days

## 2024-01-03 ENCOUNTER — NURSE ONLY (OUTPATIENT)
Facility: CLINIC | Age: 49
End: 2024-01-03

## 2024-01-03 RX ORDER — GARLIC EXTRACT 500 MG
1 CAPSULE ORAL DAILY
COMMUNITY

## 2024-01-03 RX ORDER — DIAPER,BRIEF,INFANT-TODD,DISP
1 EACH MISCELLANEOUS DAILY
COMMUNITY

## 2024-01-03 NOTE — PROGRESS NOTES
Clive Dawson    Called patient for scheduled telephone colon screening.  Medications, pharmacy, and allergies verified with the patient.   Please advise on colonoscopy and bowel prep orders.     Age 45-64 y/o (Y/N):   66-76 y/o ? Route to GI provider per rotation schedule   › GI MD preference: none  › Insurance:  [employer] [place] HMO  › Last PCP Visit:  12/26/2023  › Last CBC drawn: 2/8/2023  › Date of positive FIT test: NO  › H/W/BMI:  4'11  /124lbs/25.04    Special comments/notes:    Telephone colon screening Questionnaires:  Yes No   Are you currently experiencing any new GI symptoms? [x] []   If yes, symptom details: occ. Stool incontinence due to vaginal tear r/t child birth     Rectal Bleeding with or without bowel movements: [] [x]   Black stool: [] [x]   Dysphagia &Food feeling/getting stuck: [] [x]   Intractable Vomiting: [] [x]   Unexplained weight loss: [] [x]   First colonoscopy? [x] []   Family history of colon cancer? Aunt from paternal side [x] []   Any issues with anesthesia? [] [x]   If yes, explain details:      Personal history of Resp. Issues/Oxygen Use/LUCAS/COPD? [] [x]   If yes, CPAP/BiPAP? [] []   History of devices Pacemaker/Defibrillator/Stents? Cath bare metal stent [x] []   History of Cardiac/CVA issues/(MI/Stroke): peripheral arterial blood clot  [x] []     Medication usage:  Yes  No   Anticoagulants: warfarin, Cilostazol (Dr Klein/ Dr Alex (vascular surgeon)  Anticoagulant (Except Aspirin) ? Route to RN staff to obtain ordering provider orders [x] []   Diabetic Meds:   PO DM Meds ? Hold day prior and day of procedure  Insulin ? Route to RN clinical staff to obtain provider orders  [] [x]   Weight loss meds (phentermine/Vyvanse/Saxsenda): [] [x]   Iron/Herbal/Multivitamin Supplement (RX/OTC): [x] []   Usage of marijuana, CBD &/or vape products: [] [x]

## 2024-01-08 NOTE — PROGRESS NOTES
Please schedule for clinic visit. Needs full evaluation for following conditions:    Endorses stool incontinence (from complication of childbirth year ago). Also hx of right leg stent on anticoagulation. Also family hx colon CA.     Eunice Hicks APRN

## 2024-01-10 NOTE — PROGRESS NOTES
Spoke to patient    Scheduled patient for a OV (see messages below)    Location, date, and time confirmed with patient.    Your Appointments      Friday February 16, 2024  3:00 PM  Consult with YORDAN Arias  [facility] ([facility] [address]  [facility] [address]  [phone]

## 2024-01-15 ENCOUNTER — ANTI-COAG VISIT (OUTPATIENT)
Dept: ANTICOAGULATION | Facility: CLINIC | Age: 49
End: 2024-01-15

## 2024-01-15 DIAGNOSIS — I37.9 PULMONIC VALVE DISEASE: ICD-10-CM

## 2024-01-15 DIAGNOSIS — Z79.01 LONG TERM (CURRENT) USE OF ANTICOAGULANTS: Primary | ICD-10-CM

## 2024-01-15 LAB — INR: 2.8 (ref 2–3)

## 2024-01-15 PROCEDURE — 93793 ANTICOAG MGMT PT WARFARIN: CPT | Performed by: FAMILY MEDICINE

## 2024-01-15 NOTE — PROGRESS NOTES
[facility] / INR 2.8,  therapeutic. (Goal 2.5 ) TTR 77.6 %     Etiology: no changes.    PLAN: continue the current dose.    Recheck INR every 2 weeks.    WARFARIN Plan per protocol: 1 mg every Sun, Tue, Thu; 1.5 mg all other days

## 2024-01-23 ENCOUNTER — HOSPITAL ENCOUNTER (OUTPATIENT)
Dept: GENERAL RADIOLOGY | Facility: HOSPITAL | Age: 49
Discharge: HOME | End: 2024-01-23
Attending: ORTHOPAEDIC SURGERY
Payer: COMMERCIAL

## 2024-01-23 ENCOUNTER — OFFICE VISIT (OUTPATIENT)
Dept: ORTHOPEDICS CLINIC | Facility: CLINIC | Age: 49
End: 2024-01-23
Payer: COMMERCIAL

## 2024-01-23 VITALS — DIASTOLIC BLOOD PRESSURE: 74 MMHG | SYSTOLIC BLOOD PRESSURE: 112 MMHG | HEART RATE: 75 BPM

## 2024-01-23 DIAGNOSIS — M76.60 PAIN IN ACHILLES TENDON: ICD-10-CM

## 2024-01-23 DIAGNOSIS — M67.874 ACHILLES TENDINOSIS OF LEFT ANKLE: Primary | ICD-10-CM

## 2024-01-23 PROCEDURE — 20551 NJX 1 TENDON ORIGIN/INSJ: CPT | Performed by: ORTHOPAEDIC SURGERY

## 2024-01-23 PROCEDURE — 3078F DIAST BP <80 MM HG: CPT | Performed by: ORTHOPAEDIC SURGERY

## 2024-01-23 PROCEDURE — 73610 X-RAY EXAM OF ANKLE: CPT | Performed by: ORTHOPAEDIC SURGERY

## 2024-01-23 PROCEDURE — 3074F SYST BP LT 130 MM HG: CPT | Performed by: ORTHOPAEDIC SURGERY

## 2024-01-23 PROCEDURE — 99204 OFFICE O/P NEW MOD 45 MIN: CPT | Performed by: ORTHOPAEDIC SURGERY

## 2024-01-23 RX ORDER — TRIAMCINOLONE ACETONIDE 40 MG/ML
20 INJECTION, SUSPENSION INTRA-ARTICULAR; INTRAMUSCULAR ONCE
Status: COMPLETED | OUTPATIENT
Start: 2024-01-23 | End: 2024-01-23

## 2024-01-23 RX ADMIN — TRIAMCINOLONE ACETONIDE 20 MG: 40 INJECTION, SUSPENSION INTRA-ARTICULAR; INTRAMUSCULAR at 17:47:00

## 2024-01-23 NOTE — PROGRESS NOTES
Per verbal order from Dr. Santoyo, draw up 5ml of 0.5% Marcaine and 1ml of Kenalog 40 for cortisone injection to left left achilles tendon Amalia Lawson  Patient provided education handout for cortisone injection.

## 2024-01-23 NOTE — H&P
NURSING INTAKE COMMENTS:   Chief Complaint   Patient presents with    Ankle Pain     Consult - Referred by Dr. Flynn for left achilles tendon pain. Patient rates pain 2/10 at this time.        HPI: This 48 year old female presents today with left Achilles tendinosis in 2023.  There is no specific trauma.  She was playing pickle ball at that time but she was not sure that that was related.  She is a little bit of a bump but nothing bad and no rubbing of the skin.  She saw Dr. Thakur a year ago for foot swelling which did improve a little bit.    Her medical history is significant for peripheral bypass surgeries to the legs.  She looks to be in great health.  She has healthy skin distally.  She is on Coumadin and aspirin 81 mg anticoagulation.  She appears healthy and in good shape physically.  She lives independent with her  and 2 children ages 21 and 18.  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    Past Medical History:   Diagnosis Date    Congenital pulmonary stenosis     congenital pulmonary aa stenosis    Congenital pulmonary stenosis     SX at 18 y/o / valvuloplasty     Disease of vein 2009    vein/artery disease    Hematologic disorder     Thromboembolic Event    History of blood clots     History of blood transfusion 2012    Peripheral vascular disease (HCC)     Right leg arterial bypass ,2012    PONV (postoperative nausea and vomiting)     Popliteal artery thrombosis (HCC) 2001    Rt popliteal artery thrombosis - 15cm; Right Lower Extremity thrombosis; was on coumadin for 6 months than on plavix and aggrenox until . w/u negative for coagulopathy but was anticoagulated during pregnancies     Tendinitis 2009    discontinue insole / rx ibuprofen    Valvular disease     Pulmonic stenosis    Visual impairment     glasses     Past Surgical History:   Procedure Laterality Date    APPENDECTOMY      APPENDECTOMY             CATH BARE METAL STENT (BMS)      3 stents right leg    CYST ASPIRATION LEFT Left 09/26/2023    US bx    ENDOMETRIAL ABLATION  2015    HYSTERECTOMY  10/2018    Laparoscopic hysterectomy with Dr. Dueñas.  Had post op vaginal bleeding and repeat suturing of vaginal cuff.    LEG/ANKLE SURGERY PROC UNLISTED  2001    right leg arterial bypass / (fasciotomy)    NEEDLE BIOPSY LEFT Left 09/26/2023    US bx    OTHER SURGICAL HISTORY  1995    SX at 20 y/o / valvuloplasty (congenital Pulmonic stenosis)     Current Outpatient Medications   Medication Sig Dispense Refill    acidophilus-pectin Oral Cap Take 1 capsule by mouth daily.      Multiple Minerals-Vitamins (CALCIUM CITRATE PLUS/MAGNESIUM) Oral Tab Take 1 tablet by mouth daily.      Multiple Vitamins-Minerals (HAIR SKIN AND NAILS FORMULA) Oral Tab Take 1 tablet by mouth daily.      warfarin 1 MG Oral Tab TAKE 1 AND 1/2 TABLETS BY MOUTH EVERY SUNDAY AND 1 TABLET ON ALL OTHER DAYS OR AS DIRECTED BY COUMADIN CLINIC. (Patient taking differently: TAKE 1 AND 1/2 TABLETS BY MOUTH EVERY M-W-F and Sat AND 1 TABLET ON ALL OTHER DAYS OR AS DIRECTED BY COUMADIN CLINIC.) 120 tablet 1    cilostazol 100 MG Oral Tab Take 1 tablet (100 mg total) by mouth 2 (two) times daily. 180 tablet 3    rosuvastatin 5 MG Oral Tab Take 1 tablet (5 mg total) by mouth nightly. FOR CHOLESTEROL. (Patient taking differently: Take 1 tablet (5 mg total) by mouth nightly. FOR CHOLESTEROL. PT Is only taking three times a week, Monday Wednesday and friday) 90 tablet 9    CILOSTAZOL 100 MG Oral Tab TAKE 1 TABLET(100 MG) BY MOUTH TWICE DAILY 60 tablet 11    ASPIRIN EC LOW DOSE 81 MG Oral Tab EC Take 1 tablet (81 mg total) by mouth daily.  5    methylPREDNISolone 4 MG Oral Tablet Therapy Pack Take as directed on pack 1 each 0     Allergies   Allergen Reactions    Levaquin [Levofloxacin] HIVES and SWELLING     Other reaction(s): LEVOFLOXACIN     Family History   Problem Relation Age of Onset    No Known Problems  Father     No Known Problems Mother     Other (Other) Maternal Grandmother         Fibrocystic Breast Disease    Cancer Paternal Grandfather         lung    No Known Problems Sister     Bipolar Disorder Brother     Breast Cancer Maternal Aunt         40's,  BRCA negative    Breast Cancer Maternal Aunt         mat great great aunt breast ca unknown age    Ovarian Cancer Neg     Uterine Cancer Neg     Colon Cancer Neg      No family Hx of DVT/PE    Social History     Occupational History    Not on file   Tobacco Use    Smoking status: Never    Smokeless tobacco: Never   Vaping Use    Vaping Use: Never used   Substance and Sexual Activity    Alcohol use: Yes     Comment: rare    Drug use: No    Sexual activity: Not on file        Review of Systems:  GENERAL: feels generally well, no significant weight loss or weight gain  SKIN: no ulcerated or worrisome skin lesions  EYES:denies blurred vision or double vision  HEENT: denies new nasal congestion, sinus pain or ST  LUNGS: denies shortness of breath  CARDIOVASCULAR: denies chest pain  GI: no hematemesis, no worsening heartburn, no diarrhea  : no dysuria, no blood in urine, no difficulty urinating, no incontinence  MUSCULOSKELETAL: no other musculoskeletal complaints other than in HPI  NEURO: no numbness or tingling, no weakness or balance disorder  PSYCHE: no depression or anxiety  HEMATOLOGIC: no hx of blood dyscrasia, no Hx DVT/PE  ENDOCRINE: no thyroid or diabetes issues  ALL/ASTHMA: no new hx of severe allergy or asthma    Physical Examination:    /74   Pulse 75   LMP 09/27/2018   Constitutional: appears well hydrated, alert and responsive, no acute distress noted  Extremities: Left Achilles has a very slight bump.  It is fully intact however and Dewitt test is negative.  5 and 5 strength in plantarflexion.  No numbness or tingling.  Rest the ankle and foot exam was benign.  Musculoskeletal: Healthy skin on the left leg and foot.  Gastrocnemius was  nontender.  Neurological: Normal motor and sensory left distal lower extremity.    Imaging: X-rays of the left ankle are normal.      No results found.     Lab Results   Component Value Date    WBC 6.5 02/08/2023    HGB 12.6 02/08/2023    .0 02/08/2023      Lab Results   Component Value Date    GLU 86 02/08/2023    BUN 16 02/08/2023    CREATSERUM 0.83 02/08/2023    GFRNAA 94 07/26/2022    GFRAA 108 07/26/2022        Assessment and Plan:  Diagnoses and all orders for this visit:    Achilles tendinosis of left ankle  -     DME - External  -     tendon sheath origin/insertion  -     triamcinolone acetonide (Kenalog-40) 40 MG/ML injection 20 mg  -     Cancel: PHYSICAL THERAPY - INTERNAL  -     PHYSICAL THERAPY - INTERNAL    Pain in Achilles tendon  -     XR ANKLE (MIN 3 VIEWS), LEFT (CPT=73610); Future  -     DME - External  -     tendon sheath origin/insertion  -     triamcinolone acetonide (Kenalog-40) 40 MG/ML injection 20 mg  -     Cancel: PHYSICAL THERAPY - INTERNAL  -     PHYSICAL THERAPY - INTERNAL        Assessment: Achilles tendinosis left ankle for almost 7 months.    Plan: I recommended a heel cup, physical therapy, and we discussed cortisone injection versus pills.  She cannot take NSAIDs because of the anticoagulation.  Discussed injection of half cc cortisone and I did let her know there is a risk of rupture.  We also talked about oral steroids.  She wished to try the injection and understood the risk.  Aspiration was negative she tolerated the injection of bupivacaine 0.5% half cc and Kenalog half cc to the Achilles tendon area that was tender with the bump.  She will do her therapy and home exercises and wear the heel cup.  If she is not improving in 4 to 6 weeks, she can call the office to have MRI left ankle ordered.  She would then follow in office results.  If she is improved, I will see her as needed.    Follow Up: No follow-ups on file.    Tom Santoyo MD

## 2024-01-29 ENCOUNTER — ANTI-COAG VISIT (OUTPATIENT)
Dept: ANTICOAGULATION | Facility: CLINIC | Age: 49
End: 2024-01-29

## 2024-01-29 DIAGNOSIS — Z79.01 LONG TERM (CURRENT) USE OF ANTICOAGULANTS: Primary | ICD-10-CM

## 2024-01-29 DIAGNOSIS — I37.9 PULMONIC VALVE DISEASE: ICD-10-CM

## 2024-01-29 LAB — INR: 2.9 (ref 2–3)

## 2024-01-29 PROCEDURE — 93793 ANTICOAG MGMT PT WARFARIN: CPT | Performed by: FAMILY MEDICINE

## 2024-01-29 NOTE — PROGRESS NOTES
[facility] / INR 2.9,  therapeutic. (Goal 2.5 ) TTR 79.1 %     Etiology: stable. Pt had a cortisone injection 01/23 for lt achilles tendon pain. Coumadin and aspirin 81 mg & cilostazol 100 MG (Pletal)  IVF .  PLAN: continue the current dose.     Recheck INR one week (two is okay)    Pt reports:    No sign of unusual bruising or bleeding.  Any missed doses: No   Medications changes: YES- steroid injection.      WARFARIN Plan per protocol: 1 mg every Sun, Tue, Thu; 1.5 mg all other days

## 2024-02-02 ENCOUNTER — HOSPITAL ENCOUNTER (OUTPATIENT)
Dept: MAMMOGRAPHY | Age: 49
Discharge: HOME | End: 2024-02-02
Attending: INTERNAL MEDICINE
Payer: COMMERCIAL

## 2024-02-02 DIAGNOSIS — Z12.31 ENCOUNTER FOR SCREENING MAMMOGRAM FOR BREAST CANCER: ICD-10-CM

## 2024-02-02 PROCEDURE — 77063 BREAST TOMOSYNTHESIS BI: CPT | Performed by: INTERNAL MEDICINE

## 2024-02-02 PROCEDURE — 77067 SCR MAMMO BI INCL CAD: CPT | Performed by: INTERNAL MEDICINE

## 2024-02-07 ENCOUNTER — TELEPHONE (OUTPATIENT)
Dept: PHYSICAL THERAPY | Facility: HOSPITAL | Age: 49
End: 2024-02-07

## 2024-02-09 ENCOUNTER — OFFICE VISIT (OUTPATIENT)
Facility: CLINIC | Age: 49
End: 2024-02-09
Payer: COMMERCIAL

## 2024-02-09 ENCOUNTER — TELEPHONE (OUTPATIENT)
Facility: CLINIC | Age: 49
End: 2024-02-09

## 2024-02-09 VITALS
SYSTOLIC BLOOD PRESSURE: 103 MMHG | WEIGHT: 129 LBS | HEIGHT: 60 IN | BODY MASS INDEX: 25.32 KG/M2 | HEART RATE: 78 BPM | DIASTOLIC BLOOD PRESSURE: 56 MMHG

## 2024-02-09 DIAGNOSIS — R15.9 INCONTINENCE OF FECES WITH FECAL URGENCY: ICD-10-CM

## 2024-02-09 DIAGNOSIS — R13.10 DYSPHAGIA, UNSPECIFIED TYPE: ICD-10-CM

## 2024-02-09 DIAGNOSIS — R15.2 INCONTINENCE OF FECES WITH FECAL URGENCY: ICD-10-CM

## 2024-02-09 DIAGNOSIS — Z12.11 SCREENING FOR COLON CANCER: ICD-10-CM

## 2024-02-09 DIAGNOSIS — Z12.11 COLON CANCER SCREENING: Primary | ICD-10-CM

## 2024-02-09 DIAGNOSIS — R13.19 ESOPHAGEAL DYSPHAGIA: Primary | ICD-10-CM

## 2024-02-09 PROCEDURE — 99214 OFFICE O/P EST MOD 30 MIN: CPT

## 2024-02-09 PROCEDURE — 3008F BODY MASS INDEX DOCD: CPT

## 2024-02-09 PROCEDURE — 3078F DIAST BP <80 MM HG: CPT

## 2024-02-09 PROCEDURE — 3074F SYST BP LT 130 MM HG: CPT

## 2024-02-09 RX ORDER — SODIUM, POTASSIUM,MAG SULFATES 17.5-3.13G
SOLUTION, RECONSTITUTED, ORAL ORAL
Qty: 1 EACH | Refills: 0 | Status: SHIPPED | OUTPATIENT
Start: 2024-02-09

## 2024-02-09 NOTE — TELEPHONE ENCOUNTER
Scheduled for:  Colonoscopy 71149 EGD 53311  Provider Name:  Dr Saab  Date:  6/7/2024  Location:  [facility]  Sedation:  MAC  Time:  10:30 am. (pt is aware to arrive at 9:30 am)  Prep:  Split dose Suprep  Meds/Allergies Reconciled?:  YORDAN Hicks reviewed.  Diagnosis with codes:    Dysphagia R13.19  CRC screening Z12.11  Was patient informed to call insurance with codes (Y/N):  Yes  Referral sent?:  Referral was sent at the time of electronic surgical scheduling.  [facility] or [facility] notified?:  I sent an electronic request to Endo Scheduling and received a confirmation today.  Medication Orders:  Pt is aware to NOT take iron pills, herbal meds and diet supplements for 7 days before exam.   HOLD cilostazol for 2 days & warfarin for 5 days prior to procedure.   Also to NOT take any form of alcohol, recreational drugs and any forms of ED meds 24 hours before exam.   Misc Orders:  GI RN to reach out to Dr Acuña for approval to hold mdiaction.  Further instructions given by staff:  I discussed the prep instructions with the patient which she verbally understood. I provided patient with prep instruction's sheet in office.      Patient was informed about the new cancellation policy for his/her procedure. Patient was also given a copy of the cancellation policy at the time of the appointment and verbalized understanding.

## 2024-02-09 NOTE — H&P
[facility] - Gastroenterology                                                                                                  Clinic History and Physical     Chief Complaint   Patient presents with    Consult     Colonoscopy       Requesting physician or provider: Ravi Flynn MD    HPI:   Manjit Matt is a 48 year old year-old female with medical history including congenital pulmonary stenosis, PVD, heart murmur. Surgical hx of valvuloplasty, right leg bypass related to popliteal artery thrombosis on cilostazol and warfarin. Bypass redone in 2012. She  presents for colon cancer screening evaluation.    #fecal incontinence  -has fecal urgency and occasional fecal incontinence since 4th degree tear from 1st child vaginal delivery   -has completed pelvic floor therapy in the past    #dysphagia  -reports occasional feeling of pills or food getting stuck in esophagus (points to lower neck). Able to clear with water. Sometimes coughs from penetration of liquids to trachea when swallowing.     Patient is here today as a referral from her PCP for evaluation prior to undergoing colonoscopy for CRC screening. Patient denies any GI symptoms of nausea, vomiting, heartburn, dyspepsia, hematemesis, abdominal pain, change in bowel habits, constipation, diarrhea, hematochezia, or melena.  Additionally there is no weight loss and no reported chest pain or shortness of breath.     Pt is due for CRC screening. We discussed the available screening options for CRC such as FIT and cologuard. They are electing to pursue colonoscopy at this time.     Last colonoscopy: none   Last EGD: none     NSAIDS:none   Tobacco: none   Alcohol: rare  Marijuana: none   Illicit drugs:none     FH GI malignancy: paternal aunt - colon cancer, Dad - polyps    No history of adverse reaction to sedation  No LUCAS  YES anticoagulants  No pacemaker/defibrillator  No pain medications and/or sleep aides    Wt Readings from Last 6 Encounters:   02/09/24  129 lb (58.5 kg)   23 126 lb (57.2 kg)   23 (P) 125 lb 13.8 oz (57.1 kg)   23 125 lb (56.7 kg)   22 125 lb (56.7 kg)   22 126 lb (57.2 kg)          History, Medications, Allergies, ROS:      Past Medical History:   Diagnosis Date    Congenital pulmonary stenosis     congenital pulmonary aa stenosis    Congenital pulmonary stenosis     SX at 18 y/o / valvuloplasty     Disease of vein 2009    vein/artery disease    Heart murmur     since birth    Hematologic disorder     Thromboembolic Event    History of blood clots     History of blood transfusion     Peripheral vascular disease (HCC)     Right leg arterial bypass ,    PONV (postoperative nausea and vomiting)     Popliteal artery thrombosis (HCC)     Rt popliteal artery thrombosis - 15cm; Right Lower Extremity thrombosis; was on coumadin for 6 months than on plavix and aggrenox until . w/u negative for coagulopathy but was anticoagulated during pregnancies     Tendinitis 2009    discontinue insole / rx ibuprofen    Valvular disease     Pulmonic stenosis    Visual impairment     glasses      Past Surgical History:   Procedure Laterality Date    APPENDECTOMY      APPENDECTOMY            CATH BARE METAL STENT (BMS)      3 stents right leg    CYST ASPIRATION LEFT Left 2023    US bx    ENDOMETRIAL ABLATION      HYSTERECTOMY  10/2018    Laparoscopic hysterectomy with Dr. Dueñas.  Had post op vaginal bleeding and repeat suturing of vaginal cuff.    LEG/ANKLE SURGERY PROC UNLISTED      right leg arterial bypass / (fasciotomy)    NEEDLE BIOPSY LEFT Left 2023    US bx    OTHER SURGICAL HISTORY      SX at 18 y/o / valvuloplasty (congenital Pulmonic stenosis)      Family Hx:   Family History   Problem Relation Age of Onset    No Known Problems Father     No Known Problems Mother     Other (Other) Maternal Grandmother         Fibrocystic Breast Disease    Cancer Paternal  Grandfather         lung    No Known Problems Sister     Bipolar Disorder Brother     Breast Cancer Maternal Aunt         40's,  BRCA negative    Breast Cancer Maternal Aunt         mat great great aunt breast ca unknown age    Ovarian Cancer Neg     Uterine Cancer Neg     Colon Cancer Neg       Social History:   Social History     Socioeconomic History    Marital status:    Tobacco Use    Smoking status: Never    Smokeless tobacco: Never   Vaping Use    Vaping Use: Never used   Substance and Sexual Activity    Alcohol use: Yes     Comment: rare    Drug use: No   Other Topics Concern    Caffeine Concern Yes     Comment: coffee, 1-2 cups daily    Pt has a pacemaker No    Pt has a defibrillator No    Reaction to local anesthetic No        Medications (Active prior to today's visit):  Current Outpatient Medications   Medication Sig Dispense Refill    Na Sulfate-K Sulfate-Mg Sulf (SUPREP BOWEL PREP KIT) 17.5-3.13-1.6 GM/177ML Oral Solution Take as directed by GI clinic. Okay to substitute for generic. 1 each 0    Multiple Minerals-Vitamins (CALCIUM CITRATE PLUS/MAGNESIUM) Oral Tab Take 1 tablet by mouth daily.      Multiple Vitamins-Minerals (HAIR SKIN AND NAILS FORMULA) Oral Tab Take 1 tablet by mouth daily.      warfarin 1 MG Oral Tab TAKE 1 AND 1/2 TABLETS BY MOUTH EVERY SUNDAY AND 1 TABLET ON ALL OTHER DAYS OR AS DIRECTED BY COUMADIN CLINIC. (Patient taking differently: TAKE 1 AND 1/2 TABLETS BY MOUTH EVERY M-W-F and Sat AND 1 TABLET ON ALL OTHER DAYS OR AS DIRECTED BY COUMADIN CLINIC.) 120 tablet 1    rosuvastatin 5 MG Oral Tab Take 1 tablet (5 mg total) by mouth nightly. FOR CHOLESTEROL. (Patient taking differently: Take 1 tablet (5 mg total) by mouth nightly. FOR CHOLESTEROL. PT Is only taking three times a week, Monday Wednesday and friday) 90 tablet 9    CILOSTAZOL 100 MG Oral Tab TAKE 1 TABLET(100 MG) BY MOUTH TWICE DAILY 60 tablet 11    ASPIRIN EC LOW DOSE 81 MG Oral Tab EC Take 1 tablet (81 mg total)  by mouth daily.  5    acidophilus-pectin Oral Cap Take 1 capsule by mouth daily. (Patient not taking: Reported on 2/9/2024)      cilostazol 100 MG Oral Tab Take 1 tablet (100 mg total) by mouth 2 (two) times daily. (Patient not taking: Reported on 2/9/2024) 180 tablet 3    methylPREDNISolone 4 MG Oral Tablet Therapy Pack Take as directed on pack (Patient not taking: Reported on 2/9/2024) 1 each 0       Allergies:  Allergies   Allergen Reactions    Levaquin [Levofloxacin] HIVES and SWELLING     Other reaction(s): LEVOFLOXACIN       ROS:   CONSTITUTIONAL: negative for fevers, chills, sweats  EYES Negative for scleral icterus or redness, and diplopia  HEENT: Negative for hoarseness  RESPIRATORY: Negative for cough and severe shortness of breath  CARDIOVASCULAR: Negative for crushing sub-sternal chest pain  GASTROINTESTINAL: See HPI  GENITOURINARY: Negative for dysuria  MUSCULOSKELETAL: Negative for arthralgias and myalgias  SKIN: Negative for jaundice, rash or pruritus  NEUROLOGICAL: Negative for dizziness and headaches  BEHAVIOR/PSYCH: Negative for psychotic behavior      PHYSICAL EXAM:   Blood pressure 103/56, pulse 78, height 5' (1.524 m), weight 129 lb (58.5 kg), last menstrual period 09/27/2018.    GEN: Alert, no acute distress, well-nourished   HEENT: anicteric sclera, neck supple, trachea midline, MMM, no palpable or tender neck or supraclavicular lymph nodes  CV: RRR, the extremities are warm and well perfused, +heart murmur   LUNGS: No increased work of breathing, CTAB  ABDOMEN: Soft, symmetrical, non-tender without distention or guarding. No scars or lesions. Aorta is without bruit or visible pulsation. Umbilicus is midline without herniation. Normoactive bowel sounds are present, No masses, hepatomegaly or splenomegaly noted.  MSK: No erythema, no warmth, no swelling of joints  SKIN: No jaundice, no erythema, no rashes, no lesions  HEMATOLOGIC: No bleeding, no bruising  NEURO: Alert and interactive,  ESTEFANI  PSYCH: appropriate mood & affect    Labs/Imaging:     Patient's labs and imaging were reviewed and discussed with patient today.    .  ASSESSMENT/PLAN:   Manjit Matt is a 48 year old year-old female with medical history including congenital pulmonary stenosis, PVD, heart murmur. Surgical hx of valvuloplasty, right leg bypass related to popliteal artery thrombosis on cilostazol and warfarin. Bypass redone in 2012. She  presents for colon cancer screening evaluation.    #fecal incontinence  -has fecal urgency and occasional fecal incontinence since 4th degree tear from 1st child vaginal delivery   -has completed pelvic floor therapy in the past  -not worsening per patient, patient has no acute concerns over this    #dysphagia  -reports occasional feeling of pills or food getting stuck in esophagus (points to lower neck). Able to clear with water. Sometimes coughs from penetration of liquids to trachea when swallowing.   -appears to have both oropharyngeal and esophageal dysphagia. Will investigate with xray esophagus and EGD. Told patient to monitor oropharyngeal dysphagia and return for continued symptoms.    # Average Risk screening: patient is considered average risk for colon cancer (No family hx of colon cancer) and it is appropriate to proceed with screening colonoscopy. Patient is currently asymptomatic and denies diarrhea, hematochezia, thin-stools or weight loss. We discussed risks/benefits/alternatives to procedure, including CT colonography and stool testing, they want to proceed with colonoscopy.    Recommend:  -call to schedule xray esophagus     1. Schedule EGD & colonoscopy with Dr. Saab, Dr. Braden or Dr. Lemon   Diagnosis: dysphagia & CRC screen  Sedation: MAC   Prep: split dose suprep    *HOLD cilostazol for 2 days & warfarin for 5 days prior to procedure. (Per Dr. Saab)  GI RNs - please reach out to Dr. Acuña for approval and any necessary lovenox  bridge**send    -Diabetes meds: N/A     Colonoscopy consent: I have discussed the risks, benefits, and alternatives to colonoscopy with the patient [who demonstrated understanding], including but not limited to the risks of bleeding, infection, pain, as well as the risks of anesthesia and perforation all leading to prolonged hospitalization, surgical intervention. I also specifically mentioned the miss rate of colonoscopy of 5-10% in the best of all circumstances. All questions were answered to the patient’s satisfaction. The patient elected to proceed with colonoscopy with intervention [i.e. polypectomy, etc.] as indicated.    Orders This Visit:  No orders of the defined types were placed in this encounter.      Meds This Visit:  Requested Prescriptions     Signed Prescriptions Disp Refills    Na Sulfate-K Sulfate-Mg Sulf (SUPREP BOWEL PREP KIT) 17.5-3.13-1.6 GM/177ML Oral Solution 1 each 0     Sig: Take as directed by GI clinic. Okay to substitute for generic.       Imaging & Referrals:  XR ESOPHAGUS SINGLE CONTRAST (CPT=74220)       YORDAN Arias    [facility]  2/9/2024      The dictation was partially prepared using Dragon Medical voice recognition software. As a result, errors may occur. When identified, these errors have been corrected. While every attempt is made to correct errors during dictation, discrepancies may still exist.

## 2024-02-09 NOTE — TELEPHONE ENCOUNTER
GI RNs - please reach out to Dr. Acuña for approval to hold below medications and any necessary lovenox bridge    Patient is scheduled for the following:   Schedule EGD & colonoscopy with Dr. Saab  Diagnosis: dysphagia & CRC screen  Sedation: MAC   Prep: split dose suprep    *HOLD cilostazol for 2 days & warfarin for 5 days prior to procedure. (Per Dr. Saab)  GI RNs - please reach out to Dr. Acuña for approval and any necessary lovenox bridge. If lovenox bridge, last dose should be 24 hours prior to surgery  She can CONTINUE aspirin 81mg.     For previous gyn surgery Dr. Flynn did bridge the patient with lovenox.     Thank you.   YORDAN Arias

## 2024-02-09 NOTE — PATIENT INSTRUCTIONS
-call to schedule xray esophagus     1. Schedule EGD & colonoscopy with Dr. Saab, Dr. Braden or Dr. Lemon   Diagnosis: dysphagia & CRC screen  Sedation: MAC   Prep: split dose suprep    *HOLD cilostazol for 2 days & warfarin for 5 days prior to procedure. (Per Dr. Saab)  GI RNs - please reach out to Dr. Acuña for approval and any necessary lovenox bridge    2.  bowel prep from pharmacy   You can pick the bowel prep up now and store in a cool, dry place in your home until your scheduled bowel prep start date.    3. MEDICATION CHANGES PRIOR TO PROCEDURE       A. 7 days BEFORE colonoscopy: HOLD Iron (ferrous sulfate/ ferrous gluconate) pills, herbal supplements, multivitamins, or weight loss/diet medications (i.e.Phentermine/Vyvanse)     B. 5 days BEFORE colonoscopy HOLD coumadin or plavix     C. 48 hours BEFORE colonoscopy HOLD xarelto or eliquis (with PCP approval)     D. 1 day BEFORE and day OF colonoscopy: HOLD diabetic medications  (insulin management per endocrine or PCP)     E. Continue ALL other medications as usual      F. DO NOT TAKE: Any form of alcohol, recreational drugs and any forms of erectile dysfunction medications 24 hours prior to procedure.    4. Read all bowel prep instructions carefully. Bowel prep instructions can also be found online at:  www.[facility].org/giprep     5. AVOID seeds, nuts, popcorn, raw fruits and vegetables for 5 days before procedure    6. If you start any NEW medication after your visit today, please notify us. Certain medications (like iron or weight loss medications) will need to be held before the procedure, or the procedure cannot be performed safely.

## 2024-02-12 ENCOUNTER — ANTI-COAG VISIT (OUTPATIENT)
Dept: ANTICOAGULATION | Facility: CLINIC | Age: 49
End: 2024-02-12

## 2024-02-12 DIAGNOSIS — Z79.01 LONG TERM (CURRENT) USE OF ANTICOAGULANTS: Primary | ICD-10-CM

## 2024-02-12 DIAGNOSIS — I37.9 PULMONIC VALVE DISEASE: ICD-10-CM

## 2024-02-12 LAB — INR: 3.2 (ref 2–3)

## 2024-02-12 PROCEDURE — 93793 ANTICOAG MGMT PT WARFARIN: CPT | Performed by: FAMILY MEDICINE

## 2024-02-12 NOTE — PROGRESS NOTES
[facility] / INR 3.2, supra therapeutic. (Goal 2.5 ) TTR 76.6 %     Etiology: Pt self  adjusted - education provided about % changes so she understands her medication. (Triple therapy patient)     PLAN: continue the current dose.    Recheck INR 2 weeks.    Pt reports:    No sign of unusual bruising or bleeding.  Any missed doses: reduced after this reading.    Medications changes: No    Contacted  Manjit by phone  who verbalized understanding and agreement.    WARFARIN Plan per protocol: 6/2: Hold; 6/3: Hold; 6/4: Hold; 6/5: Hold; 6/6: Hold; Otherwise 1 mg every Sun, Tue, Thu; 1.5 mg all other days

## 2024-02-13 ENCOUNTER — OFFICE VISIT (OUTPATIENT)
Dept: PHYSICAL THERAPY | Age: 49
End: 2024-02-13
Attending: ORTHOPAEDIC SURGERY
Payer: COMMERCIAL

## 2024-02-13 DIAGNOSIS — M76.60 PAIN IN ACHILLES TENDON: Primary | ICD-10-CM

## 2024-02-13 DIAGNOSIS — M67.874 ACHILLES TENDINOSIS OF LEFT ANKLE: ICD-10-CM

## 2024-02-13 PROCEDURE — 97162 PT EVAL MOD COMPLEX 30 MIN: CPT

## 2024-02-13 PROCEDURE — 97140 MANUAL THERAPY 1/> REGIONS: CPT

## 2024-02-13 PROCEDURE — 97110 THERAPEUTIC EXERCISES: CPT

## 2024-02-14 NOTE — TELEPHONE ENCOUNTER
Dr. Flynn -    Pt scheduled for CLN/EGD 6/7/2024  Ok to hold cilostazol for 2 days and warfarin for 5 days prior to procedure?    Please see message below for details.  Thank you!

## 2024-02-14 NOTE — PROGRESS NOTES
LOWER EXTREMITY EVALUATION:     Diagnosis:   Pain in Achilles tendon (M76.60)  Achilles tendinosis of left ankle (M67.874)      Referring Provider: Yarelis  Date of Evaluation:    2/14/2024    Precautions:  None Next MD visit:   none scheduled  Date of Surgery: n/a     PATIENT SUMMARY   Manjit Matt is a 48 year old female who presents to therapy today with complaints of improving L achilles pain since summer 2023.  It was exacerbated by pickleball July 2023 and had worsened until receiving injection 1/23/2024 which helped tremendously.  Currently, she reports only minor symptoms, but still significant tenderness and some calf/achilles tightness.  Current functional limitations include slowly returning to recreational levels including pickleball, weight lifting, other recreational athletics.     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 Pt goals include full return to sport with painfree L calf/achilles.  Past medical history was reviewed with Manjit. Significant findings include R LE bypass surgeries.    ASSESSMENT  Manjit presents to physical therapy evaluation with primary c/o L calf/achilles pain and tightness. The results of the objective tests and measures show tenderness especially in Soleus, but throughout calf/achilles.  Functional deficits include but are not limited to reduced tolerance for sport.  Signs and symptoms are consistent with diagnosis of Pain in Achilles tendon (M76.60)  Achilles tendinosis of left ankle (M67.874). Pt and PT discussed evaluation findings, pathology, POC and HEP.  Pt voiced understanding and performs HEP correctly without reported pain. Skilled Physical Therapy is medically necessary to address the above impairments and reach functional goals.     OBJECTIVE:   Observation: Fit, petite woman in no apparent distress.  Palpation: Multiple areas of TTP throughout L calf/achilles especially sup/med.    AROM: (* denotes performed with pain)  - B LE's grossly WNL's including  DF both with knee straight or bent.    Accessory motion: Good lat bending of B achilles med and lat    Strength/MMT: (* denotes performed with pain)  - B LE's grossly 5/5    Gait: pt ambulates on level ground with normal mechanics  Balance: SLS  - on level, eyes open: R 30+ sec, L 30+ sec  - on level, eyes closed: R 30+ sec, L 30+ sec  - on foam, eyes open: R 30+ sec, L 30+ sec  - on foam, eyes closed: R 20 sec, L 30+ sec    Today’s Treatment and Response:   Pt education was provided on exam findings, treatment diagnosis, treatment plan, expectations, and prognosis. Pt was also provided recommendations for activity modifications and modalities as needed [ice/heat].  She was treated with Soft tissue mobilization to L calf/achilles in prone and quadruped  Patient was instructed in and issued a HEP for:   - Gastroc stretch  - Soleus stretch  - SLS variations    Charges: PT Eval Moderate Complexity, Manual, Ther Ex      Total Timed Treatment: 30 min     Total Treatment Time: 45 min     Based on clinical rationale and outcome measures, this evaluation involved Moderate Complexity decision making due to 1-2 personal factors/comorbidities, 3 body structures involved/activity limitations, and evolving symptoms including changing pain levels.  PLAN OF CARE:    Goals: (to be met in 12 visits)  Pt report min/no pain or tightness L LE  Pt independent with HEP and progression  Pt able to run, jump, hop, skip, cut, sprint, backpeddle -- all without pain or sense of instability    Frequency / Duration: Patient will be seen for 2 x/week or a total of 12 visits over a 90 day period. Treatment will include: Gait training, Neuromuscular Re-education, Therapeutic Activities, Therapeutic Exercise, and Home Exercise Program instruction    Education or treatment limitation: None  Rehab Potential:excellent    LEFS Score  LEFS Score: 88.75 % (2/12/2024  6:42 PM)      Patient/Family/Caregiver was advised of these findings, precautions, and  treatment options and has agreed to actively participate in planning and for this course of care.    Thank you for your referral. Please co-sign or sign and return this letter via fax as soon as possible to [phone]. If you have any questions, please contact me at Dept: [phone]    Sincerely,  Electronically signed by therapist: Isaiah Romano PT  Physician's certification required: Yes  I certify the need for these services furnished under this plan of treatment and while under my care.    X___________________________________________________ Date____________________    Certification From: 2/14/2024  To:5/14/2024

## 2024-02-15 ENCOUNTER — OFFICE VISIT (OUTPATIENT)
Dept: OBGYN CLINIC | Facility: CLINIC | Age: 49
End: 2024-02-15

## 2024-02-15 ENCOUNTER — APPOINTMENT (OUTPATIENT)
Dept: PHYSICAL THERAPY | Age: 49
End: 2024-02-15
Attending: ORTHOPAEDIC SURGERY
Payer: COMMERCIAL

## 2024-02-15 VITALS
DIASTOLIC BLOOD PRESSURE: 70 MMHG | SYSTOLIC BLOOD PRESSURE: 110 MMHG | WEIGHT: 129 LBS | BODY MASS INDEX: 25.32 KG/M2 | HEIGHT: 60 IN

## 2024-02-15 DIAGNOSIS — Z01.419 ENCOUNTER FOR WELL WOMAN EXAM WITH ROUTINE GYNECOLOGICAL EXAM: Primary | ICD-10-CM

## 2024-02-15 DIAGNOSIS — Z12.31 ENCOUNTER FOR SCREENING MAMMOGRAM FOR BREAST CANCER: ICD-10-CM

## 2024-02-15 DIAGNOSIS — N63.0 MASS OF BREAST, UNSPECIFIED LATERALITY: ICD-10-CM

## 2024-02-15 PROCEDURE — 3074F SYST BP LT 130 MM HG: CPT | Performed by: OBSTETRICS & GYNECOLOGY

## 2024-02-15 PROCEDURE — 99396 PREV VISIT EST AGE 40-64: CPT | Performed by: OBSTETRICS & GYNECOLOGY

## 2024-02-15 PROCEDURE — 3078F DIAST BP <80 MM HG: CPT | Performed by: OBSTETRICS & GYNECOLOGY

## 2024-02-15 PROCEDURE — 3008F BODY MASS INDEX DOCD: CPT | Performed by: OBSTETRICS & GYNECOLOGY

## 2024-02-15 NOTE — TELEPHONE ENCOUNTER
Request to hold Cilostazol and warfarin faxed to Dr. Alex at [phone] with return confirmation.  Phone [phone]    Patient is scheduled on 06/07/2024 with Dr. Saab.    Awaiting orders.

## 2024-02-15 NOTE — PROGRESS NOTES
[facility]  Obstetrics and Gynecology  Gynecology Visit      Manjit Matt is a 48 year old female who presents for Annual exam.    LMP: Hyst.    Menses regular: n/a.    Menstrual flow normal: n/a.    Birth control or HRT:  No.   Refill No  Last Pap Smear: Hysterectomy .  Any history of abnormal paps: n/a   Last MM24  Any Medication Refills needed today?: no  Sleep: 7-8 hours.    Diet: balanced.    Exercise: daily.   Screening labs/Blood work today: no.     Colonoscopy (if over 46 y/o): Scheduled for 2024.   Gardasil:(age 9-46 y/o) n/a.   Genetic Cancer screen (if indicated): Maternal Aunt Braca-negative   Flu (Aug-April): 23.TDAP (every 10 years) 23.      Additional Problems/concerns: lump on left breast.      Next Appt: annual scheduled    Immunization History   Administered Date(s) Administered    Covid-19 Vaccine Moderna 100 mcg/0.5 ml 2021, 2021, 2021    Covid-19 Vaccine Moderna Bivalent 50mcg/0.5mL 12+ years 2023    FLU VAC QIV SPLIT 3 YRS AND OLDER (82033) 10/01/2016, 10/01/2017, 10/01/2018, 10/01/2019, 10/05/2020, 11/15/2021    FLULAVAL 6 months & older 0.5 ml Prefilled syringe (89227) 10/05/2018, 2022    FLUZONE 6 months and older PFS 0.5 ml (39810) 10/05/2018    Influenza 10/14/2016, 10/26/2017, 2023    TDAP 2023         Current Outpatient Medications:     Na Sulfate-K Sulfate-Mg Sulf (SUPREP BOWEL PREP KIT) 17.5-3.13-1.6 GM/177ML Oral Solution, Take as directed by GI clinic. Okay to substitute for generic., Disp: 1 each, Rfl: 0    acidophilus-pectin Oral Cap, Take 1 capsule by mouth daily. (Patient not taking: Reported on 2024), Disp: , Rfl:     Multiple Minerals-Vitamins (CALCIUM CITRATE PLUS/MAGNESIUM) Oral Tab, Take 1 tablet by mouth daily., Disp: , Rfl:     Multiple Vitamins-Minerals (HAIR SKIN AND NAILS FORMULA) Oral Tab, Take 1 tablet by mouth daily., Disp: , Rfl:     warfarin 1 MG Oral Tab, TAKE 1 AND 1/2 TABLETS BY MOUTH  EVERY  AND 1 TABLET ON ALL OTHER DAYS OR AS DIRECTED BY COUMADIN CLINIC. (Patient taking differently: TAKE 1 AND 1/2 TABLETS BY MOUTH EVERY -- and Sat AND 1 TABLET ON ALL OTHER DAYS OR AS DIRECTED BY COUMADIN CLINIC.), Disp: 120 tablet, Rfl: 1    cilostazol 100 MG Oral Tab, Take 1 tablet (100 mg total) by mouth 2 (two) times daily. (Patient not taking: Reported on 2024), Disp: 180 tablet, Rfl: 3    methylPREDNISolone 4 MG Oral Tablet Therapy Pack, Take as directed on pack (Patient not taking: Reported on 2024), Disp: 1 each, Rfl: 0    rosuvastatin 5 MG Oral Tab, Take 1 tablet (5 mg total) by mouth nightly. FOR CHOLESTEROL. (Patient taking differently: Take 1 tablet (5 mg total) by mouth nightly. FOR CHOLESTEROL. PT Is only taking three times a week,  and friday), Disp: 90 tablet, Rfl: 9    CILOSTAZOL 100 MG Oral Tab, TAKE 1 TABLET(100 MG) BY MOUTH TWICE DAILY, Disp: 60 tablet, Rfl: 11    ASPIRIN EC LOW DOSE 81 MG Oral Tab EC, Take 1 tablet (81 mg total) by mouth daily., Disp: , Rfl: 5    Allergies   Allergen Reactions    Levaquin [Levofloxacin] HIVES and SWELLING     Other reaction(s): LEVOFLOXACIN       OB History    Para Term  AB Living   3 2 2 0 1 2   SAB IAB Ectopic Multiple Live Births   1 0 0 0 1      # Outcome Date GA Lbr Enrique/2nd Weight Sex Delivery Anes PTL Lv   3 Term 2006    M Caesarean      2 SAB 2004     SAB   FD   1 Term  38w0d   F Caesarean   KEITH       Past Medical History:   Diagnosis Date    Congenital pulmonary stenosis     congenital pulmonary aa stenosis    Congenital pulmonary stenosis     SX at 18 y/o / valvuloplasty     Disease of vein 2009    vein/artery disease    Heart murmur     since birth    Hematologic disorder     Thromboembolic Event    History of blood clots     History of blood transfusion     Peripheral vascular disease (HCC)     Right leg arterial bypass ,    PONV (postoperative nausea and vomiting)     Popliteal  artery thrombosis (HCC) 2001    Rt popliteal artery thrombosis - 15cm; Right Lower Extremity thrombosis; was on coumadin for 6 months than on plavix and aggrenox until . w/u negative for coagulopathy but was anticoagulated during pregnancies     Tendinitis 2009    discontinue insole / rx ibuprofen    Valvular disease     Pulmonic stenosis    Visual impairment     glasses       Past Surgical History:   Procedure Laterality Date    APPENDECTOMY      APPENDECTOMY            CATH BARE METAL STENT (BMS)      3 stents right leg    CYST ASPIRATION LEFT Left 2023    US bx    ENDOMETRIAL ABLATION      HYSTERECTOMY  10/2018    Laparoscopic hysterectomy with Dr. Dueñas.  Had post op vaginal bleeding and repeat suturing of vaginal cuff.    LEG/ANKLE SURGERY PROC UNLISTED      right leg arterial bypass / (fasciotomy)    NEEDLE BIOPSY LEFT Left 2023    US bx    OTHER SURGICAL HISTORY      SX at 20 y/o / valvuloplasty (congenital Pulmonic stenosis)       Family History   Problem Relation Age of Onset    No Known Problems Father     No Known Problems Mother     Other (Other) Maternal Grandmother         Fibrocystic Breast Disease    Cancer Paternal Grandfather         lung    No Known Problems Sister     Bipolar Disorder Brother     Breast Cancer Maternal Aunt         40's,  BRCA negative    Breast Cancer Maternal Aunt         mat great great aunt breast ca unknown age    Ovarian Cancer Neg     Uterine Cancer Neg     Colon Cancer Neg                Social History     Socioeconomic History    Marital status:      Spouse name: Not on file    Number of children: Not on file    Years of education: Not on file    Highest education level: Not on file   Occupational History    Not on file   Tobacco Use    Smoking status: Never    Smokeless tobacco: Never   Vaping Use    Vaping Use: Never used   Substance and Sexual Activity    Alcohol use: Yes     Comment: rare    Drug use: No     Sexual activity: Not on file   Other Topics Concern     Service Not Asked    Blood Transfusions Not Asked    Caffeine Concern Yes     Comment: coffee, 1-2 cups daily    Occupational Exposure Not Asked    Hobby Hazards Not Asked    Sleep Concern Not Asked    Stress Concern Not Asked    Weight Concern Not Asked    Special Diet Not Asked    Back Care Not Asked    Exercise Not Asked    Bike Helmet Not Asked    Seat Belt Not Asked    Self-Exams Not Asked    Grew up on a farm Not Asked    History of tanning Not Asked    Outdoor occupation Not Asked    Pt has a pacemaker No    Pt has a defibrillator No    Breast feeding Not Asked    Reaction to local anesthetic No   Social History Narrative    Not on file     Social Determinants of Health     Financial Resource Strain: Not on file   Food Insecurity: Not on file   Transportation Needs: Not on file   Physical Activity: Not on file   Stress: Not on file   Social Connections: Not on file   Housing Stability: Not on file       /70   Ht 5' (1.524 m)   Wt 128 lb 15.5 oz (58.5 kg)   LMP 09/27/2018   BMI 25.19 kg/m²     Wt Readings from Last 3 Encounters:   02/15/24 128 lb 15.5 oz (58.5 kg)   02/09/24 129 lb (58.5 kg)   12/26/23 126 lb (57.2 kg)         Health Maintenance   Topic Date Due    Influenza Vaccine (1) 08/01/2021    Screen for Cervical Cancer 11/05/2021    DTaP,Tdap and Td Vaccines (3 - Td or Tdap) 03/18/2025    Hepatitis C Screening Completed    HIV Screening Completed    COVID-19 Vaccine Completed     Review of Systems   General: Present- Feeling well. Not Present- Chills, Fever, Weight Gain and Weight Loss.  HEENT: Not Present- Headache and Sore Throat.  Respiratory: Not Present- Cough, Difficulty Breathing, Hemoptysis and Sputum Production.  Cardiovascular: Not Present- Chest Pain, Elevated Blood Pressure, Fainting / Blacking Out and Shortness of Breath.  Gastrointestinal: Not Present- Constipation, Diarrhea, Nausea and Vomiting.  Female  Genitourinary: Not Present- Discharge, Dysuria and Frequency.  Musculoskeletal: Not Present- Leg Cramps and Swelling of Extremities.  Neurological: Not Present- Dizziness and Headaches.  Psychiatric: Not Present- Anxiety and Depression.  Endocrine: Not Present- Appetite Changes, Hair Changes and Thyroid Problems.  Hematology: Not Present- Easy Bruising and Excessive bleeding.  All other systems negative     Physical Exam The physical exam findings are as follows:     General   Mental Status - Alert. General Appearance - Cooperative. Orientation - Oriented X4. Build & Nutrition - Well nourished.    Head and Neck  Thyroid   Gland Characteristics - normal size and consistency.    Chest and Lung Exam   Inspection:   Chest Wall: - Normal.  Percussion:   Quality and Intensity: - Percussion normal.  Palpation: - Palpation normal.  Auscultation:   Breath sounds: - Normal.  Adventitious sounds: - No Adventitious sounds.    Breast   Nipples: Characteristics - Bilateral - Normal. Discharge - Bilateral - None.  Breast - Bilateral - Symmetric. Breast cyst 2cm behind nipple of right breast , smooth and mobile     Cardiovascular   Auscultation: Rhythm - Regular. Heart Sounds - Normal heart sounds.  Murmurs & Other Heart Sounds: Auscultation of the heart reveals - No Murmurs.    Abdomen   Inspection: Inspection of the abdomen reveals - No Hernias. Incisional scars - no incisional scars.  Palpation/Percussion: Palpation and Percussion of the abdomen reveal - Non Tender and No Palpable abdominal masses.  Liver: - Normal.  Auscultation: Auscultation of the abdomen reveals - Bowel sounds normal.    Female Genitourinary     External Genitalia   Perineum - Normal. Bartholin's Gland - Bilateral - Normal. Clitoris - Normal.  Introitus: Characteristics - No Cystocele, Enterocele or Rectocele. Discharge - None.  Labia Majora: Lesions - Bilateral - None. Characteristics - Bilateral - Normal.  Labia Minora: Lesions - Bilateral - None.  Characteristics - Bilateral - Normal.  Urethra: Characteristics - Normal. Discharge - None.  Bartelso Gland - Bilateral - Normal.  Vulva: Characteristics - Normal. Lesions - None.    Speculum & Bimanual   Vagina: wnls   Vaginal Wall: - Normal.  Vaginal Lesions - None. Vaginal Mucosa - Normal.  Cervix: surgically absent   Uterus: surgically absent      Rectal   Anorectal Exam: External - normal external exam.    Peripheral Vascular   Upper Extremity:   Palpation: - Pulses bilaterally normal.  Lower Extremity: Inspection - Bilateral - Inspection Normal.  Palpation: Edema - Bilateral - No edema.    Neurologic   Mental Status: - Normal.    Lymphatic  General Lymphatics   Description - Normal .       1. Encounter for well woman exam with routine gynecological exam    2. Encounter for screening mammogram for breast cancer    3. Mass of breast, unspecified laterality  - US BREAST BILATERAL COMPLETE (CPT=76641-50); Future

## 2024-02-20 ENCOUNTER — TELEPHONE (OUTPATIENT)
Dept: PHYSICAL THERAPY | Facility: HOSPITAL | Age: 49
End: 2024-02-20

## 2024-02-21 ENCOUNTER — PATIENT MESSAGE (OUTPATIENT)
Dept: OBGYN CLINIC | Facility: CLINIC | Age: 49
End: 2024-02-21

## 2024-02-21 ENCOUNTER — HOSPITAL ENCOUNTER (OUTPATIENT)
Dept: MAMMOGRAPHY | Facility: HOSPITAL | Age: 49
Discharge: HOME | End: 2024-02-21
Attending: OBSTETRICS & GYNECOLOGY
Payer: COMMERCIAL

## 2024-02-21 DIAGNOSIS — N63.0 MASS OF BREAST, UNSPECIFIED LATERALITY: ICD-10-CM

## 2024-02-21 DIAGNOSIS — N60.01 BREAST CYST, RIGHT: Primary | ICD-10-CM

## 2024-02-21 PROCEDURE — 76641 ULTRASOUND BREAST COMPLETE: CPT | Performed by: OBSTETRICS & GYNECOLOGY

## 2024-02-21 NOTE — TELEPHONE ENCOUNTER
From: Manjit Matt  To: Sherry Irby  Sent: 2/21/2024 9:56 AM CST  Subject: Breast ultrasound    Good morning!  I had my breast ultrasound and as we thought, there’s a decent size cyst on my right breast.   I was asked to talk to you about it and if we thought it was appropriate, to possibly consider aspirating it. I will need an order so that I can call to make an appointment to schedule a cyst aspiration.   Please let me know your thoughts.  Thank you again for sending me to do an ultrasound.   Have a wonder day!  Manjit

## 2024-02-22 ENCOUNTER — ANTI-COAG VISIT (OUTPATIENT)
Dept: ANTICOAGULATION | Facility: CLINIC | Age: 49
End: 2024-02-22

## 2024-02-22 ENCOUNTER — APPOINTMENT (OUTPATIENT)
Dept: PHYSICAL THERAPY | Age: 49
End: 2024-02-22
Attending: ORTHOPAEDIC SURGERY
Payer: COMMERCIAL

## 2024-02-22 DIAGNOSIS — I37.9 PULMONIC VALVE DISEASE: ICD-10-CM

## 2024-02-22 DIAGNOSIS — Z79.01 LONG TERM (CURRENT) USE OF ANTICOAGULANTS: Primary | ICD-10-CM

## 2024-02-22 LAB — INR: 4.3 (ref 2–3)

## 2024-02-22 PROCEDURE — 93793 ANTICOAG MGMT PT WARFARIN: CPT | Performed by: FAMILY MEDICINE

## 2024-02-23 ENCOUNTER — HOSPITAL ENCOUNTER (OUTPATIENT)
Dept: ULTRASOUND IMAGING | Facility: HOSPITAL | Age: 49
Discharge: HOME | End: 2024-02-23
Attending: SURGERY
Payer: COMMERCIAL

## 2024-02-23 DIAGNOSIS — R22.42 LOCALIZED SWELLING OF LEFT FOOT: ICD-10-CM

## 2024-02-23 DIAGNOSIS — I73.9 PVD (PERIPHERAL VASCULAR DISEASE) (HCC): ICD-10-CM

## 2024-02-23 DIAGNOSIS — Z98.890 STATUS POST FEMOROTIBIAL BYPASS: ICD-10-CM

## 2024-02-23 PROCEDURE — 93925 LOWER EXTREMITY STUDY: CPT | Performed by: SURGERY

## 2024-02-23 NOTE — TELEPHONE ENCOUNTER
Received orders from Dr. Alex -    Hold Warfarin for 5 days prior to procedure  Hold Cilostazol for 2 days

## 2024-02-23 NOTE — PROGRESS NOTES
[facility] / INR 4.3, supra therapeutic. (Goal 2.5 ) TTR 72.7 %     Etiology: pt started Hormone [place] & is fasting for Lent a few days per week. We discussed and she will go back to eating normally. We'll reduce her dose too.    PLAN: hold x 1mg then reduce the weekly dose as listed.    Recheck INR one week.    Pt reports:   Bruise on chest. Has had the same before. Check platelets?   Any missed doses: No   Medications changes: yes    Contacted  Manjit by phone  who verbalized understanding and agreement.    WARFARIN Plan per protocol: 2/22: Hold; 6/2: Hold; 6/3: Hold; 6/4: Hold; 6/5: Hold; 6/6: Hold; Otherwise 1.5 mg every Mon, Wed, Fri; 1 mg all other days    11/17/23 CBC in media             Hormone [place]:   \"By incorporating adaptogenic herbs like melani root and chasteberry, the product assists in regulating hormonal fluctuations, contributing to mood stability, energy levels, and overall well-being.    Hormone [place] incorporates ingredients like fenugreek and fennel to support milk production. These natural galactagogues could aid lactating mothers in providing nourishment to their newborns.    Crafted with specialized ingredients like dandelion, red clover, rhubarb, black cohosh and soy isoflavones, this product helps mitigate hot flashes, mood swings, night sweats, and supports bone health. It's a holistic approach to managing this transitional phase.\"    Actual Bottle States: Red clover, Navid, Winter Lovers, Nettle    Extract Blend: 15:1     UptoDate: fennel and soy may interact with warfarin.

## 2024-02-26 ENCOUNTER — TELEPHONE (OUTPATIENT)
Dept: PHYSICAL THERAPY | Facility: HOSPITAL | Age: 49
End: 2024-02-26

## 2024-02-26 ENCOUNTER — OFFICE VISIT (OUTPATIENT)
Dept: PHYSICAL THERAPY | Age: 49
End: 2024-02-26
Attending: ORTHOPAEDIC SURGERY
Payer: COMMERCIAL

## 2024-02-26 PROCEDURE — 97112 NEUROMUSCULAR REEDUCATION: CPT

## 2024-02-26 PROCEDURE — 97110 THERAPEUTIC EXERCISES: CPT

## 2024-02-26 PROCEDURE — 97140 MANUAL THERAPY 1/> REGIONS: CPT

## 2024-02-26 NOTE — PROGRESS NOTES
Diagnosis:   Pain in Achilles tendon (M76.60)  Achilles tendinosis of left ankle (M67.874)        Referring Provider: Yarelis  Date of Evaluation:    2/13/2024    Precautions:  None Next MD visit:   none scheduled  Date of Surgery: n/a   Insurance Primary/Secondary: [employer] [place] / N/A     # Auth Visits: -            Subjective: Doing well.  Doing HEP.  Did play a bit of pickleball at the last minute request of some friends.  Did lose her balance once, gave into it and fell, so had some various aches/pains from that.  Less tenderness L calf with STM today.  Pain: -    Objective: See below treatment log.    Assessment: Good initial response to treatment and tolerated initial jumping well today.    Goals:   Pt report min/no pain or tightness L LE  Pt independent with HEP and progression  Pt able to run, jump, hop, skip, cut, sprint, backpeddle -- all without pain or sense of instability    Plan: Progress impact and agility training as able.  Date: 2/26/2024  TX#: 2/ Date:                 TX#: 3/ Date:                 TX#: 4/ Date:                 TX#: 5/ Date:   Tx#: 6/   Ther Ex (15')  - bike 6'  - stretches: gastroc and soleus on steps and slantboard  - calf raises on floor: B, unilat, 2 up/1 down eccentric       Manual (10')  - STM L calf and achilles in prone and quadruped.       Neuro Re-Ed (15')  - SLS variations  - jump: in place, AP, side/side              HEP: SLS and jumping    Charges: Ther Ex, Manual, Neuro Re-Ed       Total Timed Treatment: 40 min  Total Treatment Time: 40 min

## 2024-02-29 ENCOUNTER — OFFICE VISIT (OUTPATIENT)
Dept: PHYSICAL THERAPY | Age: 49
End: 2024-02-29
Attending: ORTHOPAEDIC SURGERY
Payer: COMMERCIAL

## 2024-02-29 PROCEDURE — 97110 THERAPEUTIC EXERCISES: CPT

## 2024-02-29 PROCEDURE — 97140 MANUAL THERAPY 1/> REGIONS: CPT

## 2024-02-29 PROCEDURE — 97112 NEUROMUSCULAR REEDUCATION: CPT

## 2024-02-29 NOTE — PROGRESS NOTES
Diagnosis:   Pain in Achilles tendon (M76.60)  Achilles tendinosis of left ankle (M67.874)        Referring Provider: Yarelis  Date of Evaluation:    2/13/2024    Precautions:  None Next MD visit:   none scheduled  Date of Surgery: n/a   Insurance Primary/Secondary: [place] / N/A     # Auth Visits: -            Subjective: Happy with progress.  Played a little pickleball yesterday without incident.  Didn't do HEP much.  Pain: -    Objective: See below treatment log.    Assessment: More TTP today in L calf, but good tolerance with jump and hop activities.    Goals:   Pt report min/no pain or tightness L LE  Pt independent with HEP and progression  Pt able to run, jump, hop, skip, cut, sprint, backpeddle -- all without pain or sense of instability    Plan: Progress impact and agility training as able.  Date: 2/26/2024  TX#: 2/ Date:  2/29/2024             TX#: 3/ Date:                 TX#: 4/ Date:                 TX#: 5/ Date:   Tx#: 6/   Ther Ex (15')  - bike 6'  - stretches: gastroc and soleus on steps and slantboard  - calf raises on floor: B, unilat, 2 up/1 down eccentric Ther Ex (15')  - bike 6'  - stretches: gastroc and soleus on steps and slantboard  - calf raises on floor: B, unilat, 2 up/1 down eccentric      Manual (10')  - STM L calf and achilles in prone and quadruped. Manual (10')  - STM L calf and achilles in prone and quadruped.      Neuro Re-Ed (15')  - SLS variations  - jump: in place, AP, side/side Neuro Re-Ed (20')  - SLS variations  - jump: in place, AP, side/side 30\" ea  - hop: in place, AP, side/side 30\" ea R, L             HEP: SLS and jumping    Charges: Ther Ex, Manual, Neuro Re-Ed       Total Timed Treatment: 45 min  Total Treatment Time: 45 min

## 2024-03-01 ENCOUNTER — ANTI-COAG VISIT (OUTPATIENT)
Dept: ANTICOAGULATION | Facility: CLINIC | Age: 49
End: 2024-03-01

## 2024-03-01 DIAGNOSIS — I37.9 PULMONIC VALVE DISEASE: ICD-10-CM

## 2024-03-01 DIAGNOSIS — Z79.01 LONG TERM (CURRENT) USE OF ANTICOAGULANTS: Primary | ICD-10-CM

## 2024-03-01 LAB — INR: 3.3 (ref 2–3)

## 2024-03-01 PROCEDURE — 93793 ANTICOAG MGMT PT WARFARIN: CPT | Performed by: FAMILY MEDICINE

## 2024-03-01 RX ORDER — WARFARIN SODIUM 1 MG/1
TABLET ORAL
Qty: 120 TABLET | Refills: 1 | Status: SHIPPED | OUTPATIENT
Start: 2024-03-01

## 2024-03-01 NOTE — PROGRESS NOTES
[facility] / INR 3.3, supra therapeutic. (Goal 2.5 ) TTR 70.8 %     Etiology: Manjit denies any changes to health diet or meds. The only thing is the Hormone [place] which does not have hormones but is herbal supplements w no significant warfarin interaction. Her MD approved it.     PLAN: reduce warfarin dose again. Try 1.5mg Sunday/Wed only, 1mg all other days.    Recheck INR 2 weeks.    Pt reports:    No sign of unusual bruising or bleeding.  Any missed doses: No   Medications changes: No    Contacted  Manjit by phone  who verbalized understanding and agreement.    WARFARIN Plan per protocol: 6/2: Hold; 6/3: Hold; 6/4: Hold; 6/5: Hold; 6/6: Hold; Otherwise 1.5 mg every Sun, Wed; 1 mg all other days

## 2024-03-01 NOTE — TELEPHONE ENCOUNTER
2/21 breast ultrasound results and recommendations reviewed with pt. Pt voices she was told that the breast cyst could be aspirated by TA. Please advise.

## 2024-03-01 NOTE — TELEPHONE ENCOUNTER
WARFARIN protocol failed due to labs. Please find:     CMP & CBC done and scanned into [facility] 11/18/2023  - drawn 11/17/2023  All normal.

## 2024-03-02 ENCOUNTER — HOSPITAL ENCOUNTER (OUTPATIENT)
Dept: GENERAL RADIOLOGY | Facility: HOSPITAL | Age: 49
Discharge: HOME | End: 2024-03-02
Payer: COMMERCIAL

## 2024-03-02 DIAGNOSIS — R13.10 DYSPHAGIA, UNSPECIFIED TYPE: ICD-10-CM

## 2024-03-02 PROCEDURE — 74220 X-RAY XM ESOPHAGUS 1CNTRST: CPT

## 2024-03-04 NOTE — TELEPHONE ENCOUNTER
Ultrasound guided breast cyst drainage by radiology - please order this way and let pt know. Sherry Irby MD

## 2024-03-07 ENCOUNTER — TELEPHONE (OUTPATIENT)
Dept: OBGYN CLINIC | Facility: CLINIC | Age: 49
End: 2024-03-07

## 2024-03-07 ENCOUNTER — APPOINTMENT (OUTPATIENT)
Dept: PHYSICAL THERAPY | Age: 49
End: 2024-03-07
Attending: ORTHOPAEDIC SURGERY
Payer: COMMERCIAL

## 2024-03-07 NOTE — TELEPHONE ENCOUNTER
Called patient to inform of order being placed to US guided cyst draining . Advised to call central scheduling to make appointment. Advised to call office with any questions.

## 2024-03-07 NOTE — TELEPHONE ENCOUNTER
Pt said received a call from Maryuri about order for biopsy on breast. Pt thought she was supposed cyst aspiration

## 2024-03-11 ENCOUNTER — OFFICE VISIT (OUTPATIENT)
Dept: PHYSICAL THERAPY | Age: 49
End: 2024-03-11
Attending: ORTHOPAEDIC SURGERY
Payer: COMMERCIAL

## 2024-03-11 PROCEDURE — 97140 MANUAL THERAPY 1/> REGIONS: CPT

## 2024-03-11 PROCEDURE — 97112 NEUROMUSCULAR REEDUCATION: CPT

## 2024-03-11 PROCEDURE — 97110 THERAPEUTIC EXERCISES: CPT

## 2024-03-11 NOTE — PROGRESS NOTES
Diagnosis:   Pain in Achilles tendon (M76.60)  Achilles tendinosis of left ankle (M67.874)        Referring Provider: Yarelis  Date of Evaluation:    2/13/2024    Precautions:  None Next MD visit:   none scheduled  Date of Surgery: n/a   Insurance Primary/Secondary: [employer] / N/A     # Auth Visits: -            Subjective: Feeling some L calf soreness from calf raises, but has been practicing 2 up/L down and its improving a lot.  Objective: See below treatment log.    Assessment:  Improving strength.  Continued TTP w/ Manual Mob in L calf and achilles.    Goals:   Pt report min/no pain or tightness L LE  Pt independent with HEP and progression  Pt able to run, jump, hop, skip, cut, sprint, backpeddle -- all without pain or sense of instability    Plan: Progress impact and agility training as able.  Date: 2/26/2024  TX#: 2/ Date:  2/29/2024             TX#: 3/ Date:   3/11/2024           TX#: 4/ Date:                 TX#: 5/ Date:   Tx#: 6/   Ther Ex (15')  - bike 6'  - stretches: gastroc and soleus on steps and slantboard  - calf raises on floor: B, unilat, 2 up/1 down eccentric Ther Ex (15')  - bike 6'  - stretches: gastroc and soleus on steps and slantboard  - calf raises on floor: B, unilat, 2 up/1 down eccentric Ther Ex (15')  - bike 6'  - stretches: gastroc and soleus on steps and slantboard  - calf raises on floor: B, unilat, 2 up/1 down eccentric  - Shuttle B 6 band/L 5 band calf raise     Manual (10')  - STM L calf and achilles in prone and quadruped. Manual (10')  - STM L calf and achilles in prone and quadruped. Manual (10')  - STM L calf and achilles in prone and quadruped.     Neuro Re-Ed (15')  - SLS variations  - jump: in place, AP, side/side Neuro Re-Ed (20')  - SLS variations  - jump: in place, AP, side/side 30\" ea  - hop: in place, AP, side/side 30\" ea R, L Neuro Re-Ed (20')  - SLS variations  - jump: in place, AP, side/side 30\" ea  - hop: in place, AP, side/side 30\" hetal R, L            HEP: SLS  and jumping    Charges: Ther Ex, Manual, Neuro Re-Ed       Total Timed Treatment: 45 min  Total Treatment Time: 45 min

## 2024-03-14 ENCOUNTER — OFFICE VISIT (OUTPATIENT)
Dept: PHYSICAL THERAPY | Age: 49
End: 2024-03-14
Attending: ORTHOPAEDIC SURGERY
Payer: COMMERCIAL

## 2024-03-14 PROCEDURE — 97112 NEUROMUSCULAR REEDUCATION: CPT

## 2024-03-14 PROCEDURE — 97140 MANUAL THERAPY 1/> REGIONS: CPT

## 2024-03-14 PROCEDURE — 97110 THERAPEUTIC EXERCISES: CPT

## 2024-03-14 NOTE — PROGRESS NOTES
Diagnosis:   Pain in Achilles tendon (M76.60)  Achilles tendinosis of left ankle (M67.874)        Referring Provider: Yarelis  Date of Evaluation:    2/13/2024    Precautions:  None Next MD visit:   none scheduled  Date of Surgery: n/a   Insurance Primary/Secondary: [place] / N/A     # Auth Visits: -            Subjective: Really sore L calf starting T morning.  A little better now.  Objective: See below treatment log.    Assessment:  DOMS L calf with recent challenges, nearly resolved now.    Goals:   Pt report min/no pain or tightness L LE  Pt independent with HEP and progression  Pt able to run, jump, hop, skip, cut, sprint, backpeddle -- all without pain or sense of instability    Plan: Progress impact and agility training as able.  Date: 2/26/2024  TX#: 2/ Date:  2/29/2024             TX#: 3/ Date:   3/11/2024           TX#: 4/ Date:  3/14/2024             TX#: 5/ Date:   Tx#: 6/   Ther Ex (15')  - bike 6'  - stretches: gastroc and soleus on steps and slantboard  - calf raises on floor: B, unilat, 2 up/1 down eccentric Ther Ex (15')  - bike 6'  - stretches: gastroc and soleus on steps and slantboard  - calf raises on floor: B, unilat, 2 up/1 down eccentric Ther Ex (15')  - bike 6'  - stretches: gastroc and soleus on steps and slantboard  - calf raises on floor: B, unilat, 2 up/1 down eccentric  - Shuttle B 6 band/L 5 band calf raise Ther Ex (15')  - bike 6'  - stretches: gastroc and soleus on steps and slantboard  - calf raises on floor: B, unilat, 2 up/1 down eccentric  - Shuttle B 6 band/L 5 band calf raise    Manual (10')  - STM L calf and achilles in prone and quadruped. Manual (10')  - STM L calf and achilles in prone and quadruped. Manual (10')  - STM L calf and achilles in prone and quadruped. Manual (10')  - STM L calf and achilles in prone and quadruped.    Neuro Re-Ed (15')  - SLS variations  - jump: in place, AP, side/side Neuro Re-Ed (20')  - SLS variations  - jump: in place, AP, side/side 30\"  ea  - hop: in place, AP, side/side 30\" ea R, L Neuro Re-Ed (20')  - SLS variations  - jump: in place, AP, side/side 30\" ea  - hop: in place, AP, side/side 30\" ea R, L Neuro Re-Ed (20')  - SLS variations  - jump: in place, AP, side/side 30\" ea  - hop: in place, AP, side/side 30\" hetal R L           HEP: SLS and jumping    Charges: Ther Ex, Manual, Neuro Re-Ed       Total Timed Treatment: 45 min  Total Treatment Time: 45 min

## 2024-03-15 ENCOUNTER — ANTI-COAG VISIT (OUTPATIENT)
Dept: ANTICOAGULATION | Facility: CLINIC | Age: 49
End: 2024-03-15

## 2024-03-15 DIAGNOSIS — I37.9 PULMONIC VALVE DISEASE: ICD-10-CM

## 2024-03-15 DIAGNOSIS — Z79.01 LONG TERM (CURRENT) USE OF ANTICOAGULANTS: Primary | ICD-10-CM

## 2024-03-15 LAB — INR: 2.4 (ref 2–3)

## 2024-03-15 PROCEDURE — 93793 ANTICOAG MGMT PT WARFARIN: CPT | Performed by: FAMILY MEDICINE

## 2024-03-15 NOTE — PROGRESS NOTES
[facility] / INR 2.4,  therapeutic. (Goal 2.5 ) TTR 70.6 %     Etiology: we reduced Manjit's warfarin dose over several weeks after she started an OTC Herbal prep. Her MD approved per Pt.     PLAN: continue the reduced dose.    Recheck INR 2 weeks.    WARFARIN Plan per protocol: 6/2: Hold; 6/3: Hold; 6/4: Hold; 6/5: Hold; 6/6: Hold; Otherwise 1.5 mg every Sun, Wed; 1 mg all other days]

## 2024-03-18 ENCOUNTER — TELEPHONE (OUTPATIENT)
Dept: PHYSICAL THERAPY | Facility: HOSPITAL | Age: 49
End: 2024-03-18

## 2024-03-18 ENCOUNTER — APPOINTMENT (OUTPATIENT)
Dept: PHYSICAL THERAPY | Age: 49
End: 2024-03-18
Attending: ORTHOPAEDIC SURGERY
Payer: COMMERCIAL

## 2024-03-21 ENCOUNTER — APPOINTMENT (OUTPATIENT)
Dept: PHYSICAL THERAPY | Age: 49
End: 2024-03-21
Attending: ORTHOPAEDIC SURGERY
Payer: COMMERCIAL

## 2024-03-21 ENCOUNTER — TELEPHONE (OUTPATIENT)
Dept: PHYSICAL THERAPY | Age: 49
End: 2024-03-21

## 2024-03-25 ENCOUNTER — OFFICE VISIT (OUTPATIENT)
Dept: PHYSICAL THERAPY | Age: 49
End: 2024-03-25
Attending: ORTHOPAEDIC SURGERY
Payer: COMMERCIAL

## 2024-03-25 PROCEDURE — 97140 MANUAL THERAPY 1/> REGIONS: CPT

## 2024-03-25 PROCEDURE — 97112 NEUROMUSCULAR REEDUCATION: CPT

## 2024-03-25 PROCEDURE — 97110 THERAPEUTIC EXERCISES: CPT

## 2024-03-25 NOTE — PROGRESS NOTES
Diagnosis:   Pain in Achilles tendon (M76.60)  Achilles tendinosis of left ankle (M67.874)        Referring Provider: Yarelis  Date of Evaluation:    2/13/2024    Precautions:  None Next MD visit:   none scheduled  Date of Surgery: n/a   Insurance Primary/Secondary: [employer] / N/A     # Auth Visits: -            Subjective:  ultimately had microdiscectomy so the pt has been caring for him, but therefore doing HEP much less.  Objective: See below treatment log.    Assessment:  Rest from HEP unfortunate, but rest from recreational sports actually helpful to pt's recovery.    Goals:   Pt report min/no pain or tightness L LE  Pt independent with HEP and progression  Pt able to run, jump, hop, skip, cut, sprint, backpeddle -- all without pain or sense of instability    Plan: Progress impact and agility training as able.  Date: 2/26/2024  TX#: 2/ Date:  2/29/2024             TX#: 3/ Date:   3/11/2024           TX#: 4/ Date:  3/14/2024             TX#: 5/ Date:  3/25/2024  Tx#: 6/   Ther Ex (15')  - bike 6'  - stretches: gastroc and soleus on steps and slantboard  - calf raises on floor: B, unilat, 2 up/1 down eccentric Ther Ex (15')  - bike 6'  - stretches: gastroc and soleus on steps and slantboard  - calf raises on floor: B, unilat, 2 up/1 down eccentric Ther Ex (15')  - bike 6'  - stretches: gastroc and soleus on steps and slantboard  - calf raises on floor: B, unilat, 2 up/1 down eccentric  - Shuttle B 6 band/L 5 band calf raise Ther Ex (15')  - bike 6'  - stretches: gastroc and soleus on steps and slantboard  - calf raises on floor: B, unilat, 2 up/1 down eccentric  - Shuttle B 6 band/L 5 band calf raise TNeuro Re-Ed (20')  - SLS variations  - Yumiko board AP and lat  - Tandem stance and gait (fwd/bwd)  - BOSU      - fwd step ups     - Lat step up/overs     - SLS     - B squats  - Trampoline bouncing w/ min UE support: B, L   Manual (10')  - STM L calf and achilles in prone and quadruped. Manual (10')  -  STM L calf and achilles in prone and quadruped. Manual (10')  - STM L calf and achilles in prone and quadruped. Manual (10')  - STM L calf and achilles in prone and quadruped. Manual (10')  - STM L calf and achilles in prone and quadruped.   Neuro Re-Ed (15')  - SLS variations  - jump: in place, AP, side/side Neuro Re-Ed (20')  - SLS variations  - jump: in place, AP, side/side 30\" ea  - hop: in place, AP, side/side 30\" ea R, L Neuro Re-Ed (20')  - SLS variations  - jump: in place, AP, side/side 30\" ea  - hop: in place, AP, side/side 30\" ea R, L Neuro Re-Ed (20')  - SLS variations  - jump: in place, AP, side/side 30\" ea  - hop: in place, AP, side/side 30\" ea R, L Neuro Re-Ed (15')  - SLS variations  - jump: in place, AP, side/side 30\" ea  - hop: in place, AP, side/side 30\" ea R, L          HEP: SLS and jumping    Charges: Ther Ex, Manual, Neuro Re-Ed       Total Timed Treatment: 45 min  Total Treatment Time: 45 min

## 2024-03-28 ENCOUNTER — OFFICE VISIT (OUTPATIENT)
Dept: PHYSICAL THERAPY | Age: 49
End: 2024-03-28
Attending: ORTHOPAEDIC SURGERY
Payer: COMMERCIAL

## 2024-03-28 PROCEDURE — 97140 MANUAL THERAPY 1/> REGIONS: CPT

## 2024-03-28 PROCEDURE — 97112 NEUROMUSCULAR REEDUCATION: CPT

## 2024-03-28 PROCEDURE — 97110 THERAPEUTIC EXERCISES: CPT

## 2024-03-28 NOTE — PROGRESS NOTES
Diagnosis:   Pain in Achilles tendon (M76.60)  Achilles tendinosis of left ankle (M67.874)        Referring Provider: Yarelis  Date of Evaluation:    2/13/2024    Precautions:  None Next MD visit:   none scheduled  Date of Surgery: n/a   Insurance Primary/Secondary: [place] / N/A     # Auth Visits: -            Subjective: B calves sore after resuming therapy/exercise last visit.  Objective: See below treatment log.    Assessment:  STM seemed to help subjectively and pt tolerated strengthening well.    Goals:   Pt report min/no pain or tightness L LE  Pt independent with HEP and progression  Pt able to run, jump, hop, skip, cut, sprint, backpeddle -- all without pain or sense of instability    Plan: Progress impact and agility training as able.  Date: 3/28/2024  TX#: 7/ Date:            TX#: Date:   TX#: Date:       TX#:  Date:    Tx#:    Ther Ex (20')  - bike x6'  - Shuttle B, L calf raise 5bands  - Seated B calf raises  - Standing calf raises on floor, off step B  - Standing L calf raise on floor.       Manual (10')  - STM L calf and achilles in prone and quadruped.       Neuro Re-Ed (15')  - SLS variations  - jump: in place, AP, side/side 30\" ea  - hop: in place, AP, side/side 30\" ea R, L              HEP: SLS and jumping    Charges: Ther Ex, Manual, Neuro Re-Ed       Total Timed Treatment: 45 min  Total Treatment Time: 45 min

## 2024-03-29 ENCOUNTER — ANTI-COAG VISIT (OUTPATIENT)
Dept: ANTICOAGULATION | Facility: CLINIC | Age: 49
End: 2024-03-29

## 2024-03-29 DIAGNOSIS — Z79.01 LONG TERM (CURRENT) USE OF ANTICOAGULANTS: Primary | ICD-10-CM

## 2024-03-29 DIAGNOSIS — I37.9 PULMONIC VALVE DISEASE: ICD-10-CM

## 2024-03-29 LAB — INR: 2.6 (ref 2–3)

## 2024-03-29 PROCEDURE — 93793 ANTICOAG MGMT PT WARFARIN: CPT | Performed by: FAMILY MEDICINE

## 2024-03-29 NOTE — PROGRESS NOTES
[facility] / INR 2.6,  therapeutic. (Goal 2.5 ) TTR 72.0 %     Etiology: stable. No changes.     PLAN: continue the current dose.    Recheck INR 2 weeks    WARFARIN Plan per protocol: 6/2: Hold; 6/3: Hold; 6/4: Hold; 6/5: Hold; 6/6: Hold; Otherwise 1.5 mg every Sun, Wed; 1 mg all other days

## 2024-04-01 ENCOUNTER — OFFICE VISIT (OUTPATIENT)
Dept: PHYSICAL THERAPY | Age: 49
End: 2024-04-01
Attending: ORTHOPAEDIC SURGERY
Payer: COMMERCIAL

## 2024-04-01 PROCEDURE — 97110 THERAPEUTIC EXERCISES: CPT

## 2024-04-01 PROCEDURE — 97112 NEUROMUSCULAR REEDUCATION: CPT

## 2024-04-01 PROCEDURE — 97140 MANUAL THERAPY 1/> REGIONS: CPT

## 2024-04-01 NOTE — PROGRESS NOTES
Diagnosis:   Pain in Achilles tendon (M76.60)  Achilles tendinosis of left ankle (M67.874)        Referring Provider: Yarelis  Date of Evaluation:    2/13/2024    Precautions:  None Next MD visit:   none scheduled  Date of Surgery: n/a   Insurance Primary/Secondary: [place] / N/A     # Auth Visits: -            Subjective:  R calf and achilles soreness today.  Mild bruising after last session (remember blood thinners).  Much better after session today.  Objective: See below treatment log.    Assessment:  Multiple bruises noted L calf along with mildly increased TTP L achilles, though tenderness resolved with session.    Goals:   Pt report min/no pain or tightness L LE  Pt independent with HEP and progression  Pt able to run, jump, hop, skip, cut, sprint, backpeddle -- all without pain or sense of instability    Plan: Progress impact and agility training as able.  Date: 3/28/2024  TX#: 7/ Date:    4/1/2024     TX#:  8 Date:   TX#: Date:       TX#:  Date:    Tx#:    Ther Ex (20')  - bike x6'  - Shuttle B, L calf raise 5bands  - Seated B calf raises  - Standing calf raises on floor, off step B  - Standing L calf raise on floor. Ther Ex (20')  - bike x6'  - Shuttle B, L calf raise 5bands  - Seated B calf raises  - Standing calf raises on floor, off step B  - Standing L calf raise on floor.      Manual (10')  - STM L calf and achilles in prone and quadruped. Manual (10')  - STM L calf and achilles in prone and quadruped.      Neuro Re-Ed (15')  - SLS variations  - jump: in place, AP, side/side 30\" ea  - hop: in place, AP, side/side 30\" ea R, L Neuro Re-Ed (15')  - SLS variations  - jump: in place, AP, side/side 30\" ea  - hop: in place, AP, side/side 30\" ea R, L             HEP: SLS and jumping    Charges: Ther Ex, Manual, Neuro Re-Ed       Total Timed Treatment: 45 min  Total Treatment Time: 45 min

## 2024-04-04 ENCOUNTER — OFFICE VISIT (OUTPATIENT)
Dept: PHYSICAL THERAPY | Age: 49
End: 2024-04-04
Attending: ORTHOPAEDIC SURGERY
Payer: COMMERCIAL

## 2024-04-04 PROCEDURE — 97110 THERAPEUTIC EXERCISES: CPT

## 2024-04-04 PROCEDURE — 97140 MANUAL THERAPY 1/> REGIONS: CPT

## 2024-04-04 PROCEDURE — 97112 NEUROMUSCULAR REEDUCATION: CPT

## 2024-04-04 NOTE — PROGRESS NOTES
Diagnosis:   Pain in Achilles tendon (M76.60)  Achilles tendinosis of left ankle (M67.874)        Referring Provider: Yarelis  Date of Evaluation:    2/13/2024    Precautions:  None Next MD visit:   none scheduled  Date of Surgery: n/a   Insurance Primary/Secondary: [place] / N/A     # Auth Visits: -            Subjective:  Less soreness today, so calves are doing better.  Objective: See below treatment log.    Assessment:  Decreased TTP L gastroc/soleus.    Goals:   Pt report min/no pain or tightness L LE  Pt independent with HEP and progression  Pt able to run, jump, hop, skip, cut, sprint, backpeddle -- all without pain or sense of instability    Plan: Progress impact and agility training as able.  Date: 3/28/2024  TX#: 7/ Date:    4/1/2024     TX#:  8 Date:  4/4/2024  TX#: 9 Date:       TX#:  Date:    Tx#:    Ther Ex (20')  - bike x6'  - Shuttle B, L calf raise 5bands  - Seated B calf raises  - Standing calf raises on floor, off step B  - Standing L calf raise on floor. Ther Ex (20')  - bike x6'  - Shuttle B, L calf raise 5bands  - Seated B calf raises  - Standing calf raises on floor, off step B  - Standing L calf raise on floor. Ther Ex (20')  - bike x6'  - Shuttle B, L calf raise 5bands  - Seated B calf raises  - Standing calf raises on floor, off step B  - Standing L calf raise on floor.     Manual (10')  - STM L calf and achilles in prone and quadruped. Manual (10')  - STM L calf and achilles in prone and quadruped. Manual (10')  - STM L calf and achilles in prone and quadruped.     Neuro Re-Ed (15')  - SLS variations  - jump: in place, AP, side/side 30\" ea  - hop: in place, AP, side/side 30\" ea R, L Neuro Re-Ed (15')  - SLS variations  - jump: in place, AP, side/side 30\" ea  - hop: in place, AP, side/side 30\" ea R, L Neuro Re-Ed (15')  - SLS variations  - skipping variations (normal, for ht, for distance)   - jump: in place, AP, side/side 30\" ea  - hop: in place, AP, side/side 30\" ea R, L             HEP: SLS and jumping    Charges: Ther Ex, Manual, Neuro Re-Ed       Total Timed Treatment: 45 min  Total Treatment Time: 45 min

## 2024-04-08 ENCOUNTER — OFFICE VISIT (OUTPATIENT)
Dept: PHYSICAL THERAPY | Age: 49
End: 2024-04-08
Attending: INTERNAL MEDICINE
Payer: COMMERCIAL

## 2024-04-08 PROCEDURE — 97140 MANUAL THERAPY 1/> REGIONS: CPT

## 2024-04-08 PROCEDURE — 97110 THERAPEUTIC EXERCISES: CPT

## 2024-04-08 PROCEDURE — 97112 NEUROMUSCULAR REEDUCATION: CPT

## 2024-04-08 NOTE — PROGRESS NOTES
Diagnosis:   Pain in Achilles tendon (M76.60)  Achilles tendinosis of left ankle (M67.874)        Referring Provider: Yraelis  Date of Evaluation:    2/13/2024    Precautions:  None Next MD visit:   none scheduled  Date of Surgery: n/a   Insurance Primary/Secondary: [place] / N/[place]     # Auth Visits: -            Subjective:  Played pickleball over weekend, and did roll L ankle (inversion) once, but no problem.  Calves are both feeling fine now.  Objective: See below treatment log.    Assessment:  TTP, though less so, throughout the L achilles and calf.  Improving tolerance for skipping, but still some achilles soreness.  Improving calf strength.    Goals:   Pt report min/no pain or tightness L LE  Pt independent with HEP and progression  Pt able to run, jump, hop, skip, cut, sprint, backpeddle -- all without pain or sense of instability    Plan: Progress impact and agility training as able.  Date: 3/28/2024  TX#: 7/ Date:    4/1/2024     TX#:  8 Date:  4/4/2024  TX#: 9 Date:  4/8/2024   TX#: 10 Date:    Tx#:    Ther Ex (20')  - bike x6'  - Shuttle B, L calf raise 5bands  - Seated B calf raises  - Standing calf raises on floor, off step B  - Standing L calf raise on floor. Ther Ex (20')  - bike x6'  - Shuttle B, L calf raise 5bands  - Seated B calf raises  - Standing calf raises on floor, off step B  - Standing L calf raise on floor. Ther Ex (20')  - bike x6'  - Shuttle B, L calf raise 5bands  - Seated B calf raises  - Standing calf raises on floor, off step B  - Standing L calf raise on floor. Ther Ex (20')  - bike x6'  - Shuttle B, L calf raise 5bands  - Seated B calf raises  - Standing calf raises on floor, off step B  - Standing L calf raise on floor.    Manual (10')  - STM L calf and achilles in prone and quadruped. Manual (10')  - STM L calf and achilles in prone and quadruped. Manual (10')  - STM L calf and achilles in prone and quadruped. Manual (10')  - STM L calf and achilles in prone and quadruped.     Neuro Re-Ed (15')  - SLS variations  - jump: in place, AP, side/side 30\" ea  - hop: in place, AP, side/side 30\" ea R, L Neuro Re-Ed (15')  - SLS variations  - jump: in place, AP, side/side 30\" ea  - hop: in place, AP, side/side 30\" ea R, L Neuro Re-Ed (15')  - SLS variations  - skipping variations (normal, for ht, for distance)   - jump: in place, AP, side/side 30\" ea  - hop: in place, AP, side/side 30\" ea R, L Neuro Re-Ed (15')  - SLS variations  - skipping variations (normal, for ht, for distance)   - jump: in place, AP, side/side 30\" ea  - hop: in place, AP, side/side 30\" ea R, L           HEP: SLS and jumping    Charges: Ther Ex, Manual, Neuro Re-Ed       Total Timed Treatment: 45 min  Total Treatment Time: 45 min

## 2024-04-12 ENCOUNTER — ANTI-COAG VISIT (OUTPATIENT)
Dept: ANTICOAGULATION | Facility: CLINIC | Age: 49
End: 2024-04-12

## 2024-04-12 ENCOUNTER — OFFICE VISIT (OUTPATIENT)
Dept: PHYSICAL THERAPY | Age: 49
End: 2024-04-12
Attending: INTERNAL MEDICINE
Payer: COMMERCIAL

## 2024-04-12 DIAGNOSIS — I37.9 PULMONIC VALVE DISEASE: ICD-10-CM

## 2024-04-12 DIAGNOSIS — Z79.01 LONG TERM (CURRENT) USE OF ANTICOAGULANTS: Primary | ICD-10-CM

## 2024-04-12 LAB — INR: 2 (ref 2–3)

## 2024-04-12 PROCEDURE — 97112 NEUROMUSCULAR REEDUCATION: CPT

## 2024-04-12 PROCEDURE — 93793 ANTICOAG MGMT PT WARFARIN: CPT | Performed by: FAMILY MEDICINE

## 2024-04-12 PROCEDURE — 97110 THERAPEUTIC EXERCISES: CPT

## 2024-04-12 PROCEDURE — 97140 MANUAL THERAPY 1/> REGIONS: CPT

## 2024-04-12 NOTE — PROGRESS NOTES
[facility] / INR 2.0,  therapeutic. (Goal 2.5 ) TTR 73.3 %     Etiology: stable. Manjit did start an herbal or vitamin complex. I wonder if she is still using that??    PLAN: continue the current warfarin dose.    Recheck INR 2 weeks.    Pt reports:    No sign of unusual bruising or bleeding.  Any missed doses: No   Medications changes: No    Contacted  Manjit by phone  who verbalized understanding and agreement.    WARFARIN Plan per protocol: 6/2: Hold; 6/3: Hold; 6/4: Hold; 6/5: Hold; 6/6: Hold; Otherwise 1.5 mg every Sun, Wed; 1 mg all other days

## 2024-04-12 NOTE — PROGRESS NOTES
Diagnosis:   Pain in Achilles tendon (M76.60)  Achilles tendinosis of left ankle (M67.874)        Referring Provider: Yarelis  Date of Evaluation:    2/13/2024    Precautions:  None Next MD visit:   none scheduled  Date of Surgery: n/a   Insurance Primary/Secondary: [employer] / N/A     # Auth Visits: -            Subjective:  Calves doing well, but achilles feels bruised, especially L medial achilles.  Objective: See below treatment log.    Assessment:  Improved calf symptoms, but bruised sensation at achilles near insertion.  R calf continues to fatigue with all challenges due to vascular history.    Goals:   Pt report min/no pain or tightness L LE  Pt independent with HEP and progression  Pt able to run, jump, hop, skip, cut, sprint, backpeddle -- all without pain or sense of instability    Plan: Progress impact and agility training as able.  Date: 3/28/2024  TX#: 7/ Date:    4/1/2024     TX#:  8 Date:  4/4/2024  TX#: 9 Date:  4/8/2024   TX#: 10 Date:  4/12/2024  Tx#: 11   Ther Ex (20')  - bike x6'  - Shuttle B, L calf raise 5bands  - Seated B calf raises  - Standing calf raises on floor, off step B  - Standing L calf raise on floor. Ther Ex (20')  - bike x6'  - Shuttle B, L calf raise 5bands  - Seated B calf raises  - Standing calf raises on floor, off step B  - Standing L calf raise on floor. Ther Ex (20')  - bike x6'  - Shuttle B, L calf raise 5bands  - Seated B calf raises  - Standing calf raises on floor, off step B  - Standing L calf raise on floor. Ther Ex (20')  - bike x6'  - Shuttle B, L calf raise 5bands  - Seated B calf raises  - Standing calf raises on floor, off step B  - Standing L calf raise on floor. Ther Ex (20')  - bike x6'  - Shuttle B, L calf raise 5bands  - Seated B calf raises  - Standing calf raises on floor, off step B  - Standing L calf raise on floor.   Manual (10')  - STM L calf and achilles in prone and quadruped. Manual (10')  - STM L calf and achilles in prone and quadruped. Manual  (10')  - STM L calf and achilles in prone and quadruped. Manual (10')  - STM L calf and achilles in prone and quadruped. Manual (10')  - STM L calf and achilles in prone and quadruped.   Neuro Re-Ed (15')  - SLS variations  - jump: in place, AP, side/side 30\" ea  - hop: in place, AP, side/side 30\" ea R, L Neuro Re-Ed (15')  - SLS variations  - jump: in place, AP, side/side 30\" ea  - hop: in place, AP, side/side 30\" ea R, L Neuro Re-Ed (15')  - SLS variations  - skipping variations (normal, for ht, for distance)   - jump: in place, AP, side/side 30\" ea  - hop: in place, AP, side/side 30\" ea R, L Neuro Re-Ed (15')  - SLS variations  - skipping variations (normal, for ht, for distance)   - jump: in place, AP, side/side 30\" ea  - hop: in place, AP, side/side 30\" ea R, L Neuro Re-Ed (15')  - SLS variations  - skipping variations (normal, for ht, for distance)   - jump: in place, AP, side/side 30\" ea  - hop: in place, AP, side/side 30\" ea R, L          HEP: SLS and jumping    Charges: Ther Ex, Manual, Neuro Re-Ed       Total Timed Treatment: 45 min  Total Treatment Time: 45 min

## 2024-04-15 ENCOUNTER — OFFICE VISIT (OUTPATIENT)
Dept: PHYSICAL THERAPY | Age: 49
End: 2024-04-15
Attending: INTERNAL MEDICINE
Payer: COMMERCIAL

## 2024-04-15 PROCEDURE — 97140 MANUAL THERAPY 1/> REGIONS: CPT

## 2024-04-15 PROCEDURE — 97110 THERAPEUTIC EXERCISES: CPT

## 2024-04-15 NOTE — PROGRESS NOTES
Diagnosis:   Pain in Achilles tendon (M76.60)  Achilles tendinosis of left ankle (M67.874)        Referring Provider: Yarelis  Date of Evaluation:    2/13/2024    Precautions:  None Next MD visit:   none scheduled  Date of Surgery: n/a   Insurance Primary/Secondary: [place] / N/A     # Auth Visits: -            Subjective:  L medial achilles still feels bruised and entire L ankle/foot/toes feels swollen today.  Objective: See below treatment log.    Assessment:  TTP medial aspect of distal L achilles.  Also TTP L FHL (pt has past sesamoid pain issues).    Goals:   Pt report min/no pain or tightness L LE  Pt independent with HEP and progression  Pt able to run, jump, hop, skip, cut, sprint, backpeddle -- all without pain or sense of instability    Plan: Instructed pt to avoid any pain-provoking activities, to avoid prolonged PF'd posture (consider night splint/[place] Sock), to ice.  Date: 4/15/2024  TX#: 12 Date:        TX#:   Date:    TX#:  Date:    TX#:  Date:  4/12/2024  Tx#: 11   Ther Ex (15')  - bike x6'  - Calf stretch variations including knee straight/bent and with great toe in DF    Ther Ex (20')  - bike x6'  - Shuttle B, L calf raise 5bands  - Seated B calf raises  - Standing calf raises on floor, off step B  - Standing L calf raise on floor.   Manual (30')  - STM L calf and achilles in prone and quadruped.  - STM L plantar fascia and FHL  - Joint mob L ankle/rear/mid/forefoot    Manual (10')  - STM L calf and achilles in prone and quadruped.   -    Neuro Re-Ed (15')  - SLS variations  - skipping variations (normal, for ht, for distance)   - jump: in place, AP, side/side 30\" ea  - hop: in place, AP, side/side 30\" ea R, L          HEP: SLS and jumping    Charges: Ther Ex, Manual x2       Total Timed Treatment: 45 min  Total Treatment Time: 45 min

## 2024-04-18 ENCOUNTER — OFFICE VISIT (OUTPATIENT)
Dept: PHYSICAL THERAPY | Age: 49
End: 2024-04-18
Attending: INTERNAL MEDICINE
Payer: COMMERCIAL

## 2024-04-18 PROCEDURE — 97140 MANUAL THERAPY 1/> REGIONS: CPT

## 2024-04-18 PROCEDURE — 97110 THERAPEUTIC EXERCISES: CPT

## 2024-04-18 NOTE — PROGRESS NOTES
Diagnosis:   Pain in Achilles tendon (M76.60)  Achilles tendinosis of left ankle (M67.874)        Referring Provider: Yraelis  Date of Evaluation:    2/13/2024    Precautions:  None Next MD visit:   none scheduled  Date of Surgery: n/a   Insurance Primary/Secondary: [place] / N/A     # Auth Visits: -            Subjective:  Still having achilles soreness L.  Did play pickleball (5 games) yesterday, with mild symptoms.  Objective: See below treatment log.    Assessment:  Continued medial L achilles tenderness, though smaller area of intense sensitivity.    Goals:   Pt report min/no pain or tightness L LE  Pt independent with HEP and progression  Pt able to run, jump, hop, skip, cut, sprint, backpeddle -- all without pain or sense of instability    Plan: Instructed pt to avoid any pain-provoking activities, to avoid prolonged PF'd posture (consider night splint/[place]ass[place] Sock), to ice.  Date: 4/15/2024  TX#: 12 Date:   4/18/2024    TX#:  13 Date:    TX#:  Date:    TX#:  Date:  4/12/2024  Tx#: 11   Ther Ex (15')  - bike x6'  - Calf stretch variations including knee straight/bent and with great toe in DF Ther Ex (15')  - bike x6'  - Calf stretch variations including knee straight/bent and with great toe in DF   Ther Ex (20')  - bike x6'  - Shuttle B, L calf raise 5bands  - Seated B calf raises  - Standing calf raises on floor, off step B  - Standing L calf raise on floor.   Manual (30')  - STM L calf and achilles in prone and quadruped.  - STM L plantar fascia and FHL  - Joint mob L ankle/rear/mid/forefoot Manual (30')  - STM L calf and achilles in prone and quadruped.  - STM L plantar fascia and FHL  - Joint mob L ankle/rear/mid/forefoot  - K-Tape L achilles/calf at end of treatment.   Manual (10')  - STM L calf and achilles in prone and quadruped.   - -   Neuro Re-Ed (15')  - SLS variations  - skipping variations (normal, for ht, for distance)   - jump: in place, AP, side/side 30\" ea  - hop: in place, AP,  side/side 30\" hetal R, L          HEP: SLS and jumping    Charges: Ther Ex, Manual x2       Total Timed Treatment: 45 min  Total Treatment Time: 45 min

## 2024-04-26 ENCOUNTER — TELEPHONE (OUTPATIENT)
Dept: ANTICOAGULATION | Facility: CLINIC | Age: 49
End: 2024-04-26

## 2024-04-26 ENCOUNTER — OFFICE VISIT (OUTPATIENT)
Dept: PHYSICAL THERAPY | Age: 49
End: 2024-04-26
Attending: ORTHOPAEDIC SURGERY
Payer: COMMERCIAL

## 2024-04-26 ENCOUNTER — ANTI-COAG VISIT (OUTPATIENT)
Dept: ANTICOAGULATION | Facility: CLINIC | Age: 49
End: 2024-04-26

## 2024-04-26 DIAGNOSIS — I37.9 PULMONIC VALVE DISEASE: ICD-10-CM

## 2024-04-26 DIAGNOSIS — Z51.81 MONITORING FOR LONG-TERM ANTICOAGULANT USE: ICD-10-CM

## 2024-04-26 DIAGNOSIS — Z79.01 LONG TERM (CURRENT) USE OF ANTICOAGULANTS: Primary | ICD-10-CM

## 2024-04-26 DIAGNOSIS — Z79.01 MONITORING FOR LONG-TERM ANTICOAGULANT USE: ICD-10-CM

## 2024-04-26 DIAGNOSIS — I73.9 PERIPHERAL VASCULAR DISEASE (HCC): Primary | ICD-10-CM

## 2024-04-26 LAB — INR: 2.2 (ref 0.8–1.2)

## 2024-04-26 PROCEDURE — 97140 MANUAL THERAPY 1/> REGIONS: CPT

## 2024-04-26 PROCEDURE — 93793 ANTICOAG MGMT PT WARFARIN: CPT | Performed by: FAMILY MEDICINE

## 2024-04-26 PROCEDURE — 97110 THERAPEUTIC EXERCISES: CPT

## 2024-04-26 NOTE — TELEPHONE ENCOUNTER
Anticoag referral expires soon. Order pended. Please sign, thank you.      Dx: Peripheral vascular disease (HCC) (I73.9)  Monitoring for long-term anticoagulant use (Z51.81,Z79.01

## 2024-04-26 NOTE — PROGRESS NOTES
TTR 74.5%    INR result received from [facility].     Per protocol, continue current dose and recheck INR in 2 weeks.     6/2: Hold; 6/3: Hold; 6/4: Hold; 6/5: Hold; 6/6: Hold; Otherwise 1.5 mg every Sun, Wed; 1 mg all other days

## 2024-04-26 NOTE — PROGRESS NOTES
Diagnosis:   Pain in Achilles tendon (M76.60)  Achilles tendinosis of left ankle (M67.874)        Referring Provider: Yarelis  Date of Evaluation:    2/13/2024    Precautions:  None Next MD visit:   none scheduled  Date of Surgery: n/a   Insurance Primary/Secondary: [employer] / N/A     # Auth Visits: -            Subjective:  Feels like the tape was helpful and was able to wear it until yesterday.  Still having achilles soreness though.  Objective: See below treatment log.    Assessment:  Given the pt's continued symptoms, activity level, and therapy, we discussed switching to a much more severe level of rest x 1 week with fulltime use of camwalker (other than stretches, bathing, contrast baths), continued use of night splint ([place] Sock), rest from exercise especially including pickleball, then reassess after next week.    Goals:   Pt report min/no pain or tightness L LE  Pt independent with HEP and progression  Pt able to run, jump, hop, skip, cut, sprint, backpeddle -- all without pain or sense of instability    Plan: See assessment.  Date: 4/15/2024  TX#: 12 Date:   4/18/2024    TX#:  13 Date:  4/26/2024  TX#: 14/15 Date:    TX#:  Date:    Tx#:    Ther Ex (15')  - bike x6'  - Calf stretch variations including knee straight/bent and with great toe in DF Ther Ex (15')  - bike x6'  - Calf stretch variations including knee straight/bent and with great toe in DF Ther Ex (15')  - bike x6'  - Calf stretch variations including knee straight/bent and with great toe in DF     Manual (30')  - STM L calf and achilles in prone and quadruped.  - STM L plantar fascia and FHL  - Joint mob L ankle/rear/mid/forefoot Manual (30')  - STM L calf and achilles in prone and quadruped.  - STM L plantar fascia and FHL  - Joint mob L ankle/rear/mid/forefoot  - K-Tape L achilles/calf at end of treatment. Manual (30')  - STM L calf and achilles (w/ iastm) in prone and quadruped.  - STM L plantar fascia and FHL  - Joint mob L  ankle/rear/mid/forefoot  - K-Tape L achilles/calf at end of treatment.     - -             HEP: SLS and jumping    Charges: Ther Ex, Manual x2       Total Timed Treatment: 45 min  Total Treatment Time: 45 min

## 2024-05-01 ENCOUNTER — TELEPHONE (OUTPATIENT)
Dept: ANTICOAGULATION | Facility: CLINIC | Age: 49
End: 2024-05-01

## 2024-05-01 NOTE — TELEPHONE ENCOUNTER
Manjit is scheduled for a GI procedure on 6/7/24. Per chart review, she has received the okay from you to hold warfarin for 5 days prior to the procedure. Last October she had a procedure where she held warfarin and bridged with lovenox.     Dr. Alex would you order lovenox for upcoming procedure- she does not have any syringes left from last time. I anticipate she will need 16 syringes for pre/post procedure.     She also asked if she should hold Aspirin for this procedure?

## 2024-05-06 NOTE — TELEPHONE ENCOUNTER
Spoke with Sariah at Dr. Alex's office. Explained that Manjit has a procedure in June and will need instructions regarding warfarin hold/lovenox bridging and lovenox ordered from the pharmacy.     Sariah will speak with Dr. Alex and get back to us/Manjit.

## 2024-05-08 ENCOUNTER — OFFICE VISIT (OUTPATIENT)
Dept: PHYSICAL THERAPY | Age: 49
End: 2024-05-08
Attending: ORTHOPAEDIC SURGERY
Payer: COMMERCIAL

## 2024-05-08 PROCEDURE — 97110 THERAPEUTIC EXERCISES: CPT

## 2024-05-08 PROCEDURE — 97140 MANUAL THERAPY 1/> REGIONS: CPT

## 2024-05-08 PROCEDURE — 97035 APP MDLTY 1+ULTRASOUND EA 15: CPT

## 2024-05-08 NOTE — PROGRESS NOTES
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  Objective: See below treatment log.    Assessment:  Initial rest hasn't been adequate to begin symptom improvement.  Trialed US today and encouraged continued rest in boot until seeing Ortho 5/17/2024.    Goals:   Pt report min/no pain or tightness L LE  Pt independent with HEP and progression  Pt able to run, jump, hop, skip, cut, sprint, backpeddle -- all without pain or sense of instability    Plan: Continue conservative approach with US, STM, gentle stretching.  Date: 4/15/2024  TX#: 12 Date:   4/18/2024    TX#:  13 Date:  4/26/2024  TX#: 14/15 Date:  5/8/2024  TX#: 15 Date:    Tx#:    Ther Ex (15')  - bike x6'  - Calf stretch variations including knee straight/bent and with great toe in DF Ther Ex (15')  - bike x6'  - Calf stretch variations including knee straight/bent and with great toe in DF Ther Ex (15')  - bike x6'  - Calf stretch variations including knee straight/bent and with great toe in DF Ther Ex (15')  - bike x6'  - Calf stretch variations including knee straight/bent and with great toe in DF    Manual (30')  - STM L calf and achilles in prone and quadruped.  - STM L plantar fascia and FHL  - Joint mob L ankle/rear/mid/forefoot Manual (30')  - STM L calf and achilles in prone and quadruped.  - STM L plantar fascia and FHL  - Joint mob L ankle/rear/mid/forefoot  - K-Tape L achilles/calf at end of treatment. Manual (30')  - STM L calf and achilles (w/ iastm) in prone and quadruped.  - STM L plantar fascia and FHL  - Joint mob L  ankle/rear/mid/forefoot  - K-Tape L achilles/calf at end of treatment. Manual (15')  - gentle STM L calf and achilles prone    - -  Ultrasound L calf/achilles: 1 MHz, 1.5 W/cm2 x 10'           HEP: SLS and jumping    Charges: Ther Ex, Manual, Ultrasound       Total Timed Treatment: 45 min  Total Treatment Time: 45 min

## 2024-05-10 ENCOUNTER — ANTI-COAG VISIT (OUTPATIENT)
Dept: ANTICOAGULATION | Facility: CLINIC | Age: 49
End: 2024-05-10

## 2024-05-10 DIAGNOSIS — Z51.81 MONITORING FOR LONG-TERM ANTICOAGULANT USE: ICD-10-CM

## 2024-05-10 DIAGNOSIS — Z79.01 MONITORING FOR LONG-TERM ANTICOAGULANT USE: ICD-10-CM

## 2024-05-10 DIAGNOSIS — I73.9 PERIPHERAL VASCULAR DISEASE (HCC): ICD-10-CM

## 2024-05-10 DIAGNOSIS — Z79.01 LONG TERM (CURRENT) USE OF ANTICOAGULANTS: Primary | ICD-10-CM

## 2024-05-10 DIAGNOSIS — I37.9 PULMONIC VALVE DISEASE: ICD-10-CM

## 2024-05-10 LAB — INR: 2.2 (ref 2–3)

## 2024-05-10 PROCEDURE — 93793 ANTICOAG MGMT PT WARFARIN: CPT | Performed by: FAMILY MEDICINE

## 2024-05-10 NOTE — PROGRESS NOTES
[facility] / INR 2.2,  therapeutic. (Goal 2.5 ) TTR 75.5 %     Etiology: stable    PLAN: continue the current dose.    Recheck INR 2 weeks    WARFARIN Plan per protocol: 6/2: Hold; 6/3: Hold; 6/4: Hold; 6/5: Hold; 6/6: Hold; Otherwise 1.5 mg every Sun, Wed; 1 mg all other days

## 2024-05-13 ENCOUNTER — OFFICE VISIT (OUTPATIENT)
Dept: PHYSICAL THERAPY | Age: 49
End: 2024-05-13
Attending: ORTHOPAEDIC SURGERY
Payer: COMMERCIAL

## 2024-05-13 PROCEDURE — 97140 MANUAL THERAPY 1/> REGIONS: CPT

## 2024-05-13 PROCEDURE — 97110 THERAPEUTIC EXERCISES: CPT

## 2024-05-13 PROCEDURE — 97035 APP MDLTY 1+ULTRASOUND EA 15: CPT

## 2024-05-13 NOTE — PROGRESS NOTES
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  Continued achilles symptoms.  Has been wearing short CamWalker most waking hours.  Objective: See below treatment log.    Assessment:  Continued symptoms.  Repeated US and continue with rest.    Goals:   Pt report min/no pain or tightness L LE  Pt independent with HEP and progression  Pt able to run, jump, hop, skip, cut, sprint, backpeddle -- all without pain or sense of instability    Plan: Continue conservative approach with US, STM, gentle stretching.  Date: 4/15/2024  TX#: 12 Date:   4/18/2024    TX#:  13 Date:  4/26/2024  TX#: 14/15 Date:  5/8/2024  TX#: 15 Date:  5/13/2024  Tx#: 16/20   Ther Ex (15')  - bike x6'  - Calf stretch variations including knee straight/bent and with great toe in DF Ther Ex (15')  - bike x6'  - Calf stretch variations including knee straight/bent and with great toe in DF Ther Ex (15')  - bike x6'  - Calf stretch variations including knee straight/bent and with great toe in DF Ther Ex (15')  - bike x6'  - Calf stretch variations including knee straight/bent and with great toe in DF Ther Ex (15')  - bike x6'  - Calf stretch variations including knee straight/bent and with great toe in DF   Manual (30')  - STM L calf and achilles in prone and quadruped.  - STM L plantar fascia and FHL  - Joint mob L ankle/rear/mid/forefoot Manual (30')  - STM L calf and achilles in prone and quadruped.  - STM L plantar fascia and FHL  - Joint mob L ankle/rear/mid/forefoot  - K-Tape L achilles/calf at end of treatment. Manual (30')  - STM L calf and achilles (w/ iastm) in prone and quadruped.  - STM L plantar fascia and FHL  - Joint mob L ankle/rear/mid/forefoot  -  K-Tape L achilles/calf at end of treatment. Manual (15')  - gentle STM L calf and achilles prone Manual (15')  - gentle STM L calf and achilles prone   - -  Ultrasound L calf/achilles: 1 MHz, 1.5 W/cm2 x 10' Ultrasound L calf/achilles: 1 MHz, 1.5 W/cm2 x 10'          HEP: SLS and jumping    Charges: Ther Ex, Manual, Ultrasound       Total Timed Treatment: 30 min  Total Treatment Time: 45 min

## 2024-05-16 PROBLEM — M76.62 LEFT ACHILLES TENDINITIS: Status: ACTIVE | Noted: 2024-05-16

## 2024-05-17 ENCOUNTER — HOSPITAL ENCOUNTER (OUTPATIENT)
Dept: MRI IMAGING | Facility: HOSPITAL | Age: 49
Discharge: HOME | End: 2024-05-17

## 2024-05-17 ENCOUNTER — OFFICE VISIT (OUTPATIENT)
Dept: ORTHOPEDICS CLINIC | Facility: CLINIC | Age: 49
End: 2024-05-17

## 2024-05-17 DIAGNOSIS — M76.62 LEFT ACHILLES TENDINITIS: ICD-10-CM

## 2024-05-17 DIAGNOSIS — M76.62 LEFT ACHILLES TENDINITIS: Primary | ICD-10-CM

## 2024-05-17 PROCEDURE — 99214 OFFICE O/P EST MOD 30 MIN: CPT | Performed by: STUDENT IN AN ORGANIZED HEALTH CARE EDUCATION/TRAINING PROGRAM

## 2024-05-17 PROCEDURE — 73721 MRI JNT OF LWR EXTRE W/O DYE: CPT | Performed by: STUDENT IN AN ORGANIZED HEALTH CARE EDUCATION/TRAINING PROGRAM

## 2024-05-17 NOTE — PROGRESS NOTES
Orthopaedic Surgery New Patient Visit  _____________________________________________________________________________________________________  _____________________________________________________________________________________________________    DATE OF VISIT: 5/17/2024     CHIEF COMPLAINT:   Chief Complaint   Patient presents with    Ankle Pain     Left f/u - was seen by Dr Santoyo and had a cortisone inj on 1/23/24 - she was in PT with no relief - still has pain rated as 4-7/10 on and off - she is in a short CAM boot         HISTORY OF PRESENT ILLNESS: Manjit Matt is a 48 year old female who presents to the clinic for left Achilles tendinopathy. She has been seen several times for this and has received an injection in the tendon without improvement. She has also attempted PT, boot wear/immobilization, and activity modification and continues to have substantial 4-7/10 pain in the ankle about the Achilles tendon. She denies trauma. She reports strength is preserved. She denies instability of the ankle. She is able to ambulate with heel cups in her shoes with less pain than with the boot.    SOCIAL HISTORY  Social History     Socioeconomic History    Marital status:      Spouse name: Not on file    Number of children: Not on file    Years of education: Not on file    Highest education level: Not on file   Occupational History    Not on file   Tobacco Use    Smoking status: Never     Passive exposure: Never    Smokeless tobacco: Never   Vaping Use    Vaping status: Never Used   Substance and Sexual Activity    Alcohol use: Yes     Comment: rare    Drug use: No    Sexual activity: Yes     Partners: Male   Other Topics Concern     Service Not Asked    Blood Transfusions Not Asked    Caffeine Concern Yes     Comment: coffee, 1-2 cups daily    Occupational Exposure Not Asked    Hobby Hazards Not Asked    Sleep Concern Not Asked    Stress Concern Not Asked    Weight Concern Not Asked    Special Diet Not  Asked    Back Care Not Asked    Exercise Not Asked    Bike Helmet Not Asked    Seat Belt Not Asked    Self-Exams Not Asked    Grew up on a farm Not Asked    History of tanning Not Asked    Outdoor occupation Not Asked    Pt has a pacemaker No    Pt has a defibrillator No    Breast feeding Not Asked    Reaction to local anesthetic No   Social History Narrative    Not on file     Social Determinants of Health     Financial Resource Strain: Not on file   Food Insecurity: Not on file   Transportation Needs: Not on file   Physical Activity: Not on file   Stress: Not on file   Social Connections: Not on file   Housing Stability: Not on file        PAST MEDICAL HISTORY  Past Medical History:    Congenital pulmonary stenosis (HCC)    congenital pulmonary aa stenosis    Congenital pulmonary stenosis (HCC)    SX at 20 y/o / valvuloplasty     Disease of vein    vein/artery disease    Heart murmur    since birth    Hematologic disorder    Thromboembolic Event    History of blood clots    History of blood transfusion    Peripheral vascular disease (HCC)    Right leg arterial bypass ,    PONV (postoperative nausea and vomiting)    Popliteal artery thrombosis (HCC)    Rt popliteal artery thrombosis - 15cm; Right Lower Extremity thrombosis; was on coumadin for 6 months than on plavix and aggrenox until . w/u negative for coagulopathy but was anticoagulated during pregnancies     Tendinitis    discontinue insole / rx ibuprofen    Valvular disease    Pulmonic stenosis    Visual impairment    glasses        PAST SURGICAL HISTORY  Past Surgical History:   Procedure Laterality Date    Appendectomy      Appendectomy            Cath bare metal stent (bms)      3 stents right leg    Cyst aspiration left Left 2023     bx    Endometrial ablation      Hysterectomy  10/2018    Laparoscopic hysterectomy with Dr. Dueñas.  Had post op vaginal bleeding and repeat suturing of vaginal cuff.    Leg/ankle  surgery proc unlisted  2001    right leg arterial bypass / (fasciotomy)    Needle biopsy left Left 09/26/2023    US bx    Other surgical history  1995    SX at 20 y/o / valvuloplasty (congenital Pulmonic stenosis)        MEDICATIONS  * Reviewed  No outpatient medications have been marked as taking for the 5/17/24 encounter (Office Visit) with Dwight Lomeli MD.        ALLERGIES  Allergies   Allergen Reactions    Levaquin [Levofloxacin] HIVES and SWELLING     Other reaction(s): LEVOFLOXACIN        FAMILY HISTORY  Family History   Problem Relation Age of Onset    No Known Problems Father     No Known Problems Mother     Other (Other) Maternal Grandmother         Fibrocystic Breast Disease    Cancer Paternal Grandfather         lung    No Known Problems Sister     Bipolar Disorder Brother     Breast Cancer Maternal Aunt         40's,  BRCA negative    Breast Cancer Maternal Aunt         mat great great aunt breast ca unknown age    Ovarian Cancer Neg     Uterine Cancer Neg     Colon Cancer Neg         REVIEW OF SYSTEMS  A 14 point review of systems was performed. Pertinent positives and negatives noted in the HPI.    PHYSICAL EXAM  LMP 09/27/2018      Constitutional: The patient is well-developed, well-nourished, in no acute distress.  Neurological: Alert and oriented to person, place, and time.  Psychiatric: Mood and affect normal.  Head: Normocephalic and atraumatic.  Cardiovascular: regular rate by palpation  Pulmonary/Chest: Effort normal. No respiratory distress. Breathing non-labored  Abdominal: Abdomen exhibits no distension.   Left  FOOT/ANKLE  INSPECTION/PALPATION  Hypertrophy about the Achilles tendon  No ecchymosis, erythema, or effusion.   No breaks in skin. Skin is euthermic.  Neutral alignment on standing, normal gait  Normal Foot arch  TTP over achilles  ROM  Dorsiflexion: 20 degrees  Plantarflexion: 60 degrees  Inversion: 20 degrees  Eversion: 15 degrees  MOTOR/STRENGTH  Dorsiflexion:  5/5  Plantarflexion: 5/5  Inversion: 5/5  Eversion: 5/5  Fires EHL/FHL, able to move all toes actively  STABILITY  Stable anterior drawer, eversion stress  Negative syndesmotic squeeze, external rotation stress  POSITIVE  Silf[place]kiold  NEGATIVE  Dewitt squeeze, matles test  Peroneal tendon subluxation  SILT Maame/Saph/SPN/DPN/T  2+ DP/PT pulse, brisk capillary refill, foot warm & well perfused     RESULTS    Lab Results   Component Value Date    WBC 6.5 02/08/2023    HGB 12.6 02/08/2023    .0 02/08/2023      Lab Results   Component Value Date    GLU 86 02/08/2023    BUN 16 02/08/2023    CREATSERUM 0.83 02/08/2023    GFRNAA 94 07/26/2022    GFRAA 108 07/26/2022        IMAGING  I independently viewed and interpreted the imaging. Radiologist interpretation is available in the imaging report.  X-ray: Plain films of the left ankle including AP, lateral, and oblique views were reviewed. These demonstrate no acute osseous abnormalities.  The talus is centrally positioned within the ankle mortise with no clear space widening.  There are minimal to no degenerative changes in the tibiotalar joint.  There are minimal to no degenerative changes in the imaged midfoot.  There are no  loose bodies evident.  There is no evidence of talar dome lesions or other fractures.     No results found.     ASSESSMENT/PLAN: Manjit Matt is a 48 year old female who presents to the Orthopaedic surgery clinic today for left achilles tendinopathy that has been refractory to conservative measures for nearly 6 months including activity modification, immobilization, anti-inflammatories, injections, and therapy. At this point, it is reasonable to obtain an MRI to evaluate the tendon and determine if there is ongoing healing and the degree of injury to the footprint. Should there be substantial degenerative tissue with tearing at the footprint, it may be reasonable to consider debridement and repair.    Discussed the history, physical exam,  treatment to date, and reviewed relevant imaging an studies with the patient.  WEIGHT BEARING STATUS: Weightbearing as tolerated with boot or heel cups  RANGE-OF-MOTION LIMITATIONS: as tolerated  NEW PRESCRIPTIONS:  We discussed medications for this condition including patient current regimen. Based on this discussion we have added/re-ordered no additional medications  IMAGING ORDERED: MRI left ankle  CONSULTS PLACED: We discussed the role of therapy for this condition including previous/ongoing therapy and specialist services. Based on this discussion we have opted not to place a consultation to other specialists/therapy  PROSTHESES/ORTHOTICS: the patient has appropriate boot and no additional prostheses/orthoses were ordered at today's visit  PROCEDURES: none    FOLLOW-UP: after imaging    RADIOGRAPHS AT NEXT VISIT: none    I have personally seen Manjit Matt and discussed in detail their plan of care. Prior to departure, they indicated agreement with and understanding of their plan of care and their follow-up as documented herein this note. Please note that this note was written in combination with voice recognition/dictation software and there is a possibility of transcription errors which were not identified at the time of note submission. If clarification is necessary, please contact the author or clinic staff.    Dwight Lomeli MD  Orthopaedic Surgery  5/18/2024

## 2024-05-18 NOTE — PATIENT INSTRUCTIONS
Dear Manjit Matt ,    It was a pleasure seeing you in clinic. I saw you for your left ankle Achilles tendinopathy. Today we discussed the problem, the necessity for additional imaging to clarify/confirm the diagnosis, pain management including activity modification, icing, elevation, and medications, and anticipated follow-up plans.    I ordered the following for you:     - Imaging: MRI left ankle    Please don't hesitate to reach out if you have any questions or concerns. We will plan to follow-up next after imaging.     Sincerely,  Dr. Dwight Lomeli

## 2024-05-21 ENCOUNTER — TELEPHONE (OUTPATIENT)
Dept: FAMILY MEDICINE CLINIC | Facility: CLINIC | Age: 49
End: 2024-05-21

## 2024-05-21 NOTE — TELEPHONE ENCOUNTER
Patient calling to state is having colonoscopy on 06/07/24, is a coumadin patient,  asking for Lovenox to bridge through, requesting call back.

## 2024-05-22 ENCOUNTER — PATIENT MESSAGE (OUTPATIENT)
Dept: INTERNAL MEDICINE CLINIC | Facility: CLINIC | Age: 49
End: 2024-05-22

## 2024-05-22 DIAGNOSIS — I73.9 PERIPHERAL VASCULAR DISEASE (HCC): Primary | ICD-10-CM

## 2024-05-22 NOTE — TELEPHONE ENCOUNTER
Refer to TE initiated on 5/1/24 regarding upcoming procedure and lovenox bridge.     May 6, 2024  ELVER    5/6/24  9:51 AM  Note  Spoke with Sariah at Dr. Alex's office. Explained that Manjit has a procedure in June and will need instructions regarding warfarin hold/lovenox bridging and lovenox ordered from the pharmacy.      Sariah will speak with Dr. Alex and get back to us/Manjit.        I spoke with Manjit today- she has not heard from Dr. Alex's office but will follow up with them today. Once she has confirmed instructions and lovenox has been ordered, I will provide her with a daily schedule with instructions for the warfarin hold/lovenox bridge.

## 2024-05-23 NOTE — TELEPHONE ENCOUNTER
From: Manjit Matt  To: Ravi Flynn  Sent: 5/22/2024 3:51 PM CDT  Subject: Yearly cardiology appointment     Good afternoon Dr Flynn!  I need to ask for a referral to see Dr. Tillman in July for my yearly appointment.  Thank you so much in Advance   Manjit

## 2024-05-28 ENCOUNTER — ANTI-COAG VISIT (OUTPATIENT)
Dept: ANTICOAGULATION | Facility: CLINIC | Age: 49
End: 2024-05-28

## 2024-05-28 ENCOUNTER — TELEPHONE (OUTPATIENT)
Dept: ANTICOAGULATION | Facility: CLINIC | Age: 49
End: 2024-05-28

## 2024-05-28 DIAGNOSIS — Z79.01 LONG TERM (CURRENT) USE OF ANTICOAGULANTS: Primary | ICD-10-CM

## 2024-05-28 DIAGNOSIS — I73.9 PERIPHERAL VASCULAR DISEASE (HCC): ICD-10-CM

## 2024-05-28 DIAGNOSIS — Z79.01 MONITORING FOR LONG-TERM ANTICOAGULANT USE: ICD-10-CM

## 2024-05-28 DIAGNOSIS — I37.9 PULMONIC VALVE DISEASE: ICD-10-CM

## 2024-05-28 DIAGNOSIS — Z51.81 MONITORING FOR LONG-TERM ANTICOAGULANT USE: ICD-10-CM

## 2024-05-28 LAB — INR: 2.3 (ref 2–3)

## 2024-05-28 PROCEDURE — 93793 ANTICOAG MGMT PT WARFARIN: CPT | Performed by: FAMILY MEDICINE

## 2024-05-28 RX ORDER — ENOXAPARIN SODIUM 100 MG/ML
INJECTION SUBCUTANEOUS
Qty: 20 EACH | Refills: 0 | Status: SHIPPED | OUTPATIENT
Start: 2024-05-28

## 2024-05-28 NOTE — TELEPHONE ENCOUNTER
Wt 147/2.2= 66mg   Lovenox 80mg / reduce to 70mg every 12 hours.  Pt notified and agreed.  [place] [facility].     CMP in Media: 11/2023 Normal     Begin Lovenox @ 9am Jun 3rd every 12 hours for 7 injections then stop. (Jun 6)  Restart Jun 8 @ 9am every 12 hours until INR is >2.0    Check INR episode for full warfarin instructions.

## 2024-05-28 NOTE — PROGRESS NOTES
[facility] / INR 2.3,  therapeutic. (Goal 2.5 ) TTR 76.6 %     Etiology: stable. She has a colonoscopy preplanned on Jun 7. Lovenox rx sent to [facility].  Hold instructions relayed to Manjit who verbalized agreement.    PLAN: continue the current dose and follow the hold schedule. Call tomorrow if Lovenox refill issues.    Recheck INR Next week.    Pt reports:    No sign of unusual bruising or bleeding.  Any missed doses: No   Medications changes: No    Contacted  Manjit by phone  who verbalized understanding and agreement.    WARFARIN Plan per protocol: 6/2: Hold; 6/3: Hold; 6/4: Hold; 6/5: Hold; 6/6: Hold; 6/7: 1.5 mg; 6/8: 1.5 mg; Otherwise 1.5 mg every Sun, Wed; 1 mg all other days      Sun 6/2     Do not take your warfarin dose today!          Mon 6/3     Do not take your warfarin dose today!   Lovenox 9am & 9pm             Tue 6/4     Do not take your warfarin dose today!   Lovenox 9am & 9pm             Wed 6/5     Do not take your warfarin dose today!   Lovenox 9am & 9pm             Thu 6/6     Do not take your warfarin dose today!   Lovenox 9am then stop.             Fri 6/7  HOLD  warfarin for 5 days prior to procedure             Sat 6/8  Lovenox 9am & 9pm

## 2024-05-30 NOTE — PAT NURSING NOTE
Patient has specific instructions regarding her lovenox, warfarin, aspirin, and cilostazol from the office.

## 2024-06-03 ENCOUNTER — ANTI-COAG VISIT (OUTPATIENT)
Dept: ANTICOAGULATION | Facility: CLINIC | Age: 49
End: 2024-06-03

## 2024-06-03 ENCOUNTER — OFFICE VISIT (OUTPATIENT)
Dept: PHYSICAL THERAPY | Age: 49
End: 2024-06-03
Attending: ORTHOPAEDIC SURGERY
Payer: COMMERCIAL

## 2024-06-03 ENCOUNTER — TELEPHONE (OUTPATIENT)
Facility: CLINIC | Age: 49
End: 2024-06-03

## 2024-06-03 DIAGNOSIS — Z79.01 LONG TERM (CURRENT) USE OF ANTICOAGULANTS: Primary | ICD-10-CM

## 2024-06-03 DIAGNOSIS — I73.9 PERIPHERAL VASCULAR DISEASE (HCC): ICD-10-CM

## 2024-06-03 DIAGNOSIS — Z51.81 MONITORING FOR LONG-TERM ANTICOAGULANT USE: ICD-10-CM

## 2024-06-03 DIAGNOSIS — Z79.01 MONITORING FOR LONG-TERM ANTICOAGULANT USE: ICD-10-CM

## 2024-06-03 DIAGNOSIS — I37.9 PULMONIC VALVE DISEASE: ICD-10-CM

## 2024-06-03 LAB — INR: 2.7 (ref 2–3)

## 2024-06-03 PROCEDURE — 97110 THERAPEUTIC EXERCISES: CPT

## 2024-06-03 PROCEDURE — 93793 ANTICOAG MGMT PT WARFARIN: CPT | Performed by: FAMILY MEDICINE

## 2024-06-03 PROCEDURE — 97140 MANUAL THERAPY 1/> REGIONS: CPT

## 2024-06-03 NOTE — PROGRESS NOTES
[facility] / INR 2.7,  therapeutic. (Goal 2.5 ) TTR 77.2 %     Etiology: She should've been below today, she started holding Sunday. She was on vacation last week so her INR must have been higher because of that.     PLAN: No Lovenox tonight. Continue to Hold warfarin. Eat some spinach.       Recheck INR tomorrow.    Pt reports:    No sign of unusual bruising or bleeding.  Any missed doses: Yes.   Medications changes: Lovenox.    Contacted  Manjit by phone  who verbalized understanding and agreement.    WARFARIN Plan per protocol: 6/3: Hold; 6/4: Hold; 6/5: Hold; 6/6: Hold; 6/7: 1.5 mg; 6/8: 1.5 mg; Otherwise 1.5 mg every Sun, Wed; 1 mg all other days

## 2024-06-03 NOTE — TELEPHONE ENCOUNTER
Spoke to patient  She began bridging her Lovenox on Sunday 6/2  She stopped taking her Warfarin on Saturday 6/1    I let patient know I will touch base with her on Wednesday 6/5

## 2024-06-04 ENCOUNTER — ANTI-COAG VISIT (OUTPATIENT)
Dept: ANTICOAGULATION | Facility: CLINIC | Age: 49
End: 2024-06-04

## 2024-06-04 DIAGNOSIS — Z51.81 MONITORING FOR LONG-TERM ANTICOAGULANT USE: ICD-10-CM

## 2024-06-04 DIAGNOSIS — Z79.01 LONG TERM (CURRENT) USE OF ANTICOAGULANTS: Primary | ICD-10-CM

## 2024-06-04 DIAGNOSIS — I37.9 PULMONIC VALVE DISEASE: ICD-10-CM

## 2024-06-04 DIAGNOSIS — Z79.01 MONITORING FOR LONG-TERM ANTICOAGULANT USE: ICD-10-CM

## 2024-06-04 DIAGNOSIS — I73.9 PERIPHERAL VASCULAR DISEASE (HCC): ICD-10-CM

## 2024-06-04 LAB — INR: 2.2 (ref 2–3)

## 2024-06-04 PROCEDURE — 93793 ANTICOAG MGMT PT WARFARIN: CPT | Performed by: FAMILY MEDICINE

## 2024-06-04 NOTE — PROGRESS NOTES
[facility] / INR 2.2,  therapeutic. (Goal 2.5 ) TTR 77.3 %     Etiology: Manjit is holding for Upper/Lower GI testing on Friday. She has Kale shake yesterday and this morning/ Okay to start Lovenox tonight for 4 injections (q 12 hours)  and stop on Jun 6 after the AM dose. Restart oral anticoags' on Friday night and subcutaneous Lovenox on Saturday am. Q 12 hours x 5 doses then check INR on Monday. She will be in [facility] for work and will bring her supplies.    PLAN: see above.    Recheck INR Monday.    Pt reports:    No sign of unusual bruising or bleeding.  Any missed doses: Yes, started holding Sunday.   Medications changes: Yes    Contacted  Manjit by phone  who verbalized understanding and agreement.    WARFARIN Plan per protocol: 6/4: Hold; 6/5: Hold; 6/6: Hold; 6/7: 1.5 mg; 6/8: 1.5 mg; Otherwise 1.5 mg every Sun, Wed; 1 mg all other days

## 2024-06-04 NOTE — PROGRESS NOTES
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  Had MRI which shows some scar tissue/irritation, but no achilles tear, and instead a peroneal tear.   said arlineot wouldn't help so Dc'd it.  Still having swelling, pain and hasn't hardly done any activity.  No pickleball or workouts.  Didn't feel US helped either.  Objective: See below treatment log.    Assessment:  Continued symptoms.  Pt to f/u with MD after MRI approx 2 weeks.  Severe TTP achilles tendon L.    Goals:   Pt report min/no pain or tightness L LE  Pt independent with HEP and progression  Pt able to run, jump, hop, skip, cut, sprint, backpeddle -- all without pain or sense of instability    Plan: Continue conservative approach with US, STM, gentle stretching.  Date: 4/15/2024  TX#: 12 Date:   4/18/2024    TX#:  13 Date:  4/26/2024  TX#: 14/15 Date:  5/8/2024  TX#: 15 Date:  5/13/2024  Tx#: 16/20 Date: 6/3/2024  Tx#: 17/20   Ther Ex (15')  - bike x6'  - Calf stretch variations including knee straight/bent and with great toe in DF Ther Ex (15')  - bike x6'  - Calf stretch variations including knee straight/bent and with great toe in DF Ther Ex (15')  - bike x6'  - Calf stretch variations including knee straight/bent and with great toe in DF Ther Ex (15')  - bike x6'  - Calf stretch variations including knee straight/bent and with great toe in DF Ther Ex (15')  - bike x6'  - Calf stretch variations including knee straight/bent and with great toe in DF Ther Ex (15')  - bike x6'  - Calf stretch variations including knee straight/bent and with great toe in DF   Manual (30')  - STM L calf and achilles in prone and quadruped.  - STM L plantar fascia and FHL  - Joint mob L  ankle/rear/mid/forefoot Manual (30')  - STM L calf and achilles in prone and quadruped.  - STM L plantar fascia and FHL  - Joint mob L ankle/rear/mid/forefoot  - K-Tape L achilles/calf at end of treatment. Manual (30')  - STM L calf and achilles (w/ iastm) in prone and quadruped.  - STM L plantar fascia and FHL  - Joint mob L ankle/rear/mid/forefoot  - K-Tape L achilles/calf at end of treatment. Manual (15')  - gentle STM L calf and achilles prone Manual (15')  - gentle STM L calf and achilles prone Manual (25')  - gentle STM L calf and achilles prone   - -  Ultrasound L calf/achilles: 1 MHz, 1.5 W/cm2 x 10' Ultrasound L calf/achilles: 1 MHz, 1.5 W/cm2 x 10' -           HEP: SLS and jumping    Charges: Ther Ex, Manual x2      Total Timed Treatment: 40 min  Total Treatment Time: 40 min

## 2024-06-05 NOTE — TELEPHONE ENCOUNTER
Spoke to patient    Her INR is below 2 so she bridged to Lovenox yesterday.    Pt had no further questions at this time

## 2024-06-06 ENCOUNTER — APPOINTMENT (OUTPATIENT)
Dept: PHYSICAL THERAPY | Age: 49
End: 2024-06-06
Attending: ORTHOPAEDIC SURGERY
Payer: COMMERCIAL

## 2024-06-06 ENCOUNTER — TELEPHONE (OUTPATIENT)
Dept: PHYSICAL THERAPY | Facility: HOSPITAL | Age: 49
End: 2024-06-06

## 2024-06-07 ENCOUNTER — HOSPITAL ENCOUNTER (OUTPATIENT)
Age: 49
Setting detail: HOSPITAL OUTPATIENT SURGERY
Discharge: HOME | End: 2024-06-07
Attending: INTERNAL MEDICINE | Admitting: INTERNAL MEDICINE
Payer: COMMERCIAL

## 2024-06-07 ENCOUNTER — ANESTHESIA EVENT (OUTPATIENT)
Dept: ENDOSCOPY | Age: 49
End: 2024-06-07
Payer: COMMERCIAL

## 2024-06-07 ENCOUNTER — ANESTHESIA (OUTPATIENT)
Dept: ENDOSCOPY | Age: 49
End: 2024-06-07
Payer: COMMERCIAL

## 2024-06-07 DIAGNOSIS — R13.19 ESOPHAGEAL DYSPHAGIA: ICD-10-CM

## 2024-06-07 DIAGNOSIS — E78.2 MIXED HYPERLIPIDEMIA: ICD-10-CM

## 2024-06-07 DIAGNOSIS — Z12.11 SCREENING FOR COLON CANCER: ICD-10-CM

## 2024-06-07 PROCEDURE — 88305 TISSUE EXAM BY PATHOLOGIST: CPT | Performed by: INTERNAL MEDICINE

## 2024-06-07 PROCEDURE — 88312 SPECIAL STAINS GROUP 1: CPT | Performed by: INTERNAL MEDICINE

## 2024-06-07 RX ORDER — SODIUM CHLORIDE, SODIUM LACTATE, POTASSIUM CHLORIDE, CALCIUM CHLORIDE 600; 310; 30; 20 MG/100ML; MG/100ML; MG/100ML; MG/100ML
INJECTION, SOLUTION INTRAVENOUS CONTINUOUS
Status: DISCONTINUED | OUTPATIENT
Start: 2024-06-07 | End: 2024-06-07

## 2024-06-07 RX ORDER — LIDOCAINE HYDROCHLORIDE 10 MG/ML
INJECTION, SOLUTION EPIDURAL; INFILTRATION; INTRACAUDAL; PERINEURAL AS NEEDED
Status: DISCONTINUED | OUTPATIENT
Start: 2024-06-07 | End: 2024-06-07 | Stop reason: SURG

## 2024-06-07 RX ADMIN — LIDOCAINE HYDROCHLORIDE 50 MG: 10 INJECTION, SOLUTION EPIDURAL; INFILTRATION; INTRACAUDAL; PERINEURAL at 10:48:00

## 2024-06-07 RX ADMIN — SODIUM CHLORIDE, SODIUM LACTATE, POTASSIUM CHLORIDE, CALCIUM CHLORIDE: 600; 310; 30; 20 INJECTION, SOLUTION INTRAVENOUS at 10:25:00

## 2024-06-07 RX ADMIN — LIDOCAINE HYDROCHLORIDE 50 MG: 10 INJECTION, SOLUTION EPIDURAL; INFILTRATION; INTRACAUDAL; PERINEURAL at 10:25:00

## 2024-06-07 NOTE — ANESTHESIA PREPROCEDURE EVALUATION
Anesthesia PreOp Note    HPI:     Manjit Matt is a 48 year old female who presents for preoperative consultation requested by: Sandrita Saab MD    Date of Surgery: 6/7/2024    Procedure(s):  COLONOSCOPY  ESOPHAGOGASTRODUODENOSCOPY (EGD)  Indication: Esophageal dysphagia/ Screening for colon cancer    Relevant Problems   No relevant active problems       NPO:  Last Liquid Consumption Date: 06/07/24  Last Liquid Consumption Time: 0700  Last Solid Consumption Date: 06/05/24  Last Solid Consumption Time: 1500  Last Liquid Consumption Date: 06/07/24          History Review:  Patient Active Problem List    Diagnosis Date Noted    Left Achilles tendinitis 05/16/2024    Monitoring for long-term anticoagulant use 04/26/2024    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    Long term (current) use of anticoagulants 10/08/2019    Encounter for therapeutic drug monitoring 10/08/2019    Peripheral vascular disease (HCC) 10/08/2019    History of laparoscopic-assisted vaginal hysterectomy 10/18/2018    Menorrhagia 12/22/2017    Chronic right-sided low back pain without sciatica 12/16/2016       Past Medical History:    Congenital pulmonary stenosis (HCC)    congenital pulmonary aa stenosis    Congenital pulmonary stenosis (HCC)    SX at 20 y/o / valvuloplasty 1995    Deep vein thrombosis (HCC)    Disease of vein    vein/artery disease    Heart murmur    since birth    Hematologic disorder    Thromboembolic Event    History of blood clots    History of blood transfusion    Peripheral vascular disease (HCC)    Right leg arterial bypass 2001,2012    PONV (postoperative nausea and vomiting)    Popliteal artery thrombosis (HCC)    Rt popliteal artery thrombosis - 15cm; Right Lower Extremity thrombosis; was on coumadin for 6 months than on plavix and aggrenox until 2007. w/u negative for coagulopathy but was anticoagulated during pregnancies     Tendinitis    discontinue insole / rx ibuprofen    Valvular disease    Pulmonic  stenosis    Visual impairment    glasses       Past Surgical History:   Procedure Laterality Date    Appendectomy      Appendectomy        2006    Cath bare metal stent (bms)      3 stents right leg    Cyst aspiration left Left 2023    US bx    Endometrial ablation      Hysterectomy  10/2018    Laparoscopic hysterectomy with Dr. Dueñas.  Had post op vaginal bleeding and repeat suturing of vaginal cuff.    Leg/ankle surgery proc unlisted      right leg arterial bypass / (fasciotomy)    Needle biopsy left Left 2023    US bx    Other surgical history      SX at 20 y/o / valvuloplasty (congenital Pulmonic stenosis)       Medications Prior to Admission   Medication Sig Dispense Refill Last Dose    enoxaparin (LOVENOX) 80 MG/0.8ML Injection Solution Prefilled Syringe Inject  70 mg every 12 hours, under the skin while INR is <2.0; or as directed by INR clinic. 20 each 0 2024 at 0900    warfarin 1 MG Oral Tab Take as directed by INR clinic or take 1 & 1/2 tablets , & 1 tablet all other days 120 tablet 1 5 days    acidophilus-pectin Oral Cap Take 1 capsule by mouth daily.   2 wks    Multiple Minerals-Vitamins (CALCIUM CITRATE PLUS/MAGNESIUM) Oral Tab Take 1 tablet by mouth daily.   2 wks    Multiple Vitamins-Minerals (HAIR SKIN AND NAILS FORMULA) Oral Tab Take 1 tablet by mouth daily.   2 wks    rosuvastatin 5 MG Oral Tab Take 1 tablet (5 mg total) by mouth nightly. FOR CHOLESTEROL. (Patient taking differently: Take 1 tablet (5 mg total) by mouth nightly. FOR CHOLESTEROL. PT Is only taking three times a week,  and friday) 90 tablet 9 2024 at pm    CILOSTAZOL 100 MG Oral Tab TAKE 1 TABLET(100 MG) BY MOUTH TWICE DAILY 60 tablet 11 1 week    ASPIRIN EC LOW DOSE 81 MG Oral Tab EC Take 1 tablet (81 mg total) by mouth daily.  5 2024 at 0900    Na Sulfate-K Sulfate-Mg Sulf (SUPREP BOWEL PREP KIT) 17.5-3.13-1.6 GM/177ML Oral Solution Take as  directed by GI clinic. Shamar to substitute for generic. 1 each 0     cilostazol 100 MG Oral Tab Take 1 tablet (100 mg total) by mouth 2 (two) times daily. (Patient not taking: Reported on 2/9/2024) 180 tablet 3      Current Facility-Administered Medications Ordered in Epic   Medication Dose Route Frequency Provider Last Rate Last Admin    lactated ringers infusion   Intravenous Continuous Sandrita Saab MD         No current [facility]-ordered outpatient medications on file.       Allergies   Allergen Reactions    Levaquin [Levofloxacin] HIVES and SWELLING     Other reaction(s): LEVOFLOXACIN       Family History   Problem Relation Age of Onset    No Known Problems Father     No Known Problems Mother     Other (Other) Maternal Grandmother         Fibrocystic Breast Disease    Cancer Paternal Grandfather         lung    No Known Problems Sister     Bipolar Disorder Brother     Breast Cancer Maternal Aunt         40's,  BRCA negative    Breast Cancer Maternal Aunt         mat great great aunt breast ca unknown age    Ovarian Cancer Neg     Uterine Cancer Neg     Colon Cancer Neg      Social History     Socioeconomic History    Marital status:    Tobacco Use    Smoking status: Never     Passive exposure: Never    Smokeless tobacco: Never   Vaping Use    Vaping status: Never Used   Substance and Sexual Activity    Alcohol use: Yes     Comment: rare    Drug use: No    Sexual activity: Yes     Partners: Male   Other Topics Concern    Caffeine Concern Yes     Comment: coffee, 1-2 cups daily    Pt has a pacemaker No    Pt has a defibrillator No    Reaction to local anesthetic No       Available pre-op labs reviewed.        Lab Results   Component Value Date    INR 2.2 (A) 06/04/2024       Vital Signs:  Body mass index is 25.85 kg/m².   height is 1.499 m (4' 11\") and weight is 58.1 kg (128 lb).   Vitals:    05/30/24 1458   Weight: 58.1 kg (128 lb)   Height: 1.499 m (4' 11\")        Anesthesia Evaluation      Patient summary reviewed and Nursing notes reviewed    History of anesthetic complications   Airway   Mallampati: I  TM distance: >3 FB  Neck ROM: full  Dental - Dentition appears grossly intact     Pulmonary - negative ROS and normal exam   Cardiovascular - normal exam    ROS comment: Per cards:  \"47 year-old with history of pulmonic stenosis corrected in 1995 borderline elevated cholesterol and claudication and vascular disease who had a arterial rupture and stent that needed peripheral bypass in 2001 who now presents for follow-up. Patient is overall doing well except for rare claudication in her right leg. She had a few episodes of some transient swelling in her left foot that is now resolved. She had recent echo which we removed reviewed and is normal. Calcium score in 2021 was normal. Patient is active and working.\"    Neuro/Psych - negative ROS     GI/Hepatic/Renal    (+) bowel prep    Endo/Other    (+) blood dyscrasia    Comments: Last Warfrain Sat 6/1, bridged with Lovenox, last injection yesterday AM  Abdominal                  Anesthesia Plan:   ASA:  3  Plan:   MAC  Plan Comments: I have discussed the anesthetic plan, major risks and alternatives with the patient and answered all questions. The patient desires to proceed with surgery and anesthesia as planned.     Informed Consent Plan and Risks Discussed With:  Patient and spouse      I have informed Manjit Matt and/or legal guardian or family member of the nature of the anesthetic plan, benefits, risks including possible dental damage if relevant, major complications, and any alternative forms of anesthetic management.   All of the patient's questions were answered to the best of my ability. The patient desires the anesthetic management as planned.  Ion Tilley MD  6/7/2024 9:43 AM  Present on Admission:  **None**

## 2024-06-07 NOTE — ANESTHESIA POSTPROCEDURE EVALUATION
Patient: Manjit Matt    Procedure Summary       Date: 06/07/24 Room / Location: [facility] ENDOSCOPY 02 / [facility] ENDO    Anesthesia Start: 1025 Anesthesia Stop: 1101    Procedures:       COLONOSCOPY      ESOPHAGOGASTRODUODENOSCOPY (EGD) Diagnosis:       Esophageal dysphagia      Screening for colon cancer      (diverticulosis, polyp, gastric erosions, hiatal hernia)    Surgeons: Sandrita Saab MD Anesthesiologist: Ion Tilley MD    Anesthesia Type: MAC ASA Status: 3            Anesthesia Type: MAC    Vitals Value Taken Time   /71 06/07/24 1100   Temp  06/07/24 1101   Pulse 91 06/07/24 1100   Resp 16 06/07/24 1100   SpO2 99 % 06/07/24 1100   Vitals shown include unfiled device data.    [facility] AN Post Evaluation:   Patient Evaluated in PACU  Patient Participation: complete - patient participated  Level of Consciousness: awake and alert  Pain Management: adequate  Airway Patency:patent  Yes    Nausea/Vomiting: none  Cardiovascular Status: acceptable  Respiratory Status: acceptable and room air  Postoperative Hydration acceptable      Ion Tilley MD  6/7/2024 11:01 AM

## 2024-06-07 NOTE — OPERATIVE REPORT
ESOPHAGOGASTRODUODENOSCOPY (EGD) & COLONOSCOPY REPORT    Manjit Matt     1975 Age 48 year old   PCP Ravi Flynn MD Endoscopist Sandrita Saab MD       Date of procedure: 24    Procedure: EGD w/ biopsies & Colonoscopy w/ cold snare polypectomy    Pre-operative diagnosis: dysphagia, CRC screening     Post-operative diagnosis: sliding hiatal hernia, gastric erosions, colon polyp, diverticulosis     Medications: MAC    Withdrawal time: 12 minutes    Complications: none    Procedure: Informed consent was obtained from the patient after the risks of the procedure were discussed, including but not limited to bleeding, perforation, aspiration, infection, or possibility of a missed lesion. We discussed the risks/benefits and alternatives to this procedure, as well as the planned sedation. EGD procedure: The patient was placed in the left lateral decubitus position and begun on continuous blood pressure pulse oximetry and EKG monitoring and this was maintained throughout the procedure. Once an adequate level of sedation was obtained a bite block was placed. Then the lubricated tip of the [id number] diagnostic video upper endoscope was inserted and advanced using direct visualization into the posterior pharynx and ultimately into the esophagus with  distal extent of the second portion of the duodenum.     Colonoscopy procedure: Once an adequate level of sedation was obtained a digital rectal exam was completed. Then the lubricated tip of the [id number] diagnostic video colonoscope was inserted and advanced without difficulty to the cecum using the CO2 insufflation technique. The cecum was identified by localizing the trifold, the appendix and the ileocecal valve. A routine second examination of the cecum/ascending colon was performed. Withdrawal was begun with thorough washing and careful examination of the colonic walls and folds. Photodocumentation was obtained. The bowel prep was  excellent. Views of the colon were excellent with washing. I then carefully withdrew the instrument from the patient who tolerated the procedure well.     Complications: None    EGD findings:      1. Esophagus: The squamocolumnar junction was noted at 35 cm and appeared regular. The diaphragmatic pinch was noted noted at 35 cm from the incisors. There was a sliding hiatal hernia present. The esophageal mucosa appeared normal. There was no evidence of esophagitis, stricture or endoscopic evidence of Grewal's esophagus. Biopsies were obtained with a cold forceps from the distal and proximal esophagus in separate jars to assess for eosinophilic esophagitis.   2. Stomach: The stomach distended normally. Normal rugal folds were seen. The pylorus was patent. Retroflexion revealed a normal fundus. There were small gastric erosions noted in the antrum. Biopsies were taken with a cold forceps from the antrum, incisura and body for histology.   3. Duodenum: The duodenal mucosa appeared normal in the bulb and 2nd portion of the duodenum.     EGD Impression:  -Esophagus: Sliding hiatal hernia. Biopsied for EoE.   -Stomach: Antral erosions. Biopsied.   -Duodenum: Normal.     Colonoscopy findings:    1. One polyp noted as follows:      A. 3 mm polyp in the ascending colon; sessile morphology; cold snare polypectomy performed, polyp retrieved.  2. Diverticulosis: mild in the left colon.  3. Ileocecal valve appeared normal. The examined portion of the terminal ileum appeared normal.   4. The colonic mucosa throughout the colon showed normal vascular pattern, without evidence of angioectasias or inflammation.   5. A retroflexed view of the rectum appeared normal.   6. ROBIN: normal rectal tone, no masses palpated.     Colonoscopy Impression:  One small (< 5 mm) polyp removed.   Mild left-sided diverticulosis.   Colon was otherwise normal with glistening mucosa and intact vascular pattern throughout.    Recommend:  Await pathology.  The interval for the next colonoscopy will be determined after reviewing pathology. If new signs or symptoms develop, colonoscopy may need to be repeated sooner.   High fiber diet.  Monitor for blood in the stool. If having more than just tinge of blood, call office or go to the ER.  Avoid NSAIDs (motrin, ibuprofen, aleve, advil, naproxen, midol, naprosyn, excedrin) for 14 days.   OK to resume coumadin.     >>>If tissue was obtained and you have not received your pathology results either by phone or letter within 2 weeks, please call our office at [phone].    Specimens: colon polyps, gastric biopsies, distal esophageal biopsies, proximal esophageal biopsies     Blood loss: <1 ml

## 2024-06-07 NOTE — H&P
History & Physical Examination    Patient Name: Manjit Matt  MRN: [id number]  CSN: [id number]  YOB: 1947    Diagnosis: dysphagia, CRC screening -- EGD/colon today      Medications Prior to Admission   Medication Sig Dispense Refill Last Dose    enoxaparin (LOVENOX) 80 MG/0.8ML Injection Solution Prefilled Syringe Inject  70 mg every 12 hours, under the skin while INR is <2.0; or as directed by INR clinic. 20 each 0 6/6/2024 at 0900    warfarin 1 MG Oral Tab Take as directed by INR clinic or take 1 & 1/2 tablets Monday Wednesday Friday, & 1 tablet all other days 120 tablet 1 5 days    acidophilus-pectin Oral Cap Take 1 capsule by mouth daily.   2 wks    Multiple Minerals-Vitamins (CALCIUM CITRATE PLUS/MAGNESIUM) Oral Tab Take 1 tablet by mouth daily.   2 wks    Multiple Vitamins-Minerals (HAIR SKIN AND NAILS FORMULA) Oral Tab Take 1 tablet by mouth daily.   2 wks    rosuvastatin 5 MG Oral Tab Take 1 tablet (5 mg total) by mouth nightly. FOR CHOLESTEROL. (Patient taking differently: Take 1 tablet (5 mg total) by mouth nightly. FOR CHOLESTEROL. PT Is only taking three times a week, Monday Wednesday and friday) 90 tablet 9 6/5/2024 at pm    CILOSTAZOL 100 MG Oral Tab TAKE 1 TABLET(100 MG) BY MOUTH TWICE DAILY 60 tablet 11 1 week    ASPIRIN EC LOW DOSE 81 MG Oral Tab EC Take 1 tablet (81 mg total) by mouth daily.  5 6/6/2024 at 0900    Na Sulfate-K Sulfate-Mg Sulf (SUPREP BOWEL PREP KIT) 17.5-3.13-1.6 GM/177ML Oral Solution Take as directed by GI clinic. Okay to substitute for generic. 1 each 0     cilostazol 100 MG Oral Tab Take 1 tablet (100 mg total) by mouth 2 (two) times daily. (Patient not taking: Reported on 2/9/2024) 180 tablet 3      Current Facility-Administered Medications   Medication Dose Route Frequency    lactated ringers infusion   Intravenous Continuous       Allergies:   Allergies   Allergen Reactions    Levaquin [Levofloxacin] HIVES and SWELLING     Other reaction(s): LEVOFLOXACIN        Past Medical History:    Congenital pulmonary stenosis (HCC)    congenital pulmonary aa stenosis    Congenital pulmonary stenosis (HCC)    SX at 18 y/o / valvuloplasty     Deep vein thrombosis (HCC)    Disease of vein    vein/artery disease    Heart murmur    since birth    Hematologic disorder    Thromboembolic Event    History of blood clots    History of blood transfusion    Peripheral vascular disease (HCC)    Right leg arterial bypass ,    PONV (postoperative nausea and vomiting)    Popliteal artery thrombosis (HCC)    Rt popliteal artery thrombosis - 15cm; Right Lower Extremity thrombosis; was on coumadin for 6 months than on plavix and aggrenox until . w/u negative for coagulopathy but was anticoagulated during pregnancies     Tendinitis    discontinue insole / rx ibuprofen    Valvular disease    Pulmonic stenosis    Visual impairment    glasses     Past Surgical History:   Procedure Laterality Date    Appendectomy      Appendectomy            Cath bare metal stent (bms)      3 stents right leg    Cyst aspiration left Left 2023    US bx    Endometrial ablation      Hysterectomy  10/2018    Laparoscopic hysterectomy with Dr. Dueñas.  Had post op vaginal bleeding and repeat suturing of vaginal cuff.    Leg/ankle surgery proc unlisted      right leg arterial bypass / (fasciotomy)    Needle biopsy left Left 2023    US bx    Other surgical history      SX at 18 y/o / valvuloplasty (congenital Pulmonic stenosis)     Family History   Problem Relation Age of Onset    No Known Problems Father     No Known Problems Mother     Other (Other) Maternal Grandmother         Fibrocystic Breast Disease    Cancer Paternal Grandfather         lung    No Known Problems Sister     Bipolar Disorder Brother     Breast Cancer Maternal Aunt         40's,  BRCA negative    Breast Cancer Maternal Aunt         mat great great aunt breast ca unknown age    Ovarian Cancer Neg      Uterine Cancer Neg     Colon Cancer Neg      Social History     Tobacco Use    Smoking status: Never     Passive exposure: Never    Smokeless tobacco: Never   Substance Use Topics    Alcohol use: Yes     Comment: rare       SYSTEM Check if Review is Normal Check if Physical Exam is Normal If not normal, please explain:   HEENT [X ] [ X]    NECK  [X ] [ X]    HEART [X ] [ X]    LUNGS [X ] [ X]    ABDOMEN [X ] [ X]    EXTREMITIES [X ] [ X]    OTHER        I have discussed the risks and benefits and alternatives of the procedure with the patient/family.  They understand and agree to proceed with plan of care.   I have reviewed the History and Physical done within the last 30 days.  Any changes noted above.    Sandrita Saab MD

## 2024-06-07 NOTE — DISCHARGE INSTRUCTIONS
Home Care Instructions for Colonoscopy and/or Gastroscopy with Sedation    Diet:  - Resume your regular diet as tolerated unless otherwise instructed.  - Start with light meals to minimize bloating.  - Do not drink alcohol today.    Medication:  - If you have questions about resuming your normal medications, please contact your Primary Care Physician.    Activities:  - Take it easy today. Do not return to work today.  - Do not drive today.  - Do not operate any machinery today (including kitchen equipment).    Colonoscopy:  - You may notice some rectal \"spotting\" (a little blood on the toilet tissue) for a day or two after the exam. This is normal.  - If you experience any rectal bleeding (not spotting), persistent tenderness or sharp severe abdominal pains, oral temperature over 100 degrees Fahrenheit, light-headedness or dizziness, or any other problems, contact your doctor.    Gastroscopy:  - You may have a sore throat for 2-3 days following the exam. This is normal. Gargling with warm salt water (1/2 tsp salt to 1 glass warm water) or using throat lozenges will help.  - If you experience any sharp pain in your neck, abdomen or chest, vomiting of blood, oral temperature over 100 degrees Fahrenheit, light-headedness or dizziness, or any other problems, contact your doctor.    **If unable to reach your doctor, please go to the [facility] Emergency Room**    - Your referring physician will receive a full report of your examination.  - If you do not hear from your doctor's office within two weeks of your biopsy, please call them for your results.    You may be able to see your laboratory results in [facility] between 4 and 7 business days.  In some cases, your physician may not have viewed the results before they are released to [facility].  If you have questions regarding your results contact the physician who ordered the test/exam by phone or via [facility] by choosing \"Ask a Medical Question.\"

## 2024-06-08 VITALS
SYSTOLIC BLOOD PRESSURE: 104 MMHG | RESPIRATION RATE: 14 BRPM | OXYGEN SATURATION: 100 % | WEIGHT: 128 LBS | BODY MASS INDEX: 25.8 KG/M2 | HEIGHT: 59 IN | HEART RATE: 67 BPM | DIASTOLIC BLOOD PRESSURE: 72 MMHG

## 2024-06-10 ENCOUNTER — APPOINTMENT (OUTPATIENT)
Dept: PHYSICAL THERAPY | Age: 49
End: 2024-06-10
Attending: ORTHOPAEDIC SURGERY
Payer: COMMERCIAL

## 2024-06-10 ENCOUNTER — ANTI-COAG VISIT (OUTPATIENT)
Dept: ANTICOAGULATION | Facility: CLINIC | Age: 49
End: 2024-06-10

## 2024-06-10 DIAGNOSIS — Z79.01 MONITORING FOR LONG-TERM ANTICOAGULANT USE: ICD-10-CM

## 2024-06-10 DIAGNOSIS — I73.9 PERIPHERAL VASCULAR DISEASE (HCC): ICD-10-CM

## 2024-06-10 DIAGNOSIS — Z79.01 LONG TERM (CURRENT) USE OF ANTICOAGULANTS: Primary | ICD-10-CM

## 2024-06-10 DIAGNOSIS — Z51.81 MONITORING FOR LONG-TERM ANTICOAGULANT USE: ICD-10-CM

## 2024-06-10 DIAGNOSIS — I37.9 PULMONIC VALVE DISEASE: ICD-10-CM

## 2024-06-10 LAB — INR: 1.5 (ref 2–3)

## 2024-06-10 PROCEDURE — 93793 ANTICOAG MGMT PT WARFARIN: CPT | Performed by: FAMILY MEDICINE

## 2024-06-10 NOTE — PROGRESS NOTES
[facility] / INR 1.5, sub therapeutic. (Goal 2.5 ) TTR 76.6 %     Etiology: Biopsy's done with GI studies on Friday.     PLAN: continue Lovenox. And warfarin 1mg     Recheck INR Wednesday.    Pt reports:    No sign of unusual bruising or bleeding.  Any missed doses: Yes -restarted Friday @ 1.5mg  Medications changes: yes    Contacted  Manjit by phone  who verbalized understanding and agreement.    WARFARIN Plan per protocol: 1.5 mg every Sun, Wed; 1 mg all other days

## 2024-06-12 ENCOUNTER — ANTI-COAG VISIT (OUTPATIENT)
Dept: ANTICOAGULATION | Facility: CLINIC | Age: 49
End: 2024-06-12

## 2024-06-12 DIAGNOSIS — I73.9 PERIPHERAL VASCULAR DISEASE (HCC): ICD-10-CM

## 2024-06-12 DIAGNOSIS — I37.9 PULMONIC VALVE DISEASE: ICD-10-CM

## 2024-06-12 DIAGNOSIS — Z51.81 MONITORING FOR LONG-TERM ANTICOAGULANT USE: ICD-10-CM

## 2024-06-12 DIAGNOSIS — Z79.01 LONG TERM (CURRENT) USE OF ANTICOAGULANTS: Primary | ICD-10-CM

## 2024-06-12 DIAGNOSIS — Z79.01 MONITORING FOR LONG-TERM ANTICOAGULANT USE: ICD-10-CM

## 2024-06-12 LAB — INR: 1.7 (ref 2–3)

## 2024-06-12 PROCEDURE — 93793 ANTICOAG MGMT PT WARFARIN: CPT | Performed by: FAMILY MEDICINE

## 2024-06-12 NOTE — PROGRESS NOTES
[facility]  / INR 1.7, sub therapeutic. (Goal 2.5 ) TTR 76.2 %     Etiology: Post procedure hold. Back on warfarin for 5 days. [facility] syringes not 16mL. [facility] said they provided the incorrect number of syringes and Manjit can come  the extra.     PLAN: Continue warfarin and Lovenox. (Get more syringes)    Recheck INR Friday.    Pt reports:    No sign of unusual bruising or bleeding.  Any missed doses: last week.  Medications changes: Yes    Contacted  Manjit by phone  who verbalized understanding and agreement.    WARFARIN Plan per protocol: 6/13: 1.5 mg; Otherwise 1.5 mg every Sun, Wed; 1 mg all other days

## 2024-06-12 NOTE — PROGRESS NOTES
ANDRIY Dawson -     GI Staff:    Can you please place recall for this patient to have a colonoscopy in 7 years.    Thank you,  Sandrita

## 2024-06-13 ENCOUNTER — TELEPHONE (OUTPATIENT)
Facility: CLINIC | Age: 49
End: 2024-06-13

## 2024-06-13 ENCOUNTER — APPOINTMENT (OUTPATIENT)
Dept: PHYSICAL THERAPY | Age: 49
End: 2024-06-13
Attending: ORTHOPAEDIC SURGERY
Payer: COMMERCIAL

## 2024-06-13 NOTE — TELEPHONE ENCOUNTER
----- Message from Sandrita Saab sent at 6/12/2024  6:48 PM CDT -----  ANDRIY Dawson -     GI Staff:    Can you please place recall for this patient to have a colonoscopy in 7 years.    Thank you,  Sandrita

## 2024-06-13 NOTE — TELEPHONE ENCOUNTER
Recall colonoscopy in 7 years per Dr Saab.    Colon done 6/7/2024    Health maintenance updated and message sent to patient outreach to repeat colonoscopy in 7 years

## 2024-06-14 ENCOUNTER — TELEPHONE (OUTPATIENT)
Dept: ORTHOPEDICS CLINIC | Facility: CLINIC | Age: 49
End: 2024-06-14

## 2024-06-14 ENCOUNTER — TELEPHONE (OUTPATIENT)
Dept: INTERNAL MEDICINE CLINIC | Facility: CLINIC | Age: 49
End: 2024-06-14

## 2024-06-14 ENCOUNTER — OFFICE VISIT (OUTPATIENT)
Dept: ORTHOPEDICS CLINIC | Facility: CLINIC | Age: 49
End: 2024-06-14
Payer: COMMERCIAL

## 2024-06-14 ENCOUNTER — ANTI-COAG VISIT (OUTPATIENT)
Dept: ANTICOAGULATION | Facility: CLINIC | Age: 49
End: 2024-06-14

## 2024-06-14 DIAGNOSIS — I73.9 PERIPHERAL VASCULAR DISEASE (HCC): ICD-10-CM

## 2024-06-14 DIAGNOSIS — M76.62 LEFT ACHILLES TENDINITIS: Primary | ICD-10-CM

## 2024-06-14 DIAGNOSIS — Z51.81 MONITORING FOR LONG-TERM ANTICOAGULANT USE: ICD-10-CM

## 2024-06-14 DIAGNOSIS — Z79.01 LONG TERM (CURRENT) USE OF ANTICOAGULANTS: Primary | ICD-10-CM

## 2024-06-14 DIAGNOSIS — I37.9 PULMONIC VALVE DISEASE: ICD-10-CM

## 2024-06-14 DIAGNOSIS — S86.312D PERONEAL TENDON TEAR, LEFT, SUBSEQUENT ENCOUNTER: ICD-10-CM

## 2024-06-14 DIAGNOSIS — Z79.01 MONITORING FOR LONG-TERM ANTICOAGULANT USE: ICD-10-CM

## 2024-06-14 LAB — INR: 2.1 (ref 2–3)

## 2024-06-14 PROCEDURE — 99215 OFFICE O/P EST HI 40 MIN: CPT | Performed by: STUDENT IN AN ORGANIZED HEALTH CARE EDUCATION/TRAINING PROGRAM

## 2024-06-14 PROCEDURE — 93793 ANTICOAG MGMT PT WARFARIN: CPT | Performed by: FAMILY MEDICINE

## 2024-06-14 NOTE — PATIENT INSTRUCTIONS
Dear Manjit Matt ,    It was a pleasure seeing you today. I saw you for your left ankle. Today we discussed the diagnosis, management options including both operative and nonoperative treatment, and anticipated follow-up plans.      Please don't hesitate to reach out if you have any questions or concerns. We will plan to follow-up next for surgery.     Sincerely,  Dr. Dwight Lomeli

## 2024-06-14 NOTE — H&P (VIEW-ONLY)
Orthopaedic Surgery Follow-up Patient Visit  _______________________________________________________________________________________________________________  _______________________________________________________________________________________________________________    DATE OF VISIT: 6/14/2024     CHIEF COMPLAINT: Follow-up left ankle    Chief Complaint   Patient presents with    Ankle Pain     L ankle f/u- here for MRI result        HISTORY OF PRESENT ILLNESS: Manjit Matt is a 48 year old female who presents to the clinic for follow-up for her left ankle. Since last visit, she obtained an MRI.  We briefly discussed this through patient messaging but she comes now to have a more in-depth discussion.  We discussed the results as well as the ramifications.  She reports that she continues to have functionally limiting pain to her left ankle that has substantially affected her quality of life and ability to function.  This pain has been present for well over 1 year and has thus far been refractory to activity modification, boot, cryotherapy, injections, and multiple rounds of physical therapy.  She denies any recurrent injuries or specific instability of the ankle but has severe pain on the posterior and posterior lateral aspects of the ankle.  This makes it difficult for her to walk any prolonged distances and she has been unable to go hiking with her family.  She now is interested in potential operative intervention    SOCIAL HISTORY  Social History     Socioeconomic History    Marital status:      Spouse name: Not on file    Number of children: Not on file    Years of education: Not on file    Highest education level: Not on file   Occupational History    Not on file   Tobacco Use    Smoking status: Never     Passive exposure: Never    Smokeless tobacco: Never   Vaping Use    Vaping status: Never Used   Substance and Sexual Activity    Alcohol use: Yes     Comment: rare    Drug use: No    Sexual activity:  Yes     Partners: Male   Other Topics Concern     Service Not Asked    Blood Transfusions Not Asked    Caffeine Concern Yes     Comment: coffee, 1-2 cups daily    Occupational Exposure Not Asked    Hobby Hazards Not Asked    Sleep Concern Not Asked    Stress Concern Not Asked    Weight Concern Not Asked    Special Diet Not Asked    Back Care Not Asked    Exercise Not Asked    Bike Helmet Not Asked    Seat Belt Not Asked    Self-Exams Not Asked    Grew up on a farm Not Asked    History of tanning Not Asked    Outdoor occupation Not Asked    Pt has a pacemaker No    Pt has a defibrillator No    Breast feeding Not Asked    Reaction to local anesthetic No   Social History Narrative    Not on file     Social Determinants of Health     Financial Resource Strain: Not on file   Food Insecurity: Not on file   Transportation Needs: Not on file   Physical Activity: Not on file   Stress: Not on file   Social Connections: Not on file   Housing Stability: Not on file        PAST MEDICAL HISTORY  Past Medical History:    Congenital pulmonary stenosis (HCC)    congenital pulmonary aa stenosis    Congenital pulmonary stenosis (HCC)    SX at 20 y/o / valvuloplasty 1995    Deep vein thrombosis (HCC)    Disease of vein    vein/artery disease    Heart murmur    since birth    Hematologic disorder    Thromboembolic Event    History of blood clots    History of blood transfusion    Peripheral vascular disease (HCC)    Right leg arterial bypass 2001,2012    PONV (postoperative nausea and vomiting)    Popliteal artery thrombosis (HCC)    Rt popliteal artery thrombosis - 15cm; Right Lower Extremity thrombosis; was on coumadin for 6 months than on plavix and aggrenox until 2007. w/u negative for coagulopathy but was anticoagulated during pregnancies     Tendinitis    discontinue insole / rx ibuprofen    Valvular disease    Pulmonic stenosis    Visual impairment    glasses        PAST SURGICAL HISTORY  Past Surgical History:    Procedure Laterality Date    Appendectomy      Appendectomy            Cath bare metal stent (bms)      3 stents right leg    Colonoscopy N/A 2024    Procedure: COLONOSCOPY;  Surgeon: Sandrita Saab MD;  Location: [facility]    Cyst aspiration left Left 2023    US bx    Endometrial ablation      Hysterectomy  10/2018    Laparoscopic hysterectomy with Dr. Dueñas.  Had post op vaginal bleeding and repeat suturing of vaginal cuff.    Leg/ankle surgery proc unlisted      right leg arterial bypass / (fasciotomy)    Needle biopsy left Left 2023    US bx    Other surgical history      SX at 18 y/o / valvuloplasty (congenital Pulmonic stenosis)        MEDICATIONS  Reviewed  No outpatient medications have been marked as taking for the 24 encounter (Office Visit) with Dwight Lomeli MD.        ALLERGIES  Allergies   Allergen Reactions    Levaquin [Levofloxacin] HIVES and SWELLING     Other reaction(s): LEVOFLOXACIN        FAMILY HISTORY  Family History   Problem Relation Age of Onset    No Known Problems Father     No Known Problems Mother     Other (Other) Maternal Grandmother         Fibrocystic Breast Disease    Cancer Paternal Grandfather         lung    No Known Problems Sister     Bipolar Disorder Brother     Breast Cancer Maternal Aunt         40's,  BRCA negative    Breast Cancer Maternal Aunt         mat great great aunt breast ca unknown age    Ovarian Cancer Neg     Uterine Cancer Neg     Colon Cancer Neg         REVIEW OF SYSTEMS  A 14 point review of systems was performed. Pertinent positives and negatives noted in the HPI.    PHYSICAL EXAM  LMP 2018      Constitutional: The patient is well-developed, well-nourished, in no acute distress.  Neurological: Alert and oriented to person, place, and time.  Psychiatric: Mood and affect normal.  Head: Normocephalic and atraumatic.  Cardiovascular: regular rate by palpation  Pulmonary/Chest: Effort  normal. No respiratory distress. Breathing non-labored  Abdominal: Abdomen exhibits no distension.   Left  FOOT/ANKLE  INSPECTION/PALPATION  Enlargement of the Achilles tendon  No ecchymosis, erythema, or effusion.   No breaks in skin. Skin is euthermic.  Neutral alignment on standing, antalgic gait  Normal foot arch  TTP over  peroneals, achilles  ROM  Dorsiflexion: 20 degrees  Plantarflexion: 50 degrees  Inversion: 20 degrees  Eversion: 15 degrees  MOTOR/STRENGTH  Dorsiflexion: 5/5  Plantarflexion: 5/5 though painful  Inversion: 5/5  Eversion: 4+/5  Fires EHL/FHL, able to move all toes actively  STABILITY  Stable anterior drawer, eversion stress  Negative syndesmotic squeeze, external rotation stress  NEGATIVE  Dewitt squeeze, matles test  Peroneal tendon subluxation  SILT Maame/Saph/SPN/DPN/T  2+ DP/PT pulse, brisk capillary refill, foot warm & well perfused     IMAGING  I independently viewed and interpreted the imaging. Radiologist interpretation is available in the imaging report.  MRI: MRI of the left ankle was reviewed.  This demonstrates thickening of the Achilles tendon with increased signal intensity suggestive of potential degeneration and tendinopathy.  There are partial-thickness tears of the peroneal tendons, particularly the peroneus brevis.  No cartilaginous lesions.  No notable degenerative changes in the ankle joint.  Ligaments all appear grossly intact    MRI ANKLE, LEFT (CPT=73721)    Result Date: 5/20/2024  PROCEDURE:  MRI ANKLE, LEFT (CPT=73721)  COMPARISON:  None.  INDICATIONS:  M76.62 Left Achilles tendinitis  TECHNIQUE:  Multiplanar imaging of the ankle is acquired including axial, sagittal and coronal imaging. Proton density, T2 weighted and fat suppression sequences are included.  Exam was performed without intravenous gadolinium contrast.  PATIENT STATED HISTORY: (As transcribed by Technologist)  Patient being evaluated for left achilles tendinitis    FINDINGS:  JOINT COMPARTMENTS:  No  fracture or malalignment.  No osteochondral lesion of the talar dome or subtalar joint.  Joint spaces are preserved.  No significant joint effusions.  Mild edema of Kager's fat pad. MUSCLULATURE:  No strain, edema, or atrophy. TENDONS:  Normal signal and morphology of the flexor and extensor tendons.  Normal peroneus longus tendon.  Partial-thickness interstitial split tear of the retro malleolar and inframalleolar segments of the peroneus brevis tendon encompasses less than 50 percent of the tendon thickness (series 4, image 14-22).  Thickening and intermediate signal of the distal Achilles tendon over a 6 cm length consistent with moderate tendinopathy.  No discrete tendon tear. LIGAMENTS:  Syndesmotic ligaments, ATF, PTF, CF, deltoid, and spring ligaments are intact without significant sprain. SINUS TARSI:  Normal fat signal.  Cervical and Interosseous ligaments are unremarkable. PLANTAR FASCIA:  Central and lateral cords are intact.  No fasciitis identified.            CONCLUSION:   1. Moderate fusiform thickening and intermediate signal of the Achilles tendon consistent with moderate tendinopathy.  No discrete tendon tear.  2. Partial-thickness interstitial split tear of the retro malleolar and inframalleolar segments of the peroneus brevis tendon encompassing less than 50 percent of the tendon thickness.  3. Intact ankle ligaments.  4. No fracture, stress injury, or osteochondral injury.    LOCATION:  [facility]   Dictated by (CST): Sabrina Plata MD on 5/20/2024 at 7:53 AM     Finalized by (CST): Sabrina Plata MD on 5/20/2024 at 8:02 AM         ASSESSMENT/PLAN: Manjit Matt is a 48 year old female who presents to the Orthopaedic surgery clinic today for follow-up for her left ankle chronic pain that is consistent with Achilles tendinopathy and peroneal tendon tearing.  This is been present for over a year and has thus far been refractory to all conservative measures. The management options for this process were  discussed with the patient to include conservative and surgical options. Given the patient's age, functional status, and findings, I suggested that several options would be reasonable including surgery.  She elected to proceed with operative options, which would include debridement of the Achilles tendon, PRP injection, and repair with potential graft augmentation of the peroneal tendons.      - General risks of surgery reviewed including risks of pain, bleeding, infection, scarring, damage to surrounding structures, need for further procedures, blood transfusion, VTE, risks of anesthesia, failure to improve symptoms, and recurrence of disease.    -  Patient will be placed on ERAS pathway. Written and verbal pre-op instructions given, including information regarding pre-op diet, utilization of chlorhexidine, and expectations for post-op activity. Discussed expected course of recovery and post-op activity restrictions.  - Discussed discharge pathway.  - Discussed postoperative pain management and  expectation that patient may require narcotic medication as an adjunct to multi-modal analgesic therapy.  - All questions were answered and patient desires to proceed.   - Plan to sign consents on the day of surgery.   - Preoperative testing needed: UPT on day of surgery  - See below for specific surgical planning details:      Surgical Planning:  Procedure: Left ankle Achilles tendon debridement, PRP injection, peroneal tendon repair with possible graft augmentation  PMH considerations: History of right lower extremity blood clot complicated by necessity for arterial bypass  PSH considerations: Right lower extremity arterial bypass  Drug Allergies: Levofloxacin  Anesthesia issues:  no FHx or personal problems with anesthesia  Planned positioning: Lateral  Bleeding Issues: Notable personal history of clotting issues, we will therefore plan to operate through anticoagulation rather than hold  Pre-op consultations needed:  PCM  Additional pre-op imaging needed: None  Counseled for smoking cessation to improve OR outcome: N/A  Special equipment needed in the operating room: ArthroFlex  Post-op pain regimen:  icing, elevation, Tylenol, celebrex, gabapentin, oxyodone  Informed Consent: to be signed on day of surgery    I have personally seen Manjit Matt and discussed in detail their plan of care. Prior to departure, they indicated agreement with and understanding of their plan of care and their follow-up as documented herein this note. Please note that this note was written in combination with voice recognition/dictation software and there is a possibility of transcription errors which were not identified at the time of note submission. If clarification is necessary, please contact the author or clinic staff.    Dwight Lomeli MD  Orthopaedic Surgery  6/14/2024

## 2024-06-14 NOTE — TELEPHONE ENCOUNTER
Type of surgery:  Left Achilles debridement, peroneal debridement and repair, PRP  Date: 6/19/24  Location: [facility]  Medical Clearance:      *Medical: Yes      *Dental:      *Other:  Prior Authorization Status: Pending   Workers Comp:  Medacta/Matias:  Egan: Yes  POV: 7/5/24

## 2024-06-14 NOTE — TELEPHONE ENCOUNTER
Patient called in and surgery date is 6/19/24.and wanted to say she is available today or Monday or Tuesday after 2pm. Please advise

## 2024-06-14 NOTE — PROGRESS NOTES
[facility] / INR 2.1,  therapeutic. (Goal 2.5 ) TTR 75.9 %     Etiology: Post hold. She will be having a second procedure next week. Ortho advised no hold necessary. For tendon repairs.     PLAN: Stop Lovenox (she did not take one this AM). Continue the current warfarin dose.     Recheck INR next week and as needed.    Pt reports:    No sign of unusual bruising or bleeding.  Any missed doses: Yes last week.   Medications changes: Yes. Stop Lovenox    Contacted  Manjit by phone  who verbalized understanding and agreement.    WARFARIN Plan per protocol: 1.5 mg every Sun, Wed; 1 mg all other days

## 2024-06-14 NOTE — TELEPHONE ENCOUNTER
Per patient she is going to have surgery on 06/20/2024 for her Achilles tendon and need to come in as soon as possible to have medical clearance either today or Monday. Please advise.    Please reply to pool: [facility] IM [facility] ALG RHE

## 2024-06-14 NOTE — TELEPHONE ENCOUNTER
Patient was given pre surgical info today in clinic. [facility] Surgery Check List (Medical clearance).   Spoke with patient to offer surgery date of 6/19. She accepted and understands that she must have medical for this surgery.

## 2024-06-14 NOTE — TELEPHONE ENCOUNTER
Ortho Surgery Request  Surgery: Left Achilles debridement, peroneal tendon debridement and repair, PRP      Surgical Assistant: Florentin Salomon  Surgery Day Request: 19 June, 25 June in PM, 25 July anytime  Estimated Procedure Length: 1.5 hours  Anesthesia type: General   Position: Left lateral   Special Needs:PRP centrifuge  Equipment: PRP equipment, arthroflex dermal allograft  [facility]  Post Op Visit Date: 2 weeks  CPT Code: 56998, 0232T, 80514       ICD Code:M76.62, S86.312D  Medical Clearance Needed: Medical. Does not need to hold/discontinue anticoagulants or antiplatelet medications  Preadmission Testing Orders:  Anesthesia testing protocols and anesthesia pre op orders will be used  Additional PAT orders:  Pre OP Orders:  Use [facility] procedure driven order set in addition to anesthesia protocol  Additional Pre Op Orders:  PCN Allergy:  No  If Yes:   __X__  Admit:  Hospital outpatient surgery

## 2024-06-14 NOTE — PROGRESS NOTES
Orthopaedic Surgery Follow-up Patient Visit  _______________________________________________________________________________________________________________  _______________________________________________________________________________________________________________    DATE OF VISIT: 6/14/2024     CHIEF COMPLAINT: Follow-up left ankle    Chief Complaint   Patient presents with    Ankle Pain     L ankle f/u- here for MRI result        HISTORY OF PRESENT ILLNESS: Manjit Matt is a 48 year old female who presents to the clinic for follow-up for her left ankle. Since last visit, she obtained an MRI.  We briefly discussed this through patient messaging but she comes now to have a more in-depth discussion.  We discussed the results as well as the ramifications.  She reports that she continues to have functionally limiting pain to her left ankle that has substantially affected her quality of life and ability to function.  This pain has been present for well over 1 year and has thus far been refractory to activity modification, boot, cryotherapy, injections, and multiple rounds of physical therapy.  She denies any recurrent injuries or specific instability of the ankle but has severe pain on the posterior and posterior lateral aspects of the ankle.  This makes it difficult for her to walk any prolonged distances and she has been unable to go hiking with her family.  She now is interested in potential operative intervention    SOCIAL HISTORY  Social History     Socioeconomic History    Marital status:      Spouse name: Not on file    Number of children: Not on file    Years of education: Not on file    Highest education level: Not on file   Occupational History    Not on file   Tobacco Use    Smoking status: Never     Passive exposure: Never    Smokeless tobacco: Never   Vaping Use    Vaping status: Never Used   Substance and Sexual Activity    Alcohol use: Yes     Comment: rare    Drug use: No    Sexual activity:  Yes     Partners: Male   Other Topics Concern     Service Not Asked    Blood Transfusions Not Asked    Caffeine Concern Yes     Comment: coffee, 1-2 cups daily    Occupational Exposure Not Asked    Hobby Hazards Not Asked    Sleep Concern Not Asked    Stress Concern Not Asked    Weight Concern Not Asked    Special Diet Not Asked    Back Care Not Asked    Exercise Not Asked    Bike Helmet Not Asked    Seat Belt Not Asked    Self-Exams Not Asked    Grew up on a farm Not Asked    History of tanning Not Asked    Outdoor occupation Not Asked    Pt has a pacemaker No    Pt has a defibrillator No    Breast feeding Not Asked    Reaction to local anesthetic No   Social History Narrative    Not on file     Social Determinants of Health     Financial Resource Strain: Not on file   Food Insecurity: Not on file   Transportation Needs: Not on file   Physical Activity: Not on file   Stress: Not on file   Social Connections: Not on file   Housing Stability: Not on file        PAST MEDICAL HISTORY  Past Medical History:    Congenital pulmonary stenosis (HCC)    congenital pulmonary aa stenosis    Congenital pulmonary stenosis (HCC)    SX at 18 y/o / valvuloplasty 1995    Deep vein thrombosis (HCC)    Disease of vein    vein/artery disease    Heart murmur    since birth    Hematologic disorder    Thromboembolic Event    History of blood clots    History of blood transfusion    Peripheral vascular disease (HCC)    Right leg arterial bypass 2001,2012    PONV (postoperative nausea and vomiting)    Popliteal artery thrombosis (HCC)    Rt popliteal artery thrombosis - 15cm; Right Lower Extremity thrombosis; was on coumadin for 6 months than on plavix and aggrenox until 2007. w/u negative for coagulopathy but was anticoagulated during pregnancies     Tendinitis    discontinue insole / rx ibuprofen    Valvular disease    Pulmonic stenosis    Visual impairment    glasses        PAST SURGICAL HISTORY  Past Surgical History:    Procedure Laterality Date    Appendectomy      Appendectomy            Cath bare metal stent (bms)      3 stents right leg    Colonoscopy N/A 2024    Procedure: COLONOSCOPY;  Surgeon: Sandrita Saab MD;  Location: [facility] END[facility]    Cyst aspiration left Left 2023    US bx    Endometrial ablation      Hysterectomy  10/2018    Laparoscopic hysterectomy with Dr. Dueñas.  Had post op vaginal bleeding and repeat suturing of vaginal cuff.    Leg/ankle surgery proc unlisted      right leg arterial bypass / (fasciotomy)    Needle biopsy left Left 2023    US bx    Other surgical history      SX at 18 y/o / valvuloplasty (congenital Pulmonic stenosis)        MEDICATIONS  Reviewed  No outpatient medications have been marked as taking for the 24 encounter (Office Visit) with Dwight Lomeli MD.        ALLERGIES  Allergies   Allergen Reactions    Levaquin [Levofloxacin] HIVES and SWELLING     Other reaction(s): LEVOFLOXACIN        FAMILY HISTORY  Family History   Problem Relation Age of Onset    No Known Problems Father     No Known Problems Mother     Other (Other) Maternal Grandmother         Fibrocystic Breast Disease    Cancer Paternal Grandfather         lung    No Known Problems Sister     Bipolar Disorder Brother     Breast Cancer Maternal Aunt         40's,  BRCA negative    Breast Cancer Maternal Aunt         mat great great aunt breast ca unknown age    Ovarian Cancer Neg     Uterine Cancer Neg     Colon Cancer Neg         REVIEW OF SYSTEMS  A 14 point review of systems was performed. Pertinent positives and negatives noted in the HPI.    PHYSICAL EXAM  LMP 2018      Constitutional: The patient is well-developed, well-nourished, in no acute distress.  Neurological: Alert and oriented to person, place, and time.  Psychiatric: Mood and affect normal.  Head: Normocephalic and atraumatic.  Cardiovascular: regular rate by palpation  Pulmonary/Chest: Effort  normal. No respiratory distress. Breathing non-labored  Abdominal: Abdomen exhibits no distension.   Left  FOOT/ANKLE  INSPECTION/PALPATION  Enlargement of the Achilles tendon  No ecchymosis, erythema, or effusion.   No breaks in skin. Skin is euthermic.  Neutral alignment on standing, antalgic gait  Normal foot arch  TTP over  peroneals, achilles  ROM  Dorsiflexion: 20 degrees  Plantarflexion: 50 degrees  Inversion: 20 degrees  Eversion: 15 degrees  MOTOR/STRENGTH  Dorsiflexion: 5/5  Plantarflexion: 5/5 though painful  Inversion: 5/5  Eversion: 4+/5  Fires EHL/FHL, able to move all toes actively  STABILITY  Stable anterior drawer, eversion stress  Negative syndesmotic squeeze, external rotation stress  NEGATIVE  Dewitt squeeze, matles test  Peroneal tendon subluxation  SILT Maame/Saph/SPN/DPN/T  2+ DP/PT pulse, brisk capillary refill, foot warm & well perfused     IMAGING  I independently viewed and interpreted the imaging. Radiologist interpretation is available in the imaging report.  MRI: MRI of the left ankle was reviewed.  This demonstrates thickening of the Achilles tendon with increased signal intensity suggestive of potential degeneration and tendinopathy.  There are partial-thickness tears of the peroneal tendons, particularly the peroneus brevis.  No cartilaginous lesions.  No notable degenerative changes in the ankle joint.  Ligaments all appear grossly intact    MRI ANKLE, LEFT (CPT=73721)    Result Date: 5/20/2024  PROCEDURE:  MRI ANKLE, LEFT (CPT=73721)  COMPARISON:  None.  INDICATIONS:  M76.62 Left Achilles tendinitis  TECHNIQUE:  Multiplanar imaging of the ankle is acquired including axial, sagittal and coronal imaging. Proton density, T2 weighted and fat suppression sequences are included.  Exam was performed without intravenous gadolinium contrast.  PATIENT STATED HISTORY: (As transcribed by Technologist)  Patient being evaluated for left achilles tendinitis    FINDINGS:  JOINT COMPARTMENTS:  No  fracture or malalignment.  No osteochondral lesion of the talar dome or subtalar joint.  Joint spaces are preserved.  No significant joint effusions.  Mild edema of Kager's fat pad. MUSCLULATURE:  No strain, edema, or atrophy. TENDONS:  Normal signal and morphology of the flexor and extensor tendons.  Normal peroneus longus tendon.  Partial-thickness interstitial split tear of the retro malleolar and inframalleolar segments of the peroneus brevis tendon encompasses less than 50 percent of the tendon thickness (series 4, image 14-22).  Thickening and intermediate signal of the distal Achilles tendon over a 6 cm length consistent with moderate tendinopathy.  No discrete tendon tear. LIGAMENTS:  Syndesmotic ligaments, ATF, PTF, CF, deltoid, and spring ligaments are intact without significant sprain. SINUS TARSI:  Normal fat signal.  Cervical and Interosseous ligaments are unremarkable. PLANTAR FASCIA:  Central and lateral cords are intact.  No fasciitis identified.            CONCLUSION:   1. Moderate fusiform thickening and intermediate signal of the Achilles tendon consistent with moderate tendinopathy.  No discrete tendon tear.  2. Partial-thickness interstitial split tear of the retro malleolar and inframalleolar segments of the peroneus brevis tendon encompassing less than 50 percent of the tendon thickness.  3. Intact ankle ligaments.  4. No fracture, stress injury, or osteochondral injury.    LOCATION:  [facility]   Dictated by (CST): Sabrina Plata MD on 5/20/2024 at 7:53 AM     Finalized by (CST): Sabrina Plata MD on 5/20/2024 at 8:02 AM         ASSESSMENT/PLAN: Manjit Matt is a 48 year old female who presents to the Orthopaedic surgery clinic today for follow-up for her left ankle chronic pain that is consistent with Achilles tendinopathy and peroneal tendon tearing.  This is been present for over a year and has thus far been refractory to all conservative measures. The management options for this process were  discussed with the patient to include conservative and surgical options. Given the patient's age, functional status, and findings, I suggested that several options would be reasonable including surgery.  She elected to proceed with operative options, which would include debridement of the Achilles tendon, PRP injection, and repair with potential graft augmentation of the peroneal tendons.      - General risks of surgery reviewed including risks of pain, bleeding, infection, scarring, damage to surrounding structures, need for further procedures, blood transfusion, VTE, risks of anesthesia, failure to improve symptoms, and recurrence of disease.    -  Patient will be placed on ERAS pathway. Written and verbal pre-op instructions given, including information regarding pre-op diet, utilization of chlorhexidine, and expectations for post-op activity. Discussed expected course of recovery and post-op activity restrictions.  - Discussed discharge pathway.  - Discussed postoperative pain management and  expectation that patient may require narcotic medication as an adjunct to multi-modal analgesic therapy.  - All questions were answered and patient desires to proceed.   - Plan to sign consents on the day of surgery.   - Preoperative testing needed: UPT on day of surgery  - See below for specific surgical planning details:      Surgical Planning:  Procedure: Left ankle Achilles tendon debridement, PRP injection, peroneal tendon repair with possible graft augmentation  PMH considerations: History of right lower extremity blood clot complicated by necessity for arterial bypass  PSH considerations: Right lower extremity arterial bypass  Drug Allergies: Levofloxacin  Anesthesia issues:  no FHx or personal problems with anesthesia  Planned positioning: Lateral  Bleeding Issues: Notable personal history of clotting issues, we will therefore plan to operate through anticoagulation rather than hold  Pre-op consultations needed:  PCM  Additional pre-op imaging needed: None  Counseled for smoking cessation to improve OR outcome: N/A  Special equipment needed in the operating room: ArthroFlex  Post-op pain regimen:  icing, elevation, Tylenol, celebrex, gabapentin, oxyodone  Informed Consent: to be signed on day of surgery    I have personally seen Manjit Matt and discussed in detail their plan of care. Prior to departure, they indicated agreement with and understanding of their plan of care and their follow-up as documented herein this note. Please note that this note was written in combination with voice recognition/dictation software and there is a possibility of transcription errors which were not identified at the time of note submission. If clarification is necessary, please contact the author or clinic staff.    Dwight Lomeli MD  Orthopaedic Surgery  6/14/2024

## 2024-06-17 ENCOUNTER — LAB ENCOUNTER (OUTPATIENT)
Dept: LAB | Age: 49
End: 2024-06-17
Attending: STUDENT IN AN ORGANIZED HEALTH CARE EDUCATION/TRAINING PROGRAM

## 2024-06-17 ENCOUNTER — APPOINTMENT (OUTPATIENT)
Dept: PHYSICAL THERAPY | Age: 49
End: 2024-06-17
Attending: ORTHOPAEDIC SURGERY
Payer: COMMERCIAL

## 2024-06-17 ENCOUNTER — OFFICE VISIT (OUTPATIENT)
Dept: INTERNAL MEDICINE CLINIC | Facility: CLINIC | Age: 49
End: 2024-06-17

## 2024-06-17 VITALS
WEIGHT: 133 LBS | TEMPERATURE: 99 F | HEART RATE: 77 BPM | HEIGHT: 59 IN | DIASTOLIC BLOOD PRESSURE: 66 MMHG | SYSTOLIC BLOOD PRESSURE: 104 MMHG | BODY MASS INDEX: 26.81 KG/M2 | OXYGEN SATURATION: 99 %

## 2024-06-17 DIAGNOSIS — Z01.818 PREOP TESTING: ICD-10-CM

## 2024-06-17 DIAGNOSIS — E78.2 MIXED HYPERLIPIDEMIA: ICD-10-CM

## 2024-06-17 DIAGNOSIS — I73.9 PERIPHERAL VASCULAR DISEASE (HCC): ICD-10-CM

## 2024-06-17 DIAGNOSIS — S86.312S TEAR OF PERONEAL TENDON, LEFT, SEQUELA: ICD-10-CM

## 2024-06-17 DIAGNOSIS — M76.62 TENDONITIS, ACHILLES, LEFT: ICD-10-CM

## 2024-06-17 DIAGNOSIS — Z01.818 PREOP GENERAL PHYSICAL EXAM: Primary | ICD-10-CM

## 2024-06-17 DIAGNOSIS — I37.9 PULMONIC VALVE DISEASE: ICD-10-CM

## 2024-06-17 LAB
ALBUMIN SERPL-MCNC: 4.4 G/DL (ref 3.2–4.8)
ALBUMIN/GLOB SERPL: 1.5 {RATIO} (ref 1–2)
ALP LIVER SERPL-CCNC: 76 U/L
ALT SERPL-CCNC: 58 U/L
ANION GAP SERPL CALC-SCNC: 6 MMOL/L (ref 0–18)
AST SERPL-CCNC: 36 U/L (ref ?–34)
BASOPHILS # BLD AUTO: 0.06 X10(3) UL (ref 0–0.2)
BASOPHILS NFR BLD AUTO: 0.8 %
BILIRUB SERPL-MCNC: 0.4 MG/DL (ref 0.3–1.2)
BUN BLD-MCNC: 20 MG/DL (ref 9–23)
BUN/CREAT SERPL: 23.8 (ref 10–20)
CALCIUM BLD-MCNC: 9 MG/DL (ref 8.7–10.4)
CHLORIDE SERPL-SCNC: 109 MMOL/L (ref 98–112)
CO2 SERPL-SCNC: 27 MMOL/L (ref 21–32)
CREAT BLD-MCNC: 0.84 MG/DL
DEPRECATED RDW RBC AUTO: 44.6 FL (ref 35.1–46.3)
EGFRCR SERPLBLD CKD-EPI 2021: 86 ML/MIN/1.73M2 (ref 60–?)
EOSINOPHIL # BLD AUTO: 0.2 X10(3) UL (ref 0–0.7)
EOSINOPHIL NFR BLD AUTO: 2.7 %
ERYTHROCYTE [DISTWIDTH] IN BLOOD BY AUTOMATED COUNT: 12.2 % (ref 11–15)
GLOBULIN PLAS-MCNC: 2.9 G/DL (ref 2–3.5)
GLUCOSE BLD-MCNC: 118 MG/DL (ref 70–99)
HCT VFR BLD AUTO: 36 %
HGB BLD-MCNC: 12.2 G/DL
IMM GRANULOCYTES # BLD AUTO: 0.01 X10(3) UL (ref 0–1)
IMM GRANULOCYTES NFR BLD: 0.1 %
LYMPHOCYTES # BLD AUTO: 2.34 X10(3) UL (ref 1–4)
LYMPHOCYTES NFR BLD AUTO: 31.6 %
MCH RBC QN AUTO: 33.5 PG (ref 26–34)
MCHC RBC AUTO-ENTMCNC: 33.9 G/DL (ref 31–37)
MCV RBC AUTO: 98.9 FL
MONOCYTES # BLD AUTO: 0.51 X10(3) UL (ref 0.1–1)
MONOCYTES NFR BLD AUTO: 6.9 %
NEUTROPHILS # BLD AUTO: 4.29 X10 (3) UL (ref 1.5–7.7)
NEUTROPHILS # BLD AUTO: 4.29 X10(3) UL (ref 1.5–7.7)
NEUTROPHILS NFR BLD AUTO: 57.9 %
OSMOLALITY SERPL CALC.SUM OF ELEC: 298 MOSM/KG (ref 275–295)
PLATELET # BLD AUTO: 302 10(3)UL (ref 150–450)
POTASSIUM SERPL-SCNC: 3.5 MMOL/L (ref 3.5–5.1)
PROT SERPL-MCNC: 7.3 G/DL (ref 5.7–8.2)
RBC # BLD AUTO: 3.64 X10(6)UL
SODIUM SERPL-SCNC: 142 MMOL/L (ref 136–145)
WBC # BLD AUTO: 7.4 X10(3) UL (ref 4–11)

## 2024-06-17 PROCEDURE — 3008F BODY MASS INDEX DOCD: CPT | Performed by: INTERNAL MEDICINE

## 2024-06-17 PROCEDURE — 93005 ELECTROCARDIOGRAM TRACING: CPT

## 2024-06-17 PROCEDURE — 3078F DIAST BP <80 MM HG: CPT | Performed by: INTERNAL MEDICINE

## 2024-06-17 PROCEDURE — 99214 OFFICE O/P EST MOD 30 MIN: CPT | Performed by: INTERNAL MEDICINE

## 2024-06-17 PROCEDURE — 80053 COMPREHEN METABOLIC PANEL: CPT

## 2024-06-17 PROCEDURE — 36415 COLL VENOUS BLD VENIPUNCTURE: CPT

## 2024-06-17 PROCEDURE — 3074F SYST BP LT 130 MM HG: CPT | Performed by: INTERNAL MEDICINE

## 2024-06-17 PROCEDURE — 93010 ELECTROCARDIOGRAM REPORT: CPT | Performed by: INTERNAL MEDICINE

## 2024-06-17 PROCEDURE — 85025 COMPLETE CBC W/AUTO DIFF WBC: CPT

## 2024-06-17 RX ORDER — LORATADINE 10 MG/1
10 TABLET ORAL DAILY
COMMUNITY

## 2024-06-17 NOTE — PROGRESS NOTES
Subjective:     Patient ID: Manjit Matt is a 48 year old female.    Patient presents today for her preop medical clearance as requested by ortho Dr Lomeli for scheduled achilles tendon surgery to be done on June 19,2025 at [facility].     No history of CAD, CHF, CVA, IDDM  and CKD.  No history of LUCAS. She has known history of pulmonic valve stenosis s/p valvuloplasty.   Patient is on chronic coumadin therapy.        History/Other:   Review of Systems   Constitutional: Negative.    HENT:  Positive for congestion.    Eyes: Negative.    Respiratory: Negative.          No hemoptysis    Cardiovascular:  Negative for chest pain and palpitations.        Able to climb flight of stairs few times a day with no chest pans    NO PND/orthopnea   Gastrointestinal: Negative.    Genitourinary: Negative.    Neurological:  Negative for syncope.     Current Outpatient Medications   Medication Sig Dispense Refill    loratadine 10 MG Oral Tab Take 1 tablet (10 mg total) by mouth daily.      warfarin 1 MG Oral Tab Take as directed by INR clinic or take 1 & 1/2 tablets Monday Wednesday Friday, & 1 tablet all other days 120 tablet 1    Multiple Vitamins-Minerals (HAIR SKIN AND NAILS FORMULA) Oral Tab Take 1 tablet by mouth daily.      rosuvastatin 5 MG Oral Tab Take 1 tablet (5 mg total) by mouth nightly. FOR CHOLESTEROL. (Patient taking differently: Take 1 tablet (5 mg total) by mouth nightly. FOR CHOLESTEROL. PT Is only taking three times a week, Monday Wednesday and friday) 90 tablet 9    CILOSTAZOL 100 MG Oral Tab TAKE 1 TABLET(100 MG) BY MOUTH TWICE DAILY 60 tablet 11    ASPIRIN EC LOW DOSE 81 MG Oral Tab EC Take 1 tablet (81 mg total) by mouth daily.  5    enoxaparin (LOVENOX) 80 MG/0.8ML Injection Solution Prefilled Syringe Inject  70 mg every 12 hours, under the skin while INR is <2.0; or as directed by INR clinic. (Patient not taking: Reported on 6/17/2024) 20 each 0    acidophilus-pectin Oral Cap Take 1 capsule by mouth daily. (Patient  not taking: Reported on 2024)       Allergies:  Allergies   Allergen Reactions    Levaquin [Levofloxacin] HIVES and SWELLING     Other reaction(s): LEVOFLOXACIN       Past Medical History:    Congenital pulmonary stenosis (HCC)    congenital pulmonary aa stenosis    Congenital pulmonary stenosis (HCC)    SX at 18 y/o / valvuloplasty     Deep vein thrombosis (HCC)    Disease of vein    vein/artery disease    Heart murmur    since birth    Hematologic disorder    Thromboembolic Event    History of blood clots    History of blood transfusion    Peripheral vascular disease (HCC)    Right leg arterial bypass ,    PONV (postoperative nausea and vomiting)    Popliteal artery thrombosis (HCC)    Rt popliteal artery thrombosis - 15cm; Right Lower Extremity thrombosis; was on coumadin for 6 months than on plavix and aggrenox until . w/u negative for coagulopathy but was anticoagulated during pregnancies     Tendinitis    discontinue insole / rx ibuprofen    Valvular disease    Pulmonic stenosis    Visual impairment    glasses      Past Surgical History:   Procedure Laterality Date    Appendectomy      Appendectomy            Cath bare metal stent (bms)      3 stents right leg    Colonoscopy N/A 2024    Procedure: COLONOSCOPY;  Surgeon: Sandrita Saab MD;  Location: [facility]    Cyst aspiration left Left 2023    US bx    Endometrial ablation      Hysterectomy  10/2018    Laparoscopic hysterectomy with Dr. Dueñas.  Had post op vaginal bleeding and repeat suturing of vaginal cuff.    Leg/ankle surgery proc unlisted      right leg arterial bypass / (fasciotomy)    Needle biopsy left Left 2023    US bx    Other surgical history      SX at 18 y/o / valvuloplasty (congenital Pulmonic stenosis)      Family History   Problem Relation Age of Onset    No Known Problems Father     No Known Problems Mother     Other (Other) Maternal Grandmother          Fibrocystic Breast Disease    Cancer Paternal Grandfather         lung    No Known Problems Sister     Bipolar Disorder Brother     Breast Cancer Maternal Aunt         40's,  BRCA negative    Breast Cancer Maternal Aunt         mat great great aunt breast ca unknown age    Ovarian Cancer Neg     Uterine Cancer Neg     Colon Cancer Neg       Social History:   Social History     Socioeconomic History    Marital status:    Tobacco Use    Smoking status: Never     Passive exposure: Never    Smokeless tobacco: Never   Vaping Use    Vaping status: Never Used   Substance and Sexual Activity    Alcohol use: Yes     Comment: rare    Drug use: No    Sexual activity: Yes     Partners: Male   Other Topics Concern    Caffeine Concern Yes     Comment: coffee, 1-2 cups daily    Pt has a pacemaker No    Pt has a defibrillator No    Reaction to local anesthetic No        Objective:   Physical Exam  Constitutional:       General: She is not in acute distress.     Appearance: She is well-developed. She is not ill-appearing, toxic-appearing or diaphoretic.   HENT:      Head: Normocephalic and atraumatic.      Right Ear: External ear normal.      Left Ear: External ear normal.      Nose: Nose normal.      Mouth/Throat:      Pharynx: No oropharyngeal exudate.   Eyes:      General:         Right eye: No discharge.         Left eye: No discharge.      Conjunctiva/sclera: Conjunctivae normal.      Pupils: Pupils are equal, round, and reactive to light.   Neck:      Vascular: No JVD.   Cardiovascular:      Rate and Rhythm: Normal rate and regular rhythm.      Heart sounds: Murmur heard.   Pulmonary:      Effort: Pulmonary effort is normal. No respiratory distress.      Breath sounds: Normal breath sounds. No wheezing or rales.   Abdominal:      General: Bowel sounds are normal. There is no distension.      Palpations: Abdomen is soft. There is no mass.      Tenderness: There is no abdominal tenderness. There is no guarding or rebound.    Musculoskeletal:         General: No tenderness. Normal range of motion.      Cervical back: Normal range of motion and neck supple. No rigidity.      Right lower leg: No edema.      Left lower leg: No edema.   Lymphadenopathy:      Cervical: No cervical adenopathy.   Skin:     General: Skin is warm and dry.      Coloration: Skin is not jaundiced or pale.      Findings: No rash.   Neurological:      Mental Status: She is alert and oriented to person, place, and time.         Assessment & Plan:   (Z01.818) Preop general physical exam  (primary encounter diagnosis)  Plan: preop labs and EKG  ordered by Dr Lomeli .  Pt has no active cardiac conditions and  has good functional capacity>4 METS activities without chest pains.  Pt has acceptable risk for her surgery and may proceed as scheduled.     (S86.312S) Tear of peroneal tendon, left, sequela  Plan: as per Dr Lomeli recommendation.     (M76.62) Tendonitis, Achilles, left  Plan: as per Dr Lomeli recommendation.     (E78.2) Mixed hyperlipidemia  Plan: continue statin therapy.     (I37.9) Pulmonic valve disease  Plan:  remote history of pulmonic valvuloplasty, remains asymptomatic and stable on 2decho done 11mos ago.     (I73.9) Peripheral vascular disease  Plan:  pt on chronic coumadin therapy and antiplaletet therapy which will be continued as discussed by pt with Dr Lomeli.        No orders of the defined types were placed in this encounter.      Meds This Visit:  Requested Prescriptions      No prescriptions requested or ordered in this encounter       Imaging & Referrals:  None

## 2024-06-18 ENCOUNTER — ANTI-COAG VISIT (OUTPATIENT)
Dept: ANTICOAGULATION | Facility: CLINIC | Age: 49
End: 2024-06-18

## 2024-06-18 DIAGNOSIS — I73.9 PERIPHERAL VASCULAR DISEASE (HCC): ICD-10-CM

## 2024-06-18 DIAGNOSIS — I37.9 PULMONIC VALVE DISEASE: ICD-10-CM

## 2024-06-18 DIAGNOSIS — Z51.81 MONITORING FOR LONG-TERM ANTICOAGULANT USE: ICD-10-CM

## 2024-06-18 DIAGNOSIS — Z79.01 MONITORING FOR LONG-TERM ANTICOAGULANT USE: ICD-10-CM

## 2024-06-18 DIAGNOSIS — Z79.01 LONG TERM (CURRENT) USE OF ANTICOAGULANTS: Primary | ICD-10-CM

## 2024-06-18 LAB
ATRIAL RATE: 81 BPM
INR: 1.9 (ref 2–3)
P AXIS: 79 DEGREES
P-R INTERVAL: 186 MS
Q-T INTERVAL: 368 MS
QRS DURATION: 66 MS
QTC CALCULATION (BEZET): 427 MS
R AXIS: 69 DEGREES
T AXIS: 65 DEGREES
VENTRICULAR RATE: 81 BPM

## 2024-06-18 PROCEDURE — 93793 ANTICOAG MGMT PT WARFARIN: CPT | Performed by: FAMILY MEDICINE

## 2024-06-18 NOTE — TELEPHONE ENCOUNTER
Spoke with patient. We had an emergency surgery come in. Patient is ok with moving surgery from 6/19/24 to 6/20/24.

## 2024-06-18 NOTE — PROGRESS NOTES
[facility] / INR 1.9, sub therapeutic. (Goal 2.5 ) TTR 75.7 %     Etiology: tendon debridement changed to Thursday. No warfarin adjustment.    PLAN: continue the current dose.    Recheck INR 1 week    Pt reports:    No sign of unusual bruising or bleeding.  Any missed doses: No   Medications changes: No    Contacted  Manjit by phone  who verbalized understanding and agreement.    WARFARIN Plan per protocol: 1.5 mg every Sun, Wed; 1 mg all other days

## 2024-06-20 ENCOUNTER — HOSPITAL ENCOUNTER (OUTPATIENT)
Facility: HOSPITAL | Age: 49
Discharge: HOME | End: 2024-06-21
Attending: STUDENT IN AN ORGANIZED HEALTH CARE EDUCATION/TRAINING PROGRAM | Admitting: STUDENT IN AN ORGANIZED HEALTH CARE EDUCATION/TRAINING PROGRAM

## 2024-06-20 ENCOUNTER — APPOINTMENT (OUTPATIENT)
Dept: PHYSICAL THERAPY | Age: 49
End: 2024-06-20
Attending: ORTHOPAEDIC SURGERY
Payer: COMMERCIAL

## 2024-06-20 ENCOUNTER — ANESTHESIA (OUTPATIENT)
Dept: SURGERY | Facility: HOSPITAL | Age: 49
End: 2024-06-20

## 2024-06-20 ENCOUNTER — ANESTHESIA EVENT (OUTPATIENT)
Dept: SURGERY | Facility: HOSPITAL | Age: 49
End: 2024-06-20

## 2024-06-20 VITALS
BODY MASS INDEX: 26.41 KG/M2 | TEMPERATURE: 98 F | OXYGEN SATURATION: 98 % | HEIGHT: 59 IN | WEIGHT: 131 LBS | HEART RATE: 98 BPM | RESPIRATION RATE: 18 BRPM | DIASTOLIC BLOOD PRESSURE: 78 MMHG | SYSTOLIC BLOOD PRESSURE: 120 MMHG

## 2024-06-20 DIAGNOSIS — M76.62 LEFT ACHILLES TENDINITIS: ICD-10-CM

## 2024-06-20 DIAGNOSIS — E78.2 MIXED HYPERLIPIDEMIA: ICD-10-CM

## 2024-06-20 DIAGNOSIS — S86.312D PERONEAL TENDON TEAR, LEFT, SUBSEQUENT ENCOUNTER: ICD-10-CM

## 2024-06-20 DIAGNOSIS — Z01.818 PREOP TESTING: Primary | ICD-10-CM

## 2024-06-20 LAB
INR BLD: 1.67 (ref 0.8–1.2)
PROTHROMBIN TIME: 20.7 SECONDS (ref 11.6–14.8)

## 2024-06-20 PROCEDURE — 27659 REPAIR OF LEG TENDON EACH: CPT | Performed by: STUDENT IN AN ORGANIZED HEALTH CARE EDUCATION/TRAINING PROGRAM

## 2024-06-20 PROCEDURE — 0LUP0KZ SUPPLEMENT LEFT LOWER LEG TENDON WITH NONAUTOLOGOUS TISSUE SUBSTITUTE, OPEN APPROACH: ICD-10-PCS | Performed by: STUDENT IN AN ORGANIZED HEALTH CARE EDUCATION/TRAINING PROGRAM

## 2024-06-20 PROCEDURE — 27654 REPAIR OF ACHILLES TENDON: CPT | Performed by: STUDENT IN AN ORGANIZED HEALTH CARE EDUCATION/TRAINING PROGRAM

## 2024-06-20 PROCEDURE — 3008F BODY MASS INDEX DOCD: CPT | Performed by: STUDENT IN AN ORGANIZED HEALTH CARE EDUCATION/TRAINING PROGRAM

## 2024-06-20 PROCEDURE — 3E023GC INTRODUCTION OF OTHER THERAPEUTIC SUBSTANCE INTO MUSCLE, PERCUTANEOUS APPROACH: ICD-10-PCS | Performed by: STUDENT IN AN ORGANIZED HEALTH CARE EDUCATION/TRAINING PROGRAM

## 2024-06-20 PROCEDURE — 0LQP0ZZ REPAIR LEFT LOWER LEG TENDON, OPEN APPROACH: ICD-10-PCS | Performed by: STUDENT IN AN ORGANIZED HEALTH CARE EDUCATION/TRAINING PROGRAM

## 2024-06-20 PROCEDURE — 3074F SYST BP LT 130 MM HG: CPT | Performed by: STUDENT IN AN ORGANIZED HEALTH CARE EDUCATION/TRAINING PROGRAM

## 2024-06-20 PROCEDURE — 3078F DIAST BP <80 MM HG: CPT | Performed by: STUDENT IN AN ORGANIZED HEALTH CARE EDUCATION/TRAINING PROGRAM

## 2024-06-20 PROCEDURE — 0232T NJX PLATELET PLASMA: CPT | Performed by: STUDENT IN AN ORGANIZED HEALTH CARE EDUCATION/TRAINING PROGRAM

## 2024-06-20 RX ORDER — OXYCODONE HYDROCHLORIDE 5 MG/1
5 TABLET ORAL EVERY 4 HOURS PRN
Qty: 16 TABLET | Refills: 0 | Status: SHIPPED | OUTPATIENT
Start: 2024-06-20

## 2024-06-20 RX ORDER — HYDROMORPHONE HYDROCHLORIDE 1 MG/ML
0.2 INJECTION, SOLUTION INTRAMUSCULAR; INTRAVENOUS; SUBCUTANEOUS EVERY 5 MIN PRN
Status: DISCONTINUED | OUTPATIENT
Start: 2024-06-20 | End: 2024-06-20 | Stop reason: HOSPADM

## 2024-06-20 RX ORDER — SODIUM CHLORIDE 9 MG/ML
INJECTION, SOLUTION INTRAVENOUS CONTINUOUS
Status: DISCONTINUED | OUTPATIENT
Start: 2024-06-20 | End: 2024-06-20

## 2024-06-20 RX ORDER — NALOXONE HYDROCHLORIDE 0.4 MG/ML
80 INJECTION, SOLUTION INTRAMUSCULAR; INTRAVENOUS; SUBCUTANEOUS AS NEEDED
Status: DISCONTINUED | OUTPATIENT
Start: 2024-06-20 | End: 2024-06-20 | Stop reason: HOSPADM

## 2024-06-20 RX ORDER — HYDROMORPHONE HYDROCHLORIDE 1 MG/ML
0.8 INJECTION, SOLUTION INTRAMUSCULAR; INTRAVENOUS; SUBCUTANEOUS EVERY 2 HOUR PRN
Status: DISCONTINUED | OUTPATIENT
Start: 2024-06-20 | End: 2024-06-21

## 2024-06-20 RX ORDER — SODIUM CHLORIDE, SODIUM LACTATE, POTASSIUM CHLORIDE, CALCIUM CHLORIDE 600; 310; 30; 20 MG/100ML; MG/100ML; MG/100ML; MG/100ML
INJECTION, SOLUTION INTRAVENOUS CONTINUOUS
Status: DISCONTINUED | OUTPATIENT
Start: 2024-06-20 | End: 2024-06-20

## 2024-06-20 RX ORDER — ACETAMINOPHEN 500 MG
1000 TABLET ORAL EVERY 8 HOURS SCHEDULED
Status: DISCONTINUED | OUTPATIENT
Start: 2024-06-21 | End: 2024-06-21

## 2024-06-20 RX ORDER — ONDANSETRON 2 MG/ML
INJECTION INTRAMUSCULAR; INTRAVENOUS AS NEEDED
Status: DISCONTINUED | OUTPATIENT
Start: 2024-06-20 | End: 2024-06-21 | Stop reason: SURG

## 2024-06-20 RX ORDER — ONDANSETRON 2 MG/ML
4 INJECTION INTRAMUSCULAR; INTRAVENOUS EVERY 6 HOURS PRN
Status: DISCONTINUED | OUTPATIENT
Start: 2024-06-20 | End: 2024-06-21

## 2024-06-20 RX ORDER — EPHEDRINE SULFATE 50 MG/ML
INJECTION, SOLUTION INTRAVENOUS AS NEEDED
Status: DISCONTINUED | OUTPATIENT
Start: 2024-06-20 | End: 2024-06-21 | Stop reason: SURG

## 2024-06-20 RX ORDER — SENNA AND DOCUSATE SODIUM 50; 8.6 MG/1; MG/1
2 TABLET, FILM COATED ORAL NIGHTLY
Qty: 28 TABLET | Refills: 0 | Status: SHIPPED | OUTPATIENT
Start: 2024-06-20

## 2024-06-20 RX ORDER — DEXAMETHASONE SODIUM PHOSPHATE 10 MG/ML
INJECTION, SOLUTION INTRAMUSCULAR; INTRAVENOUS
Status: COMPLETED | OUTPATIENT
Start: 2024-06-20 | End: 2024-06-20

## 2024-06-20 RX ORDER — OXYCODONE HYDROCHLORIDE 5 MG/1
10 TABLET ORAL EVERY 4 HOURS PRN
Status: DISCONTINUED | OUTPATIENT
Start: 2024-06-20 | End: 2024-06-21

## 2024-06-20 RX ORDER — LIDOCAINE HYDROCHLORIDE 10 MG/ML
INJECTION, SOLUTION EPIDURAL; INFILTRATION; INTRACAUDAL; PERINEURAL AS NEEDED
Status: DISCONTINUED | OUTPATIENT
Start: 2024-06-20 | End: 2024-06-21 | Stop reason: SURG

## 2024-06-20 RX ORDER — MORPHINE SULFATE 4 MG/ML
2 INJECTION, SOLUTION INTRAMUSCULAR; INTRAVENOUS EVERY 10 MIN PRN
Status: DISCONTINUED | OUTPATIENT
Start: 2024-06-20 | End: 2024-06-20 | Stop reason: HOSPADM

## 2024-06-20 RX ORDER — PROCHLORPERAZINE EDISYLATE 5 MG/ML
5 INJECTION INTRAMUSCULAR; INTRAVENOUS EVERY 8 HOURS PRN
Status: DISCONTINUED | OUTPATIENT
Start: 2024-06-20 | End: 2024-06-20 | Stop reason: HOSPADM

## 2024-06-20 RX ORDER — ACETAMINOPHEN 500 MG
1000 TABLET ORAL ONCE
Status: COMPLETED | OUTPATIENT
Start: 2024-06-20 | End: 2024-06-20

## 2024-06-20 RX ORDER — ASCORBIC ACID 500 MG
500 TABLET ORAL 2 TIMES DAILY
Qty: 84 TABLET | Refills: 0 | Status: SHIPPED | OUTPATIENT
Start: 2024-06-20

## 2024-06-20 RX ORDER — MORPHINE SULFATE 4 MG/ML
4 INJECTION, SOLUTION INTRAMUSCULAR; INTRAVENOUS EVERY 10 MIN PRN
Status: DISCONTINUED | OUTPATIENT
Start: 2024-06-20 | End: 2024-06-20 | Stop reason: HOSPADM

## 2024-06-20 RX ORDER — ROCURONIUM BROMIDE 10 MG/ML
INJECTION, SOLUTION INTRAVENOUS AS NEEDED
Status: DISCONTINUED | OUTPATIENT
Start: 2024-06-20 | End: 2024-06-21 | Stop reason: SURG

## 2024-06-20 RX ORDER — HYDROMORPHONE HYDROCHLORIDE 1 MG/ML
0.6 INJECTION, SOLUTION INTRAMUSCULAR; INTRAVENOUS; SUBCUTANEOUS EVERY 5 MIN PRN
Status: DISCONTINUED | OUTPATIENT
Start: 2024-06-20 | End: 2024-06-20 | Stop reason: HOSPADM

## 2024-06-20 RX ORDER — MIDAZOLAM HYDROCHLORIDE 1 MG/ML
INJECTION INTRAMUSCULAR; INTRAVENOUS
Status: COMPLETED | OUTPATIENT
Start: 2024-06-20 | End: 2024-06-20

## 2024-06-20 RX ORDER — MORPHINE SULFATE 10 MG/ML
6 INJECTION, SOLUTION INTRAMUSCULAR; INTRAVENOUS EVERY 10 MIN PRN
Status: DISCONTINUED | OUTPATIENT
Start: 2024-06-20 | End: 2024-06-20 | Stop reason: HOSPADM

## 2024-06-20 RX ORDER — HYDROMORPHONE HYDROCHLORIDE 1 MG/ML
0.4 INJECTION, SOLUTION INTRAMUSCULAR; INTRAVENOUS; SUBCUTANEOUS EVERY 2 HOUR PRN
Status: DISCONTINUED | OUTPATIENT
Start: 2024-06-20 | End: 2024-06-21

## 2024-06-20 RX ORDER — OXYCODONE HYDROCHLORIDE 5 MG/1
5 TABLET ORAL EVERY 4 HOURS PRN
Status: DISCONTINUED | OUTPATIENT
Start: 2024-06-20 | End: 2024-06-21

## 2024-06-20 RX ORDER — ROPIVACAINE HYDROCHLORIDE 5 MG/ML
INJECTION, SOLUTION EPIDURAL; INFILTRATION; PERINEURAL
Status: COMPLETED | OUTPATIENT
Start: 2024-06-20 | End: 2024-06-20

## 2024-06-20 RX ORDER — ONDANSETRON 2 MG/ML
4 INJECTION INTRAMUSCULAR; INTRAVENOUS EVERY 6 HOURS PRN
Status: DISCONTINUED | OUTPATIENT
Start: 2024-06-20 | End: 2024-06-20 | Stop reason: HOSPADM

## 2024-06-20 RX ORDER — HYDROMORPHONE HYDROCHLORIDE 1 MG/ML
0.4 INJECTION, SOLUTION INTRAMUSCULAR; INTRAVENOUS; SUBCUTANEOUS EVERY 5 MIN PRN
Status: DISCONTINUED | OUTPATIENT
Start: 2024-06-20 | End: 2024-06-20 | Stop reason: HOSPADM

## 2024-06-20 RX ORDER — ONDANSETRON 4 MG/1
4 TABLET, ORALLY DISINTEGRATING ORAL EVERY 8 HOURS PRN
Qty: 10 TABLET | Refills: 0 | Status: SHIPPED | OUTPATIENT
Start: 2024-06-20

## 2024-06-20 RX ORDER — SODIUM CHLORIDE, SODIUM LACTATE, POTASSIUM CHLORIDE, CALCIUM CHLORIDE 600; 310; 30; 20 MG/100ML; MG/100ML; MG/100ML; MG/100ML
INJECTION, SOLUTION INTRAVENOUS CONTINUOUS
Status: DISCONTINUED | OUTPATIENT
Start: 2024-06-20 | End: 2024-06-20 | Stop reason: HOSPADM

## 2024-06-20 RX ORDER — GABAPENTIN 300 MG/1
300 CAPSULE ORAL EVERY 8 HOURS
Qty: 30 CAPSULE | Refills: 0 | Status: SHIPPED | OUTPATIENT
Start: 2024-06-20

## 2024-06-20 RX ADMIN — ROPIVACAINE HYDROCHLORIDE 30 ML: 5 INJECTION, SOLUTION EPIDURAL; INFILTRATION; PERINEURAL at 21:12:00

## 2024-06-20 RX ADMIN — MIDAZOLAM HYDROCHLORIDE 2 MG: 1 INJECTION INTRAMUSCULAR; INTRAVENOUS at 21:05:00

## 2024-06-20 RX ADMIN — ROPIVACAINE HYDROCHLORIDE 15 ML: 5 INJECTION, SOLUTION EPIDURAL; INFILTRATION; PERINEURAL at 21:15:00

## 2024-06-20 RX ADMIN — ONDANSETRON 4 MG: 2 INJECTION INTRAMUSCULAR; INTRAVENOUS at 22:22:00

## 2024-06-20 RX ADMIN — ROCURONIUM BROMIDE 3 MG: 10 INJECTION, SOLUTION INTRAVENOUS at 21:08:00

## 2024-06-20 RX ADMIN — SODIUM CHLORIDE, SODIUM LACTATE, POTASSIUM CHLORIDE, CALCIUM CHLORIDE: 600; 310; 30; 20 INJECTION, SOLUTION INTRAVENOUS at 22:40:00

## 2024-06-20 RX ADMIN — LIDOCAINE HYDROCHLORIDE 50 MG: 10 INJECTION, SOLUTION EPIDURAL; INFILTRATION; INTRACAUDAL; PERINEURAL at 21:12:00

## 2024-06-20 RX ADMIN — DEXAMETHASONE SODIUM PHOSPHATE 10 MG: 10 INJECTION, SOLUTION INTRAMUSCULAR; INTRAVENOUS at 21:12:00

## 2024-06-20 RX ADMIN — EPHEDRINE SULFATE 10 MG: 50 INJECTION, SOLUTION INTRAVENOUS at 21:38:00

## 2024-06-20 RX ADMIN — SODIUM CHLORIDE, SODIUM LACTATE, POTASSIUM CHLORIDE, CALCIUM CHLORIDE: 600; 310; 30; 20 INJECTION, SOLUTION INTRAVENOUS at 21:08:00

## 2024-06-21 ENCOUNTER — TELEPHONE (OUTPATIENT)
Dept: ORTHOPEDICS CLINIC | Facility: CLINIC | Age: 49
End: 2024-06-21

## 2024-06-21 ENCOUNTER — ANTI-COAG VISIT (OUTPATIENT)
Dept: ANTICOAGULATION | Facility: CLINIC | Age: 49
End: 2024-06-21

## 2024-06-21 ENCOUNTER — PATIENT OUTREACH (OUTPATIENT)
Dept: CASE MANAGEMENT | Age: 49
End: 2024-06-21

## 2024-06-21 ENCOUNTER — TELEPHONE (OUTPATIENT)
Dept: INTERNAL MEDICINE CLINIC | Facility: CLINIC | Age: 49
End: 2024-06-21

## 2024-06-21 DIAGNOSIS — Z79.01 MONITORING FOR LONG-TERM ANTICOAGULANT USE: ICD-10-CM

## 2024-06-21 DIAGNOSIS — I37.9 PULMONIC VALVE DISEASE: ICD-10-CM

## 2024-06-21 DIAGNOSIS — Z79.01 LONG TERM (CURRENT) USE OF ANTICOAGULANTS: Primary | ICD-10-CM

## 2024-06-21 DIAGNOSIS — I73.9 PERIPHERAL VASCULAR DISEASE (HCC): ICD-10-CM

## 2024-06-21 DIAGNOSIS — Z51.81 MONITORING FOR LONG-TERM ANTICOAGULANT USE: ICD-10-CM

## 2024-06-21 PROCEDURE — 93793 ANTICOAG MGMT PT WARFARIN: CPT | Performed by: FAMILY MEDICINE

## 2024-06-21 NOTE — INTERVAL H&P NOTE
Pre-op Diagnosis: Left Achilles tendinitis [M76.62]  Peroneal tendon tear, left, subsequent encounter [I53.654V]    The above referenced H&P was reviewed by Dwight Lomeli MD on 6/20/2024, the patient was examined and no significant changes have occurred in the patient's condition since the H&P was performed.  I discussed with the patient and/or legal representative the potential benefits, risks and side effects of this procedure; the likelihood of the patient achieving goals; and potential problems that might occur during recuperation.  I discussed reasonable alternatives to the procedure, including risks, benefits and side effects related to the alternatives and risks related to not receiving this procedure.  We will proceed with procedure as planned.

## 2024-06-21 NOTE — TELEPHONE ENCOUNTER
Patient has a few questions regarding medication for the coumadin clinic. Would like a callback.

## 2024-06-21 NOTE — PROGRESS NOTES
PCP-[facility] apt request (discharged 06/21)    Dr Ravi Flynn  Internal Medicine  [address]  [address] [place] [address]  [phone]  Apt made:  Wed 06/26 @4:00pm w/Dr Abraham Ayala  Confirmed w/pt  Closing encounter

## 2024-06-21 NOTE — TELEPHONE ENCOUNTER
Patient has questions regarding orders for boot and physical therapy. For the boot, will it be available to  on the day of patient's follow up on 7/5 and/or where can patient order and . Patient also would like for clarification on physical therapy, when should patient start. Please advise

## 2024-06-21 NOTE — ANESTHESIA PROCEDURE NOTES
Airway  Date/Time: 6/20/2024 9:13 PM  Urgency: Elective      General Information and Staff    Patient location during procedure: OR  Anesthesiologist: Alex Solo MD  Performed: anesthesiologist   Performed by: Alex Solo MD  Authorized by: Alex Solo MD      Indications and Patient Condition  Indications for airway management: anesthesia  Sedation level: deep  Preoxygenated: yes  Patient position: sniffing  Mask difficulty assessment: 1 - vent by mask    Final Airway Details  Final airway type: endotracheal airway      Successful airway: ETT  Cuffed: yes   Successful intubation technique: direct laryngoscopy  Endotracheal tube insertion site: oral  Blade: Rohith  Blade size: #3  ETT size (mm): 6.5    Cormack-Lehane Classification: grade I - full view of glottis  Placement verified by: capnometry   Inital cuff pressure (cm H2O): 6  Measured from: teeth  ETT to teeth (cm): 20  Number of attempts at approach: 1

## 2024-06-21 NOTE — ANESTHESIA PREPROCEDURE EVALUATION
Anesthesia PreOp Note    HPI:     Manjit Matt is a 48 year old female who presents for preoperative consultation requested by: Dwight Lomeli MD    Date of Surgery: 6/20/2024    Procedure(s):  Left achilles debridement, peroneal tendon debridement and repair, platelet rich plasma  Indication: Left Achilles tendinitis [M76.62]  Peroneal tendon tear, left, subsequent encounter [W16.009N]    Relevant Problems   No relevant active problems       NPO:  Last Liquid Consumption Date: 06/20/24  Last Liquid Consumption Time: 0900  Last Solid Consumption Date: 06/19/24  Last Solid Consumption Time: 2300  Last Liquid Consumption Date: 06/20/24          History Review:  Patient Active Problem List    Diagnosis Date Noted    Peroneal tendon tear, left, subsequent encounter 06/14/2024    Left Achilles tendinitis 05/16/2024    Monitoring for long-term anticoagulant use 04/26/2024    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    Long term (current) use of anticoagulants 10/08/2019    Encounter for therapeutic drug monitoring 10/08/2019    Peripheral vascular disease (HCC) 10/08/2019    History of laparoscopic-assisted vaginal hysterectomy 10/18/2018    Menorrhagia 12/22/2017    Chronic right-sided low back pain without sciatica 12/16/2016       Past Medical History:    Congenital pulmonary stenosis (HCC)    congenital pulmonary aa stenosis    Congenital pulmonary stenosis (HCC)    SX at 20 y/o / valvuloplasty 1995    Deep vein thrombosis (HCC)    Disease of vein    vein/artery disease    Heart murmur    since birth    Hematologic disorder    Thromboembolic Event    History of blood clots    History of blood transfusion    Peripheral vascular disease (HCC)    Right leg arterial bypass 2001,2012    PONV (postoperative nausea and vomiting)    Popliteal artery thrombosis (HCC)    Rt popliteal artery thrombosis - 15cm; Right Lower Extremity thrombosis; was on coumadin for 6 months than on plavix and aggrenox until 2007. w/u negative for  coagulopathy but was anticoagulated during pregnancies     Tendinitis    discontinue insole / rx ibuprofen    Valvular disease    Pulmonic stenosis    Visual impairment    glasses       Past Surgical History:   Procedure Laterality Date    Appendectomy      Appendectomy            Cath bare metal stent (bms)      3 stents right leg    Colonoscopy N/A 2024    Procedure: COLONOSCOPY;  Surgeon: Sandrita Saab MD;  [facility] ENDO    Cyst aspiration left Left 2023    US bx    Endometrial ablation      Hysterectomy  10/2018    Laparoscopic hysterectomy with Dr. Dueñas.  Had post op vaginal bleeding and repeat suturing of vaginal cuff.    Leg/ankle surgery proc unlisted      right leg arterial bypass / (fasciotomy)    Needle biopsy left Left 2023    US bx    Other surgical history      SX at 20 y/o / valvuloplasty (congenital Pulmonic stenosis)       Medications Prior to Admission   Medication Sig Dispense Refill Last Dose    loratadine 10 MG Oral Tab Take 1 tablet (10 mg total) by mouth daily.   2024    enoxaparin (LOVENOX) 80 MG/0.8ML Injection Solution Prefilled Syringe Inject  70 mg every 12 hours, under the skin while INR is <2.0; or as directed by INR clinic. (Patient not taking: Reported on 2024) 20 each 0 2024    warfarin 1 MG Oral Tab Take as directed by INR clinic or take 1 & 1/2 tablets , & 1 tablet all other days 120 tablet 1 2024 at 2130    acidophilus-pectin Oral Cap Take 1 capsule by mouth daily.   2024    Multiple Vitamins-Minerals (HAIR SKIN AND NAILS FORMULA) Oral Tab Take 1 tablet by mouth daily.   2024    rosuvastatin 5 MG Oral Tab Take 1 tablet (5 mg total) by mouth nightly. FOR CHOLESTEROL. (Patient taking differently: Take 1 tablet (5 mg total) by mouth nightly. FOR CHOLESTEROL. PT Is only taking three times a week,  and friday) 90 tablet 9 2024    CILOSTAZOL 100  MG Oral Tab TAKE 1 TABLET(100 MG) BY MOUTH TWICE DAILY 60 tablet 11 6/20/2024    ASPIRIN EC LOW DOSE 81 MG Oral Tab EC Take 1 tablet (81 mg total) by mouth daily.  5 6/20/2024     Current Facility-Administered Medications Ordered in Epic   Medication Dose Route Frequency Provider Last Rate Last Admin    lactated ringers infusion   Intravenous Continuous Dwight Lomeli MD        ceFAZolin (Ancef) 2g in 10mL IV syringe premix  2 g Intravenous Once Dwight Lomeli MD        sodium chloride 0.9% infusion   Intravenous Continuous Dwight Lomeli MD 83 mL/hr at 06/20/24 1900 New Bag at 06/20/24 1900     No current [facility]-ordered outpatient medications on file.       Allergies   Allergen Reactions    Levaquin [Levofloxacin] HIVES and SWELLING     Other reaction(s): LEVOFLOXACIN       Family History   Problem Relation Age of Onset    No Known Problems Father     No Known Problems Mother     Other (Other) Maternal Grandmother         Fibrocystic Breast Disease    Cancer Paternal Grandfather         lung    No Known Problems Sister     Bipolar Disorder Brother     Breast Cancer Maternal Aunt         40's,  BRCA negative    Breast Cancer Maternal Aunt         mat great great aunt breast ca unknown age    Ovarian Cancer Neg     Uterine Cancer Neg     Colon Cancer Neg      Social History     Socioeconomic History    Marital status:    Tobacco Use    Smoking status: Never     Passive exposure: Never    Smokeless tobacco: Never   Vaping Use    Vaping status: Never Used   Substance and Sexual Activity    Alcohol use: Yes     Comment: rare    Drug use: No    Sexual activity: Yes     Partners: Male   Other Topics Concern    Caffeine Concern Yes     Comment: coffee, 1-2 cups daily    Pt has a pacemaker No    Pt has a defibrillator No    Reaction to local anesthetic No       Available pre-op labs reviewed.  Lab Results   Component Value Date    WBC 7.4 06/17/2024    RBC 3.64 (L) 06/17/2024    HGB 12.2 06/17/2024    HCT 36.0  06/17/2024    MCV 98.9 06/17/2024    MCH 33.5 06/17/2024    MCHC 33.9 06/17/2024    RDW 12.2 06/17/2024    .0 06/17/2024     Lab Results   Component Value Date     06/17/2024    K 3.5 06/17/2024     06/17/2024    CO2 27.0 06/17/2024    BUN 20 06/17/2024    CREATSERUM 0.84 06/17/2024     (H) 06/17/2024    CA 9.0 06/17/2024     Lab Results   Component Value Date    INR 1.9 (A) 06/17/2024       Vital Signs:  Body mass index is 26.46 kg/m².   height is 1.499 m (4' 11\") and weight is 59.4 kg (131 lb). Her oral temperature is 98.4 °F (36.9 °C). Her blood pressure is 122/76 and her pulse is 80. Her respiration is 16 and oxygen saturation is 100%.   Vitals:    06/15/24 0825 06/20/24 1819   BP:  122/76   Pulse:  80   Resp:  16   Temp:  98.4 °F (36.9 °C)   TempSrc:  Oral   SpO2:  100%   Weight: 59 kg (130 lb) 59.4 kg (131 lb)   Height: 1.499 m (4' 11\")         Anesthesia Evaluation     Patient summary reviewed and Nursing notes reviewed    No history of anesthetic complications   Airway   Mallampati: II  TM distance: >3 FB  Neck ROM: full  Dental      Pulmonary - negative ROS    breath sounds clear to auscultation  (-) COPD, asthma, sleep apnea  Cardiovascular   Exercise tolerance: good  (+) valvular problems/murmurs  (-) hypertension, CAD, CHF    Rhythm: regular  Rate: normal  ROS comment: H/o severe pulmonic valve disease s/p valvuloplasty    Neuro/Psych - negative ROS   (-) CVA    GI/Hepatic/Renal - negative ROS   (-) GERD, liver disease, renal disease    Endo/Other - negative ROS   (-) diabetes mellitus, hypothyroidism  Abdominal                  Anesthesia Plan:   ASA:  3  Plan:   General  Airway:  ETT  Post-op Pain Management: IV analgesics, Oral pain medication, Popliteal block and Saphenous block  Informed Consent Plan and Risks Discussed With:  Patient      I have informed Manjit Matt and/or legal guardian or family member of the nature of the anesthetic plan, benefits, risks including  possible dental damage if relevant, major complications, and any alternative forms of anesthetic management.   All of the patient's questions were answered to the best of my ability. The patient desires the anesthetic management as planned.  Alex Solo MD  6/20/2024 7:12 PM  Present on Admission:  **None**

## 2024-06-21 NOTE — ANESTHESIA PROCEDURE NOTES
Peripheral Block    Date/Time: 6/20/2024 9:12 PM    Performed by: Alex Solo MD  Authorized by: Alex Solo MD      General Information and Staff    Start Time:  6/20/2024 9:12 PM  End Time:  6/20/2024 9:15 PM  Anesthesiologist:  Alex Solo MD  Performed by:  Anesthesiologist  Patient Location:  PACU    Block Placement: Pre Induction  Site Identification: real time ultrasound guided and image stored and retrievable      Reason for Block: at surgeon's request and post-op pain management    Preanesthetic Checklist: 2 patient identifers, IV checked, risks and benefits discussed, monitors and equipment checked, pre-op evaluation, timeout performed, anesthesia consent and sterile technique used      Procedure Details    Patient Position:  Supine  Prep: ChloraPrep    Monitoring:  Cardiac monitor  Block Type:  Saphenous  Laterality:  Left  Injection Technique:  Single-shot    Needle    Needle Type:  Short-bevel  Needle Gauge:  20 G  Needle Length:  100 mm  Needle Localization:  Ultrasound guidance  Reason for Ultrasound Use: appropriate spread of the medication was noted in real time and no ultrasound evidence of intravascular and/or intraneural injection            Assessment    Injection Assessment:  Good spread noted, incremental injection, low pressure, local visualized surrounding nerve on ultrasound, negative aspiration for heme and no pain on injection  Paresthesia Pain:  None  Heart Rate Change: No        Medications  6/20/2024 9:12 PM  ropivacaine (NAROPIN) injection 0.5% - Infiltration   15 mL - 6/20/2024 9:15:00 PM    Additional Comments

## 2024-06-21 NOTE — PLAN OF CARE
Problem: Patient Centered Care  Goal: Patient preferences are identified and integrated in the patient's plan of care  Description: Interventions:  - What would you like us to know as we care for you?   - Provide timely, complete, and accurate information to patient/family  - Incorporate patient and family knowledge, values, beliefs, and cultural backgrounds into the planning and delivery of care  - Encourage patient/family to participate in care and decision-making at the level they choose  - Honor patient and family perspectives and choices  Outcome: Progressing     [place] pt. Vitals taken. Mild nausea when first arrived, improved. Tolerated juice and christopher crackers. IVF stopped. Voiding without difficulty. No pain. Dressings c/d/I. Ankle/ leg elevated with pillow. Ambulated in room with walker- pt did not put foot on ground. Went over discharge paper work with pt and . IV removed. Pt taken to car in wheelchair by RN. All belongings and paperwork with .

## 2024-06-21 NOTE — TELEPHONE ENCOUNTER
Post-op call for Dr. Lomeli    Date: 6/21/2024      Time: 12:24 PM   Patient: Manjit Matt      number: [id number]   Surgery and surgery date:6/19/2024    Procedure Laterality Anesthesia   Left achilles debridement, peroneal tendon debridement and repair, platelet rich plasma          Patient unavailable.  Left message on voicemail to call clinic with questions or concerns.  Number was provided./SPOKE TO PATIENT  Patient's general feeling since discharge:Doing great  I'm still numb. I didn't leave the hospital until 1:am  Pain control (0-10):/0  Pain Medication:  dose/strength    Medication Quantity Refills Start End   oxyCODONE 5 MG Oral Tab         Medication Quantity Refills Start End   Acetaminophen 500 MG Oral Cap 180 capsule 0 6/20/2024 --   Sig:   Take 2 capsules (1,000 mg tota       Medication Quantity Refills Start End   gabapentin 300 MG Oral Cap 30 capsule 0 6/20/2024 --   Sig:   Take 1 capsule (300 mg to       Fever:  no   Chills:  no  SOB:  no  Incision site appearance:  Redness   no  Drainage  no  Clean/dry/Intact yes   Calf pain, redness or warmth:  no   Bowel Regimen: yes   If not, what are you taking stool softener/laxative?  Confirmed appointment date for post op:7/5/2024  Other concerns patients may ask: Her  is picking up her drugs right now. We discussed using tylenol and oxycodone and alternating when the pain gets worse . We discussed the maximum of  3000mg in 24 hrs. Including the 325mg in the 0xyocodone .  We discussed placement of ice behind the knee  Bathing and bandages   Patient needs to keep incision clean and dry for the first week./DONE  Enforce rest, ice, compression elevation and use their pain medication accordingly./DONE  If the patient needs more pain medication, please put in a communication.

## 2024-06-21 NOTE — ANESTHESIA PROCEDURE NOTES
Peripheral Block    Date/Time: 6/20/2024 9:05 PM    Performed by: Alex Solo MD  Authorized by: Alex Solo MD      General Information and Staff    Start Time:  6/20/2024 9:05 PM  End Time:  6/20/2024 9:12 PM  Anesthesiologist:  Alex Solo MD  Performed by:  Anesthesiologist  Patient Location:  PACU    Block Placement: Pre Induction  Site Identification: real time ultrasound guided and image stored and retrievable      Reason for Block: at surgeon's request and post-op pain management    Preanesthetic Checklist: 2 patient identifers, IV checked, risks and benefits discussed, monitors and equipment checked, pre-op evaluation, timeout performed, anesthesia consent and sterile technique used      Procedure Details    Patient Position:   Prep: ChloraPrep and patient draped    Monitoring:  Cardiac monitor  Block Type:  Popliteal  Laterality:  Left  Injection Technique:  Single-shot    Needle    Needle Type:  Short-bevel  Needle Gauge:  22 G  Needle Length:  90 mm  Needle Localization:  Ultrasound guidance and nerve stimulator  Reason for Ultrasound Use: appropriate spread of the medication was noted in real time and no ultrasound evidence of intravascular and/or intraneural injection      Muscle Twitch Response: plantarflexion      Assessment    Injection Assessment:  Good spread noted, incremental injection, local visualized surrounding nerve on ultrasound, low pressure, negative aspiration for heme and no pain on injection  Heart Rate Change: No        Medications  6/20/2024 9:05 PM  midazolam (VERSED)injection 2mg/2ml - Intravenous   2 mg - 6/20/2024 9:05:00 PM  ropivacaine (NAROPIN) injection 0.5% - Infiltration   30 mL - 6/20/2024 9:12:00 PM  dexamethasone (DECADRON) PF injection 10 mg/ml - Injection   10 mg - 6/20/2024 9:12:00 PM    Additional Comments

## 2024-06-21 NOTE — DISCHARGE INSTRUCTIONS
Lower Extremity Complex Injury Post-Op Instructions      WEIGHT BEARING: You will be nonweightbearing on your operative leg after surgery. Based on your healing, you and your surgeon will be able to determine when to advance your weightbearing and activities. You should walk at a slow pace and use the assistive devices provided to you by your therapy treatment team.  After you are cleared by your surgeon, you may begin to slowly work on progressing strength in your leg with light resistance exercises.     RANGE OF MOTION: You may move your leg as tolerated. Motion is important to avoid post-operative stiffness.     PAIN MEDICATIONS:   For many people, post-operative pain can be managed with simple measures, such as elevation of the operative extremity above the level of the heart, cryotherapy and ice, compression, etc.   In addition to these measures, we have prescribed pain medications. To minimize narcotic use and establish better pain control, we employ a multimodal pain approach. This protocol combines the use of scheduled acetaminophen, gabapentin, and narcotics.  We will often use anti-inflammatories (NSAID's such as ibuprofen, celecoxib, and/or naproxen) to further assist with pain control thus allowing for a lower dose of narcotic to be used. Dosing of medications will vary from patient to patient based on their needs and past medical history, so please refer to your discharge medication list.  Opiate pain medications (oxycodone) will typically only be refilled after an in-person visit.     ICE PACK: Use frequently over the next 7-10 days.  Ice bags/packs/bladder should be used for 20-30 minutes over the surgical site every 3-4 hours during waking hours. Protect your skin from frostbite with a washcloth, towel, or ace wrap between the ice bag/pack and your skin.     DRESSINGS/WOUND CARE:   You should leave your dressings in place until your postoperative visit with your surgeon.  Follow the bathing  instructions below.  If you have increased drainage, incision redness, foul smell, or fevers - inform the office.  You may need to be evaluated in the office earlier than your follow-up appointment.    BATHING:   You should not get the surgical dressings wet unless you have a water-resistant dressing. While you may shower immediately, it is often easier to wait until the dressings are removed to do so. After 3-4 days, when you remove the surgical dressing, you may shower normally.  After removal of the dressing, you may allow warm soapy water to wash over the wound. Try to avoid direct water pressure aimed at your surgical site.  You should keep your surgical site clean and dry when possible until you are seen for your postoperative visit.   Do not soak the wound/incision, use hot tubs or swimming pools, oceans, lakes, ponds until 4 weeks after surgery   Following these guidelines will help avoid any wound healing issues and/or infections.    PHYSICAL/OCCUPATIONAL THERAPY (PT/OT): To maximize surgical benefit, PT/OT is necessary. If you do not have an appointment, you should contact PT/OT to set up an appointment. You should start working with the therapist immediately after surgery. If you have problems setting up PT, please contact our team.     DRIVING: Driving after your surgery is dependent on several factors. You may not drive if you are taking opiate pain medications. You may not drive if you're physically unable to steer with 2 hands and press the brake with full force. It is often more difficult to return to driving if the right leg was injured. If you are unsure whether you are safe to drive, you should visit your local DMV. We are not medically or legally responsible for an accident caused by unsafe driving.     POST-OPERATIVE APPOINTMENT: Your first post-operative appointment is scheduled for 2-3 weeks after the procedure. If you do not have an appointment scheduled yet, please contact our scheduling  office.    SPECIAL INSTRUCTIONS: If you experience fevers greater than 100.4°F for two consecutive days or any single temperature greater than 102.2°F, or if you experience chest pain, difficulty breathing, confusion, or an allergic reaction, return to your nearest emergency department as soon as possible.    Dwight Lomeli MD  Orthopaedic Surgery

## 2024-06-21 NOTE — TELEPHONE ENCOUNTER
Called patient and informed her physical therapy will start after her first PO appointment when splint is removed. Advised her to call [facility] clinic to obtain boot prior to her 1st PO on 7/5. Patient states she has 2 boots at home that he daughter used for her surgery and wondering if she can use it. She will send a picture through [email or web address]. I advised if clean/in good condition and fits her properly may be ok. Will Follow up after she sends picture.

## 2024-06-21 NOTE — ANESTHESIA POSTPROCEDURE EVALUATION
Patient: Manjit Matt    Procedure Summary       Date: 06/20/24 Room / Location: [facility] MAIN OR  / [facility] MAIN OR    Anesthesia Start: 2108 Anesthesia Stop: 2246    Procedure: Left achilles debridement, peroneal tendon debridement and repair, platelet rich plasma (Left: Lower Leg) Diagnosis:       Left Achilles tendinitis      Peroneal tendon tear, left, subsequent encounter      (Left Achilles tendinitis [M76.62]Peroneal tendon tear, left, subsequent encounter [S86.312D])    Surgeons: Dwight Lomeli MD Anesthesiologist: Alex Solo MD    Anesthesia Type: general ASA Status: 3            Anesthesia Type: general    Vitals Value Taken Time   /78 06/20/24 2246   Temp 97.7 °F (36.5 °C) 06/20/24 2246      Pulse 98 06/20/24 2246      Resp 18 06/20/24 2246      SpO2 98 % 06/20/24 2246      Vitals shown include unfiled device data.    [facility] AN Post Evaluation:   Patient Evaluated in PACU  Patient Participation: waiting for patient participation  Level of Consciousness: sleepy but conscious  Pain Score: 0  Pain Management: adequate  Airway Patency:patent  Yes    Cardiovascular Status: acceptable and hemodynamically stable  Respiratory Status: acceptable, spontaneous ventilation and nonlabored ventilation  Postoperative Hydration euvolemic      Alex Solo MD  6/20/2024 10:56 PM

## 2024-06-21 NOTE — OPERATIVE REPORT
PATIENT NAME: Manjit Matt   DATE OF OPERATION: 6/20/2024      PREOPERATIVE DIAGNOSIS: Left Achilles tendinitis and peroneal tendon tear  POSTOPERATIVE DIAGNOSIS:   Left Achilles tendinitis with partial-thickness split tear  Left peroneal tendinitis of the peroneus brevis with interstitial tear     PROCEDURE PERFORMED:    Achilles tendon debridement  Achilles tendon PRP injection  Peroneal tendon debridement and repair  Peroneal tendon allograft augmentation and PRP injection     STAFF SURGEON:  Dwight Lomeli MD   FIRST ASSISTANT: Corinna Whitfield  ANESTHESIA:  General    ANTIBIOTICS:   Ancef 2 grams.     IMPLANTS:  Implant Name Type Inv. Item Serial No.  Lot No. LRB No. Used Action   decellularized dermis   [id number] Arthrex NA Left 1 Implanted        COMPLICATIONS:  None.   CONDITION:  Stable to post anesthesia care unit.      INDICATION FOR PROCEDURE: Manjit Matt  is a 48 year old female who presented with the above diagnosis.  Symptoms have been present for well over a year and has been refractory to all conservative measures including injections, activity modification, anti-inflammatory medications, prolonged periods of nonweightbearing, cryotherapy, and physical therapy.  We discussed the risks and benefits of operative versus nonoperative management. The patient elected to undergo operative treatment.  We discussed potential benefits of the surgery including return of function and strength and for pain management.  We also discussed that surgery may not be perfectly reliable for treatment of her pain.  We discussed alternative options including nonoperative management. We additionally discussed the risks of surgery, which include, but are not limited to bleeding, pain, infection, damage to nerves/vessels/tendons, incomplete relief of pain, incomplete return of function, failure of healing, need for additional surgery, blood clots, and death. The patient understands the above risks and elected to  proceed.     DESCRIPTION OF THE OPERATION:  On the day of the procedure, the patient was met in the preoperative holding area where the consent form was verified and the correct patient, laterality and procedure were identified and found to be correct.  The operative extremity was marked with indelible ink and the patient was then met by the anesthesia provider, who provided a regional nerve block prior to surgery.  The patient was subsequently transferred to the operating room and placed in the supine position on the operating room table with appropriate bump.  All bony prominences were well padded. Once adequate anesthesia was induced and prophylactic antibiotics were infused, the dressings to the operative lower extremity were removed and a tourniquet was placed high on the leg.  The patient was then repositioned into a prone position.  The leg was then prepped and draped in the usual sterile fashion.      A surgical time-out was performed prior to the initiation of the surgery and the consent form was verified and the patient, laterality, and correct procedure were verified and found to be correct.  An Esmarch bandage was used to exsanguinate the Left lower extremity and the tourniquet was inflated to 250 mmHg.      A 5 cm incision was then made longitudinally overlying the swelling of the Achilles tendon.  Peritenon was split and the tendon was identified which demonstrated partial-thickness split tear of the mid substance of the tendon. This was thoroughly debrided using a combination of rongeur and curette in order to stimulate a healing tissue bed.  Excess bulk was debrided.  A side-to-side repair was then performed for the split in the tissue using 3-0 Ethibond sutures.  Attention was then turned to the peroneal tendons    A 9 cm curvilinear incision was made starting just posterior to the lateral malleolus and curving down towards the fifth metatarsal base.  The soft tissues were dissected using a  combination of sharp and dull dissection.  The retinaculum was identified and split sharply.  This then revealed the peroneus brevis and longus.  There was a substantial amount of synovitic tissue around the tendons which was debrided rongeurs and curettes, taking care not to abrade the tendon edges.  A longitudinal split tear was then identified within the peroneus brevis, which was repaired using sharp instruments to achieve a healing wound bed.  An acellular dermal allograft was then selected and introduced around the peroneus brevis and then sewn into position around the split tear, performing both a side-to-side repair of the split as well as securing the tubularized graft into place.  Knots were then flipped to keep them from being prominent.    PRP had been centrifuged on the back table and approximately 14 cc was available for injection.  4 cc were used to free soak the dermal allograft.  Final 10 cc were injected into the peroneal tendon and Achilles tendon as well as into the muscle belly at the myotendinous junctions in order to encourage healing.    Finally, the retinaculum was then closed with 3-0 Ethibond sutures.    The Achilles paratenon was closed with interrupted figure-of-eight 2-0-vicryl sutures. The skin was then closed with 3-0 vicryl interrupted subdermal sutures followed by 3-0 monocryl in a running subcuticular pattern. The incision was covered with Dermabond. The tourniquet was then let down and the toes appeared well perfused. The wound was then dressed with gauze, ABD dressings, webril, and an L&U splint.    Once the splint had hardened, the patient was awoken from anesthesia without complication.  The patient was subsequently transferred to the post-anesthesia care unit in good condition     POSTOPERATIVE INSTRUCTIONS:   - NWB  - Start PT/OT  - Discharge to home today  - Pain protocol including elevation, icing, acetaminophen, NSAIDs, gabapentin, and oxycodone as needed  - Patient will  follow up with me at two weeks     First Assist Necessity and Involvement     For this procedure, a surgical assistant was present for, and assisted with, the entirety of the case. The surgical assistant was involved with patient positioning, prepping and draping, directly assisting with key portions of the case including retracting and/or managing instrumentation, and closing. The surgical assistant was necessary to ensure greater likelihood of a safe and efficient operation, and no Orthopaedic Surgery resident was available to operate as an assistant.

## 2024-06-21 NOTE — BRIEF OP NOTE
Pre-Operative Diagnosis: Left Achilles tendinitis   Peroneal tendon tear, left     Post-Operative Diagnosis:   Left Achilles tendinitis with partial thickness longitudinal tearing   Peroneal tendon tear, left with synovitis      Procedure Performed:   Left achilles tendon debridement  Left achilles tendon PRP injection  Left peroneal tendon debridement and repair  Left peroneal tendon graft augmentation and injection of platelet rich plasma    Surgeons and Role:     * Dwight Lomeli MD - Primary    Assistant(s):  Surgical Assistant.: Corinna Whitfield     Surgical Findings: Split tears in Achilles with fusiform swelling and tendinitis     Specimen: None     Estimated Blood Loss: 5cc    Dictation Number:  N/A    Dwight Lomeli MD  6/20/2024  10:44 PM

## 2024-06-24 ENCOUNTER — APPOINTMENT (OUTPATIENT)
Dept: PHYSICAL THERAPY | Age: 49
End: 2024-06-24
Attending: ORTHOPAEDIC SURGERY
Payer: COMMERCIAL

## 2024-06-26 ENCOUNTER — ANTI-COAG VISIT (OUTPATIENT)
Dept: ANTICOAGULATION | Facility: CLINIC | Age: 49
End: 2024-06-26

## 2024-06-26 ENCOUNTER — OFFICE VISIT (OUTPATIENT)
Dept: INTERNAL MEDICINE CLINIC | Facility: CLINIC | Age: 49
End: 2024-06-26

## 2024-06-26 VITALS
BODY MASS INDEX: 26.21 KG/M2 | WEIGHT: 130 LBS | HEIGHT: 59 IN | SYSTOLIC BLOOD PRESSURE: 97 MMHG | DIASTOLIC BLOOD PRESSURE: 61 MMHG | HEART RATE: 76 BPM

## 2024-06-26 DIAGNOSIS — Z79.01 MONITORING FOR LONG-TERM ANTICOAGULANT USE: ICD-10-CM

## 2024-06-26 DIAGNOSIS — Z51.81 MONITORING FOR LONG-TERM ANTICOAGULANT USE: ICD-10-CM

## 2024-06-26 DIAGNOSIS — M76.62 LEFT ACHILLES TENDINITIS: ICD-10-CM

## 2024-06-26 DIAGNOSIS — Z79.01 LONG TERM (CURRENT) USE OF ANTICOAGULANTS: Primary | ICD-10-CM

## 2024-06-26 DIAGNOSIS — I73.9 PERIPHERAL VASCULAR DISEASE (HCC): ICD-10-CM

## 2024-06-26 DIAGNOSIS — S86.312D PERONEAL TENDON TEAR, LEFT, SUBSEQUENT ENCOUNTER: ICD-10-CM

## 2024-06-26 DIAGNOSIS — I37.9 PULMONIC VALVE DISEASE: ICD-10-CM

## 2024-06-26 DIAGNOSIS — Z79.01 LONG TERM (CURRENT) USE OF ANTICOAGULANTS: ICD-10-CM

## 2024-06-26 DIAGNOSIS — Z09 HOSPITAL DISCHARGE FOLLOW-UP: Primary | ICD-10-CM

## 2024-06-26 LAB — INR: 2.2 (ref 2–3)

## 2024-06-26 PROCEDURE — 99496 TRANSJ CARE MGMT HIGH F2F 7D: CPT | Performed by: STUDENT IN AN ORGANIZED HEALTH CARE EDUCATION/TRAINING PROGRAM

## 2024-06-26 PROCEDURE — 3078F DIAST BP <80 MM HG: CPT | Performed by: STUDENT IN AN ORGANIZED HEALTH CARE EDUCATION/TRAINING PROGRAM

## 2024-06-26 PROCEDURE — 3008F BODY MASS INDEX DOCD: CPT | Performed by: STUDENT IN AN ORGANIZED HEALTH CARE EDUCATION/TRAINING PROGRAM

## 2024-06-26 PROCEDURE — 3074F SYST BP LT 130 MM HG: CPT | Performed by: STUDENT IN AN ORGANIZED HEALTH CARE EDUCATION/TRAINING PROGRAM

## 2024-06-26 PROCEDURE — 93793 ANTICOAG MGMT PT WARFARIN: CPT | Performed by: FAMILY MEDICINE

## 2024-06-26 NOTE — PROGRESS NOTES
Subjective:   Manjit Matt is a 48 year old female who presents for hospital follow up.   She was discharged from hospital, [facility]  to Home   Admit Date: 6/20/2024  Discharge Date: 6/21/2024  Hospital Discharge Diagnosis: Left Achilles debridement, peronaeal tendon debridement and repair, platelet rich plasma injection    Interactive contact within 2 business days post discharge first initiated on Date: 6/21/2023    During the visit, the following was completed:  Obtained and reviewed discharge summary, continuity of care documents, and Surgery operative note and discharge  Reviewed Labs (CBC, CMP) and Operative reports: Surgery    HPI: Pt is a 47y/o female with PMHx of Pulmonic valve stenosis s/p valvuloplasty, Chronic Coumadin (1.5mg MWF and 1mg all other days, Goal INR 2.5, HTN, HLD, PVD (followed by Dr. Lomeli)     Taking Oxycodone 5mg at night to help sleep,  and has pain well controlled on tylenol. Does have left hand dorsum pain.                       History/Other:   Current Medications:  Medication Reconciliation:  I am aware of an inpatient discharge within the last 30 days.  The discharge medication list has been reconciled with the patient's current medication list and reviewed by me.  See medication list for additions of new medication, and changes to current doses of medications and discontinued medications.  Outpatient Medications Marked as Taking for the 6/26/24 encounter (Office Visit) with Abraham Ayala MD   Medication Sig    oxyCODONE 5 MG Oral Tab Take 1 tablet (5 mg total) by mouth every 4 (four) hours as needed for Pain.    Acetaminophen 500 MG Oral Cap Take 2 capsules (1,000 mg total) by mouth every 8 (eight) hours as needed for Pain. Never exceed 3000 mg in a 24-hour period.  Many over-the-counter medications also have acetaminophen in them.    gabapentin 300 MG Oral Cap Take 1 capsule (300 mg total) by mouth every 8 (eight) hours.    ondansetron 4 MG Oral Tablet Dispersible Take 1  tablet (4 mg total) by mouth every 8 (eight) hours as needed for Nausea.    senna-docusate 8.6-50 MG Oral Tab Take 2 tablets by mouth at bedtime.    Vitamin C 500 MG Oral Tab Take 1 tablet (500 mg total) by mouth in the morning and 1 tablet (500 mg total) before bedtime.    loratadine 10 MG Oral Tab Take 1 tablet (10 mg total) by mouth daily.    warfarin 1 MG Oral Tab Take as directed by INR clinic or take 1 & 1/2 tablets Monday Wednesday Friday, & 1 tablet all other days    acidophilus-pectin Oral Cap Take 1 capsule by mouth daily.    Multiple Vitamins-Minerals (HAIR SKIN AND NAILS FORMULA) Oral Tab Take 1 tablet by mouth daily.    rosuvastatin 5 MG Oral Tab Take 1 tablet (5 mg total) by mouth nightly. FOR CHOLESTEROL. (Patient taking differently: Take 1 tablet (5 mg total) by mouth nightly. FOR CHOLESTEROL. PT Is only taking three times a week, Monday Wednesday and friday)    CILOSTAZOL 100 MG Oral Tab TAKE 1 TABLET(100 MG) BY MOUTH TWICE DAILY    ASPIRIN EC LOW DOSE 81 MG Oral Tab EC Take 1 tablet (81 mg total) by mouth daily.       Review of Systems   Constitutional: Negative.    Respiratory: Negative.     Cardiovascular: Negative.    Gastrointestinal: Negative.    Skin: Negative.    Neurological: Negative.    Hematological:  Bruises/bleeds easily.          Objective:   Physical Exam  Constitutional:       Appearance: She is well-developed.   Cardiovascular:      Rate and Rhythm: Normal rate and regular rhythm.      Heart sounds: Normal heart sounds.   Pulmonary:      Effort: Pulmonary effort is normal.      Breath sounds: Normal breath sounds.   Abdominal:      General: Bowel sounds are normal.      Palpations: Abdomen is soft.   Skin:     General: Skin is warm and dry.      Findings: Bruising present.   Neurological:      Mental Status: She is alert and oriented to person, place, and time.      Deep Tendon Reflexes: Reflexes are normal and symmetric.                          BP 97/61 (BP Location: Right arm,  Patient Position: Sitting, Cuff Size: adult)   Pulse 76   Ht 4' 11\" (1.499 m)   Wt 130 lb (59 kg)   LMP 09/27/2018   BMI 26.26 kg/m²  Estimated body mass index is 26.26 kg/m² as calculated from the following:    Height as of this encounter: 4' 11\" (1.499 m).    Weight as of this encounter: 130 lb (59 kg).    Assessment & Plan:   1. Hospital discharge follow-up (Primary)  2. Long term (current) use of anticoagulants  3. Peroneal tendon tear, left, subsequent encounter  4. Left Achilles tendinitis  Pt presents for follow up after hospitalization/peroneal tendon and left achilles tendon repair by Dr. Lomeli also on chronic coumdain   Plan;  -continue coumadin dosing as per coumdain clinic (INR goal 2.5) 1.5mg warfarin MWF and 1mg other days  -continue oxy 5mg nightly, gabapentin 300mg po TID IRVING and tylenol 1gm q6-8hrs IRVING   -reconciled medications and patient is medically stable at time of my evaluation and continue follow up with ortho and coumadin clinic for ongoing care.       No follow-ups on file.

## 2024-06-26 NOTE — PROGRESS NOTES
[facility] / INR 2.2,  therapeutic. (Goal 2.5 ) TTR 74.4 %     Etiology: stable. Post Op 1 week.    PLAN: continue the current dose.    Recheck INR 2 weeks.    WARFARIN Plan per protocol: 1.5 mg every Mon, Wed, Fri; 1 mg all other days

## 2024-06-27 ENCOUNTER — APPOINTMENT (OUTPATIENT)
Dept: PHYSICAL THERAPY | Age: 49
End: 2024-06-27
Attending: ORTHOPAEDIC SURGERY
Payer: COMMERCIAL

## 2024-07-01 ENCOUNTER — TELEPHONE (OUTPATIENT)
Dept: ORTHOPEDICS CLINIC | Facility: CLINIC | Age: 49
End: 2024-07-01

## 2024-07-01 NOTE — TELEPHONE ENCOUNTER
Patient calling stating she had call  clinic on [address] to schedule an appointment to get her walking boot  but they are requesting for script.  Needs to have the diagnosis code and what type of boot.  Appointment is tomorrow     Fax number  [phone]

## 2024-07-01 NOTE — TELEPHONE ENCOUNTER
S/p left achilles surgery on 6/20/24  Spoke with patient and she confirmed she needed gabapentin refilled- she has a \"buzzing\" type of pain and she states she was talking to her physician family member who thought she needed to continue with the gabapentin longer.   Last rx gabapentin given 6/20/24 #30 no reifll  Patient has scheduled PO on 7/5.

## 2024-07-01 NOTE — TELEPHONE ENCOUNTER
Patient calling to request a refill of medication. Per patient they will be out of medication by Tuesday 07.02.24 and would like to have a refill for medication to last them until they see the doctor next as they are still in pain. Please advise

## 2024-07-02 ENCOUNTER — TELEPHONE (OUTPATIENT)
Dept: ORTHOPEDICS CLINIC | Facility: CLINIC | Age: 49
End: 2024-07-02

## 2024-07-02 RX ORDER — GABAPENTIN 300 MG/1
300 CAPSULE ORAL 3 TIMES DAILY
Qty: 60 CAPSULE | Refills: 0 | Status: SHIPPED | OUTPATIENT
Start: 2024-07-02

## 2024-07-02 NOTE — TELEPHONE ENCOUNTER
Per patient she is at the [facility] now to  her boot and they need a new order with CPT code  faxed over as soon as possible. Thank you    Fax: [phone]

## 2024-07-05 ENCOUNTER — OFFICE VISIT (OUTPATIENT)
Dept: ORTHOPEDICS CLINIC | Facility: CLINIC | Age: 49
End: 2024-07-05

## 2024-07-05 DIAGNOSIS — Z47.89 ORTHOPEDIC AFTERCARE: Primary | ICD-10-CM

## 2024-07-05 PROCEDURE — 99024 POSTOP FOLLOW-UP VISIT: CPT | Performed by: STUDENT IN AN ORGANIZED HEALTH CARE EDUCATION/TRAINING PROGRAM

## 2024-07-06 PROBLEM — Z47.89 ORTHOPEDIC AFTERCARE: Status: ACTIVE | Noted: 2024-07-06

## 2024-07-06 NOTE — PROGRESS NOTES
Orthopaedic Surgery Postop Patient Visit  _______________________________________________________________________________________________________________  _______________________________________________________________________________________________________________    DATE OF VISIT: 7/5/2024     DATE OF SURGERY: 6/20/2024     CHIEF COMPLAINT: Follow-up Left ankle Achilles tendon debridement, peroneal tendon debridement, graft, and PRP injection    HISTORY OF PRESENT ILLNESS: Manjit Matt is a 48 year old female who presents to the clinic for follow-up after Left ankle soft tissue surgery. The patient has been nonweightbearing in her postoperative boot. The patient reports normal sensation in dorsal foot. Pain is well controlled.    The patient denies fevers, chills, and wound issues.     MEDICATIONS  Reviewed  No outpatient medications have been marked as taking for the 7/5/24 encounter (Office Visit) with Dwight Lomeli MD.        ALLERGIES  Allergies   Allergen Reactions    Levaquin [Levofloxacin] HIVES and SWELLING     Other reaction(s): LEVOFLOXACIN        REVIEW OF SYSTEMS  A 14 point review of systems was performed. Pertinent positives and negatives noted in the HPI.    PHYSICAL EXAM  LMP 09/27/2018      Constitutional: The patient is well-developed, well-nourished, in no acute distress.  Neurological: Alert and oriented to person, place, and time.  Psychiatric: Mood and affect normal.  Head: Normocephalic and atraumatic.  Cardiovascular: regular rate by palpation  Pulmonary/Chest: Effort normal. No respiratory distress. Breathing non-labored  Abdominal: Abdomen exhibits no distension.   Left ankle  Wound well appearing  Swelling resolving  ROM:  Ankle Dorsiflexion: 0  Ankle Plantarflexion: 30  Motor/Strength:  Motor intact EHL/FHL/TA  Ankle plantarflexion/dorsiflexion: Deferred  SILT spn/dpn/helio/saph/tib distributions  2+ DP/PT pulse, brisk capillary refill to all toes    ASSESSMENT/PLAN: Manjit Matt is a 48  year old female who presents to the Orthopaedic surgery clinic today for follow-up 2 weeks after Left ankle retrograde debridement, peroneal tendon debridement, and graft procedure.  She is overall doing very well    WEIGHT BEARING STATUS: Nonweightbearing with plan to progress weightbearing innext 4 weeks  RANGE-OF-MOTION LIMITATIONS: immobilized in boot, okay to gently range when nonweightbearing  NEW PRESCRIPTIONS: none  IMAGING ORDERED: none  CONSULTS PLACED: none  PROSTHESES/ORTHOTICS: none    FOLLOW-UP: 4 weeks    RADIOGRAPHS AT NEXT VISIT: none    I have personally seen Manjit Shaka and discussed in detail their plan of care. Prior to departure, they indicated agreement with and understanding of their plan of care and their follow-up as documented herein this note. Please note that this note was written in combination with voice recognition/dictation software and there is a possibility of transcription errors which were not identified at the time of note submission. If clarification is necessary, please contact the author or clinic staff.    Dwight Lomeli MD  Orthopaedic Surgery  7/6/2024

## 2024-07-08 ENCOUNTER — OFFICE VISIT (OUTPATIENT)
Dept: PHYSICAL THERAPY | Age: 49
End: 2024-07-08
Attending: STUDENT IN AN ORGANIZED HEALTH CARE EDUCATION/TRAINING PROGRAM
Payer: COMMERCIAL

## 2024-07-08 DIAGNOSIS — S86.312D PERONEAL TENDON TEAR, LEFT, SUBSEQUENT ENCOUNTER: Primary | ICD-10-CM

## 2024-07-08 DIAGNOSIS — M76.62 LEFT ACHILLES TENDINITIS: ICD-10-CM

## 2024-07-08 PROCEDURE — 97140 MANUAL THERAPY 1/> REGIONS: CPT

## 2024-07-08 PROCEDURE — 97110 THERAPEUTIC EXERCISES: CPT

## 2024-07-08 PROCEDURE — 97162 PT EVAL MOD COMPLEX 30 MIN: CPT

## 2024-07-09 NOTE — PROGRESS NOTES
LOWER EXTREMITY EVALUATION:     Diagnosis:   Pain in Achilles tendon (M76.60)  Achilles tendinosis of left ankle (M67.874)      Referring Provider: [employer]  Date of Evaluation:    2/14/2024, 7/8/2024    Precautions:  NWB L Next MD visit:   none scheduled  Date of Surgery: 6/20/2024 (Left achilles debridement, peroneal tendon debridement and repair, PRP)     PATIENT SUMMARY   Pt had been seen for physical therapy for 17 visits from 2/14/2024 - 6/3/2024.    Manjit Matt is a 48 year old female who presents to therapy today 2.5 weeks post Left achilles debridement, peroneal tendon debridement and repair, PRP with complaints of improving L foot/ankle swelling and pain. She is wearing camboot and safely using B axillary crutches. Current functional limitations include Ambulation NWB w/ crutches, using scooter for work and longer distances, interrupted sleep.    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 Pt goals include full return to sport with painfree L calf/achilles.  Past medical history was reviewed with Manjit. Significant findings include R LE bypass surgeries.    ASSESSMENT  Manjit presents to physical therapy evaluation with primary c/o L foot/ankle pain and swelling.  Functional deficits include but are not limited to Ambulation NWB w/ crutches, using scooter for work and longer distances, interrupted sleep..  Signs and symptoms are consistent with diagnosis of being 2.5 weeks post surgery. Pt and PT discussed evaluation findings, pathology, POC and HEP.  Pt voiced understanding and performs HEP correctly without reported pain. Skilled Physical Therapy is medically necessary to address the above impairments and reach functional goals.     OBJECTIVE:   Observation: Fit, petite woman in no apparent distress, moving safely in L NWB. Incisions clean and dry with good healing.  Palpation: Pitting edema distal dorsal of L foot.  Mild TTP throughout L foot/ankle.    APROM: (* denotes performed with pain)  - R LE  grossly WNL's including DF both with knee straight or bent.   - L foot/ankle demonstrate minimal movement actively or passively with max DF to neutral (0 deg) and PF to 30    Accessory motion: n/a    Strength/MMT: (* denotes performed with pain)  - R LE's grossly 5/5  - L LE n/a    Gait: pt ambulates on level ground with B axillary crutches, camboot and NWB L LE.    Today’s Treatment and Response:   Pt education was provided on exam findings, treatment diagnosis, treatment plan, expectations, and prognosis. Pt was also provided recommendations for activity modifications and modalities as needed [ice/heat].  She was treated with Soft tissue mobilization to L calf/achilles in prone and quadruped  Patient was instructed in and issued a HEP for:   - gentle AROM DF/PF, Inv/Ever, ankle circles CW, CCW  - P.R.I.C.E.    Charges: PT Eval Moderate Complexity, Manual, Ther Ex      Total Timed Treatment: 30 min     Total Treatment Time: 45 min     Based on clinical rationale and outcome measures, this evaluation involved Moderate Complexity decision making due to 1-2 personal factors/comorbidities, 3 body structures involved/activity limitations, and evolving symptoms including changing pain levels.  PLAN OF CARE:    Goals: (to be met in 20 visits post op)  Pt report min/no pain or tightness L LE  Pt independent with HEP and progression  Pt able to run, jump, hop, skip, cut, sprint, backpeddle -- all without pain or sense of instability    Frequency / Duration: Patient will be seen for 2 x/week or a total of 12 visits over a 90 day period. Treatment will include: Gait training, Neuromuscular Re-education, Therapeutic Activities, Therapeutic Exercise, and Home Exercise Program instruction    Education or treatment limitation: None  Rehab Potential:excellent    Patient/Family/Caregiver was advised of these findings, precautions, and treatment options and has agreed to actively participate in planning and for this course of  care.    Thank you for your referral. Please co-sign or sign and return this letter via fax as soon as possible to [phone]. If you have any questions, please contact me at Dept: [phone]    Sincerely,  Electronically signed by therapist: Isaiah Romano PT  Physician's certification required: Yes  I certify the need for these services furnished under this plan of treatment and while under my care.    X___________________________________________________ Date____________________    Certification From: 7/8/2024  To:10/8/2024

## 2024-07-10 ENCOUNTER — OFFICE VISIT (OUTPATIENT)
Dept: PHYSICAL THERAPY | Age: 49
End: 2024-07-10
Attending: STUDENT IN AN ORGANIZED HEALTH CARE EDUCATION/TRAINING PROGRAM
Payer: COMMERCIAL

## 2024-07-10 PROCEDURE — 97110 THERAPEUTIC EXERCISES: CPT

## 2024-07-10 PROCEDURE — 97140 MANUAL THERAPY 1/> REGIONS: CPT

## 2024-07-10 NOTE — PROGRESS NOTES
Diagnosis:   Pain in Achilles tendon (M76.60)  Achilles tendinosis of left ankle (M67.874)        Referring Provider: [employer]  Date of Evaluation:    2/13/2024, 7/8/2024    Precautions:  NWB L Next MD visit:   none scheduled  Date of Surgery: 6/20/2024 (Left achilles debridement, peroneal tendon debridement and repair, PRP)    Insurance Primary/Secondary: [employer] [place] HMO / N/A     # Auth Visits:     Subjective:  Various pain and tightness in calf, ankle, foot and toes on L.  Wearing CamWalker and abiding by NWB.  Objective: See below treatment log.    Assessment:  Sensitive to touch throughout foot and ankle and with PROM. Able to achieve 5 deg DF passively with both knee straight or bent.    Goals:   Pt report min/no pain or tightness L LE  Pt independent with HEP and progression  Pt able to run, jump, hop, skip, cut, sprint, backpeddle -- all without pain or sense of instability    Plan: NWB L.  Progress A/AA/PROM slowly.  Date:   TX#: 2 Date:      TX#:   Date:    TX#:  Date:   TX#:  Date:   Tx#:  Date:   Tx#:    Ther Ex (15')  - gentle AROM L ankle, foot, toes  - toe scrunches, yoga (extension) and spreading        Manual (30')  - PROM L ankle/foot  - gentle joint mobilization toes/mid/forefoot  - desensitization rubbing, patting and tapping to L lower leg, ankle, foot                        HEP: AROM ankle/toes    Charges: Ther Ex, Manual x2      Total Timed Treatment: 45 min  Total Treatment Time: 45 min

## 2024-07-11 ENCOUNTER — ANTI-COAG VISIT (OUTPATIENT)
Dept: ANTICOAGULATION | Facility: CLINIC | Age: 49
End: 2024-07-11

## 2024-07-11 DIAGNOSIS — Z79.01 LONG TERM (CURRENT) USE OF ANTICOAGULANTS: Primary | ICD-10-CM

## 2024-07-11 DIAGNOSIS — Z79.01 MONITORING FOR LONG-TERM ANTICOAGULANT USE: ICD-10-CM

## 2024-07-11 DIAGNOSIS — I73.9 PERIPHERAL VASCULAR DISEASE (HCC): ICD-10-CM

## 2024-07-11 DIAGNOSIS — I37.9 PULMONIC VALVE DISEASE: ICD-10-CM

## 2024-07-11 DIAGNOSIS — Z51.81 MONITORING FOR LONG-TERM ANTICOAGULANT USE: ICD-10-CM

## 2024-07-11 DIAGNOSIS — E78.2 MIXED HYPERLIPIDEMIA: ICD-10-CM

## 2024-07-11 LAB — INR: 2.5 (ref 2–3)

## 2024-07-11 PROCEDURE — 93793 ANTICOAG MGMT PT WARFARIN: CPT | Performed by: FAMILY MEDICINE

## 2024-07-11 NOTE — PROGRESS NOTES
[facility] / INR 2.5,  therapeutic. (Goal 2.5 ) TTR 75.3 %     Etiology: stable.    PLAN: continue the current dose.    Recheck INR 2 weeks.      WARFARIN Plan per protocol: 1.5 mg every Mon, Wed, Fri; 1 mg all other days

## 2024-07-15 ENCOUNTER — OFFICE VISIT (OUTPATIENT)
Dept: PHYSICAL THERAPY | Age: 49
End: 2024-07-15
Attending: STUDENT IN AN ORGANIZED HEALTH CARE EDUCATION/TRAINING PROGRAM
Payer: COMMERCIAL

## 2024-07-15 PROCEDURE — 97140 MANUAL THERAPY 1/> REGIONS: CPT

## 2024-07-15 PROCEDURE — 97110 THERAPEUTIC EXERCISES: CPT

## 2024-07-15 NOTE — PROGRESS NOTES
Diagnosis:   Pain in Achilles tendon (M76.60)  Achilles tendinosis of left ankle (M67.874)        Referring Provider: Armani  Date of Evaluation:    2/13/2024, 7/8/2024    Precautions:  NWB L Next MD visit:   none scheduled  Date of Surgery: 6/20/2024 (Left achilles debridement, peroneal tendon debridement and repair, PRP)    Insurance Primary/Secondary: [place] / N/A     # Auth Visits:     Subjective:  Doing a bit better.  Having to pad various places within the CamWalker due to pressure/soreness.  Objective: See below treatment log.    Assessment:  Improving A/PROM L ankle.    Goals:   Pt report min/no pain or tightness L LE  Pt independent with HEP and progression  Pt able to run, jump, hop, skip, cut, sprint, backpeddle -- all without pain or sense of instability    Plan: NWB L.  Progress A/AA/PROM slowly.  Date: 7/10/2024  TX#: 2 Date:  7/15/2024    TX#:  3 Date:    TX#:  Date:   TX#:  Date:   Tx#:  Date:   Tx#:    Ther Ex (15')  - gentle AROM L ankle, foot, toes  - toe scrunches, yoga (extension) and spreading Ther Ex (15')  - gentle AROM L ankle, foot, toes  - toe scrunches, yoga (extension) and spreading       Manual (30')  - PROM L ankle/foot  - gentle joint mobilization toes/mid/forefoot  - desensitization rubbing, patting and tapping to L lower leg, ankle, foot Manual (30')  - PROM L ankle/foot  - gentle joint mobilization toes/mid/forefoot  - desensitization rubbing, patting and tapping to L lower leg, ankle, foot                       HEP: AROM ankle/toes    Charges: Ther Ex, Manual x2      Total Timed Treatment: 45 min  Total Treatment Time: 45 min

## 2024-07-16 NOTE — TELEPHONE ENCOUNTER
Please review. Protocol Failed; No Protocol    No Active/ Future labs pended     Requested Prescriptions   Pending Prescriptions Disp Refills    ROSUVASTATIN 5 MG Oral Tab [Pharmacy Med Name: ROSUVASTATIN 5MG TABLETS] 90 tablet 9     Sig: TAKE 1 TABLET(5 MG) BY MOUTH EVERY NIGHT FOR CHOLESTEROL       Cholesterol Medication Protocol Failed - 7/11/2024  4:13 PM        Failed - Lipid panel within past 12 months     Lab Results   Component Value Date    CHOLEST 176 02/08/2023    TRIG 143 02/08/2023    HDL 71 (H) 02/08/2023    LDL 81 02/08/2023    VLDL 22 02/08/2023    NONHDLC 105 02/08/2023             Passed - ALT < 80     Lab Results   Component Value Date    ALT 58 (H) 06/17/2024             Passed - ALT resulted within past year        Passed - In person appointment or virtual visit in the past 12 mos or appointment in next 3 mos     Recent Outpatient Visits              Yesterday     [facility] in [place] Isaiah Romano, PT    Office Visit    6 days ago     [facility] in [place] Isaiah Romano, PT    Office Visit    1 week ago Peroneal tendon tear, left, subsequent encounter    [facility] in [place] Isaiah Romano, PT    Office Visit    1 week ago Orthopedic aftercare    [facility], Dwight Rider MD    Office Visit    2 weeks ago Hospital discharge follow-up    [facility], Abraham Ramachandran MD    Office Visit          Future Appointments         Provider Department Appt Notes    In 2 days Isaiah Romano PT [facility] in [place] 28 visit 1/23 to 8/31  [facility]  no c/p    In 1 week Isaiah Romano PT [facility] in [place] 28 visit 1/23 to 8/31  [facility]  no c/p    In 1 week Isaiah Romano PT [facility] in [place] 28 visit 1/23 to 8/31  [facility]  no c/p    In 2 weeks Isaiah Romano, PT [facility] [place] 28 visit 1/23 to 8/31  [facility]  [facility]  [facility]    In 2 weeks Isaiah Romano, PT [facility] in [place] 28 visit 1/23 to 8/31  [employer]  no c/p    In 2 weeks Dwight Lomeli MD [facility], [facility], [facility] Follow up    In 3 weeks Isaiah Romano, PT [facility] in [place] 28 visit 1/23 to 8/31  [employer] HMO  no c/p    In 3 weeks Isaiah Romano, PT [facility] in [place] 28 visit 1/23 to 8/31  [employer] HMO  no c/p    In 4 weeks Isaiah Romano, PT [facility] in [place] 28 visit 1/23 to 8/31  [employer] HMO  no c/p    In 1 month Isaiah Romano PT [facility] in [place] 28 visit 1/23 to 8/31  [employer] HMO  no c/p                           Future Appointments         Provider Department Appt Notes    In 2 days Isaiah Romano PT [facility] in [place] 28 visit 1/23 to 8/31  [employer] HMO  no c/p    In 1 week Isaiah Romano, PT [facility] in [place] 28 visit 1/23 to 8/31  [employer] HMO  no c/p    In 1 week Isaiah Romano, PT [facility] in [place] 28 visit 1/23 to 8/31  [employer] HMO  no c/p    In 2 weeks Isaiah Romano, PT [facility] in [place] 28 visit 1/23 to 8/31  [employer] HMO  no c/p    In 2 weeks Isaiah Romano, PT [facility] in [place] 28 visit 1/23 to 8/31  [employer] HMO  no c/p    In 2 weeks Dwight Lomeli MD [facility], [facility], [facility] Follow up    In 3 weeks Isaiah Romano, PT [facility] in [place] 28 visit 1/23 to 8/31  [employer] HMO  no c/p    In 3 weeks Isaiah Romano, PT [facility] in [place] 28 visit 1/23 to 8/31  [facility]  no c/p    In 4 weeks Isaiah Romano, PT [facility] in [place] 28 visit 1/23 to 8/31  [facility]  no c/p    In 1 month Isaiah Romano, PT [facility] in [place] 28 visit 1/23 to 8/31  [facility]  no c/p          Recent Outpatient Visits              Yesterday     [facility] in [place] Isaiah Romano, PT    Office  Visit    6 days ago     [facility] in [place] Shuert, Jeffrey, PT    Office Visit    1 week ago Peroneal tendon tear, left, subsequent encounter    [facility] in [place] Shuert, Jeffrey, PT    Office Visit    1 week ago Orthopedic aftercare    [facility] Dwight Rider MD    Office Visit    2 weeks ago Hospital discharge follow-up    [facility], Abraham Ramachandran MD    Office Visit

## 2024-07-17 RX ORDER — ROSUVASTATIN CALCIUM 5 MG/1
5 TABLET, COATED ORAL NIGHTLY
Qty: 90 TABLET | Refills: 3 | Status: SHIPPED | OUTPATIENT
Start: 2024-07-17

## 2024-07-18 ENCOUNTER — OFFICE VISIT (OUTPATIENT)
Dept: PHYSICAL THERAPY | Age: 49
End: 2024-07-18
Attending: STUDENT IN AN ORGANIZED HEALTH CARE EDUCATION/TRAINING PROGRAM
Payer: COMMERCIAL

## 2024-07-18 PROCEDURE — 97110 THERAPEUTIC EXERCISES: CPT

## 2024-07-18 PROCEDURE — 97140 MANUAL THERAPY 1/> REGIONS: CPT

## 2024-07-18 NOTE — PROGRESS NOTES
Diagnosis:   Pain in Achilles tendon (M76.60)  Achilles tendinosis of left ankle (M67.874)        Referring Provider: Armani  Date of Evaluation:    2/13/2024, 7/8/2024    Precautions:  NWPOWER REBOLLEDO Next MD visit:   none scheduled  Date of Surgery: 6/20/2024 (Left achilles debridement, peroneal tendon debridement and repair, PRP)    Ins[place] Primary/Secondary[place] / N/[place]     # Auth Visits:     Subjective:  Still feels tight and pressure lat foot and under 5th ray, but achilles isn't as bothersome.  Objective: See below treatment log.    Assessment:  Improving mobility and touch tolerance L ankle/foot.    Goals:   Pt report min/no pain or tightness L LE  Pt independent with HEP and progression  Pt able to run, jump, hop, skip, cut, sprint, backpeddle -- all without pain or sense of instability    Plan: NWB L.  Progress A/AA/PROM slowly.  Date: 7/10/2024  TX#: 2 Date:  7/15/2024    TX#:  3 Date:  7/18/2024  TX#: 4 Date:   TX#:  Date:   Tx#:  Date:   Tx#:    Ther Ex (15')  - gentle AROM L ankle, foot, toes  - toe scrunches, yoga (extension) and spreading Ther Ex (15')  - gentle AROM L ankle, foot, toes  - toe scrunches, yoga (extension) and spreading Ther Ex (15')  - gentle AROM L ankle, foot, toes  - toe scrunches, yoga (extension) and spreading      Manual (30')  - PROM L ankle/foot  - gentle joint mobilization toes/mid/forefoot  - desensitization rubbing, patting and tapping to L lower leg, ankle, foot Manual (30')  - PROM L ankle/foot  - gentle joint mobilization toes/mid/forefoot  - desensitization rubbing, patting and tapping to L lower leg, ankle, foot Manual (30')  - PROM L ankle/foot  - gentle joint mobilization toes/mid/forefoot  - desensitization rubbing, patting and tapping to L lower leg, ankle, foot                      HEP: AROM ankle/toes    Charges: Ther Ex, Manual x2      Total Timed Treatment: 45 min  Total Treatment Time: 45 min

## 2024-07-23 ENCOUNTER — OFFICE VISIT (OUTPATIENT)
Dept: PHYSICAL THERAPY | Age: 49
End: 2024-07-23
Attending: STUDENT IN AN ORGANIZED HEALTH CARE EDUCATION/TRAINING PROGRAM
Payer: COMMERCIAL

## 2024-07-23 ENCOUNTER — TELEPHONE (OUTPATIENT)
Dept: ORTHOPEDICS CLINIC | Facility: CLINIC | Age: 49
End: 2024-07-23

## 2024-07-23 PROCEDURE — 97140 MANUAL THERAPY 1/> REGIONS: CPT

## 2024-07-23 PROCEDURE — 97110 THERAPEUTIC EXERCISES: CPT

## 2024-07-23 NOTE — TELEPHONE ENCOUNTER
Patient Comment: I will be out of them on Tuesday. I know I’m not supposed to stop it suddenly and gradually need to be taken off. The nerve pain still persists in my foot. Please advise

## 2024-07-23 NOTE — TELEPHONE ENCOUNTER
Patient calling because she fell and landed on her knee that she had surgery on and is now in a lot of pain in her knee and patient needs more of the gabapentin. Patient is requesting a refill of the gabapentin. Patient also states she will be in physical therapy today and will ask them if she needs an xray for the knee. Patient does not want to go to ER or UC until she knows more. See refill request message from 7/20. Please advise

## 2024-07-23 NOTE — PROGRESS NOTES
Diagnosis:   Pain in Achilles tendon (M76.60)  Achilles tendinosis of left ankle (M67.874)        Referring Provider: Armani  Date of Evaluation:    2/13/2024, 7/8/2024    Precautions:  TELLY REBOLLEDO Next MD visit:   none scheduled  Date of Surgery: 6/20/2024 (Left achilles debridement, peroneal tendon debridement and repair, PRP)    Ins[place] Primary/Secondary[place] / N/[place]     # Auth Visits:     Subjective:  Fell Sunday at home and landed on her L knee (while wearing CAMwalker).  Is bruised and L knee hurts.  Objective: See below treatment log.    Assessment:  Iced L knee during treatment to L ankle.  Bruising present.  Will monitor.    Goals:   Pt report min/no pain or tightness L LE  Pt independent with HEP and progression  Pt able to run, jump, hop, skip, cut, sprint, backpeddle -- all without pain or sense of instability    Plan: NWB L.  Progress A/AA/PROM slowly.  Date: 7/10/2024  TX#: 2 Date:  7/15/2024    TX#:  3 Date:  7/18/2024  TX#: 4 Date: 7/23/2024  TX#: 5 Date:   Tx#:  Date:   Tx#:    Ther Ex (15')  - gentle AROM L ankle, foot, toes  - toe scrunches, yoga (extension) and spreading Ther Ex (15')  - gentle AROM L ankle, foot, toes  - toe scrunches, yoga (extension) and spreading Ther Ex (15')  - gentle AROM L ankle, foot, toes  - toe scrunches, yoga (extension) and spreading Ther Ex (15')  - gentle AROM L ankle, foot, toes  - toe scrunches, yoga (extension) and spreading     Manual (30')  - PROM L ankle/foot  - gentle joint mobilization toes/mid/forefoot  - desensitization rubbing, patting and tapping to L lower leg, ankle, foot Manual (30')  - PROM L ankle/foot  - gentle joint mobilization toes/mid/forefoot  - desensitization rubbing, patting and tapping to L lower leg, ankle, foot Manual (30')  - PROM L ankle/foot  - gentle joint mobilization toes/mid/forefoot  - desensitization rubbing, patting and tapping to L lower leg, ankle, foot Manual (30')  - PROM L ankle/foot  - gentle joint mobilization  toes/mid/forefoot  - desensitization rubbing, patting and tapping to L lower leg, ankle, foot                     HEP: AROM ankle/toes    Charges: Ther Ex, Manual x2      Total Timed Treatment: 45 min  Total Treatment Time: 45 min

## 2024-07-24 ENCOUNTER — ANTI-COAG VISIT (OUTPATIENT)
Dept: ANTICOAGULATION | Facility: CLINIC | Age: 49
End: 2024-07-24

## 2024-07-24 DIAGNOSIS — Z79.01 LONG TERM (CURRENT) USE OF ANTICOAGULANTS: Primary | ICD-10-CM

## 2024-07-24 DIAGNOSIS — Z51.81 MONITORING FOR LONG-TERM ANTICOAGULANT USE: ICD-10-CM

## 2024-07-24 DIAGNOSIS — I73.9 PERIPHERAL VASCULAR DISEASE (HCC): ICD-10-CM

## 2024-07-24 DIAGNOSIS — I37.9 PULMONIC VALVE DISEASE: ICD-10-CM

## 2024-07-24 DIAGNOSIS — Z79.01 MONITORING FOR LONG-TERM ANTICOAGULANT USE: ICD-10-CM

## 2024-07-24 LAB — INR: 3.1 (ref 2–3)

## 2024-07-24 PROCEDURE — 93793 ANTICOAG MGMT PT WARFARIN: CPT | Performed by: FAMILY MEDICINE

## 2024-07-24 RX ORDER — GABAPENTIN 300 MG/1
300 CAPSULE ORAL 3 TIMES DAILY
Qty: 60 CAPSULE | Refills: 0 | Status: SHIPPED | OUTPATIENT
Start: 2024-07-24

## 2024-07-24 NOTE — TELEPHONE ENCOUNTER
Spoke with patient to let her know of prescription sent and that she can get x-rays at her next visit. Moved her visit to 7/30/24 to be seen sooner. Location and directions given. Has Physical Therapy in that building. Endorses she is unable to touch her knee without pain, pain with any pressure. Stopped using scooter and is using crutches now. Is icing and elevating. States the only pain from the surgery now is the shocking nerve pain in her foot which the gabapentin does relieve. Advised to discuss nerve issues with Dr. Lomeli and possibly with primary doctor. Advised she may want to have primary doctor send referral for knee just in case. Verbalized understanding.

## 2024-07-24 NOTE — PROGRESS NOTES
[facility] / INR 3.1, slightly supra therapeutic. (Goal 2.5 ) TTR 75.6 %     Etiology: mostly stable. Call if medication or health changes.  Still NON-Wt baring.     PLAN: continue the current dose- eat a salad. Vitamin B Complex ~ Vit D3 5000 & magnesium. Restart Kale Shakes.       Recheck INR 2 weeks.    Pt reports:    No sign of unusual bruising or bleeding.  Any missed doses: No   Medications changes: Tylenol 1000mg tid & gabapentin.      Contacted  Manjit by phone, verbalized understanding and agreement.     WARFARIN Plan per protocol: 1.5 mg every Mon, Wed, Fri; 1 mg all other days

## 2024-07-25 ENCOUNTER — OFFICE VISIT (OUTPATIENT)
Dept: PHYSICAL THERAPY | Age: 49
End: 2024-07-25
Attending: STUDENT IN AN ORGANIZED HEALTH CARE EDUCATION/TRAINING PROGRAM
Payer: COMMERCIAL

## 2024-07-25 PROCEDURE — 97110 THERAPEUTIC EXERCISES: CPT

## 2024-07-25 PROCEDURE — 97140 MANUAL THERAPY 1/> REGIONS: CPT

## 2024-07-25 NOTE — PROGRESS NOTES
Diagnosis:   Pain in Achilles tendon (M76.60)  Achilles tendinosis of left ankle (M67.874)        Referring Provider: Armani  Date of Evaluation:    2/13/2024, 7/8/2024    Precautions:  NWB L Next MD visit:   none scheduled  Date of Surgery: 6/20/2024 (Left achilles debridement, peroneal tendon debridement and repair, PRP)    Insurance Primary/Secondary[place] / N/[place]     # Auth Visits:     Subjective:  L knee is still bruised and painful, though maybe a little improved.  Lower leg hypersensitivity continues, especially under 5th toe/ray.  Objective: See below treatment log. Able to DF with knee straight or bent actively to just past neutral.  Pt able to generate mild amounts of inv/ever L.    Assessment:  Improving A/PROM and decreased sensitivity to scars and lower leg in general.    Goals:   Pt report min/no pain or tightness L LE  Pt independent with HEP and progression  Pt able to run, jump, hop, skip, cut, sprint, backpeddle -- all without pain or sense of instability    Plan: To see Dr. Lomeli next week to examine her L knee.  Continue NWB L.  Progress A/AA/PROM slowly.  Date: 7/10/2024  TX#: 2 Date:  7/15/2024    TX#:  3 Date:  7/18/2024  TX#: 4 Date: 7/23/2024  TX#: 5 Date: 7/25/2024  Tx#: 6 Date:   Tx#:    Ther Ex (15')  - gentle AROM L ankle, foot, toes  - toe scrunches, yoga (extension) and spreading Ther Ex (15')  - gentle AROM L ankle, foot, toes  - toe scrunches, yoga (extension) and spreading Ther Ex (15')  - gentle AROM L ankle, foot, toes  - toe scrunches, yoga (extension) and spreading Ther Ex (15')  - gentle AROM L ankle, foot, toes  - toe scrunches, yoga (extension) and spreading Ther Ex (15')  - gentle AROM L ankle, foot, toes  - toe scrunches, yoga (extension) and spreading    Manual (30')  - PROM L ankle/foot  - gentle joint mobilization toes/mid/forefoot  - desensitization rubbing, patting and tapping to L lower leg, ankle, foot Manual (30')  - PROM L ankle/foot  - gentle joint mobilization  toes/mid/forefoot  - desensitization rubbing, patting and tapping to L lower leg, ankle, foot Manual (30')  - PROM L ankle/foot  - gentle joint mobilization toes/mid/forefoot  - desensitization rubbing, patting and tapping to L lower leg, ankle, foot Manual (30')  - PROM L ankle/foot  - gentle joint mobilization toes/mid/forefoot  - desensitization rubbing, patting and tapping to L lower leg, ankle, foot Manual (30')  - PROM L ankle/foot  - gentle joint mobilization toes/mid/forefoot  - desensitization rubbing, patting and tapping to L lower leg, ankle, foot                    HEP: AROM ankle/toes    Charges: Ther Ex, Manual x2      Total Timed Treatment: 45 min  Total Treatment Time: 45 min

## 2024-07-26 ENCOUNTER — NURSE TRIAGE (OUTPATIENT)
Dept: INTERNAL MEDICINE CLINIC | Facility: CLINIC | Age: 49
End: 2024-07-26

## 2024-07-26 DIAGNOSIS — M25.562 ACUTE PAIN OF LEFT KNEE: ICD-10-CM

## 2024-07-26 DIAGNOSIS — S86.312D PERONEAL TENDON TEAR, LEFT, SUBSEQUENT ENCOUNTER: Primary | ICD-10-CM

## 2024-07-26 NOTE — TELEPHONE ENCOUNTER
Action Requested: Summary for Provider     []  Critical Lab, Recommendations Needed  [] Need Additional Advice  []   FYI    []   Need Orders  [] Need Medications Sent to Pharmacy  []  Other     SUMMARY: patient requests referral to ortho.     Patient states just had surgery with ortho for left tenon tear. Patient fell on her left knee 5 days ago trying to walk with her crutches. She is non weight bearing right now due to restrictions post surgery.   Patient states has bruise and swelling at site.  Patient is on anticoagulants.   \"I can stretch out the leg but there is some pain when I do that.  It only hurts when I press on the knee other wise\"    Patient denies swelling to leg, numbness/tingling.     Patient denies hitting head or any further injuries during fall.        Patient contacted Dr Lomeli's office and has an office visit for 7/30/24.  Patient will need a referral  See ortho TE 7/23/24.  Patient does not want to go to IC or ER and \"I already notified my ortho\"  Patient only wants referral.  Please advise          Reason for call: No chief complaint on file.  Onset: Data Unavailable                     Reason for Disposition  • MODERATE pain (e.g., symptoms interfere with work or school, limping) and present > 3 days    Protocols used: Knee Pain-A-OH

## 2024-07-30 ENCOUNTER — HOSPITAL ENCOUNTER (OUTPATIENT)
Dept: GENERAL RADIOLOGY | Age: 49
Discharge: HOME | End: 2024-07-30
Attending: STUDENT IN AN ORGANIZED HEALTH CARE EDUCATION/TRAINING PROGRAM
Payer: COMMERCIAL

## 2024-07-30 ENCOUNTER — OFFICE VISIT (OUTPATIENT)
Dept: PHYSICAL THERAPY | Age: 49
End: 2024-07-30
Attending: STUDENT IN AN ORGANIZED HEALTH CARE EDUCATION/TRAINING PROGRAM
Payer: COMMERCIAL

## 2024-07-30 ENCOUNTER — TELEPHONE (OUTPATIENT)
Dept: ORTHOPEDICS CLINIC | Facility: CLINIC | Age: 49
End: 2024-07-30

## 2024-07-30 ENCOUNTER — OFFICE VISIT (OUTPATIENT)
Dept: ORTHOPEDICS CLINIC | Facility: CLINIC | Age: 49
End: 2024-07-30
Payer: COMMERCIAL

## 2024-07-30 DIAGNOSIS — M25.562 LEFT KNEE PAIN, UNSPECIFIED CHRONICITY: ICD-10-CM

## 2024-07-30 DIAGNOSIS — Z47.89 ORTHOPEDIC AFTERCARE: Primary | ICD-10-CM

## 2024-07-30 DIAGNOSIS — S86.312D PERONEAL TENDON TEAR, LEFT, SUBSEQUENT ENCOUNTER: ICD-10-CM

## 2024-07-30 DIAGNOSIS — M76.62 LEFT ACHILLES TENDINITIS: Primary | ICD-10-CM

## 2024-07-30 DIAGNOSIS — Z47.89 ORTHOPEDIC AFTERCARE: ICD-10-CM

## 2024-07-30 PROCEDURE — 73564 X-RAY EXAM KNEE 4 OR MORE: CPT | Performed by: STUDENT IN AN ORGANIZED HEALTH CARE EDUCATION/TRAINING PROGRAM

## 2024-07-30 PROCEDURE — 99024 POSTOP FOLLOW-UP VISIT: CPT | Performed by: STUDENT IN AN ORGANIZED HEALTH CARE EDUCATION/TRAINING PROGRAM

## 2024-07-30 PROCEDURE — 97110 THERAPEUTIC EXERCISES: CPT

## 2024-07-30 PROCEDURE — 97140 MANUAL THERAPY 1/> REGIONS: CPT

## 2024-07-31 NOTE — PROGRESS NOTES
Diagnosis:   Pain in Achilles tendon (M76.60)  Achilles tendinosis of left ankle (M67.874)        Referring Provider: Armani  Date of Evaluation:    2/13/2024, 7/8/2024    Precautions:  NWPOWER REBOLLEDO Next MD visit:   none scheduled  Date of Surgery: 6/20/2024 (Left achilles debridement, peroneal tendon debridement and repair, PRP)    Insurance Primary/Secondary: [place] / N/[place]     # Auth Visits:     Subjective:  Dr says L knee is fine.  Continues to have sensitivity in L lower leg.  He removed some stitches that didn't dissolve, which helps, but still having pain with AROM, especially eversion.  Objective: See below treatment log.     Assessment:  Good initial tolerance for very light Wb'ing.    Goals:   Pt report min/no pain or tightness L LE  Pt independent with HEP and progression  Pt able to run, jump, hop, skip, cut, sprint, backpeddle -- all without pain or sense of instability    Plan: Per Dr. Lomeli's note earlier today: WEIGHT BEARING STATUS:  Progressive weightbearing by 25 %/week over the next 4 weeks  Date: 7/10/2024  TX#: 2 Date:  7/15/2024    TX#:  3 Date:  7/18/2024  TX#: 4 Date: 7/23/2024  TX#: 5 Date: 7/25/2024  Tx#: 6 Date: 7/30/2024  Tx#: 7   Ther Ex (15')  - gentle AROM L ankle, foot, toes  - toe scrunches, yoga (extension) and spreading Ther Ex (15')  - gentle AROM L ankle, foot, toes  - toe scrunches, yoga (extension) and spreading Ther Ex (15')  - gentle AROM L ankle, foot, toes  - toe scrunches, yoga (extension) and spreading Ther Ex (15')  - gentle AROM L ankle, foot, toes  - toe scrunches, yoga (extension) and spreading Ther Ex (15')  - gentle AROM L ankle, foot, toes  - toe scrunches, yoga (extension) and spreading Ther Ex (20')  - gentle AROM L ankle, foot, toes  - toe scrunches, yoga (extension) and spreading - seated with foot on floor  - Seated balance board B teetering AP and side/side  - NuStep (in boot) L5 x5 min B UE/LE   Manual (30')  - PROM L ankle/foot  - gentle joint mobilization  toes/mid/forefoot  - desensitization rubbing, patting and tapping to L lower leg, ankle, foot Manual (30')  - PROM L ankle/foot  - gentle joint mobilization toes/mid/forefoot  - desensitization rubbing, patting and tapping to L lower leg, ankle, foot Manual (30')  - PROM L ankle/foot  - gentle joint mobilization toes/mid/forefoot  - desensitization rubbing, patting and tapping to L lower leg, ankle, foot Manual (30')  - PROM L ankle/foot  - gentle joint mobilization toes/mid/forefoot  - desensitization rubbing, patting and tapping to L lower leg, ankle, foot Manual (30')  - PROM L ankle/foot  - gentle joint mobilization toes/mid/forefoot  - desensitization rubbing, patting and tapping to L lower leg, ankle, foot Manual (25')  - PROM L ankle/foot  - gentle joint mobilization toes/mid/forefoot  - desensitization rubbing, patting and tapping to L lower leg, ankle, foot                   HEP: AROM ankle/toes    Charges: Ther Ex, Manual x2      Total Timed Treatment: 45 min  Total Treatment Time: 45 min

## 2024-07-31 NOTE — PROGRESS NOTES
Orthopaedic Surgery Postop Patient Visit  _______________________________________________________________________________________________________________  _______________________________________________________________________________________________________________    DATE OF VISIT: 7/30/2024     DATE OF SURGERY: 6/20/2024     CHIEF COMPLAINT: Follow-up Left ankle Achilles and peroneal tendon surgery  Chief Complaint   Patient presents with    Post-Op      s/p L ankle sx on 6/20/2024- States stiches are irritating skin. Some soreness.     Knee Pain     L knee - Pt states she had a fall when using crutches. Pain to the touch and some bruising   Had xray done today.         HISTORY OF PRESENT ILLNESS: Manjit Matt is a 48 year old female who presents to the clinic for follow-up after Left ankle t surgery.  She is overall doing well and has been nonweightbearing since the time of the surgery.  She reports that she fell directly onto her knee in the past few days, resulting in a contusion to the knee.  Otherwise she feels that her pain is overall improving in the ankle though she does have some neurologic pain in the lateral aspect of her foot that she has had since the block was first placed.  She denies any other issues at this time  .  She continues to work with therapy  MEDICATIONS  Reviewed   gabapentin 300 MG Oral Cap Take 1 capsule (300 mg total) by mouth in the morning, at noon, and at bedtime. Start with one capsule at bedtime for a week. If tolerating, also take one at lunch. 60 capsule 0    rosuvastatin 5 MG Oral Tab Take 1 tablet (5 mg total) by mouth nightly. 90 tablet 3    oxyCODONE 5 MG Oral Tab Take 1 tablet (5 mg total) by mouth every 4 (four) hours as needed for Pain. 16 tablet 0    Acetaminophen 500 MG Oral Cap Take 2 capsules (1,000 mg total) by mouth every 8 (eight) hours as needed for Pain. Never exceed 3000 mg in a 24-hour period.  Many over-the-counter medications also have acetaminophen in them.  180 capsule 0    gabapentin 300 MG Oral Cap Take 1 capsule (300 mg total) by mouth every 8 (eight) hours. 30 capsule 0    ondansetron 4 MG Oral Tablet Dispersible Take 1 tablet (4 mg total) by mouth every 8 (eight) hours as needed for Nausea. 10 tablet 0    senna-docusate 8.6-50 MG Oral Tab Take 2 tablets by mouth at bedtime. 28 tablet 0    Vitamin C 500 MG Oral Tab Take 1 tablet (500 mg total) by mouth in the morning and 1 tablet (500 mg total) before bedtime. 84 tablet 0    loratadine 10 MG Oral Tab Take 1 tablet (10 mg total) by mouth daily.      warfarin 1 MG Oral Tab Take as directed by INR clinic or take 1 & 1/2 tablets Monday Wednesday Friday, & 1 tablet all other days 120 tablet 1    acidophilus-pectin Oral Cap Take 1 capsule by mouth daily.      Multiple Vitamins-Minerals (HAIR SKIN AND NAILS FORMULA) Oral Tab Take 1 tablet by mouth daily.      CILOSTAZOL 100 MG Oral Tab TAKE 1 TABLET(100 MG) BY MOUTH TWICE DAILY 60 tablet 11    ASPIRIN EC LOW DOSE 81 MG Oral Tab EC Take 1 tablet (81 mg total) by mouth daily.  5        ALLERGIES  Allergies   Allergen Reactions    Levaquin [Levofloxacin] HIVES and SWELLING     Other reaction(s): LEVOFLOXACIN        REVIEW OF SYSTEMS  A 14 point review of systems was performed. Pertinent positives and negatives noted in the HPI.    PHYSICAL EXAM  LMP 09/27/2018      Constitutional: The patient is well-developed, well-nourished, in no acute distress.  Neurological: Alert and oriented to person, place, and time.  Psychiatric: Mood and affect normal.  Head: Normocephalic and atraumatic.  Cardiovascular: regular rate by palpation  Pulmonary/Chest: Effort normal. No respiratory distress. Breathing non-labored  Abdominal: Abdomen exhibits no distension.   Left ankle  Wound well healed  Swelling resolving  ROM:  Ankle Dorsiflexion: 10  Ankle Plantarflexion: 50  Motor/Strength:  Motor intact EHL/FHL/TA/peroneals  Ankle plantarflexion/dorsiflexion: Diminished  SILT spn/dpn/saph/tib  distributions but slightly altered in the sural nerve distribution  2+ DP/PT pulse, brisk capillary refill to all toes    IMAGING  I independently viewed and interpreted the imaging. Radiologist interpretation is available in the imaging report.  X-ray: Plain films of the left knee demonstrate no acute osseous abnormalities.  No fractures or dislocations present    ASSESSMENT/PLAN: Manjit Matt is a 48 year old female who presents to the Orthopaedic surgery clinic today for follow-up 6 weeks after Left ankle peroneal tendon and Achilles debridement/repair surgeries.  She is overall doing well though continues to have neurologic pain from her block.  We will plan to transition her out of the normal weightbearing status and progress weightbearing over the next 4 weeks.  She will start working with her therapist to progress her motion and continue to progress her range of motion and strength in the ankle.  Will plan to follow-up in approximately 4 weeks.  We discussed the relevant radiographs at today's visit  We reviewed the PT protocol and adjustments were made as needed  WEIGHT BEARING STATUS:  Progressive weightbearing by 25 %/week over the next 4 weeks  RANGE-OF-MOTION LIMITATIONS: as tolerated  NEW PRESCRIPTIONS: none  IMAGING ORDERED: none  CONSULTS PLACED:  Physical therapy renewal  PROSTHESES/ORTHOTICS: none    FOLLOW-UP: 4 weeks    RADIOGRAPHS AT NEXT VISIT: none    I have personally seen Manjit Matt and discussed in detail their plan of care. Prior to departure, they indicated agreement with and understanding of their plan of care and their follow-up as documented herein this note. Please note that this note was written in combination with voice recognition/dictation software and there is a possibility of transcription errors which were not identified at the time of note submission. If clarification is necessary, please contact the author or clinic staff.    Dwight Lomeli MD  Orthopaedic  Surgery  7/30/2024

## 2024-08-01 ENCOUNTER — OFFICE VISIT (OUTPATIENT)
Dept: PHYSICAL THERAPY | Age: 49
End: 2024-08-01
Attending: STUDENT IN AN ORGANIZED HEALTH CARE EDUCATION/TRAINING PROGRAM
Payer: COMMERCIAL

## 2024-08-01 PROCEDURE — 97140 MANUAL THERAPY 1/> REGIONS: CPT

## 2024-08-01 PROCEDURE — 97110 THERAPEUTIC EXERCISES: CPT

## 2024-08-02 NOTE — PROGRESS NOTES
Diagnosis:   Pain in Achilles tendon (M76.60)  Achilles tendinosis of left ankle (M67.874)        Referring Provider: Armani  Date of Evaluation:    2/13/2024, 7/8/2024    Precautions:  NWB L Next MD visit:   none scheduled  Date of Surgery: 6/20/2024 (Left achilles debridement, peroneal tendon debridement and repair, PRP)    Insurance Primary/Secondary: [employer] / N/A     # Auth Visits:     Subjective:  Swollen today and sensitive on top of foot.  Did use bathroom scale at home for WB'ing up to 20# briefly.  Objective: See below treatment log.     Assessment:  Good initial tolerance for very light Wb'ing.    Goals:   Pt report min/no pain or tightness L LE  Pt independent with HEP and progression  Pt able to run, jump, hop, skip, cut, sprint, backpeddle -- all without pain or sense of instability    Plan: Per Dr. Lomeli's note earlier today: WEIGHT BEARING STATUS:  Progressive weightbearing by 25 %/week over the next 4 weeks  Date: 7/10/2024  TX#: 2 Date:  7/15/2024    TX#:  3 Date:  7/18/2024  TX#: 4 Date: 7/23/2024  TX#: 5 Date: 7/25/2024  Tx#: 6 Date: 7/30/2024  Tx#: 7 Date: 8/1/2024  Tx#: 8   Ther Ex (15')  - gentle AROM L ankle, foot, toes  - toe scrunches, yoga (extension) and spreading Ther Ex (15')  - gentle AROM L ankle, foot, toes  - toe scrunches, yoga (extension) and spreading Ther Ex (15')  - gentle AROM L ankle, foot, toes  - toe scrunches, yoga (extension) and spreading Ther Ex (15')  - gentle AROM L ankle, foot, toes  - toe scrunches, yoga (extension) and spreading Ther Ex (15')  - gentle AROM L ankle, foot, toes  - toe scrunches, yoga (extension) and spreading Ther Ex (20')  - gentle AROM L ankle, foot, toes  - toe scrunches, yoga (extension) and spreading - seated with foot on floor  - Seated balance board B teetering AP and side/side  - NuStep (in boot) L5 x5 min B UE/LE Ther Ex (20')  - gentle AROM L ankle, foot, toes  - toe scrunches, yoga (extension) and spreading - seated with foot on floor  -  Seated balance board B teetering AP and side/side  - NuStep (in boot) L5 x5 min B UE/LE   Manual (30')  - PROM L ankle/foot  - gentle joint mobilization toes/mid/forefoot  - desensitization rubbing, patting and tapping to L lower leg, ankle, foot Manual (30')  - PROM L ankle/foot  - gentle joint mobilization toes/mid/forefoot  - desensitization rubbing, patting and tapping to L lower leg, ankle, foot Manual (30')  - PROM L ankle/foot  - gentle joint mobilization toes/mid/forefoot  - desensitization rubbing, patting and tapping to L lower leg, ankle, foot Manual (30')  - PROM L ankle/foot  - gentle joint mobilization toes/mid/forefoot  - desensitization rubbing, patting and tapping to L lower leg, ankle, foot Manual (30')  - PROM L ankle/foot  - gentle joint mobilization toes/mid/forefoot  - desensitization rubbing, patting and tapping to L lower leg, ankle, foot Manual (25')  - PROM L ankle/foot  - gentle joint mobilization toes/mid/forefoot  - desensitization rubbing, patting and tapping to L lower leg, ankle, foot Manual (25')  - PROM L ankle/foot  - gentle joint mobilization toes/mid/forefoot  - desensitization rubbing, patting and tapping to L lower leg, ankle, foot                     HEP: AROM ankle/toes    Charges: Ther Ex, Manual x2      Total Timed Treatment: 45 min  Total Treatment Time: 45 min

## 2024-08-06 ENCOUNTER — TELEPHONE (OUTPATIENT)
Dept: ORTHOPEDICS CLINIC | Facility: CLINIC | Age: 49
End: 2024-08-06

## 2024-08-06 ENCOUNTER — OFFICE VISIT (OUTPATIENT)
Dept: PHYSICAL THERAPY | Age: 49
End: 2024-08-06
Attending: STUDENT IN AN ORGANIZED HEALTH CARE EDUCATION/TRAINING PROGRAM
Payer: COMMERCIAL

## 2024-08-06 PROCEDURE — 97140 MANUAL THERAPY 1/> REGIONS: CPT

## 2024-08-06 PROCEDURE — 97110 THERAPEUTIC EXERCISES: CPT

## 2024-08-06 NOTE — TELEPHONE ENCOUNTER
Spoke with Daly BALDERAS from [facility]. Completed [facility] intake for patient. It has been sent for review. This will take 24-48 hours to turn around. They will fax a decision to us. She states in the meantime [employer] is giving patient 10 day supplies and that she picked the most recent ones up today. The request I just completed is for patient to get a 30 day supply of gabapentin 300 mg at a time, and I requested it for 3x a day, which is what per below, patient states she is taking.    Case ID is [id number].    Called patient and let her know. Advised to follow up if she does not hear by end of week. We will be awaiting fax with decision re: medication in meantime. She did state that she is getting medication, just 10 days at a time currently.

## 2024-08-06 NOTE — PROGRESS NOTES
Diagnosis:   Pain in Achilles tendon (M76.60)  Achilles tendinosis of left ankle (M67.874)        Referring Provider: Armani  Date of Evaluation:    2/13/2024, 7/8/2024    Precautions:  NWB L Next MD visit:   none scheduled  Date of Surgery: 6/20/2024 (Left achilles debridement, peroneal tendon debridement and repair, PRP)    Insurance Primary/Secondary: [place] / N/A     # Auth Visits:     Subjective:  Various symptoms primarily at L foot and under 5th ray.  Progressing WB'ing a bit more  Objective: See below treatment log. This week able to WB up to 50%.    Assessment:  Good increase with ROM and WB'ing dieudonne.    Goals:   Pt report min/no pain or tightness L LE  Pt independent with HEP and progression  Pt able to run, jump, hop, skip, cut, sprint, backpeddle -- all without pain or sense of instability    Plan: Progress Wb'ing: this week to 50%, next week to 75%.  Date: 7/25/2024  Tx#: 6 Date: 7/30/2024  Tx#: 7 Date: 8/1/2024  Tx#: 8 Date: 8/6/2024  Tx#: 9   Ther Ex (15')  - gentle AROM L ankle, foot, toes  - toe scrunches, yoga (extension) and spreading Ther Ex (20')  - gentle AROM L ankle, foot, toes  - toe scrunches, yoga (extension) and spreading - seated with foot on floor  - Seated balance board B teetering AP and side/side  - NuStep (in boot) L5 x5 min B UE/LE Ther Ex (20')  - gentle AROM L ankle, foot, toes  - toe scrunches, yoga (extension) and spreading - seated with foot on floor  - Seated balance board B teetering AP and side/side  - NuStep (in boot) L5 x5 min B UE/LE Ther Ex (20')  - NuStep (in boot) L5 x5 min B UE/LE  - gentle AROM L ankle, foot, toes  - toe scrunches, yoga (extension) and spreading - seated with foot on floor  - Seated and PWB balance board B teetering AP and side/side  - Seated and PWB BAPS L1 AP, ML   Manual (30')  - PROM L ankle/foot  - gentle joint mobilization toes/mid/forefoot  - desensitization rubbing, patting and tapping to L lower leg, ankle, foot Manual (25')  - PROM L  ankle/foot  - gentle joint mobilization toes/mid/forefoot  - desensitization rubbing, patting and tapping to L lower leg, ankle, foot Manual (25')  - PROM L ankle/foot  - gentle joint mobilization toes/mid/forefoot  - desensitization rubbing, patting and tapping to L lower leg, ankle, foot Manual (25')  - PROM L ankle/foot  - gentle joint mobilization toes/mid/forefoot  - desensitization rubbing, patting and tapping to L lower leg, ankle, foot               HEP: AROM ankle/toes    Charges: Ther Ex, Manual x2      Total Timed Treatment: 45 min  Total Treatment Time: 45 min

## 2024-08-06 NOTE — TELEPHONE ENCOUNTER
Per patient states she needs new prescription for gabapentin, states insurance is stating original prescription was written to take this medication 4 times a day but patient only takes it 3 times a day. Patient states only solutions provided was to place new script. Patient has been waiting for medication since last week. Please advise thank you.

## 2024-08-08 ENCOUNTER — APPOINTMENT (OUTPATIENT)
Dept: PHYSICAL THERAPY | Age: 49
End: 2024-08-08
Attending: STUDENT IN AN ORGANIZED HEALTH CARE EDUCATION/TRAINING PROGRAM
Payer: COMMERCIAL

## 2024-08-08 ENCOUNTER — ANTI-COAG VISIT (OUTPATIENT)
Dept: ANTICOAGULATION | Facility: CLINIC | Age: 49
End: 2024-08-08

## 2024-08-08 DIAGNOSIS — I73.9 PERIPHERAL VASCULAR DISEASE (HCC): ICD-10-CM

## 2024-08-08 DIAGNOSIS — Z79.01 LONG TERM (CURRENT) USE OF ANTICOAGULANTS: Primary | ICD-10-CM

## 2024-08-08 DIAGNOSIS — Z79.01 MONITORING FOR LONG-TERM ANTICOAGULANT USE: ICD-10-CM

## 2024-08-08 DIAGNOSIS — Z51.81 MONITORING FOR LONG-TERM ANTICOAGULANT USE: ICD-10-CM

## 2024-08-08 DIAGNOSIS — I37.9 PULMONIC VALVE DISEASE: ICD-10-CM

## 2024-08-08 LAB — INR: 4 (ref 2–3)

## 2024-08-08 PROCEDURE — 93793 ANTICOAG MGMT PT WARFARIN: CPT | Performed by: FAMILY MEDICINE

## 2024-08-08 NOTE — PROGRESS NOTES
Self reported [facility] / INR 4.0, supra therapeutic. (Goal 2.5 ) TTR 73.1 %     Etiology: Manjit had surgery on her LE and she stopped doing Kale shakes and is eating Kale/mushroom bites from [employer]. I suspect the bites have a lower Vit K content and the INR is gradually increasing.     PLAN: HELD already yesteryday. Get a comparison venous @  tomorrow.    Recheck INR tomorrow since she's off work on this day.    Pt reports:    No sign of unusual bruising or bleeding.  Any missed doses: No   Medications changes: No    Contacted  Manjit by phone  who verbalized understanding and agreement.    WARFARIN Plan per protocol: 1.5 mg every Mon; 1 mg all other days

## 2024-08-09 ENCOUNTER — LAB ENCOUNTER (OUTPATIENT)
Dept: LAB | Facility: HOSPITAL | Age: 49
End: 2024-08-09
Attending: FAMILY MEDICINE
Payer: COMMERCIAL

## 2024-08-09 ENCOUNTER — ANTI-COAG VISIT (OUTPATIENT)
Dept: ANTICOAGULATION | Facility: CLINIC | Age: 49
End: 2024-08-09

## 2024-08-09 DIAGNOSIS — Z51.81 MONITORING FOR LONG-TERM ANTICOAGULANT USE: ICD-10-CM

## 2024-08-09 DIAGNOSIS — Z79.01 MONITORING FOR LONG-TERM ANTICOAGULANT USE: ICD-10-CM

## 2024-08-09 DIAGNOSIS — I73.9 PERIPHERAL VASCULAR DISEASE (HCC): ICD-10-CM

## 2024-08-09 DIAGNOSIS — I37.9 PULMONIC VALVE DISEASE: ICD-10-CM

## 2024-08-09 DIAGNOSIS — Z79.01 LONG TERM (CURRENT) USE OF ANTICOAGULANTS: Primary | ICD-10-CM

## 2024-08-09 LAB
INR BLD: 2.25 (ref 0.8–1.2)
PROTHROMBIN TIME: 25.1 SECONDS (ref 11.6–14.8)

## 2024-08-09 PROCEDURE — 36415 COLL VENOUS BLD VENIPUNCTURE: CPT

## 2024-08-09 PROCEDURE — 93793 ANTICOAG MGMT PT WARFARIN: CPT | Performed by: FAMILY MEDICINE

## 2024-08-09 PROCEDURE — 85610 PROTHROMBIN TIME: CPT

## 2024-08-09 NOTE — PROGRESS NOTES
[facility]: VENOUS / INR 2.25 therapeutic. (Goal 2.5 ) TTR 72.9 %   [employer] XS 2.6 comparison. Pt advised this difference is acceptable. (0.4)     Etiology: INR was elevated a few days ago. She checked today to verify her POC device is reading accurately which it looks like it is. She is post op and has been non-weight bearing so Not as many shakes. She has been eating \"kale-Bites\" from [employer] and I don't think it compares to the shakes. Also: She is switching her Vitamin D3 to WITH Vitamin K for better absorption. This is fine, just be consistent. Also: she'll try to get more spinach in. Also: will try Reishi mushroom coffee. Uptodate: no interaction.    PLAN: go back to the pre- 4.0 dose of 1.5mg - 3 days.     Recheck INR ONE week so we can make sure it doesn't go up again.  Reduce if it does.     Pt reports:    No sign of unusual bruising or bleeding.  Any missed doses: Yes, Wednesday for high reading.   Medications changes: No    Contacted  Manjit by phone  who verbalized understanding and agreement.    WARFARIN Plan per protocol: 1.5 mg every Mon, Wed, Fri; 1 mg all other days

## 2024-08-13 ENCOUNTER — OFFICE VISIT (OUTPATIENT)
Dept: PHYSICAL THERAPY | Age: 49
End: 2024-08-13
Attending: STUDENT IN AN ORGANIZED HEALTH CARE EDUCATION/TRAINING PROGRAM
Payer: COMMERCIAL

## 2024-08-13 PROCEDURE — 97140 MANUAL THERAPY 1/> REGIONS: CPT

## 2024-08-13 PROCEDURE — 97110 THERAPEUTIC EXERCISES: CPT

## 2024-08-14 ENCOUNTER — ANTI-COAG VISIT (OUTPATIENT)
Dept: ANTICOAGULATION | Facility: CLINIC | Age: 49
End: 2024-08-14

## 2024-08-14 DIAGNOSIS — I37.9 PULMONIC VALVE DISEASE: ICD-10-CM

## 2024-08-14 DIAGNOSIS — Z51.81 MONITORING FOR LONG-TERM ANTICOAGULANT USE: ICD-10-CM

## 2024-08-14 DIAGNOSIS — Z79.01 LONG TERM (CURRENT) USE OF ANTICOAGULANTS: Primary | ICD-10-CM

## 2024-08-14 DIAGNOSIS — I73.9 PERIPHERAL VASCULAR DISEASE (HCC): ICD-10-CM

## 2024-08-14 DIAGNOSIS — Z79.01 MONITORING FOR LONG-TERM ANTICOAGULANT USE: ICD-10-CM

## 2024-08-14 LAB — INR: 2.8 (ref 2–3)

## 2024-08-14 PROCEDURE — 93793 ANTICOAG MGMT PT WARFARIN: CPT | Performed by: FAMILY MEDICINE

## 2024-08-14 NOTE — PROGRESS NOTES
Diagnosis:   Pain in Achilles tendon (M76.60)  Achilles tendinosis of left ankle (M67.874)        Referring Provider: Armani  Date of Evaluation:    2/13/2024, 7/8/2024    Precautions:  NWB L Next MD visit:   none scheduled  Date of Surgery: 6/20/2024 (Left achilles debridement, peroneal tendon debridement and repair, PRP)    Insurance Primary/Secondary: [place] / N/A     # Auth Visits:     Subjective:  WB'ing has helped quite a bit with ambulation efficiency, and comfort.  Objective: See below treatment log. This week able to WB up to 75%.    Assessment:  Good improvements with ROM and closed chain activity.    Goals:   Pt report min/no pain or tightness L LE  Pt independent with HEP and progression  Pt able to run, jump, hop, skip, cut, sprint, backpeddle -- all without pain or sense of instability    Plan: Progress Wb'ing: this week to 75%, next week to 100%.  Date: 7/25/2024  Tx#: 6 Date: 7/30/2024  Tx#: 7 Date: 8/1/2024  Tx#: 8 Date: 8/6/2024  Tx#: 9 Date: 8/13/2024  Tx#: 10    Ther Ex (15')  - gentle AROM L ankle, foot, toes  - toe scrunches, yoga (extension) and spreading Ther Ex (20')  - gentle AROM L ankle, foot, toes  - toe scrunches, yoga (extension) and spreading - seated with foot on floor  - Seated balance board B teetering AP and side/side  - NuStep (in boot) L5 x5 min B UE/LE Ther Ex (20')  - gentle AROM L ankle, foot, toes  - toe scrunches, yoga (extension) and spreading - seated with foot on floor  - Seated balance board B teetering AP and side/side  - NuStep (in boot) L5 x5 min B UE/LE Ther Ex (20')  - NuStep (in boot) L5 x5 min B UE/LE  - gentle AROM L ankle, foot, toes  - toe scrunches, yoga (extension) and spreading - seated with foot on floor  - Seated and PWB balance board B teetering AP and side/side  - Seated and PWB BAPS L1 AP, ML Ther Ex (25')  - NuStep (in boot) L5 x5 min B UE/LE  - gentle AROM L ankle, foot, toes  - toe scrunches, yoga (extension) and spreading - seated with foot on  floor  - Seated and PWB balance board B teetering AP and side/side  - Seated and PWB BAPS L2 AP, ML, CW, CCW  - PWB TKE BTB Next - add Shuttle   Manual (30')  - PROM L ankle/foot  - gentle joint mobilization toes/mid/forefoot  - desensitization rubbing, patting and tapping to L lower leg, ankle, foot Manual (25')  - PROM L ankle/foot  - gentle joint mobilization toes/mid/forefoot  - desensitization rubbing, patting and tapping to L lower leg, ankle, foot Manual (25')  - PROM L ankle/foot  - gentle joint mobilization toes/mid/forefoot  - desensitization rubbing, patting and tapping to L lower leg, ankle, foot Manual (25')  - PROM L ankle/foot  - gentle joint mobilization toes/mid/forefoot  - desensitization rubbing, patting and tapping to L lower leg, ankle, foot Manual (20')  - PROM L ankle/foot  - gentle joint mobilization toes/mid/forefoot  - desensitization rubbing, patting and tapping to L lower leg, ankle, foot                    HEP: AROM ankle/toes    Charges: Ther Ex x2, Manual      Total Timed Treatment: 45 min  Total Treatment Time: 45 min

## 2024-08-14 NOTE — PROGRESS NOTES
[facility] / INR 3.0,  therapeutic. (Goal 2.5 ) TTR 73.2 %     Etiology: stable - high last week.     PLAN: continue the current dose.    Recheck INR one week would be best.    WARFARIN Plan per protocol: 1.5 mg every Mon, Wed, Fri; 1 mg all other days

## 2024-08-15 ENCOUNTER — OFFICE VISIT (OUTPATIENT)
Dept: PHYSICAL THERAPY | Age: 49
End: 2024-08-15
Attending: STUDENT IN AN ORGANIZED HEALTH CARE EDUCATION/TRAINING PROGRAM
Payer: COMMERCIAL

## 2024-08-15 PROCEDURE — 97110 THERAPEUTIC EXERCISES: CPT

## 2024-08-15 PROCEDURE — 97140 MANUAL THERAPY 1/> REGIONS: CPT

## 2024-08-15 NOTE — PROGRESS NOTES
Diagnosis:   Pain in Achilles tendon (M76.60)  Achilles tendinosis of left ankle (M67.874)        Referring Provider: Armani  Date of Evaluation:    2/13/2024, 7/8/2024    Precautions:  TELLY REBOLLEDO Next MD visit:   none scheduled  Date of Surgery: 6/20/2024 (Left achilles debridement, peroneal tendon debridement and repair, PRP)    Insurance Primary/Secondary: [place] / N/A     # Auth Visits:     Subjective:  Hurting since she has done too much WB'ing today.  Got greedy because it was feeling so good, but clearly over did it.  Objective: See below treatment log.     Assessment:  Good improvements with A/PROM, but increased pain as pt did too much WB'ing.    Goals:   Pt report min/no pain or tightness L LE  Pt independent with HEP and progression  Pt able to run, jump, hop, skip, cut, sprint, backpeddle -- all without pain or sense of instability    Plan: Progress Wb'ing: this week to 75%, next week to 100%.  Date: 7/25/2024  Tx#: 6 Date: 7/30/2024  Tx#: 7 Date: 8/1/2024  Tx#: 8 Date: 8/6/2024  Tx#: 9 Date: 8/13/2024  Tx#: 10 Date: 8/15/2024  Tx#: 11    Ther Ex (15')  - gentle AROM L ankle, foot, toes  - toe scrunches, yoga (extension) and spreading Ther Ex (20')  - gentle AROM L ankle, foot, toes  - toe scrunches, yoga (extension) and spreading - seated with foot on floor  - Seated balance board B teetering AP and side/side  - NuStep (in boot) L5 x5 min B UE/LE Ther Ex (20')  - gentle AROM L ankle, foot, toes  - toe scrunches, yoga (extension) and spreading - seated with foot on floor  - Seated balance board B teetering AP and side/side  - NuStep (in boot) L5 x5 min B UE/LE Ther Ex (20')  - NuStep (in boot) L5 x5 min B UE/LE  - gentle AROM L ankle, foot, toes  - toe scrunches, yoga (extension) and spreading - seated with foot on floor  - Seated and PWB balance board B teetering AP and side/side  - Seated and PWB BAPS L1 AP, ML Ther Ex (25')  - NuStep (in boot) L5 x5 min B UE/LE  - gentle AROM L ankle, foot, toes  - toe  scrunches, yoga (extension) and spreading - seated with foot on floor  - Seated and PWB balance board B teetering AP and side/side  - Seated and PWB BAPS L2 AP, ML, CW, CCW  - PWB TKE BTB Ther Ex (25')  - NuStep (in boot) L5 x5 min B UE/LE  - gentle AROM L ankle, foot, toes  - toe scrunches, yoga (extension) and spreading - seated with foot on floor  - Seated and PWB balance board B teetering AP and side/side  - Seated and PWB BAPS L2 AP, ML, CW, CCW  - PWB TKE BTB Next - add Shuttle   Manual (30')  - PROM L ankle/foot  - gentle joint mobilization toes/mid/forefoot  - desensitization rubbing, patting and tapping to L lower leg, ankle, foot Manual (25')  - PROM L ankle/foot  - gentle joint mobilization toes/mid/forefoot  - desensitization rubbing, patting and tapping to L lower leg, ankle, foot Manual (25')  - PROM L ankle/foot  - gentle joint mobilization toes/mid/forefoot  - desensitization rubbing, patting and tapping to L lower leg, ankle, foot Manual (25')  - PROM L ankle/foot  - gentle joint mobilization toes/mid/forefoot  - desensitization rubbing, patting and tapping to L lower leg, ankle, foot Manual (20')  - PROM L ankle/foot  - gentle joint mobilization toes/mid/forefoot  - desensitization rubbing, patting and tapping to L lower leg, ankle, foot Manual (20')  - PROM L ankle/foot  - gentle joint mobilization toes/mid/forefoot  - desensitization rubbing, patting and tapping to L lower leg, ankle, foot                      HEP: AROM ankle/toes    Charges: Ther Ex x2, Manual      Total Timed Treatment: 45 min  Total Treatment Time: 45 min

## 2024-08-16 ENCOUNTER — TELEPHONE (OUTPATIENT)
Dept: ORTHOPEDICS CLINIC | Facility: CLINIC | Age: 49
End: 2024-08-16

## 2024-08-16 NOTE — TELEPHONE ENCOUNTER
Patient calling would like a new prescription to be send to [facility] stating  3 times a day for 30 days due to her insurance   gabapentin 300 MG Oral Cap

## 2024-08-19 RX ORDER — GABAPENTIN 300 MG/1
300 CAPSULE ORAL 3 TIMES DAILY
Qty: 30 CAPSULE | Refills: 0 | Status: CANCELLED | OUTPATIENT
Start: 2024-08-19

## 2024-08-20 ENCOUNTER — TELEPHONE (OUTPATIENT)
Dept: ORTHOPEDICS CLINIC | Facility: CLINIC | Age: 49
End: 2024-08-20

## 2024-08-20 NOTE — TELEPHONE ENCOUNTER
Left message for patient informing her that we have to change her appointment time for 8/27 from 4:45 pm to 9 am. She is to call the office back if that time doesn't work for her.

## 2024-08-20 NOTE — TELEPHONE ENCOUNTER
Patient called back to inform me that her new appointment time ross not work for her. I was able to reschedule her appointment for Friday 8/30 at 1041

## 2024-08-21 RX ORDER — GABAPENTIN 300 MG/1
300 CAPSULE ORAL 3 TIMES DAILY
Qty: 90 CAPSULE | Refills: 0 | Status: SHIPPED | OUTPATIENT
Start: 2024-08-21 | End: 2024-09-20

## 2024-08-21 NOTE — TELEPHONE ENCOUNTER
Patient requesting to speak with nurse regarding clarification on new script for Gabapentin. Please call at [phone],thanks.

## 2024-08-22 ENCOUNTER — OFFICE VISIT (OUTPATIENT)
Dept: PHYSICAL THERAPY | Age: 49
End: 2024-08-22
Attending: ORTHOPAEDIC SURGERY
Payer: COMMERCIAL

## 2024-08-22 PROCEDURE — 97140 MANUAL THERAPY 1/> REGIONS: CPT

## 2024-08-22 PROCEDURE — 97110 THERAPEUTIC EXERCISES: CPT

## 2024-08-22 NOTE — PROGRESS NOTES
Diagnosis:   Pain in Achilles tendon (M76.60)  Achilles tendinosis of left ankle (M67.874)        Referring Provider: Yarelis  Date of Evaluation:    2/13/2024, 7/8/2024    Precautions:  NWB L Next MD visit:   none scheduled  Date of Surgery: 6/20/2024 (Left achilles debridement, peroneal tendon debridement and repair, PRP)    Insurance Primary/Secondary: [employer] / N/A     # Auth Visits:     Subjective:  Really hurting.  Really tired.  She has had long days with moving her work location and knows she has walked 100% WB on L in boot, but without crutch quite a bit.  The L knee continues to be a big problem.  Objective: See below treatment log.     Assessment: Improving WB'ing tolerance, though not abiding by restriction (this week to 75%).  Limited as much by knee pain.    Goals:   Pt report min/no pain or tightness L LE  Pt independent with HEP and progression  Pt able to run, jump, hop, skip, cut, sprint, backpeddle -- all without pain or sense of instability    Plan: Progress Wb'ing: this week to 75%, next week to 100%.  Date: 7/25/2024  Tx#: 6 Date: 7/30/2024  Tx#: 7 Date: 8/1/2024  Tx#: 8 Date: 8/6/2024  Tx#: 9 Date: 8/13/2024  Tx#: 10 Date: 8/15/2024  Tx#: 11 Date: 8/22/2024  Tx#: 12 (29/33)   Ther Ex (15')  - gentle AROM L ankle, foot, toes  - toe scrunches, yoga (extension) and spreading Ther Ex (20')  - gentle AROM L ankle, foot, toes  - toe scrunches, yoga (extension) and spreading - seated with foot on floor  - Seated balance board B teetering AP and side/side  - NuStep (in boot) L5 x5 min B UE/LE Ther Ex (20')  - gentle AROM L ankle, foot, toes  - toe scrunches, yoga (extension) and spreading - seated with foot on floor  - Seated balance board B teetering AP and side/side  - NuStep (in boot) L5 x5 min B UE/LE Ther Ex (20')  - NuStep (in boot) L5 x5 min B UE/LE  - gentle AROM L ankle, foot, toes  - toe scrunches, yoga (extension) and spreading - seated with foot on floor  - Seated and PWB balance  board B teetering AP and side/side  - Seated and PWB BAPS L1 AP, ML Ther Ex (25')  - NuStep (in boot) L5 x5 min B UE/LE  - gentle AROM L ankle, foot, toes  - toe scrunches, yoga (extension) and spreading - seated with foot on floor  - Seated and PWB balance board B teetering AP and side/side  - Seated and PWB BAPS L2 AP, ML, CW, CCW  - PWB TKE BTB Ther Ex (25')  - NuStep (in boot) L5 x5 min B UE/LE  - gentle AROM L ankle, foot, toes  - toe scrunches, yoga (extension) and spreading - seated with foot on floor  - Seated and PWB balance board B teetering AP and side/side  - Seated and PWB BAPS L2 AP, ML, CW, CCW  - PWB TKE BTB Ther Ex (30')  - NuStep (in boot) L5 x5 min B UE/LE  - gentle AROM L ankle, foot, toes  - toe scrunches, yoga (extension) and spreading - seated with foot on floor  - Seated and PWB balance board B teetering AP and side/side  - Seated and PWB BAPS L2 AP, ML, CW, CCW  - PWB TKE BTB  - Shuttle B leg press 4b x30  - Shuttle B calf raise 2b (only from foot plate)  - Shuttle L leg press 4b x30   Manual (30')  - PROM L ankle/foot  - gentle joint mobilization toes/mid/forefoot  - desensitization rubbing, patting and tapping to L lower leg, ankle, foot Manual (25')  - PROM L ankle/foot  - gentle joint mobilization toes/mid/forefoot  - desensitization rubbing, patting and tapping to L lower leg, ankle, foot Manual (25')  - PROM L ankle/foot  - gentle joint mobilization toes/mid/forefoot  - desensitization rubbing, patting and tapping to L lower leg, ankle, foot Manual (25')  - PROM L ankle/foot  - gentle joint mobilization toes/mid/forefoot  - desensitization rubbing, patting and tapping to L lower leg, ankle, foot Manual (20')  - PROM L ankle/foot  - gentle joint mobilization toes/mid/forefoot  - desensitization rubbing, patting and tapping to L lower leg, ankle, foot Manual (20')  - PROM L ankle/foot  - gentle joint mobilization toes/mid/forefoot  - desensitization rubbing, patting and tapping to L  lower leg, ankle, foot Manual (15')  - PROM L ankle/foot  - gentle joint mobilization toes/mid/forefoot  - desensitization rubbing, patting and tapping to L lower leg, ankle, foot                     HEP: AROM ankle/toes    Charges: Ther Ex x2, Manual      Total Timed Treatment: 45 min  Total Treatment Time: 45 min

## 2024-08-22 NOTE — TELEPHONE ENCOUNTER
Spoke with patient. She got the medication but decided to pay out of pocket rather than dealing with insurance issues. She stated she was fine with that as it was only 15 dollars. Will follow up when she needs a refill. No further questions or concerns at this time.

## 2024-08-27 ENCOUNTER — APPOINTMENT (OUTPATIENT)
Dept: PHYSICAL THERAPY | Age: 49
End: 2024-08-27
Attending: ORTHOPAEDIC SURGERY
Payer: COMMERCIAL

## 2024-08-28 ENCOUNTER — ANTI-COAG VISIT (OUTPATIENT)
Dept: ANTICOAGULATION | Facility: CLINIC | Age: 49
End: 2024-08-28

## 2024-08-28 DIAGNOSIS — I73.9 PERIPHERAL VASCULAR DISEASE (HCC): ICD-10-CM

## 2024-08-28 DIAGNOSIS — Z79.01 MONITORING FOR LONG-TERM ANTICOAGULANT USE: ICD-10-CM

## 2024-08-28 DIAGNOSIS — Z51.81 MONITORING FOR LONG-TERM ANTICOAGULANT USE: ICD-10-CM

## 2024-08-28 DIAGNOSIS — I37.9 PULMONIC VALVE DISEASE: ICD-10-CM

## 2024-08-28 DIAGNOSIS — Z79.01 LONG TERM (CURRENT) USE OF ANTICOAGULANTS: Primary | ICD-10-CM

## 2024-08-28 LAB — INR: 4 (ref 2–3)

## 2024-08-28 PROCEDURE — 93793 ANTICOAG MGMT PT WARFARIN: CPT | Performed by: FAMILY MEDICINE

## 2024-08-28 NOTE — PROGRESS NOTES
[facility] / INR 4.0, supra therapeutic. (Goal 2.5 ) TTR 71.4 %     Etiology: using Tylenol for pain. Its not working- her MD advised ibuprofen and muscle relaxer. Watch for black stool and or blood. Call if bruising worsens.    PLAN: HOLD warfarin today. Then resume the usual dose.    Recheck INR one week.    Pt reports:    No sign of unusual bruising or bleeding.  Any missed doses: No   Medications changes: OTC pain meds.    Contacted  Manjit by phone  who verbalized understanding and agreement.    WARFARIN Plan per protocol: 8/28: Hold; Otherwise 1.5 mg every Mon, Wed, Fri; 1 mg all other days

## 2024-08-29 ENCOUNTER — OFFICE VISIT (OUTPATIENT)
Dept: PHYSICAL THERAPY | Age: 49
End: 2024-08-29
Attending: ORTHOPAEDIC SURGERY
Payer: COMMERCIAL

## 2024-08-29 PROCEDURE — 97110 THERAPEUTIC EXERCISES: CPT

## 2024-08-29 PROCEDURE — 97140 MANUAL THERAPY 1/> REGIONS: CPT

## 2024-08-29 NOTE — PROGRESS NOTES
Diagnosis:   Pain in Achilles tendon (M76.60)  Achilles tendinosis of left ankle (M67.874)        Referring Provider: Yarelis  Date of Evaluation:    2/13/2024, 7/8/2024    Precautions:  NWB L Next MD visit:   none scheduled  Date of Surgery: 6/20/2024 (Left achilles debridement, peroneal tendon debridement and repair, PRP)    Insurance Primary/Secondary: [employer] / N/A     # Auth Visits:     Subjective:  Knee and back are really problematic this week, but the ankle is moving forward.  Doing ok with 100% WB L. Hopes that Dr. Lomeli transitions her to an in shoe ankle brace.  Objective: See below treatment log.     Assessment: Walking without boot, with mild antalgia with shorter step length on R (decreased WB time on L).    Goals:   Pt report min/no pain or tightness L LE  Pt independent with HEP and progression  Pt able to run, jump, hop, skip, cut, sprint, backpeddle -- all without pain or sense of instability    Plan: Progress Wb'ing: this week to 100%.  Date: 8/13/2024  Tx#: 10 Date: 8/15/2024  Tx#: 11 Date: 8/22/2024  Tx#: 12 (29/33) Date: 8/29/2024  Tx#: 13 (30/33)   Ther Ex (25')  - NuStep (in boot) L5 x5 min B UE/LE  - gentle AROM L ankle, foot, toes  - toe scrunches, yoga (extension) and spreading - seated with foot on floor  - Seated and PWB balance board B teetering AP and side/side  - Seated and PWB BAPS L2 AP, ML, CW, CCW  - PWB TKE BTB Ther Ex (25')  - NuStep (in boot) L5 x5 min B UE/LE  - gentle AROM L ankle, foot, toes  - toe scrunches, yoga (extension) and spreading - seated with foot on floor  - Seated and PWB balance board B teetering AP and side/side  - Seated and PWB BAPS L2 AP, ML, CW, CCW  - PWB TKE BTB Ther Ex (30')  - NuStep (in boot) L5 x5 min B UE/LE  - gentle AROM L ankle, foot, toes  - toe scrunches, yoga (extension) and spreading - seated with foot on floor  - Seated and PWB balance board B teetering AP and side/side  - Seated and PWB BAPS L2 AP, ML, CW, CCW  - PWB TKE BTB  - Shuttle  B leg press 4b x30  - Shuttle B calf raise 2b (only from foot plate)  - Shuttle L leg press 4b x30 Ther Ex (30')  - NuStep (in boot) L5 x5 min B UE/LE  - gentle AROM L ankle, foot, toes  - toe scrunches, yoga (extension) and spreading - seated with foot on floor  - B Standing balance board B teetering AP and side/side  - B Standing BAPS L2 AP, ML, CW, CCW  - PWB TKE BTB  - Shuttle B leg press 4b x30  - Shuttle B calf raise 3b (only from foot plate)  - Shuttle L leg press 4b x30   Manual (20')  - PROM L ankle/foot  - gentle joint mobilization toes/mid/forefoot  - desensitization rubbing, patting and tapping to L lower leg, ankle, foot Manual (20')  - PROM L ankle/foot  - gentle joint mobilization toes/mid/forefoot  - desensitization rubbing, patting and tapping to L lower leg, ankle, foot Manual (15')  - PROM L ankle/foot  - gentle joint mobilization toes/mid/forefoot  - desensitization rubbing, patting and tapping to L lower leg, ankle, foot Manual (15')  - PROM L ankle/foot  - gentle joint mobilization toes/mid/forefoot  - desensitization rubbing, patting and tapping to L lower leg, ankle, foot               HEP: AROM ankle/toes    Charges: Ther Ex x2, Manual      Total Timed Treatment: 45 min  Total Treatment Time: 45 min

## 2024-08-30 ENCOUNTER — OFFICE VISIT (OUTPATIENT)
Dept: ORTHOPEDICS CLINIC | Facility: CLINIC | Age: 49
End: 2024-08-30
Payer: COMMERCIAL

## 2024-08-30 DIAGNOSIS — Z47.89 ORTHOPEDIC AFTERCARE: ICD-10-CM

## 2024-08-30 DIAGNOSIS — S80.02XD CONTUSION OF LEFT KNEE, SUBSEQUENT ENCOUNTER: Primary | ICD-10-CM

## 2024-08-30 PROBLEM — S80.02XA CONTUSION OF LEFT KNEE: Status: ACTIVE | Noted: 2024-08-30

## 2024-08-30 PROCEDURE — 99024 POSTOP FOLLOW-UP VISIT: CPT | Performed by: STUDENT IN AN ORGANIZED HEALTH CARE EDUCATION/TRAINING PROGRAM

## 2024-08-31 NOTE — PROGRESS NOTES
Orthopaedic Surgery Postop Patient Visit  _______________________________________________________________________________________________________________  _______________________________________________________________________________________________________________    DATE OF VISIT: 8/30/2024     DATE OF SURGERY: 6/20/2024     CHIEF COMPLAINT: Follow-up Left  Achilles tendon debridement surgery with graft application for peroneal tendons    Chief Complaint   Patient presents with    Post-Op     S/p L ankle sx on 6/20/2024- rates pain 2-3/10 on and off- has numbness and tingling in the L 5th toe most of the time- pt walks w/ walker boot        HISTORY OF PRESENT ILLNESS: Manjit Matt is a 48 year old female who presents to the clinic for follow-up after Left ankle surgery. The patient  is progressing  with therapy.  She has discontinued use of her crutch and is now ambulating with a boot with very mild pain.  The patient reports normal sensation in foot. Pain is well controlled.     She does report that the pain in her knee which onset after a fall very early on in her rehabilitation has not improved despite therapeutic exercises, anti-inflammatories, and supervised intervention.  She now has distrust in the knee, buckling, and intermittent sharp shooting pain which is suspicious for mechanical symptoms.  This makes it very difficult for her to progress ambulation    MEDICATIONS  Reviewed  No outpatient medications have been marked as taking for the 8/30/24 encounter (Office Visit) with Dwight Lomeli MD.        ALLERGIES  Allergies   Allergen Reactions    Levaquin [Levofloxacin] HIVES and SWELLING     Other reaction(s): LEVOFLOXACIN        REVIEW OF SYSTEMS  A 14 point review of systems was performed. Pertinent positives and negatives noted in the HPI.    PHYSICAL EXAM  LMP 09/27/2018      Constitutional: The patient is well-developed, well-nourished, in no acute distress.  Neurological: Alert and oriented to  Received Transport Form @ 7554  Spoke to Apryl @ [facility]    Transport is scheduled for 3/21/21 @7306-7843 going to [facility].    Reservation #[id number]    Quote for wheelchair transport from [facility] to [facility] is $133.90.    TREVON Boo notified via Teams.    Rosemary at [facility] notified of transport time.    @[id number]  This DPA faxed the transport communication form and the approved services for $133.90 to [facility] via manual fax.  Fax #[phone].         person, place, and time.  Psychiatric: Mood and affect normal.  Head: Normocephalic and atraumatic.  Cardiovascular: regular rate by palpation  Pulmonary/Chest: Effort normal. No respiratory distress. Breathing non-labored  Abdominal: Abdomen exhibits no distension.   Left ankle  Wound well healed  Swelling resolving  ROM:  Ankle Dorsiflexion: 10  Ankle Plantarflexion: 50  Motor/Strength:  Motor intact EHL/FHL/TA/peroneals  Ankle plantarflexion: 4+/5  Ankle dorsiflexion: 5/5  SILT spn/dpn/helio/saph/tib distributions  2+ DP/PT pulse, brisk capillary refill to all toes  Left  KNEE  INSPECTION/PALPATION  No readily apparent visual abnormalities of the knee.   No ecchymosis or erythema  Mild effusion.   No breaks in skin. Skin is euthermic.  Neutral alignment on standing  Slightly antalgic gait  TTP  to medial joint line, NTTP to lateral joint line  ROM  0-130 degrees  KNEE STRENGTH  Flexion: 5/5  Extension: 5/5  STABILITY  1A lachman, stable anterior drawer  Stable posterior drawer  Stable to varus and valgus stress at 0 and 30 degrees  1 quadrant of lateral patellar translation  Negative J sign, negative apprehension  PERTINENT FINDINGS  Positive Alfred    Positive Thessaly  Negative Patellar grind    ASSESSMENT/PLAN: Manjit Matt is a 48 year old female who presents to the Orthopaedic surgery clinic today for follow-up 9 weeks after Left ankle surgery.  She is overall doing well from her left ankle and peroneal tendon healing perspective, however she now has continued pain in her left knee after a fall which is suspicious for meniscal pathology that has not improved despite therapy, anti-inflammatories, activity modification, and other appropriate interventions.  Given the persistence of the symptoms despite appropriate treatment, we will plan to obtain an MRI.  She will follow-up in approximately 6 weeks for her ankle but we will discuss the results of this MRI prior to this follow-up    We reviewed the PT protocol  and adjustments were made as needed  WEIGHT BEARING STATUS: Weightbearing as tolerated , progressing out of boot  BOOT: transition out of boot  RANGE-OF-MOTION LIMITATIONS: as tolerated  NEW PRESCRIPTIONS: none  IMAGING ORDERED:  MRI left knee  CONSULTS PLACED: none  PROSTHESES/ORTHOTICS: none    FOLLOW-UP: 6 weeks    RADIOGRAPHS AT NEXT VISIT: none    I have personally seen Manjit Matt and discussed in detail their plan of care. Prior to departure, they indicated agreement with and understanding of their plan of care and their follow-up as documented herein this note. Please note that this note was written in combination with voice recognition/dictation software and there is a possibility of transcription errors which were not identified at the time of note submission. If clarification is necessary, please contact the author or clinic staff.    Dwight Lomeli MD  Orthopaedic Surgery  8/30/2024

## 2024-09-03 ENCOUNTER — OFFICE VISIT (OUTPATIENT)
Dept: PHYSICAL THERAPY | Age: 49
End: 2024-09-03
Attending: ORTHOPAEDIC SURGERY
Payer: COMMERCIAL

## 2024-09-03 PROCEDURE — 97140 MANUAL THERAPY 1/> REGIONS: CPT

## 2024-09-03 PROCEDURE — 97110 THERAPEUTIC EXERCISES: CPT

## 2024-09-03 RX ORDER — WARFARIN SODIUM 1 MG/1
TABLET ORAL
Qty: 120 TABLET | Refills: 1 | Status: SHIPPED | OUTPATIENT
Start: 2024-09-03

## 2024-09-03 RX ORDER — CILOSTAZOL 100 MG/1
TABLET ORAL
Qty: 180 TABLET | Refills: 0 | OUTPATIENT
Start: 2024-09-03

## 2024-09-03 NOTE — PROGRESS NOTES
Diagnosis:   Pain in Achilles tendon (M76.60)  Achilles tendinosis of left ankle (M67.874)        Referring Provider: Yarelis  Date of Evaluation:    2/13/2024, 7/8/2024    Precautions:  NWB L Next MD visit:   none scheduled  Date of Surgery: 6/20/2024 (Left achilles debridement, peroneal tendon debridement and repair, PRP)    Insurance Primary/Secondary: [place] / N/A     # Auth Visits:     Subjective:  Per [employer] - still to use CamWalker x 6 more weeks.  Frustrated, but still having pain, so understands.  Objective: See below treatment log.     Assessment: Still tends to over do it with activity, especially at work, so pt continuing in the boot, especially at work, makes sense.  Improving WB'ing tolerance.  Variable swelling in L ankle/foot/toes.    Goals:   Pt report min/no pain or tightness L LE  Pt independent with HEP and progression  Pt able to run, jump, hop, skip, cut, sprint, backpeddle -- all without pain or sense of instability    Plan: Progress Wb'ing: this week to 100%.  Date: 8/13/2024  Tx#: 10 Date: 8/15/2024  Tx#: 11 Date: 8/22/2024  Tx#: 12 (29/33) Date: 8/29/2024  Tx#: 13 (30/33) Date: 9/4/2024  Tx#: 14 (31/33)   Ther Ex (25')  - NuStep (in boot) L5 x5 min B UE/LE  - gentle AROM L ankle, foot, toes  - toe scrunches, yoga (extension) and spreading - seated with foot on floor  - Seated and PWB balance board B teetering AP and side/side  - Seated and PWB BAPS L2 AP, ML, CW, CCW  - PWB TKE BTB Ther Ex (25')  - NuStep (in boot) L5 x5 min B UE/LE  - gentle AROM L ankle, foot, toes  - toe scrunches, yoga (extension) and spreading - seated with foot on floor  - Seated and PWB balance board B teetering AP and side/side  - Seated and PWB BAPS L2 AP, ML, CW, CCW  - PWB TKE BTB Ther Ex (30')  - NuStep (in boot) L5 x5 min B UE/LE  - gentle AROM L ankle, foot, toes  - toe scrunches, yoga (extension) and spreading - seated with foot on floor  - Seated and PWB balance board B teetering AP and side/side  -  Seated and PWB BAPS L2 AP, ML, CW, CCW  - PWB TKE BTB  - Shuttle B leg press 4b x30  - Shuttle B calf raise 2b (only from foot plate)  - Shuttle L leg press 4b x30 Ther Ex (30')  - NuStep (in boot) L5 x5 min B UE/LE  - gentle AROM L ankle, foot, toes  - toe scrunches, yoga (extension) and spreading - seated with foot on floor  - B Standing balance board B teetering AP and side/side  - B Standing BAPS L2 AP, ML, CW, CCW  - PWB TKE BTB  - Shuttle B leg press 4b x30  - Shuttle B calf raise 3b (only from foot plate)  - Shuttle L leg press 4b x30 Ther Ex (30')  - NuStep (in boot) L5 x5 min B UE/LE  - gentle AROM L ankle, foot, toes  - toe scrunches, yoga (extension) and spreading - seated with foot on floor  - B partial Standing balance board B teetering AP and side/side  - B partial Standing BAPS L2 AP, ML, CW, CCW  - PWB TKE BTB  - Shuttle B leg press 4b x30  - Shuttle B calf raise 3b (only from foot plate)  - Shuttle L leg press 4b x30   Manual (20')  - PROM L ankle/foot  - gentle joint mobilization toes/mid/forefoot  - desensitization rubbing, patting and tapping to L lower leg, ankle, foot Manual (20')  - PROM L ankle/foot  - gentle joint mobilization toes/mid/forefoot  - desensitization rubbing, patting and tapping to L lower leg, ankle, foot Manual (15')  - PROM L ankle/foot  - gentle joint mobilization toes/mid/forefoot  - desensitization rubbing, patting and tapping to L lower leg, ankle, foot Manual (15')  - PROM L ankle/foot  - gentle joint mobilization toes/mid/forefoot  - desensitization rubbing, patting and tapping to L lower leg, ankle, foot Manual (15')  - PROM L ankle/foot  - gentle joint mobilization toes/mid/forefoot  - desensitization rubbing, patting and tapping to L lower leg, ankle, foot                 HEP: AROM ankle/toes    Charges: Ther Ex x2, Manual      Total Timed Treatment: 45 min  Total Treatment Time: 45 min

## 2024-09-03 NOTE — TELEPHONE ENCOUNTER
Failed (incorrectly) WARFARIN refill Protocol. INR 08/14/24 INR  3.0 therapeutic.    Most Current dose:  WARFARIN Plan per protocol: 1.5 mg every Mon, Wed, Fri; 1 mg all other days

## 2024-09-04 ENCOUNTER — ANTI-COAG VISIT (OUTPATIENT)
Dept: ANTICOAGULATION | Facility: CLINIC | Age: 49
End: 2024-09-04

## 2024-09-04 DIAGNOSIS — Z79.01 LONG TERM (CURRENT) USE OF ANTICOAGULANTS: Primary | ICD-10-CM

## 2024-09-04 DIAGNOSIS — I73.9 PERIPHERAL VASCULAR DISEASE (HCC): ICD-10-CM

## 2024-09-04 DIAGNOSIS — I37.9 PULMONIC VALVE DISEASE: ICD-10-CM

## 2024-09-04 DIAGNOSIS — Z79.01 MONITORING FOR LONG-TERM ANTICOAGULANT USE: ICD-10-CM

## 2024-09-04 DIAGNOSIS — Z51.81 MONITORING FOR LONG-TERM ANTICOAGULANT USE: ICD-10-CM

## 2024-09-04 LAB — INR: 3 (ref 2–3)

## 2024-09-04 PROCEDURE — 93793 ANTICOAG MGMT PT WARFARIN: CPT | Performed by: FAMILY MEDICINE

## 2024-09-04 NOTE — PROGRESS NOTES
[facility] / INR 3.0,  therapeutic. (Goal 2.5 ) TTR 70.3 %     Etiology: INR has been up due to pain medications. NSAID and tylenol. She is off both at this moment.     PLAN: continue this dose for now.    Recheck INR 1 - 2 weeks.    Pt reports:    No sign of unusual bruising or bleeding.  Any missed doses: YES   Medications changes: Yes.    Contacted  Manjit by phone  who verbalized understanding and agreement.    WARFARIN Plan per protocol: 1.5 mg every Mon, Wed, Fri; 1 mg all other days

## 2024-09-05 ENCOUNTER — OFFICE VISIT (OUTPATIENT)
Dept: PHYSICAL THERAPY | Age: 49
End: 2024-09-05
Attending: ORTHOPAEDIC SURGERY
Payer: COMMERCIAL

## 2024-09-05 PROCEDURE — 97140 MANUAL THERAPY 1/> REGIONS: CPT

## 2024-09-05 PROCEDURE — 97110 THERAPEUTIC EXERCISES: CPT

## 2024-09-05 RX ORDER — CILOSTAZOL 100 MG/1
100 TABLET ORAL 2 TIMES DAILY
Qty: 180 TABLET | Refills: 3 | Status: SHIPPED | OUTPATIENT
Start: 2024-09-05

## 2024-09-05 RX ORDER — CILOSTAZOL 100 MG/1
TABLET ORAL
Qty: 180 TABLET | Refills: 0 | OUTPATIENT
Start: 2024-09-05

## 2024-09-05 NOTE — PROGRESS NOTES
Diagnosis:   Pain in Achilles tendon (M76.60)  Achilles tendinosis of left ankle (M67.874)        Referring Provider: Yarelis  Date of Evaluation:    2/13/2024, 7/8/2024    Precautions:  NWB L Next MD visit:   none scheduled  Date of Surgery: 6/20/2024 (Left achilles debridement, peroneal tendon debridement and repair, PRP)    Insurance Primary/Secondary: [place] / N/A     # Auth Visits:     Subjective:  Did try some SLS (briefly) at home out of boot.  Still pretty sensitive both incision regions.  Eager to progress.  Objective: See below treatment log.     Assessment: Slowly improving motion, Wb'ing tolerance and strength.    Goals:   Pt report min/no pain or tightness L LE  Pt independent with HEP and progression  Pt able to run, jump, hop, skip, cut, sprint, backpeddle -- all without pain or sense of instability    Plan: Progress Wb'ing: this week to 100%.  Date: 8/13/2024  Tx#: 10 Date: 8/15/2024  Tx#: 11 Date: 8/22/2024  Tx#: 12 (29/33) Date: 8/29/2024  Tx#: 13 (30/33) Date: 9/4/2024  Tx#: 14 (31/33) Date: 9/5/2024  Tx#: 15 (32/33)   Ther Ex (25')  - NuStep (in boot) L5 x5 min B UE/LE  - gentle AROM L ankle, foot, toes  - toe scrunches, yoga (extension) and spreading - seated with foot on floor  - Seated and PWB balance board B teetering AP and side/side  - Seated and PWB BAPS L2 AP, ML, CW, CCW  - PWB TKE BTB Ther Ex (25')  - NuStep (in boot) L5 x5 min B UE/LE  - gentle AROM L ankle, foot, toes  - toe scrunches, yoga (extension) and spreading - seated with foot on floor  - Seated and PWB balance board B teetering AP and side/side  - Seated and PWB BAPS L2 AP, ML, CW, CCW  - PWB TKE BTB Ther Ex (30')  - NuStep (in boot) L5 x5 min B UE/LE  - gentle AROM L ankle, foot, toes  - toe scrunches, yoga (extension) and spreading - seated with foot on floor  - Seated and PWB balance board B teetering AP and side/side  - Seated and PWB BAPS L2 AP, ML, CW, CCW  - PWB TKE BTB  - Shuttle B leg press 4b x30  - Shuttle B  calf raise 2b (only from foot plate)  - Shuttle L leg press 4b x30 Ther Ex (30')  - NuStep (in boot) L5 x5 min B UE/LE  - gentle AROM L ankle, foot, toes  - toe scrunches, yoga (extension) and spreading - seated with foot on floor  - B Standing balance board B teetering AP and side/side  - B Standing BAPS L2 AP, ML, CW, CCW  - PWB TKE BTB  - Shuttle B leg press 4b x30  - Shuttle B calf raise 3b (only from foot plate)  - Shuttle L leg press 4b x30 Ther Ex (30')  - NuStep (in boot) L5 x5 min B UE/LE  - gentle AROM L ankle, foot, toes  - toe scrunches, yoga (extension) and spreading - seated with foot on floor  - B partial Standing balance board B teetering AP and side/side  - B partial Standing BAPS L2 AP, ML, CW, CCW  - PWB TKE BTB  - Shuttle B leg press 4b x30  - Shuttle B calf raise 3b (only from foot plate)  - Shuttle L leg press 4b x30 Ther Ex (30')  - NuStep (in boot) L5 x5 min B UE/LE  - gentle AROM L ankle, foot, toes  - toe scrunches, yoga (extension) and spreading - seated with foot on floor  - B partial Standing balance board B teetering AP and side/side  - B partial Standing BAPS L2 AP, ML, CW, CCW  - PWB TKE BTB  - Shuttle B leg press 4b x30  - Shuttle B calf raise 3b (only from foot plate)  - Shuttle L leg press 4b x30   Manual (20')  - PROM L ankle/foot  - gentle joint mobilization toes/mid/forefoot  - desensitization rubbing, patting and tapping to L lower leg, ankle, foot Manual (20')  - PROM L ankle/foot  - gentle joint mobilization toes/mid/forefoot  - desensitization rubbing, patting and tapping to L lower leg, ankle, foot Manual (15')  - PROM L ankle/foot  - gentle joint mobilization toes/mid/forefoot  - desensitization rubbing, patting and tapping to L lower leg, ankle, foot Manual (15')  - PROM L ankle/foot  - gentle joint mobilization toes/mid/forefoot  - desensitization rubbing, patting and tapping to L lower leg, ankle, foot Manual (15')  - PROM L ankle/foot  - gentle joint mobilization  toes/mid/forefoot  - desensitization rubbing, patting and tapping to L lower leg, ankle, foot Manual (15')  - PROM L ankle/foot  - gentle joint mobilization toes/mid/forefoot  - desensitization rubbing, patting and tapping to L lower leg, ankle, foot                   HEP: AROM ankle/toes    Charges: Ther Ex x2, Manual      Total Timed Treatment: 45 min  Total Treatment Time: 45 min

## 2024-09-09 ENCOUNTER — NURSE TRIAGE (OUTPATIENT)
Dept: INTERNAL MEDICINE CLINIC | Facility: CLINIC | Age: 49
End: 2024-09-09

## 2024-09-09 NOTE — TELEPHONE ENCOUNTER
Action Requested: Summary for Provider     []  Critical Lab, Recommendations Needed  [x] Need Additional Advice  []   FYI    []   Need Orders  [] Need Medications Sent to Pharmacy  []  Other     SUMMARY: Please advise if able to add patient tomorrow.    Patient states she saw her dentist, who diagnosed her with TMJ, patent states for past 1 seek has been having 6/1-9/10 pain in her jaw. She is unable to open her mouth more than a few inches. Pain is sharp when she tries.    Patient states dentist gave panoramic x-ray and started her on medrol pack.    Patient would like her primary care giver to manage care for this.         Reason for call: Jaw Pain and Tmj Disorder  Onset: 1 week  Reason for Disposition   Swollen area of face and toothache    Protocols used: Face Pain-A-[place]

## 2024-09-09 NOTE — TELEPHONE ENCOUNTER
Patient was contacted and assisted with scheduling appointment for tomorrow.    Future Appointments   Date Time Provider Department Center   9/10/2024 10:15 AM Ravi Flynn MD [facility]

## 2024-09-10 ENCOUNTER — OFFICE VISIT (OUTPATIENT)
Dept: OTOLARYNGOLOGY | Facility: CLINIC | Age: 49
End: 2024-09-10

## 2024-09-10 ENCOUNTER — OFFICE VISIT (OUTPATIENT)
Dept: INTERNAL MEDICINE CLINIC | Facility: CLINIC | Age: 49
End: 2024-09-10

## 2024-09-10 ENCOUNTER — OFFICE VISIT (OUTPATIENT)
Dept: PHYSICAL THERAPY | Age: 49
End: 2024-09-10
Attending: ORTHOPAEDIC SURGERY
Payer: COMMERCIAL

## 2024-09-10 VITALS
SYSTOLIC BLOOD PRESSURE: 122 MMHG | WEIGHT: 130 LBS | DIASTOLIC BLOOD PRESSURE: 64 MMHG | RESPIRATION RATE: 18 BRPM | BODY MASS INDEX: 26.21 KG/M2 | HEART RATE: 75 BPM | HEIGHT: 59 IN

## 2024-09-10 DIAGNOSIS — M62.838 MASSETER MUSCLE SPASM: ICD-10-CM

## 2024-09-10 DIAGNOSIS — R68.84 JAW PAIN: Primary | ICD-10-CM

## 2024-09-10 DIAGNOSIS — R25.2 TRISMUS: Primary | ICD-10-CM

## 2024-09-10 DIAGNOSIS — M79.10 MYALGIA: ICD-10-CM

## 2024-09-10 PROCEDURE — 99203 OFFICE O/P NEW LOW 30 MIN: CPT | Performed by: STUDENT IN AN ORGANIZED HEALTH CARE EDUCATION/TRAINING PROGRAM

## 2024-09-10 PROCEDURE — 3078F DIAST BP <80 MM HG: CPT | Performed by: INTERNAL MEDICINE

## 2024-09-10 PROCEDURE — 3074F SYST BP LT 130 MM HG: CPT | Performed by: INTERNAL MEDICINE

## 2024-09-10 PROCEDURE — 99213 OFFICE O/P EST LOW 20 MIN: CPT | Performed by: INTERNAL MEDICINE

## 2024-09-10 PROCEDURE — 97110 THERAPEUTIC EXERCISES: CPT

## 2024-09-10 PROCEDURE — 97140 MANUAL THERAPY 1/> REGIONS: CPT

## 2024-09-10 PROCEDURE — 3008F BODY MASS INDEX DOCD: CPT | Performed by: INTERNAL MEDICINE

## 2024-09-10 RX ORDER — CYCLOBENZAPRINE HCL 10 MG
10 TABLET ORAL NIGHTLY
COMMUNITY
Start: 2024-08-28

## 2024-09-10 RX ORDER — METHYLPREDNISOLONE 4 MG
4 TABLET, DOSE PACK ORAL AS DIRECTED
COMMUNITY
Start: 2024-09-05

## 2024-09-10 NOTE — PROGRESS NOTES
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  Still having stringy pain along L 5th ray like a hair being pulled.  Also still having mild hypersensitivity throughout lower leg/ankle/foot.  Achilles feeling a bit better too.  Eager to be out of the [facility].  Objective: See below treatment log.     Assessment: Generally improving with improving ROM and tolerance for movement and WB'ing.  Pt limited somewhat recently by unrelated jaw pain.    Goals:   Pt report min/no pain or tightness L LE  Pt independent with HEP and progression  Pt able to run, jump, hop, skip, cut, sprint, backpeddle -- all without pain or sense of instability    Plan: Seek additional insurance authorization to continue PT as ordered.  Date: 8/29/2024  Tx#: 13 (30/33) Date: 9/4/2024  Tx#: 14 (31/33) Date: 9/5/2024  Tx#: 15 (32/33) Date: 9/10/2024  Tx#: 16 (33/33)   Ther Ex (30')  - NuStep (in boot) L5 x5 min B UE/LE  - gentle AROM L ankle, foot, toes  - toe scrunches, yoga (extension) and spreading - seated with foot on floor  - B Standing balance board B teetering AP and side/side  - B Standing BAPS L2 AP, ML, CW, CCW  - PWB TKE BTB  - Shuttle B leg press 4b x30  - Shuttle B calf raise 3b (only from foot plate)  - Shuttle L leg press 4b x30 Ther Ex (30')  - NuStep (in boot) L5 x5 min B UE/LE  - gentle AROM L ankle, foot, toes  - toe scrunches, yoga (extension) and spreading - seated with foot on floor  - B partial Standing balance board B darius LEAL  and side/side  - B partial Standing BAPS L2 AP, ML, CW, CCW  - PWB TKE BTB  - Shuttle B leg press 4b x30  - Shuttle B calf raise 3b (only from foot plate)  - Shuttle L leg press 4b x30 Ther Ex (30')  - NuStep (in boot) L5 x5 min B UE/LE  - gentle AROM L ankle, foot, toes  - toe scrunches, yoga (extension) and spreading - seated with foot on floor  - B partial Standing balance board B teetering AP and side/side  - B partial Standing BAPS L2 AP, ML, CW, CCW  - PWB TKE BTB  - Shuttle B leg press 4b x30  - Shuttle B calf raise 3b (only from foot plate)  - Shuttle L leg press 4b x30 Ther Ex (30')  - NuStep (in boot) L5 x5 min B UE/LE  - gentle AROM L ankle, foot, toes  - toe scrunches, yoga (extension) and spreading - seated with foot on floor  - B partial Standing balance board B teetering AP and side/side  - B partial Standing BAPS L2 AP, ML, CW, CCW  - PWB TKE BTB  - Shuttle B leg press 4b x30  - Shuttle B calf raise 3b (only from foot plate)  - Shuttle L leg press 4b x30   Manual (15')  - PROM L ankle/foot  - gentle joint mobilization toes/mid/forefoot  - desensitization rubbing, patting and tapping to L lower leg, ankle, foot Manual (15')  - PROM L ankle/foot  - gentle joint mobilization toes/mid/forefoot  - desensitization rubbing, patting and tapping to L lower leg, ankle, foot Manual (15')  - PROM L ankle/foot  - gentle joint mobilization toes/mid/forefoot  - desensitization rubbing, patting and tapping to L lower leg, ankle, foot Manual (15')  - PROM L ankle/foot  - gentle joint mobilization toes/mid/forefoot  - desensitization rubbing, patting and tapping to L lower leg, ankle, foot               HEP: AROM ankle/toes    Charges: Ther Ex x2, Manual      Total Timed Treatment: 45 min  Total Treatment Time: 45 min

## 2024-09-10 NOTE — PROGRESS NOTES
Manjit Matt is a 48 year old female.   Chief Complaint   Patient presents with    Jaw Pain     Jaw and facial pain of the right side x2 weeks  Pt reports she can barely open her mouth     HPI:   48-year-old presents with trismus for about 2 weeks as well as severe pain in the area of her right cheek.  No swelling of her face or cellulitis.  Recently had a back spasm and is also recovering from Achilles tendon surgery    Current Outpatient Medications   Medication Sig Dispense Refill    methylPREDNISolone 4 MG Oral Tablet Therapy Pack Take 1 tablet (4 mg total) by mouth As Directed. FOLLOW DIRECTIONS ON PACKAGING      cilostazol 100 MG Oral Tab Take 1 tablet (100 mg total) by mouth 2 (two) times daily. 180 tablet 3    gabapentin 300 MG Oral Cap Take 1 capsule (300 mg total) by mouth in the morning, at noon, and at bedtime. 90 capsule 0    gabapentin 300 MG Oral Cap Take 1 capsule (300 mg total) by mouth every 8 (eight) hours. 30 capsule 0    Vitamin C 500 MG Oral Tab Take 1 tablet (500 mg total) by mouth in the morning and 1 tablet (500 mg total) before bedtime. 84 tablet 0    CILOSTAZOL 100 MG Oral Tab TAKE 1 TABLET(100 MG) BY MOUTH TWICE DAILY 60 tablet 11    cyclobenzaprine 10 MG Oral Tab Take 1 tablet (10 mg total) by mouth nightly.      warfarin 1 MG Oral Tab TAKE 1& 1/2 TABLETS BY MOUTH ON MONDAY, WEDNESDAY, FRIDAY, AND 1 TABLET BY MOUTH ALL OTHER DAYS. 120 tablet 1    rosuvastatin 5 MG Oral Tab Take 1 tablet (5 mg total) by mouth nightly. (Patient not taking: Reported on 9/10/2024) 90 tablet 3    oxyCODONE 5 MG Oral Tab Take 1 tablet (5 mg total) by mouth every 4 (four) hours as needed for Pain. (Patient not taking: Reported on 9/10/2024) 16 tablet 0    Acetaminophen 500 MG Oral Cap Take 2 capsules (1,000 mg total) by mouth every 8 (eight) hours as needed for Pain. Never exceed 3000 mg in a 24-hour period.  Many over-the-counter medications also have acetaminophen in them. (Patient not taking: Reported on  9/10/2024) 180 capsule 0    ondansetron 4 MG Oral Tablet Dispersible Take 1 tablet (4 mg total) by mouth every 8 (eight) hours as needed for Nausea. 10 tablet 0    acidophilus-pectin Oral Cap Take 1 capsule by mouth daily.      Multiple Vitamins-Minerals (HAIR SKIN AND NAILS FORMULA) Oral Tab Take 1 tablet by mouth daily.      ASPIRIN EC LOW DOSE 81 MG Oral Tab EC Take 1 tablet (81 mg total) by mouth daily.  5      Past Medical History:    Congenital pulmonary stenosis (HCC)    congenital pulmonary aa stenosis    Congenital pulmonary stenosis (HCC)    SX at 20 y/o / valvuloplasty     Deep vein thrombosis (HCC)    Disease of vein    vein/artery disease    Heart murmur    since birth    Hematologic disorder    Thromboembolic Event    History of blood clots    History of blood transfusion    Peripheral vascular disease (HCC)    Right leg arterial bypass ,    PONV (postoperative nausea and vomiting)    Popliteal artery thrombosis (HCC)    Rt popliteal artery thrombosis - 15cm; Right Lower Extremity thrombosis; was on coumadin for 6 months than on plavix and aggrenox until . w/u negative for coagulopathy but was anticoagulated during pregnancies     Tendinitis    discontinue insole / rx ibuprofen    Valvular disease    Pulmonic stenosis    Visual impairment    glasses      Social History:  Social History     Socioeconomic History    Marital status:    Tobacco Use    Smoking status: Never     Passive exposure: Never    Smokeless tobacco: Never   Vaping Use    Vaping status: Never Used   Substance and Sexual Activity    Alcohol use: Yes     Comment: rare    Drug use: No    Sexual activity: Yes     Partners: Male   Other Topics Concern    Caffeine Concern Yes     Comment: coffee, 1-2 cups daily    Pt has a pacemaker No    Pt has a defibrillator No    Reaction to local anesthetic No      Past Surgical History:   Procedure Laterality Date    Appendectomy      Appendectomy             Cath bare metal stent (bms)      3 stents right leg    Colonoscopy N/A 6/7/2024    Procedure: COLONOSCOPY;  Surgeon: Sandrita Saab MD;  Location: [facility] ENDO    Cyst aspiration left Left 09/26/2023    US bx    Endometrial ablation  2015    Hysterectomy  10/2018    Laparoscopic hysterectomy with Dr. Dueñas.  Had post op vaginal bleeding and repeat suturing of vaginal cuff.    Leg/ankle surgery proc unlisted  2001    right leg arterial bypass / (fasciotomy)    Needle biopsy left Left 09/26/2023    US bx    Other surgical history  1995    SX at 20 y/o / valvuloplasty (congenital Pulmonic stenosis)         EXAM:   LMP 09/27/2018     System Details   Skin Inspection - Normal.   Constitutional Overall appearance - Normal.   Head/Face Symmetric, TMJ tenderness not present   No parotid swelling   Eyes EOMI, PERRL   Right ear:  Canal clear, TM intact, no RADHA   Left ear:  Canal clear, TM intact, no RADHA   Nose: Septum midline, inferior turbinates not enlarged, nasal valves without collapse    Oral cavity/Oropharynx: No lesions or masses on inspection or palpation, tonsils symmetric   Moderate trismus   Neck: Soft without LAD, thyroid not enlarged  Voice clear/ no stridor   Other:      SCOPES AND PROCEDURES:         AUDIOGRAM AND IMAGING:         IMPRESSION:   1. Trismus    2. Masseter muscle spasm       Recommendations:  -Parotid gland does not look involved that this may represent a masseter muscle spasm  -Gave her tongue depressors and instructed on exercises on mouth opening  -Instructed her to use her muscle relaxant prior to sleep and NSAIDs she will discuss with her Coumadin clinic  -If not improving would consider her seeing an oral surgeon or an MRI of the temporomandibular joints and face  -She can use heat with a warm water bottle    The patient indicates understanding of these issues and agrees to the plan.  Consult from Dr. Fylnn regarding trismus    Sam Cervantes MD  9/10/2024  1:05 PM

## 2024-09-10 NOTE — PROGRESS NOTES
Subjective:     Patient ID: Manjit Matt is a 48 year old female.    Complains of right sided jaw pain, more on the angle of the mandible  for a week. No trauma; had dental work a week prior to getting pain.  Rechecked by dentist, normal panorex view, told to have TMJ syndrome.  Pt with limited ability to open mouth due to pain.  No swelling noted. No fevers. No contact to sick persons.   Had taken 2 days of flexeril and some otc nsaids, no improvement .    Pain  This is a new (right jaw pain) problem. The current episode started in the past 7 days. The problem occurs constantly. The problem has been unchanged. Pertinent negatives include no fever. Exacerbated by: opening mouth. She has tried NSAIDs (flexeril and also medrol dospak) for the symptoms. The treatment provided no relief.       History/Other:   Review of Systems   Constitutional:  Negative for fever.     Current Outpatient Medications   Medication Sig Dispense Refill    methylPREDNISolone 4 MG Oral Tablet Therapy Pack Take 1 tablet (4 mg total) by mouth As Directed. FOLLOW DIRECTIONS ON PACKAGING      cyclobenzaprine 10 MG Oral Tab Take 1 tablet (10 mg total) by mouth nightly.      cilostazol 100 MG Oral Tab Take 1 tablet (100 mg total) by mouth 2 (two) times daily. 180 tablet 3    warfarin 1 MG Oral Tab TAKE 1& 1/2 TABLETS BY MOUTH ON MONDAY, WEDNESDAY, FRIDAY, AND 1 TABLET BY MOUTH ALL OTHER DAYS. 120 tablet 1    gabapentin 300 MG Oral Cap Take 1 capsule (300 mg total) by mouth in the morning, at noon, and at bedtime. 90 capsule 0    oxyCODONE 5 MG Oral Tab Take 1 tablet (5 mg total) by mouth every 4 (four) hours as needed for Pain. 16 tablet 0    Acetaminophen 500 MG Oral Cap Take 2 capsules (1,000 mg total) by mouth every 8 (eight) hours as needed for Pain. Never exceed 3000 mg in a 24-hour period.  Many over-the-counter medications also have acetaminophen in them. 180 capsule 0    gabapentin 300 MG Oral Cap Take 1 capsule (300 mg total) by mouth  every 8 (eight) hours. 30 capsule 0    ondansetron 4 MG Oral Tablet Dispersible Take 1 tablet (4 mg total) by mouth every 8 (eight) hours as needed for Nausea. 10 tablet 0    Vitamin C 500 MG Oral Tab Take 1 tablet (500 mg total) by mouth in the morning and 1 tablet (500 mg total) before bedtime. 84 tablet 0    acidophilus-pectin Oral Cap Take 1 capsule by mouth daily.      Multiple Vitamins-Minerals (HAIR SKIN AND NAILS FORMULA) Oral Tab Take 1 tablet by mouth daily.      CILOSTAZOL 100 MG Oral Tab TAKE 1 TABLET(100 MG) BY MOUTH TWICE DAILY 60 tablet 11    ASPIRIN EC LOW DOSE 81 MG Oral Tab EC Take 1 tablet (81 mg total) by mouth daily.  5    rosuvastatin 5 MG Oral Tab Take 1 tablet (5 mg total) by mouth nightly. (Patient not taking: Reported on 9/10/2024) 90 tablet 3     Allergies:  Allergies   Allergen Reactions    Levaquin [Levofloxacin] HIVES and SWELLING     Other reaction(s): LEVOFLOXACIN       Past Medical History:    Congenital pulmonary stenosis (HCC)    congenital pulmonary aa stenosis    Congenital pulmonary stenosis (HCC)    SX at 20 y/o / valvuloplasty     Deep vein thrombosis (HCC)    Disease of vein    vein/artery disease    Heart murmur    since birth    Hematologic disorder    Thromboembolic Event    History of blood clots    History of blood transfusion    Peripheral vascular disease (HCC)    Right leg arterial bypass ,    PONV (postoperative nausea and vomiting)    Popliteal artery thrombosis (HCC)    Rt popliteal artery thrombosis - 15cm; Right Lower Extremity thrombosis; was on coumadin for 6 months than on plavix and aggrenox until . w/u negative for coagulopathy but was anticoagulated during pregnancies     Tendinitis    discontinue insole / rx ibuprofen    Valvular disease    Pulmonic stenosis    Visual impairment    glasses      Past Surgical History:   Procedure Laterality Date    Appendectomy      Appendectomy            Cath bare metal stent (bms)      3  stents right leg    Colonoscopy N/A 6/7/2024    Procedure: COLONOSCOPY;  Surgeon: Sandrita Saab MD;  Location: [facility] ENDO    Cyst aspiration left Left 09/26/2023    US bx    Endometrial ablation  2015    Hysterectomy  10/2018    Laparoscopic hysterectomy with Dr. Dueñas.  Had post op vaginal bleeding and repeat suturing of vaginal cuff.    Leg/ankle surgery proc unlisted  2001    right leg arterial bypass / (fasciotomy)    Needle biopsy left Left 09/26/2023    US bx    Other surgical history  1995    SX at 20 y/o / valvuloplasty (congenital Pulmonic stenosis)      Family History   Problem Relation Age of Onset    No Known Problems Father     No Known Problems Mother     Other (Other) Maternal Grandmother         Fibrocystic Breast Disease    Cancer Paternal Grandfather         lung    No Known Problems Sister     Bipolar Disorder Brother     Breast Cancer Maternal Aunt         40's,  BRCA negative    Breast Cancer Maternal Aunt         mat great great aunt breast ca unknown age    Ovarian Cancer Neg     Uterine Cancer Neg     Colon Cancer Neg       Social History:   Social History     Socioeconomic History    Marital status:    Tobacco Use    Smoking status: Never     Passive exposure: Never    Smokeless tobacco: Never   Vaping Use    Vaping status: Never Used   Substance and Sexual Activity    Alcohol use: Yes     Comment: rare    Drug use: No    Sexual activity: Yes     Partners: Male   Other Topics Concern    Caffeine Concern Yes     Comment: coffee, 1-2 cups daily    Pt has a pacemaker No    Pt has a defibrillator No    Reaction to local anesthetic No        Objective:   Physical Exam  Constitutional:       General: She is not in acute distress.     Appearance: She is not ill-appearing, toxic-appearing or diaphoretic.   HENT:      Head:      Jaw: There is normal jaw occlusion. Tenderness and pain on movement present. No swelling.        Comments: Tenderness was at the area of the angle of  the mandible; no swelling/redness nor increased temp;  No fluctuance to suggest abscess     Right Ear: Tympanic membrane, ear canal and external ear normal.      Left Ear: Tympanic membrane, ear canal and external ear normal.      Nose:      Comments: Pt with limited opening of mouth  Cardiovascular:      Rate and Rhythm: Normal rate and regular rhythm.      Pulses: Normal pulses.      Heart sounds: Normal heart sounds. No murmur heard.     No gallop.   Pulmonary:      Effort: Pulmonary effort is normal. No respiratory distress.      Breath sounds: Normal breath sounds. No stridor. No wheezing or rales.   Musculoskeletal:      Cervical back: Normal range of motion and neck supple. No rigidity or tenderness.   Lymphadenopathy:      Cervical: No cervical adenopathy.   Neurological:      Mental Status: She is alert.         Assessment & Plan:   (R68.84) Jaw pain  (primary encounter diagnosis)  Plan: ENT Referral - In Network        She had seen her dentist again and had normal panorex view of jaw. No trauma noted. Her tenderness is not on the TMJ area so may not be TMJ syndrome ; I told her would want her to see ENT for eval and was able to get her apptment today with Dr Sahu.     (M79.10) Myalgia  Plan:         (E)        Pt had some muscle pains on her lower back and some soreness on some muscles of legs; had stopped her chol med and already feeling better so told this can be statin induced myalgias. She will fffup with me later this month and will recheck again. May consider zetia instead of statin.        No orders of the defined types were placed in this encounter.      Meds This Visit:  Requested Prescriptions      No prescriptions requested or ordered in this encounter       Imaging & Referrals:  None

## 2024-09-12 ENCOUNTER — ANTI-COAG VISIT (OUTPATIENT)
Dept: ANTICOAGULATION | Facility: CLINIC | Age: 49
End: 2024-09-12

## 2024-09-12 ENCOUNTER — OFFICE VISIT (OUTPATIENT)
Dept: PHYSICAL THERAPY | Age: 49
End: 2024-09-12
Attending: ORTHOPAEDIC SURGERY
Payer: COMMERCIAL

## 2024-09-12 DIAGNOSIS — Z79.01 MONITORING FOR LONG-TERM ANTICOAGULANT USE: ICD-10-CM

## 2024-09-12 DIAGNOSIS — I73.9 PERIPHERAL VASCULAR DISEASE (HCC): ICD-10-CM

## 2024-09-12 DIAGNOSIS — Z51.81 MONITORING FOR LONG-TERM ANTICOAGULANT USE: ICD-10-CM

## 2024-09-12 DIAGNOSIS — I37.9 PULMONIC VALVE DISEASE: ICD-10-CM

## 2024-09-12 DIAGNOSIS — Z79.01 LONG TERM (CURRENT) USE OF ANTICOAGULANTS: Primary | ICD-10-CM

## 2024-09-12 LAB — INR: 3.9 (ref 2–3)

## 2024-09-12 PROCEDURE — 97110 THERAPEUTIC EXERCISES: CPT

## 2024-09-12 PROCEDURE — 97140 MANUAL THERAPY 1/> REGIONS: CPT

## 2024-09-12 PROCEDURE — 93793 ANTICOAG MGMT PT WARFARIN: CPT | Performed by: FAMILY MEDICINE

## 2024-09-12 NOTE — PROGRESS NOTES
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  Objective: See below treatment log.   Assessment: Improved ankle control open and closed chain.  Goals:   Pt report min/no pain or tightness L LE  Pt independent with HEP and progression  Pt able to run, jump, hop, skip, cut, sprint, backpeddle -- all without pain or sense of instability    Plan: Progress open and closed chain strengthening.  Date: 8/29/2024  Tx#: 13 (30/33) Date: 9/4/2024  Tx#: 14 (31/33) Date: 9/5/2024  Tx#: 15 (32/33) Date: 9/10/2024  Tx#: 16 (33/33) Date: 9/12/2024  Tx#: 17 (34/40)   Ther Ex (30')  - NuStep (in boot) L5 x5 min B UE/LE  - gentle AROM L ankle, foot, toes  - toe scrunches, yoga (extension) and spreading - seated with foot on floor  - B Standing balance board B teetering AP and side/side  - B Standing BAPS L2 AP, ML, CW, CCW  - PWB TKE BTB  - Shuttle B leg press 4b x30  - Shuttle B calf raise 3b (only from foot plate)  - Shuttle L leg press 4b x30 Ther Ex (30')  - NuStep (in boot) L5 x5 min B UE/LE  - gentle AROM L ankle, foot, toes  - toe scrunches, yoga (extension) and spreading - seated with foot on floor  - B partial Standing balance board B teetering AP and side/side  - B partial Standing BAPS L2 AP, ML, CW, CCW  - PWB TKE BTB  - Shuttle B leg press 4b x30  - Shuttle B calf raise 3b (only from foot plate)  - Shuttle L leg press 4b x30 Ther Ex (30')  - NuStep (in boot) L5 x5 min B UE/LE  - gentle AROM L ankle, foot, toes  - toe scrunches, yoga (extension) and spreading - seated with  foot on floor  - B partial Standing balance board B teetering AP and side/side  - B partial Standing BAPS L2 AP, ML, CW, CCW  - PWB TKE BTB  - Shuttle B leg press 4b x30  - Shuttle B calf raise 3b (only from foot plate)  - Shuttle L leg press 4b x30 Ther Ex (30')  - NuStep (in boot) L5 x5 min B UE/LE  - gentle AROM L ankle, foot, toes  - toe scrunches, yoga (extension) and spreading - seated with foot on floor  - B partial Standing balance board B teetering AP and side/side  - B partial Standing BAPS L2 AP, ML, CW, CCW  - PWB TKE BTB  - Shuttle B leg press 4b x30  - Shuttle B calf raise 3b (only from foot plate)  - Shuttle L leg press 4b x30 Ther Ex (30')  - Upright bike 6'  - gentle AROM L ankle, foot, toes  - toe scrunches, yoga (extension) and spreading - seated with foot on floor  - B partial Standing balance board B teetering AP and side/side  - B partial Standing BAPS L2 AP, ML, CW, CCW  - PWB TKE BTB  - Shuttle B leg press 4b x30  - Shuttle B calf raise 2b (only from foot plate)  - Shuttle L leg press 3b x30   Manual (15')  - PROM L ankle/foot  - gentle joint mobilization toes/mid/forefoot  - desensitization rubbing, patting and tapping to L lower leg, ankle, foot Manual (15')  - PROM L ankle/foot  - gentle joint mobilization toes/mid/forefoot  - desensitization rubbing, patting and tapping to L lower leg, ankle, foot Manual (15')  - PROM L ankle/foot  - gentle joint mobilization toes/mid/forefoot  - desensitization rubbing, patting and tapping to L lower leg, ankle, foot Manual (15')  - PROM L ankle/foot  - gentle joint mobilization toes/mid/forefoot  - desensitization rubbing, patting and tapping to L lower leg, ankle, foot Manual (15')  - PROM L ankle/foot  - gentle joint mobilization toes/mid/forefoot  - desensitization rubbing, patting and tapping to L lower leg, ankle, foot                 HEP: AROM ankle/toes    Charges: Ther Ex x2, Manual      Total Timed Treatment: 45 min  Total Treatment Time: 45  min

## 2024-09-12 NOTE — PROGRESS NOTES
[facility] / INR 3.9, supra therapeutic. (Goal 2.5 ) TTR 69.2 %     Etiology: medrol dose pack last week for spasm in jaw. She is seeing the dentist for this. No antibiotics.    PLAN: HOLD one dose. She did. Then reduce to 1.5mg Monday only, 1mg all other days.    Recheck INR one week.    Pt reports:    unusual bruising or bleeding.- chin.  Any missed doses: No   Medications changes: Yes./medrol    Contacted  Manjit by phone  who verbalized understanding and agreement.    WARFARIN Plan per protocol: 1.5 mg every Mon; 1 mg all other days

## 2024-09-17 ENCOUNTER — OFFICE VISIT (OUTPATIENT)
Dept: PHYSICAL THERAPY | Age: 49
End: 2024-09-17
Attending: ORTHOPAEDIC SURGERY
Payer: COMMERCIAL

## 2024-09-17 PROCEDURE — 97110 THERAPEUTIC EXERCISES: CPT

## 2024-09-17 PROCEDURE — 97140 MANUAL THERAPY 1/> REGIONS: CPT

## 2024-09-17 NOTE — PROGRESS NOTES
Diagnosis:   Pain in Achilles tendon (M76.60)  Achilles tendinosis of left ankle (M67.874)        Referring Provider: Yarelis  Date of Evaluation:    2/13/2024, 7/8/2024    Precautions:  NWB L Next MD visit:   none scheduled  Date of Surgery: 6/20/2024 (Left achilles debridement, peroneal tendon debridement and repair, PRP)    Insurance Primary/Secondary: [employer] [place] / N/A     # Auth Visits: 40 total    Subjective:  Missed dose of Gabapentin again, so a little sensitive today.  Objective: See below treatment log.   Assessment: Improving activity tolerance in open and closed chain.  Still sensitive on occasion to touch at incisions/achilles, but less frequently.  Goals:   Pt report min/no pain or tightness L LE  Pt independent with HEP and progression  Pt able to run, jump, hop, skip, cut, sprint, backpeddle -- all without pain or sense of instability    Plan: Progress open and closed chain strengthening.  Date: 9/10/2024  Tx#: 16 (33/33) Date: 9/12/2024  Tx#: 17 (34/40) Date: 9/17/2024  Tx#: 18 (35/40)   Ther Ex (30')  - NuStep (in boot) L5 x5 min B UE/LE  - gentle AROM L ankle, foot, toes  - toe scrunches, yoga (extension) and spreading - seated with foot on floor  - B partial Standing balance board B teetering AP and side/side  - B partial Standing BAPS L2 AP, ML, CW, CCW  - PWB TKE BTB  - Shuttle B leg press 4b x30  - Shuttle B calf raise 3b (only from foot plate)  - Shuttle L leg press 4b x30 Ther Ex (30')  - Upright bike 6'  - gentle AROM L ankle, foot, toes  - toe scrunches, yoga (extension) and spreading - seated with foot on floor  - B partial Standing balance board B teetering AP and side/side  - B partial Standing BAPS L2 AP, ML, CW, CCW  - PWB TKE BTB  - Shuttle B leg press 4b x30  - Shuttle B calf raise 2b (only from foot plate)  - Shuttle L leg press 3b x30 Ther Ex (30')  - Upright bike 6'  - gentle AROM L ankle, foot, toes  - toe scrunches, yoga (extension) and spreading - seated with foot on floor  -  B partial Standing balance board B teetering AP and side/side  - B partial Standing BAPS L2 AP, ML, CW, CCW  - Slant board calf stretch level 1 and 2  - Shuttle B leg press 4b x30  - Shuttle B calf raise 2b (only from foot plate)  - Shuttle L leg press 3b x30   Manual (15')  - PROM L ankle/foot  - gentle joint mobilization toes/mid/forefoot  - desensitization rubbing, patting and tapping to L lower leg, ankle, foot Manual (15')  - PROM L ankle/foot  - gentle joint mobilization toes/mid/forefoot  - desensitization rubbing, patting and tapping to L lower leg, ankle, foot Manual (15')  - PROM L ankle/foot  - gentle joint mobilization toes/mid/forefoot  - desensitization rubbing, patting and tapping to L lower leg, ankle, foot             HEP: AROM ankle/toes    Charges: Ther Ex x2, Manual      Total Timed Treatment: 45 min  Total Treatment Time: 45 min

## 2024-09-19 ENCOUNTER — NURSE TRIAGE (OUTPATIENT)
Dept: INTERNAL MEDICINE CLINIC | Facility: CLINIC | Age: 49
End: 2024-09-19

## 2024-09-19 ENCOUNTER — OFFICE VISIT (OUTPATIENT)
Dept: PHYSICAL THERAPY | Age: 49
End: 2024-09-19
Attending: ORTHOPAEDIC SURGERY
Payer: COMMERCIAL

## 2024-09-19 ENCOUNTER — TELEPHONE (OUTPATIENT)
Dept: CASE MANAGEMENT | Age: 49
End: 2024-09-19

## 2024-09-19 ENCOUNTER — HOSPITAL ENCOUNTER (OUTPATIENT)
Age: 49
Discharge: HOME | End: 2024-09-19
Attending: EMERGENCY MEDICINE
Payer: COMMERCIAL

## 2024-09-19 ENCOUNTER — ANTI-COAG VISIT (OUTPATIENT)
Dept: ANTICOAGULATION | Facility: CLINIC | Age: 49
End: 2024-09-19

## 2024-09-19 ENCOUNTER — TELEPHONE (OUTPATIENT)
Dept: INTERNAL MEDICINE CLINIC | Facility: CLINIC | Age: 49
End: 2024-09-19

## 2024-09-19 VITALS
DIASTOLIC BLOOD PRESSURE: 68 MMHG | TEMPERATURE: 99 F | RESPIRATION RATE: 18 BRPM | HEART RATE: 94 BPM | SYSTOLIC BLOOD PRESSURE: 132 MMHG | OXYGEN SATURATION: 96 %

## 2024-09-19 DIAGNOSIS — Z79.01 MONITORING FOR LONG-TERM ANTICOAGULANT USE: ICD-10-CM

## 2024-09-19 DIAGNOSIS — R68.84 JAW PAIN: Primary | ICD-10-CM

## 2024-09-19 DIAGNOSIS — R60.0 FACIAL EDEMA: Primary | ICD-10-CM

## 2024-09-19 DIAGNOSIS — I37.9 PULMONIC VALVE DISEASE: ICD-10-CM

## 2024-09-19 DIAGNOSIS — Z51.81 MONITORING FOR LONG-TERM ANTICOAGULANT USE: ICD-10-CM

## 2024-09-19 DIAGNOSIS — R25.2 TRISMUS: ICD-10-CM

## 2024-09-19 DIAGNOSIS — Z79.01 LONG TERM (CURRENT) USE OF ANTICOAGULANTS: Primary | ICD-10-CM

## 2024-09-19 DIAGNOSIS — I73.9 PERIPHERAL VASCULAR DISEASE (HCC): ICD-10-CM

## 2024-09-19 LAB — INR BLDC: 2.5 (ref 0.9–1.1)

## 2024-09-19 PROCEDURE — 36415 COLL VENOUS BLD VENIPUNCTURE: CPT

## 2024-09-19 PROCEDURE — 85610 PROTHROMBIN TIME: CPT | Performed by: EMERGENCY MEDICINE

## 2024-09-19 PROCEDURE — 97110 THERAPEUTIC EXERCISES: CPT

## 2024-09-19 PROCEDURE — 97140 MANUAL THERAPY 1/> REGIONS: CPT

## 2024-09-19 PROCEDURE — 93793 ANTICOAG MGMT PT WARFARIN: CPT | Performed by: FAMILY MEDICINE

## 2024-09-19 PROCEDURE — 99213 OFFICE O/P EST LOW 20 MIN: CPT

## 2024-09-19 PROCEDURE — 99214 OFFICE O/P EST MOD 30 MIN: CPT

## 2024-09-19 NOTE — TELEPHONE ENCOUNTER
I had talked to DR Cervantes ENT who had seen pt before for this problem. He recommended seeing  oral surgeons dr Cabello/Gail/Rudolph who are in network so I did the referral.   Dr Cervantes also recommended doing mri tmj and face which I also ordered.   I talked to pt regarding above and she will call above oral surgeons and schedule the mri  ordered.  thanks

## 2024-09-19 NOTE — TELEPHONE ENCOUNTER
Referral done for oral surgeon Dr iGll at [place]. Pls check [facility] managed care if this needs their preapproval.  thanks

## 2024-09-19 NOTE — TELEPHONE ENCOUNTER
Please reply to pool: EM RN TRIAGE  Action Requested: Summary for Provider     []  Critical Lab, Recommendations Needed  [] Need Additional Advice  [x]   OLIVIAI    []   Need Orders  [] Need Medications Sent to Pharmacy  []  Other     SUMMARY: Patient contacts clinic to follow up on jaw pain and face pain.  Seen 09/10 by PCP as well as ENT.  Symptoms have progressed, face is now swollen on right side and very painful to touch between eye and ear/temple area.  Denies vision loss, headache or dizziness.  Denies facial drooping, numbness or tingling, just pain. Denies fever, breathing difficulty or difficulty swallowing.  Nurse concerned for possible temporal arteritis. Nurse advised immediate care/emergency room evaluation due to progression of symptoms.  She feels more comfortable with immediate care, recommended [facility] due to advanced diagnostics available.  She agreed and will go.  Dr. Rina ASHRAF.    Reason for call: Parotid Swelling  Onset: Data Unavailable                       Reason for Disposition   Face swelling is painful to touch    Protocols used: Face Swelling-A-OH

## 2024-09-19 NOTE — TELEPHONE ENCOUNTER
Patient is calling to advise she was at the [facility] site today and advising of her  P T Results    2.5  we are back to normal    Please advise if okay to continue care as advised and follow up as advised.   .

## 2024-09-19 NOTE — DISCHARGE INSTRUCTIONS
Thank you for visiting our immediate care for your health care needs.  Please follow up with your regular doctor in the next 1-2 days.  If you have any additional problems please return to the immediate care.  Please take Augmentin as prescribed.

## 2024-09-19 NOTE — PROGRESS NOTES
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  Is to start anti-biotics later today.  Is thinking ahead to when she can remove the boot, but the following week is gone to a conference and fears the long walking, so will probably take the boot.  Tried doing a yoga class and felt some L ankle/foot strain in Warrior pose, but not a sustained problem.  Objective: See below treatment log.   Assessment: Pt still has a tendency to over do it, but is showing some recent constraint.  Still easily causing mild pain at both sites with any closed chain activities.  Goals:   Pt report min/no pain or tightness L LE  Pt independent with HEP and progression  Pt able to run, jump, hop, skip, cut, sprint, backpeddle -- all without pain or sense of instability    Plan: Progress open and closed chain strengthening.  Date: 9/10/2024  Tx#: 16 (33/33) Date: 9/12/2024  Tx#: 17 (34/40) Date: 9/17/2024  Tx#: 18 (35/40) Date: 9/19/2024  Tx#: 19/(36/40)   Ther Ex (30')  - NuStep (in boot) L5 x5 min B UE/LE  - gentle AROM L ankle, foot, toes  - toe scrunches, yoga (extension) and spreading - seated with foot on floor  - B partial Standing balance board B teetering AP and side/side  - B partial Standing BAPS L2 AP, ML, CW, CCW  - PWB TKE BTB  - Shuttle B leg press 4b x30  - Shuttle B calf raise 3b (only from foot plate)  - Shuttle L leg press 4b x30 Ther Ex (30')  - Upright bike 6'  - gentle AROM L ankle, foot, toes  - toe scrunches, yoga (extension) and spreading - seated with foot on floor  - B partial Standing balance board B  teetering AP and side/side  - B partial Standing BAPS L2 AP, ML, CW, CCW  - PWB TKE BTB  - Shuttle B leg press 4b x30  - Shuttle B calf raise 2b (only from foot plate)  - Shuttle L leg press 3b x30 Ther Ex (30')  - Upright bike 6'  - gentle AROM L ankle, foot, toes  - toe scrunches, yoga (extension) and spreading - seated with foot on floor  - B partial Standing balance board B teetering AP and side/side  - B partial Standing BAPS L2 AP, ML, CW, CCW  - Slant board calf stretch level 1 and 2  - Shuttle B leg press 4b x30  - Shuttle B calf raise 2b (only from foot plate)  - Shuttle L leg press 3b x30 Ther Ex (30')  - Upright bike 6'  - gentle AROM L ankle, foot, toes  - toe scrunches, yoga (extension) and spreading - seated with foot on floor  - B partial Standing balance board B teetering AP and side/side  - B partial Standing BAPS L2 AP, ML, CW, CCW  - Slant board calf stretch level 1 and 2  - Shuttle B leg press 4b x30  - Shuttle B calf raise 2b (only from foot plate)  - Shuttle L leg press 3b x30  - review HEP ankle TB exercises x 4 directions  - Lat walk  - braiding   Manual (15')  - PROM L ankle/foot  - gentle joint mobilization toes/mid/forefoot  - desensitization rubbing, patting and tapping to L lower leg, ankle, foot Manual (15')  - PROM L ankle/foot  - gentle joint mobilization toes/mid/forefoot  - desensitization rubbing, patting and tapping to L lower leg, ankle, foot Manual (15')  - PROM L ankle/foot  - gentle joint mobilization toes/mid/forefoot  - desensitization rubbing, patting and tapping to L lower leg, ankle, foot Manual (15')  - PROM L ankle/foot  - gentle joint mobilization toes/mid/forefoot  - desensitization rubbing, patting and tapping to L lower leg, ankle, foot               HEP: AROM ankle/toes    Charges: Ther Ex x2, Manual      Total Timed Treatment: 45 min  Total Treatment Time: 45 min

## 2024-09-19 NOTE — PROGRESS NOTES
[facility] Labs:  / INR 2.5,  therapeutic. (Goal 2.5 ) TTR 68.6 %     Etiology: INR is better. She is starting Aug 875 for parotid gland swelling. Use probiotic to avoid diarrhea. Check INR next week if GI sx.     PLAN: continue this lower dose.     Recheck INR 1-2 weeks.    Pt reports:    No sign of unusual bruising or bleeding.  Any missed doses: No   Medications changes: YES> starting today.    Contacted  Manjit by phone  who verbalized understanding and agreement.    WARFARIN Plan per protocol: 1.5 mg every Mon; 1 mg all other days

## 2024-09-19 NOTE — TELEPHONE ENCOUNTER
patient called us back as she got discharge from the immediate care and no imaging was done. She was inform that it would be best for her to see a oral surgeon. Patient will like a referral asap. Patient is very frustrated with all of this she started to get teary on on the phone. Patient stated that she has come in to see you gone to the ENT and to Immediate care. The immediate care just gave her a antibiotic for possible infection. Dr. Flynn please advise

## 2024-09-19 NOTE — TELEPHONE ENCOUNTER
Dr. Flynn,     Patient called Dr. Gill's office and was notified they cannot help her with TMJ and she should see an orthognathic doctor.      Please pend a referral to specialist you recommend.     Thank you

## 2024-09-19 NOTE — ED INITIAL ASSESSMENT (HPI)
Patient arrived ambulatory to room. Patient states she had a dental procedure on 8/27. Symptoms started after procedure. +right sided jaw pain. Patient states she is unable to fully open her mouth. Patient has been seen by ENT and her PCP. Patient states she has been on muscle relaxants and a steroid which have not helped. This morning she noticed right sided facial swelling. No fevers. No gum swelling. No dental pain.

## 2024-09-19 NOTE — ED PROVIDER NOTES
Patient Seen in: [facility]      History     Chief Complaint   Patient presents with    Jaw Pain     Stated Complaint: Right side eye Pain  and hard time opening mouth    Subjective:     49-year-old female presents today for evaluation of right facial swelling.  Patient reports approximately 1 month ago had a tooth removed.  Since then has been having right jaw pain.  Has seen her dentist ENT and primary care doctor.  Dentist wants to run tests for TMJ.  Patient reports she woke up this morning with right facial swelling.  No fevers or chills.  No trauma.  No headache.  Complain of right jaw pain that is been constant for the past month.    Objective:   Past Medical History:    Congenital pulmonary stenosis (HCC)    congenital pulmonary aa stenosis    Congenital pulmonary stenosis (HCC)    SX at 20 y/o / valvuloplasty     Deep vein thrombosis (HCC)    Disease of vein    vein/artery disease    Heart murmur    since birth    Hematologic disorder    Thromboembolic Event    History of blood clots    History of blood transfusion    Peripheral vascular disease (HCC)    Right leg arterial bypass ,    PONV (postoperative nausea and vomiting)    Popliteal artery thrombosis (HCC)    Rt popliteal artery thrombosis - 15cm; Right Lower Extremity thrombosis; was on coumadin for 6 months than on plavix and aggrenox until . w/u negative for coagulopathy but was anticoagulated during pregnancies     Tendinitis    discontinue insole / rx ibuprofen    Valvular disease    Pulmonic stenosis    Visual impairment    glasses              Past Surgical History:   Procedure Laterality Date    Appendectomy      Appendectomy            Cath bare metal stent (bms)      3 stents right leg    Colonoscopy N/A 2024    Procedure: COLONOSCOPY;  Surgeon: Sandrita Saab MD;  Location: [facility] END[facility]    Cyst aspiration left Left 2023     bx    Endometrial ablation       Hysterectomy  10/2018    Laparoscopic hysterectomy with Dr. Dueñas.  Had post op vaginal bleeding and repeat suturing of vaginal cuff.    Leg/ankle surgery proc unlisted  2001    right leg arterial bypass / (fasciotomy)    Needle biopsy left Left 09/26/2023    US bx    Other surgical history  1995    SX at 20 y/o / valvuloplasty (congenital Pulmonic stenosis)                Social History     Socioeconomic History    Marital status:    Tobacco Use    Smoking status: Never     Passive exposure: Never    Smokeless tobacco: Never   Vaping Use    Vaping status: Never Used   Substance and Sexual Activity    Alcohol use: Yes     Comment: rare    Drug use: No    Sexual activity: Yes     Partners: Male   Other Topics Concern    Caffeine Concern Yes     Comment: coffee, 1-2 cups daily    Pt has a pacemaker No    Pt has a defibrillator No    Reaction to local anesthetic No                Physical Exam     ED Triage Vitals [09/19/24 1148]   /68   Pulse 94   Resp 18   Temp 98.7 °F (37.1 °C)   Temp src    SpO2 96 %   O2 Device None (Room air)       Current:/68   Pulse 94   Temp 98.7 °F (37.1 °C)   Resp 18   LMP 09/27/2018   SpO2 96%         Physical Exam  Vitals reviewed.   Constitutional:       Appearance: Normal appearance. She is not toxic-appearing.   HENT:      Head: Normocephalic and atraumatic.        Comments: Mild right facial edema, no fluctuance, no erythema.  No open wounds on cheek or inside mouth.  No dental tenderness.  Able to open her mouth approximately 2 cm which is chronic.  Increased pain when attempts to open finger.  No ecchymosis.     Right Ear: Tympanic membrane, ear canal and external ear normal.      Mouth/Throat:      Mouth: Mucous membranes are moist.   Eyes:      Extraocular Movements: Extraocular movements intact.      Pupils: Pupils are equal, round, and reactive to light.   Cardiovascular:      Rate and Rhythm: Normal rate and regular rhythm.   Pulmonary:      Effort:  Pulmonary effort is normal.      Breath sounds: Normal breath sounds.   Musculoskeletal:      Cervical back: Neck supple. No rigidity.   Skin:     General: Skin is warm and dry.   Neurological:      Mental Status: She is alert and oriented to person, place, and time.      Cranial Nerves: No cranial nerve deficit, dysarthria or facial asymmetry.      Sensory: No sensory deficit.      Motor: No weakness.   Psychiatric:         Mood and Affect: Mood normal.         ED Course     Labs Reviewed   POCT COAGUCHECK - Abnormal; Notable for the following components:       Result Value    POC INR  2.50 (*)     All other components within normal limits    Narrative:     INR Therapeutic Interval Group A (2.00-3.00)     Venous Thrombosis, Pulmonary & Systemic Thrombosis, Tissue Heart Valve,         Acute Myocardidal Infarction, Valvular Heart Disease or Atrial Fibrillation     INR Therapeutic Interval Group B (2.50-3.50)        Recurrent Systemic Embolism or Mechanical Prosthetic Valve           Imaging:  No results found.              MDM        49 year old female with right facial edema.  Will check INR and plan to place on antibiotics and follow-up with primary/dentist    Differential diagnosis (including but not limited to):  TMJ, cellulitis    ED course:  Pulse Ox: 96% on room air which is normal      Comment: Please note this report has been produced using speech recognition software and may contain errors related to that system including errors in grammar, punctuation, and spelling, as well as words and phrases that may be inappropriate. If there are any questions or concerns please feel free to contact the dictating provider for clarification.                                     Medical Decision Making      Disposition and Plan     Clinical Impression:  1. Facial edema         Disposition:  Discharge  9/19/2024 12:08 pm    Follow-up:  Ravi Flynn MD  [address] [place] [address]  [id number]  [phone]    Schedule an appointment as soon as possible for a visit             Medications Prescribed:  Current Discharge Medication List        START taking these medications    Details   amoxicillin clavulanate 875-125 MG Oral Tab Take 1 tablet by mouth 2 (two) times daily for 7 days.  Qty: 14 tablet, Refills: 0                                    Gabino Coy MD  9/19/2024  12:01 PM

## 2024-09-20 ENCOUNTER — PATIENT MESSAGE (OUTPATIENT)
Dept: INTERNAL MEDICINE CLINIC | Facility: CLINIC | Age: 49
End: 2024-09-20

## 2024-09-20 DIAGNOSIS — R68.84 JAW PAIN: Primary | ICD-10-CM

## 2024-09-22 NOTE — TELEPHONE ENCOUNTER
See attached image.     Please respond directly to the patient if no additional staff support is required.

## 2024-09-22 NOTE — TELEPHONE ENCOUNTER
From: Manjit Matt  To: Ravi Flynn  Sent: 9/20/2024 2:40 PM CDT  Subject: MRI orders     Hi Dr Flynn,  There were two orders for an MRI.  I went ahead and scheduled them both.  Should I keep both?  I was not able to get in touch yet with the dentist group.  I also attached a picture of my face yesterday morning and the two orders with the appointments .  Thank you again!  Manjit

## 2024-09-23 ENCOUNTER — HOSPITAL ENCOUNTER (OUTPATIENT)
Dept: MRI IMAGING | Age: 49
Discharge: HOME | End: 2024-09-23
Attending: INTERNAL MEDICINE
Payer: COMMERCIAL

## 2024-09-23 ENCOUNTER — TELEPHONE (OUTPATIENT)
Dept: INTERNAL MEDICINE CLINIC | Facility: CLINIC | Age: 49
End: 2024-09-23

## 2024-09-23 DIAGNOSIS — R25.2 TRISMUS: ICD-10-CM

## 2024-09-23 DIAGNOSIS — R68.84 JAW PAIN: ICD-10-CM

## 2024-09-23 PROCEDURE — 70336 MAGNETIC IMAGE JAW JOINT: CPT | Performed by: INTERNAL MEDICINE

## 2024-09-23 NOTE — TELEPHONE ENCOUNTER
If still not seen by oral surgeon, at least a ffup for the meantime with Dr Cervantes ENT who saw her before, while waiting for oral surgeon consult.

## 2024-09-23 NOTE — TELEPHONE ENCOUNTER
Per patient requesting refill of gabapentin and states she pays out of pocket and does not want it sent to insurance. Please advise

## 2024-09-23 NOTE — TELEPHONE ENCOUNTER
Pt called to find out if Dr Flynn put referral in to see specialist Dr Galdamez maxillofacial surgeon.     Chart reviewed, referral was placed by MD but not authorized as of yet by insurance.   Patient has appointment with Dr Galdamez 10/25/24 @ 2:15 pm.     Manage care telephone number [phone] given to patient.     Please reply to pool: EM RN TRIAGE

## 2024-09-24 ENCOUNTER — HOSPITAL ENCOUNTER (OUTPATIENT)
Dept: MRI IMAGING | Facility: HOSPITAL | Age: 49
Discharge: HOME | End: 2024-09-24
Attending: INTERNAL MEDICINE
Payer: COMMERCIAL

## 2024-09-24 ENCOUNTER — OFFICE VISIT (OUTPATIENT)
Dept: PHYSICAL THERAPY | Age: 49
End: 2024-09-24
Attending: ORTHOPAEDIC SURGERY
Payer: COMMERCIAL

## 2024-09-24 DIAGNOSIS — R25.2 TRISMUS: ICD-10-CM

## 2024-09-24 DIAGNOSIS — R68.84 JAW PAIN: ICD-10-CM

## 2024-09-24 PROCEDURE — A9575 INJ GADOTERATE MEGLUMI 0.1ML: HCPCS | Performed by: INTERNAL MEDICINE

## 2024-09-24 PROCEDURE — 97110 THERAPEUTIC EXERCISES: CPT

## 2024-09-24 PROCEDURE — 97140 MANUAL THERAPY 1/> REGIONS: CPT

## 2024-09-24 PROCEDURE — 70543 MRI ORBT/FAC/NCK W/O &W/DYE: CPT | Performed by: INTERNAL MEDICINE

## 2024-09-24 RX ORDER — GABAPENTIN 300 MG/1
300 CAPSULE ORAL NIGHTLY
Qty: 90 CAPSULE | Refills: 0 | Status: SHIPPED | OUTPATIENT
Start: 2024-09-24

## 2024-09-24 RX ORDER — GADOTERATE MEGLUMINE 376.9 MG/ML
15 INJECTION INTRAVENOUS
Status: COMPLETED | OUTPATIENT
Start: 2024-09-24 | End: 2024-09-24

## 2024-09-24 RX ADMIN — GADOTERATE MEGLUMINE 12 ML: 376.9 INJECTION INTRAVENOUS at 20:08:00

## 2024-09-24 NOTE — PROGRESS NOTES
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  Having imaging later today.  L ankle/foot slowly improving, though Gabapentin has been taken only sporadically due to pharmacy issues.  Objective: See below treatment log.   Assessment: Tolerance for eccentric lowering from calf raise on Shuttle only tolerated at 2 bands.  Goals:   Pt report min/no pain or tightness L LE  Pt independent with HEP and progression  Pt able to run, jump, hop, skip, cut, sprint, backpeddle -- all without pain or sense of instability    Plan: Progress open and closed chain strengthening.  Date: 9/10/2024  Tx#: 16 (33/33) Date: 9/12/2024  Tx#: 17 (34/40) Date: 9/17/2024  Tx#: 18 (35/40) Date: 9/19/2024  Tx#: 19/(36/40) Date: 9/24/2024  Tx#: 20 (37/40)   Ther Ex (30')  - NuStep (in boot) L5 x5 min B UE/LE  - gentle AROM L ankle, foot, toes  - toe scrunches, yoga (extension) and spreading - seated with foot on floor  - B partial Standing balance board B teetering AP and side/side  - B partial Standing BAPS L2 AP, ML, CW, CCW  - PWB TKE BTB  - Shuttle B leg press 4b x30  - Shuttle B calf raise 3b (only from foot plate)  - Shuttle L leg press 4b x30 Ther Ex (30')  - Upright bike 6'  - gentle AROM L ankle, foot, toes  - toe scrunches, yoga (extension) and spreading - seated with foot on floor  - B partial Standing balance board B teetering AP and side/side  - B partial Standing BAPS L2 AP, ML, CW, CCW  - PWB TKE BTB  - Shuttle B leg press 4b x30  - Shuttle B calf raise 2b (only from foot plate)  - Shuttle L leg press 3b x30 Ther Ex  (30')  - Upright bike 6'  - gentle AROM L ankle, foot, toes  - toe scrunches, yoga (extension) and spreading - seated with foot on floor  - B partial Standing balance board B teetering AP and side/side  - B partial Standing BAPS L2 AP, ML, CW, CCW  - Slant board calf stretch level 1 and 2  - Shuttle B leg press 4b x30  - Shuttle B calf raise 2b (only from foot plate)  - Shuttle L leg press 3b x30 Ther Ex (30')  - Upright bike 6'  - gentle AROM L ankle, foot, toes  - toe scrunches, yoga (extension) and spreading - seated with foot on floor  - B partial Standing balance board B teetering AP and side/side  - B partial Standing BAPS L2 AP, ML, CW, CCW  - Slant board calf stretch level 1 and 2  - Shuttle B leg press 4b x30  - Shuttle B calf raise 2b (only from foot plate)  - Shuttle L leg press 3b x30  - review HEP ankle TB exercises x 4 directions  - Lat walk  - braiding Ther Ex (30')  - Upright bike 6'  - gentle AROM L ankle, foot, toes  - toe scrunches, yoga (extension) and spreading - seated with foot on floor  - B partial Standing balance board B teetering AP and side/side  - B partial Standing BAPS L2 AP, ML, CW, CCW  - Slant board calf stretch level 1 and 2  - Shuttle B leg press 4b x30  - Shuttle B calf raise 2b (only from foot plate) w/ eccentric lowering L  - Shuttle L leg press 3b x30  - review HEP ankle TB exercises x 4 directions  - Lat walk  - braiding   Manual (15')  - PROM L ankle/foot  - gentle joint mobilization toes/mid/forefoot  - desensitization rubbing, patting and tapping to L lower leg, ankle, foot Manual (15')  - PROM L ankle/foot  - gentle joint mobilization toes/mid/forefoot  - desensitization rubbing, patting and tapping to L lower leg, ankle, foot Manual (15')  - PROM L ankle/foot  - gentle joint mobilization toes/mid/forefoot  - desensitization rubbing, patting and tapping to L lower leg, ankle, foot Manual (15')  - PROM L ankle/foot  - gentle joint mobilization toes/mid/forefoot  -  desensitization rubbing, patting and tapping to L lower leg, ankle, foot Manual (15')  - PROM L ankle/foot  - gentle joint mobilization toes/mid/forefoot  - desensitization rubbing, patting and tapping to L lower leg, ankle, foot                 HEP: AROM ankle/toes    Charges: Ther Ex x2, Manual      Total Timed Treatment: 45 min  Total Treatment Time: 45 min

## 2024-09-24 NOTE — TELEPHONE ENCOUNTER
Called patient, left message that she should reach out to the pharmacy that will be filling the prescription to as that they not run the prescription through insurance as we have no control over that. Also sent [employer] message.    Last prescription 6/20/24 30#, 0 refills  LOV 8/30/24  Follow up on 10/11/24

## 2024-09-26 ENCOUNTER — OFFICE VISIT (OUTPATIENT)
Dept: PHYSICAL THERAPY | Age: 49
End: 2024-09-26
Attending: ORTHOPAEDIC SURGERY
Payer: COMMERCIAL

## 2024-09-26 ENCOUNTER — TELEPHONE (OUTPATIENT)
Dept: PHYSICAL THERAPY | Facility: HOSPITAL | Age: 49
End: 2024-09-26

## 2024-09-26 ENCOUNTER — TELEPHONE (OUTPATIENT)
Dept: PHYSICAL THERAPY | Age: 49
End: 2024-09-26

## 2024-09-26 ENCOUNTER — OFFICE VISIT (OUTPATIENT)
Dept: INTERNAL MEDICINE CLINIC | Facility: CLINIC | Age: 49
End: 2024-09-26
Payer: COMMERCIAL

## 2024-09-26 ENCOUNTER — HOSPITAL ENCOUNTER (OUTPATIENT)
Dept: ULTRASOUND IMAGING | Facility: HOSPITAL | Age: 49
Discharge: HOME | End: 2024-09-26
Attending: SURGERY
Payer: COMMERCIAL

## 2024-09-26 VITALS
SYSTOLIC BLOOD PRESSURE: 110 MMHG | HEIGHT: 59 IN | DIASTOLIC BLOOD PRESSURE: 73 MMHG | BODY MASS INDEX: 26 KG/M2 | TEMPERATURE: 98 F | HEART RATE: 98 BPM | OXYGEN SATURATION: 98 %

## 2024-09-26 DIAGNOSIS — M26.69: Primary | ICD-10-CM

## 2024-09-26 DIAGNOSIS — I73.9 PERIPHERAL VASCULAR DISEASE, UNSPECIFIED (HCC): ICD-10-CM

## 2024-09-26 DIAGNOSIS — I77.89 POPLITEAL ARTERY ENTRAPMENT SYNDROME (HCC): ICD-10-CM

## 2024-09-26 PROCEDURE — 3078F DIAST BP <80 MM HG: CPT | Performed by: INTERNAL MEDICINE

## 2024-09-26 PROCEDURE — 97140 MANUAL THERAPY 1/> REGIONS: CPT

## 2024-09-26 PROCEDURE — 93925 LOWER EXTREMITY STUDY: CPT | Performed by: SURGERY

## 2024-09-26 PROCEDURE — 3074F SYST BP LT 130 MM HG: CPT | Performed by: INTERNAL MEDICINE

## 2024-09-26 PROCEDURE — 99213 OFFICE O/P EST LOW 20 MIN: CPT | Performed by: INTERNAL MEDICINE

## 2024-09-26 PROCEDURE — 3008F BODY MASS INDEX DOCD: CPT | Performed by: INTERNAL MEDICINE

## 2024-09-26 PROCEDURE — 97110 THERAPEUTIC EXERCISES: CPT

## 2024-09-26 NOTE — PROGRESS NOTES
Subjective:     Patient ID: Manjit Matt is a 49 year old female.    Pt here for ffup today; was seen by oral surgeon already for her right jaw pain which had started after having dental work. She had  mri tmj which came back normal. Her mri of the face did show likey partial tear or strain of her right lateral pterygoid muscle.  She still having pain though slightly better than last visit.   She was recommended by to start physical therapy by oral surgeon.         History/Other:   Review of Systems   Constitutional: Negative.    HENT:  Negative for congestion, ear discharge and ear pain.    Respiratory: Negative.       Current Outpatient Medications   Medication Sig Dispense Refill    GABAPENTIN 300 MG Oral Cap TAKE 1 CAPSULE BY MOUTH EVERY MORNING, AT NOON, AND EVERY NIGHT AT BEDTIME. 90 capsule 0    amoxicillin clavulanate 875-125 MG Oral Tab Take 1 tablet by mouth 2 (two) times daily for 7 days. 14 tablet 0    cilostazol 100 MG Oral Tab Take 1 tablet (100 mg total) by mouth 2 (two) times daily. 180 tablet 3    warfarin 1 MG Oral Tab TAKE 1& 1/2 TABLETS BY MOUTH ON MONDAY, WEDNESDAY, FRIDAY, AND 1 TABLET BY MOUTH ALL OTHER DAYS. 120 tablet 1    gabapentin 300 MG Oral Cap Take 1 capsule (300 mg total) by mouth every 8 (eight) hours. 30 capsule 0    acidophilus-pectin Oral Cap Take 1 capsule by mouth daily.      CILOSTAZOL 100 MG Oral Tab TAKE 1 TABLET(100 MG) BY MOUTH TWICE DAILY 60 tablet 11    ASPIRIN EC LOW DOSE 81 MG Oral Tab EC Take 1 tablet (81 mg total) by mouth daily.  5    methylPREDNISolone 4 MG Oral Tablet Therapy Pack Take 1 tablet (4 mg total) by mouth As Directed. FOLLOW DIRECTIONS ON PACKAGING (Patient not taking: Reported on 9/26/2024)      cyclobenzaprine 10 MG Oral Tab Take 1 tablet (10 mg total) by mouth nightly. (Patient not taking: Reported on 9/26/2024)      rosuvastatin 5 MG Oral Tab Take 1 tablet (5 mg total) by mouth nightly. (Patient not taking: Reported on 9/10/2024) 90 tablet 3     oxyCODONE 5 MG Oral Tab Take 1 tablet (5 mg total) by mouth every 4 (four) hours as needed for Pain. (Patient not taking: Reported on 9/10/2024) 16 tablet 0    Acetaminophen 500 MG Oral Cap Take 2 capsules (1,000 mg total) by mouth every 8 (eight) hours as needed for Pain. Never exceed 3000 mg in a 24-hour period.  Many over-the-counter medications also have acetaminophen in them. (Patient not taking: Reported on 9/10/2024) 180 capsule 0    ondansetron 4 MG Oral Tablet Dispersible Take 1 tablet (4 mg total) by mouth every 8 (eight) hours as needed for Nausea. (Patient not taking: Reported on 9/26/2024) 10 tablet 0    Vitamin C 500 MG Oral Tab Take 1 tablet (500 mg total) by mouth in the morning and 1 tablet (500 mg total) before bedtime. (Patient not taking: Reported on 9/26/2024) 84 tablet 0    Multiple Vitamins-Minerals (HAIR SKIN AND NAILS FORMULA) Oral Tab Take 1 tablet by mouth daily. (Patient not taking: Reported on 9/26/2024)       Allergies:  Allergies   Allergen Reactions    Levaquin [Levofloxacin] HIVES and SWELLING     Other reaction(s): LEVOFLOXACIN       Past Medical History:    Congenital pulmonary stenosis (HCC)    congenital pulmonary aa stenosis    Congenital pulmonary stenosis (HCC)    SX at 20 y/o / valvuloplasty 1995    Deep vein thrombosis (HCC)    Disease of vein    vein/artery disease    Heart murmur    since birth    Hematologic disorder    Thromboembolic Event    History of blood clots    History of blood transfusion    Peripheral vascular disease (HCC)    Right leg arterial bypass 2001,2012    PONV (postoperative nausea and vomiting)    Popliteal artery thrombosis (HCC)    Rt popliteal artery thrombosis - 15cm; Right Lower Extremity thrombosis; was on coumadin for 6 months than on plavix and aggrenox until 2007. w/u negative for coagulopathy but was anticoagulated during pregnancies     Tendinitis    discontinue insole / rx ibuprofen    Valvular disease    Pulmonic stenosis    Visual  impairment    glasses      Past Surgical History:   Procedure Laterality Date    Appendectomy      Appendectomy        2006    Cath bare metal stent (bms)      3 stents right leg    Colonoscopy N/A 2024    Procedure: COLONOSCOPY;  Surgeon: Sandrita Saab MD;  Location: [facility] ENDO    Cyst aspiration left Left 2023    US bx    Endometrial ablation      Hysterectomy  10/2018    Laparoscopic hysterectomy with Dr. Dueñas.  Had post op vaginal bleeding and repeat suturing of vaginal cuff.    Leg/ankle surgery proc unlisted      right leg arterial bypass / (fasciotomy)    Needle biopsy left Left 2023    US bx    Other surgical history      SX at 20 y/o / valvuloplasty (congenital Pulmonic stenosis)      Family History   Problem Relation Age of Onset    No Known Problems Father     No Known Problems Mother     Other (Other) Maternal Grandmother         Fibrocystic Breast Disease    Cancer Paternal Grandfather         lung    No Known Problems Sister     Bipolar Disorder Brother     Breast Cancer Maternal Aunt         40's,  BRCA negative    Breast Cancer Maternal Aunt         mat great great aunt breast ca unknown age    Ovarian Cancer Neg     Uterine Cancer Neg     Colon Cancer Neg       Social History:   Social History     Socioeconomic History    Marital status:    Tobacco Use    Smoking status: Never     Passive exposure: Never    Smokeless tobacco: Never   Vaping Use    Vaping status: Never Used   Substance and Sexual Activity    Alcohol use: Yes     Comment: rare    Drug use: No    Sexual activity: Yes     Partners: Male   Other Topics Concern    Caffeine Concern Yes     Comment: coffee, 1-2 cups daily    Pt has a pacemaker No    Pt has a defibrillator No    Reaction to local anesthetic No        Objective:   Physical Exam  Constitutional:       General: She is not in acute distress.     Appearance: She is not ill-appearing, toxic-appearing or diaphoretic.    HENT:      Head:        Comments: Limited opening of mouth/jaw  Tenderness on the right cheek/mandibular area     Right Ear: Tympanic membrane, ear canal and external ear normal.      Left Ear: Tympanic membrane, ear canal and external ear normal.   Neck:      Vascular: No carotid bruit.   Cardiovascular:      Rate and Rhythm: Normal rate and regular rhythm.      Heart sounds: Murmur heard.   Pulmonary:      Effort: Pulmonary effort is normal. No respiratory distress.      Breath sounds: Normal breath sounds. No wheezing or rales.   Musculoskeletal:      Cervical back: Normal range of motion. No rigidity or tenderness.   Lymphadenopathy:      Cervical: No cervical adenopathy.   Neurological:      Mental Status: She is alert.         Assessment & Plan:   (M26.69) Derangement of temporomandibular joint  (primary encounter diagnosis)  Plan: Physical Therapy Referral - [facility]        Order for physical therapy as recommended by oral surgeon. Call back if pain persist/worsens.        No orders of the defined types were placed in this encounter.      Meds This Visit:  Requested Prescriptions      No prescriptions requested or ordered in this encounter       Imaging & Referrals:  None

## 2024-09-26 NOTE — PROGRESS NOTES
Diagnosis:   Pain in Achilles tendon (M76.60)  Achilles tendinosis of left ankle (M67.874)        Referring Provider: Yarelis  Date of Evaluation:    2/13/2024, 7/8/2024    Precautions:   Next MD visit:   none scheduled  Date of Surgery: 6/20/2024 (Left achilles debridement, peroneal tendon debridement and repair, PRP)    Insurance Primary/Secondary: [employer] [place] / N/A     # Auth Visits: 40 total    Subjective:  Has a 50% tear of the R lateral pterygoid, not sure of the treatment plan.  Ankle doing a little better with some improved sensation (decreased hypersensitivity).  Still sensitive especially at achilles incision and top of peroneal incision.  Objective: See below treatment log.   Assessment:   Goals:   Pt report min/no pain or tightness L LE  Pt independent with HEP and progression  Pt able to run, jump, hop, skip, cut, sprint, backpeddle -- all without pain or sense of instability    Plan: Progress open and closed chain strengthening.  Date: 9/19/2024  Tx#: 19/(36/40) Date: 9/24/2024  Tx#: 20 (37/40) Date: 9/26/2024  Tx#: 21 (38/40)   Ther Ex (30')  - Upright bike 6'  - gentle AROM L ankle, foot, toes  - toe scrunches, yoga (extension) and spreading - seated with foot on floor  - B partial Standing balance board B teetering AP and side/side  - B partial Standing BAPS L2 AP, ML, CW, CCW  - Slant board calf stretch level 1 and 2  - Shuttle B leg press 4b x30  - Shuttle B calf raise 2b (only from foot plate)  - Shuttle L leg press 3b x30  - review HEP ankle TB exercises x 4 directions  - Lat walk  - braiding Ther Ex (30')  - Upright bike 6'  - gentle AROM L ankle, foot, toes  - toe scrunches, yoga (extension) and spreading - seated with foot on floor  - B partial Standing balance board B teetering AP and side/side  - B partial Standing BAPS L2 AP, ML, CW, CCW  - Slant board calf stretch level 1 and 2  - Shuttle B leg press 4b x30  - Shuttle B calf raise 2b (only from foot plate) w/ eccentric lowering L  -  Shuttle L leg press 3b x30  - review HEP ankle TB exercises x 4 directions  - Lat walk  - braiding Ther Ex (30')  - Upright bike 6'  - gentle AROM L ankle, foot, toes  - toe scrunches, yoga (extension) and spreading - seated with foot on floor  - B partial Standing balance board B teetering AP and side/side  - B partial Standing BAPS L2 AP, ML, CW, CCW  - Slant board calf stretch level 1 and 2  - Shuttle B leg press 4b x30  - Shuttle B calf raise 2b (only from foot plate) w/ eccentric lowering L  - Shuttle L leg press 3b x30  - review HEP ankle TB exercises x 4 directions  - Lat walk  - braiding   Manual (15')  - PROM L ankle/foot  - gentle joint mobilization toes/mid/forefoot  - desensitization rubbing, patting and tapping to L lower leg, ankle, foot Manual (15')  - PROM L ankle/foot  - gentle joint mobilization toes/mid/forefoot  - desensitization rubbing, patting and tapping to L lower leg, ankle, foot Manual (15')  - PROM L ankle/foot  - gentle joint mobilization toes/mid/forefoot  - desensitization rubbing, patting and tapping to L lower leg, ankle, foot             HEP: AROM ankle/toes    Charges: Ther Ex x2, Manual      Total Timed Treatment: 45 min  Total Treatment Time: 45 min

## 2024-10-01 ENCOUNTER — OFFICE VISIT (OUTPATIENT)
Dept: PHYSICAL THERAPY | Age: 49
End: 2024-10-01
Attending: ORTHOPAEDIC SURGERY
Payer: COMMERCIAL

## 2024-10-01 PROCEDURE — 97140 MANUAL THERAPY 1/> REGIONS: CPT

## 2024-10-01 PROCEDURE — 97110 THERAPEUTIC EXERCISES: CPT

## 2024-10-01 NOTE — PROGRESS NOTES
Diagnosis:   Pain in Achilles tendon (M76.60)  Achilles tendinosis of left ankle (M67.874)        Referring Provider: Yarelis  Date of Evaluation:    2/13/2024, 7/8/2024    Precautions:   Next MD visit:   none scheduled  Date of Surgery: 6/20/2024 (Left achilles debridement, peroneal tendon debridement and repair, PRP)    Insurance Primary/Secondary: [employer] / N/A     # Auth Visits: 40 total    Subjective:  Pt forget her medications today due to switching bags, so she is a bit more sensitive today.  Objective: See below treatment log.   Assessment: Improving mobility, but continued sensitivity throughout achilles and lat foot.  Goals:   Pt report min/no pain or tightness L LE  Pt independent with HEP and progression  Pt able to run, jump, hop, skip, cut, sprint, backpeddle -- all without pain or sense of instability    Plan: Progress open and closed chain strengthening.  Date: 9/19/2024  Tx#: 19/(36/40) Date: 9/24/2024  Tx#: 20 (37/40) Date: 9/26/2024  Tx#: 21 (38/40) Date: 10/1/2024  Tx#: 22 (39/40)    Ther Ex (30')  - Upright bike 6'  - gentle AROM L ankle, foot, toes  - toe scrunches, yoga (extension) and spreading - seated with foot on floor  - B partial Standing balance board B teetering AP and side/side  - B partial Standing BAPS L2 AP, ML, CW, CCW  - Slant board calf stretch level 1 and 2  - Shuttle B leg press 4b x30  - Shuttle B calf raise 2b (only from foot plate)  - Shuttle L leg press 3b x30  - review HEP ankle TB exercises x 4 directions  - Lat walk  - braiding Ther Ex (30')  - Upright bike 6'  - gentle AROM L ankle, foot, toes  - toe scrunches, yoga (extension) and spreading - seated with foot on floor  - B partial Standing balance board B teetering AP and side/side  - B partial Standing BAPS L2 AP, ML, CW, CCW  - Slant board calf stretch level 1 and 2  - Shuttle B leg press 4b x30  - Shuttle B calf raise 2b (only from foot plate) w/ eccentric lowering L  - Shuttle L leg press 3b x30  - review HEP  ankle TB exercises x 4 directions  - Lat walk  - braiding Ther Ex (30')  - Upright bike 6'  - gentle AROM L ankle, foot, toes  - toe scrunches, yoga (extension) and spreading - seated with foot on floor  - B partial Standing balance board B teetering AP and side/side  - B partial Standing BAPS L2 AP, ML, CW, CCW  - Slant board calf stretch level 1 and 2  - Shuttle B leg press 4b x30  - Shuttle B calf raise 2b (only from foot plate) w/ eccentric lowering L  - Shuttle L leg press 3b x30  - review HEP ankle TB exercises x 4 directions  - Lat walk  - braiding Ther Ex (30')  - Upright bike 6'  - gentle AROM L ankle, foot, toes  - toe scrunches, yoga (extension) and spreading - seated with foot on floor  - B partial Standing balance board B teetering AP and side/side  - B partial Standing BAPS L2 AP, ML, CW, CCW  - Slant board calf stretch level 1 and 2  - Shuttle B leg press 6b x30  - Shuttle B calf raise 3b (only from foot plate) w/ eccentric lowering L  - Shuttle L leg press 4b x30  - review HEP ankle TB exercises x 4 directions  - Lat walk  - braiding    Manual (15')  - PROM L ankle/foot  - gentle joint mobilization toes/mid/forefoot  - desensitization rubbing, patting and tapping to L lower leg, ankle, foot Manual (15')  - PROM L ankle/foot  - gentle joint mobilization toes/mid/forefoot  - desensitization rubbing, patting and tapping to L lower leg, ankle, foot Manual (15')  - PROM L ankle/foot  - gentle joint mobilization toes/mid/forefoot  - desensitization rubbing, patting and tapping to L lower leg, ankle, foot Manual (15')  - PROM L ankle/foot  - gentle joint mobilization toes/mid/forefoot  - desensitization rubbing, patting and tapping to L lower leg, ankle, foot                  HEP: AROM ankle/toes    Charges: Ther Ex x2, Manual      Total Timed Treatment: 45 min  Total Treatment Time: 45 min

## 2024-10-02 ENCOUNTER — ANTI-COAG VISIT (OUTPATIENT)
Dept: ANTICOAGULATION | Facility: CLINIC | Age: 49
End: 2024-10-02

## 2024-10-02 DIAGNOSIS — I37.9 PULMONIC VALVE DISEASE: ICD-10-CM

## 2024-10-02 DIAGNOSIS — Z51.81 MONITORING FOR LONG-TERM ANTICOAGULANT USE: ICD-10-CM

## 2024-10-02 DIAGNOSIS — Z79.01 MONITORING FOR LONG-TERM ANTICOAGULANT USE: ICD-10-CM

## 2024-10-02 DIAGNOSIS — Z79.01 LONG TERM (CURRENT) USE OF ANTICOAGULANTS: Primary | ICD-10-CM

## 2024-10-02 DIAGNOSIS — I73.9 PERIPHERAL VASCULAR DISEASE (HCC): ICD-10-CM

## 2024-10-02 LAB — INR: 2.5 (ref 0.8–1.2)

## 2024-10-02 PROCEDURE — 93793 ANTICOAG MGMT PT WARFARIN: CPT | Performed by: FAMILY MEDICINE

## 2024-10-02 NOTE — PROGRESS NOTES
TTR 69.3%     INR result received from [employer] home monitoring.      Per protocol, continue current dose and recheck INR in 2 weeks.      1.5 mg every Mon; 1 mg all other days

## 2024-10-08 ENCOUNTER — HOSPITAL ENCOUNTER (OUTPATIENT)
Dept: MRI IMAGING | Facility: HOSPITAL | Age: 49
Discharge: HOME | End: 2024-10-08
Attending: STUDENT IN AN ORGANIZED HEALTH CARE EDUCATION/TRAINING PROGRAM
Payer: COMMERCIAL

## 2024-10-08 DIAGNOSIS — S80.02XD CONTUSION OF LEFT KNEE, SUBSEQUENT ENCOUNTER: ICD-10-CM

## 2024-10-08 PROCEDURE — 73721 MRI JNT OF LWR EXTRE W/O DYE: CPT | Performed by: STUDENT IN AN ORGANIZED HEALTH CARE EDUCATION/TRAINING PROGRAM

## 2024-10-11 ENCOUNTER — OFFICE VISIT (OUTPATIENT)
Dept: ORTHOPEDICS CLINIC | Facility: CLINIC | Age: 49
End: 2024-10-11
Payer: COMMERCIAL

## 2024-10-11 DIAGNOSIS — S80.02XD CONTUSION OF LEFT KNEE, SUBSEQUENT ENCOUNTER: Primary | ICD-10-CM

## 2024-10-11 DIAGNOSIS — S86.312D PERONEAL TENDON TEAR, LEFT, SUBSEQUENT ENCOUNTER: ICD-10-CM

## 2024-10-11 PROCEDURE — 99214 OFFICE O/P EST MOD 30 MIN: CPT | Performed by: STUDENT IN AN ORGANIZED HEALTH CARE EDUCATION/TRAINING PROGRAM

## 2024-10-11 NOTE — PROGRESS NOTES
Orthopaedic Surgery Follow-up Patient Visit  _______________________________________________________________________________________________________________  _______________________________________________________________________________________________________________    DATE OF VISIT: 10/11/2024     CHIEF COMPLAINT: Follow-up left knee and left ankle    Chief Complaint   Patient presents with    Post-Op     Post -op for Left ankle sx on 6/20/2024,  declines pain today, and left knee MRI        HISTORY OF PRESENT ILLNESS: Manjit Matt is a 49 year old female who presents to the clinic for follow-up for her left knee and left ankle. Since last visit, she obtained the MRI of her left knee.  She reports that overall her ankle continues to improve.  She is working with physical therapy on a regular basis.  She denies any new injuries.  She reports some mild improvement in her knee symptoms.  She denies any instability or mechanical symptoms at this time.  She has an upcoming work trip in [place] which may involve some extended walking.  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 many mobility issues    SOCIAL HISTORY  Social History     Socioeconomic History    Marital status:      Spouse name: Not on file    Number of children: Not on file    Years of education: Not on file    Highest education level: Not on file   Occupational History    Not on file   Tobacco Use    Smoking status: Never     Passive exposure: Never    Smokeless tobacco: Never   Vaping Use    Vaping status: Never Used   Substance and Sexual Activity    Alcohol use: Yes     Comment: rare    Drug use: No    Sexual activity: Yes     Partners: Male   Other Topics Concern     Service Not Asked    Blood Transfusions Not Asked    Caffeine Concern Yes     Comment: coffee, 1-2 cups daily    Occupational Exposure Not Asked    Hobby Hazards Not Asked    Sleep Concern Not Asked    Stress Concern Not Asked    Weight Concern Not  Asked    Special Diet Not Asked    Back Care Not Asked    Exercise Not Asked    Bike Helmet Not Asked    Seat Belt Not Asked    Self-Exams Not Asked    Grew up on a farm Not Asked    History of tanning Not Asked    Outdoor occupation Not Asked    Pt has a pacemaker No    Pt has a defibrillator No    Breast feeding Not Asked    Reaction to local anesthetic No   Social History Narrative    Not on file     Social Drivers of Health     Financial Resource Strain: Not on file   Food Insecurity: Not on file   Transportation Needs: Not on file   Physical Activity: Not on file   Stress: Not on file   Social Connections: Not on file   Housing Stability: Not on file        PAST MEDICAL HISTORY  Past Medical History:    Congenital pulmonary stenosis (HCC)    congenital pulmonary aa stenosis    Congenital pulmonary stenosis (HCC)    SX at 20 y/o / valvuloplasty     Deep vein thrombosis (HCC)    Disease of vein    vein/artery disease    Heart murmur    since birth    Hematologic disorder    Thromboembolic Event    History of blood clots    History of blood transfusion    Peripheral vascular disease (HCC)    Right leg arterial bypass ,    PONV (postoperative nausea and vomiting)    Popliteal artery thrombosis (HCC)    Rt popliteal artery thrombosis - 15cm; Right Lower Extremity thrombosis; was on coumadin for 6 months than on plavix and aggrenox until . w/u negative for coagulopathy but was anticoagulated during pregnancies     Tendinitis    discontinue insole / rx ibuprofen    Valvular disease    Pulmonic stenosis    Visual impairment    glasses        PAST SURGICAL HISTORY  Past Surgical History:   Procedure Laterality Date    Appendectomy      Appendectomy            Cath bare metal stent (bms)      3 stents right leg    Colonoscopy N/A 2024    Procedure: COLONOSCOPY;  Surgeon: Sandrita Saab MD;  Location: [facility] END[facility]    Cyst aspiration left Left 2023    [facility]     Endometrial ablation  2015    Hysterectomy  10/2018    Laparoscopic hysterectomy with Dr. Dueñas.  Had post op vaginal bleeding and repeat suturing of vaginal cuff.    Leg/ankle surgery proc unlisted  2001    right leg arterial bypass / (fasciotomy)    Needle biopsy left Left 09/26/2023    US bx    Other surgical history  1995    SX at 18 y/o / valvuloplasty (congenital Pulmonic stenosis)        MEDICATIONS  Reviewed   Coenzyme Q10 (CO Q 10) 100 MG Oral Cap Take by mouth daily.      GABAPENTIN 300 MG Oral Cap TAKE 1 CAPSULE BY MOUTH EVERY MORNING, AT NOON, AND EVERY NIGHT AT BEDTIME. 90 capsule 0    cilostazol 100 MG Oral Tab Take 1 tablet (100 mg total) by mouth 2 (two) times daily. 180 tablet 3    warfarin 1 MG Oral Tab TAKE 1& 1/2 TABLETS BY MOUTH ON MONDAY, WEDNESDAY, FRIDAY, AND 1 TABLET BY MOUTH ALL OTHER DAYS. 120 tablet 1    Acetaminophen 500 MG Oral Cap Take 2 capsules (1,000 mg total) by mouth every 8 (eight) hours as needed for Pain. Never exceed 3000 mg in a 24-hour period.  Many over-the-counter medications also have acetaminophen in them. 180 capsule 0    gabapentin 300 MG Oral Cap Take 1 capsule (300 mg total) by mouth every 8 (eight) hours. 30 capsule 0    Vitamin C 500 MG Oral Tab Take 1 tablet (500 mg total) by mouth in the morning and 1 tablet (500 mg total) before bedtime. 84 tablet 0    acidophilus-pectin Oral Cap Take 1 capsule by mouth daily.      Multiple Vitamins-Minerals (HAIR SKIN AND NAILS FORMULA) Oral Tab Take 1 tablet by mouth daily.      CILOSTAZOL 100 MG Oral Tab TAKE 1 TABLET(100 MG) BY MOUTH TWICE DAILY 60 tablet 11    ASPIRIN EC LOW DOSE 81 MG Oral Tab EC Take 1 tablet (81 mg total) by mouth daily.  5        ALLERGIES  Allergies[1]     FAMILY HISTORY  Family History   Problem Relation Age of Onset    No Known Problems Father     No Known Problems Mother     Other (Other) Maternal Grandmother         Fibrocystic Breast Disease    Cancer Paternal Grandfather         lung    No Known  Problems Sister     Bipolar Disorder Brother     Breast Cancer Maternal Aunt         40's,  BRCA negative    Breast Cancer Maternal Aunt         mat great great aunt breast ca unknown age    Ovarian Cancer Neg     Uterine Cancer Neg     Colon Cancer Neg         REVIEW OF SYSTEMS  A 14 point review of systems was performed. Pertinent positives and negatives noted in the HPI.    PHYSICAL EXAM  LMP 09/27/2018      Constitutional: The patient is well-developed, well-nourished, in no acute distress.  Neurological: Alert and oriented to person, place, and time.  Psychiatric: Mood and affect normal.  Head: Normocephalic and atraumatic.  Cardiovascular: regular rate by palpation  Pulmonary/Chest: Effort normal. No respiratory distress. Breathing non-labored  Abdominal: Abdomen exhibits no distension.   Left  KNEE  INSPECTION/PALPATION  No readily apparent visual abnormalities of the knee.   No ecchymosis or erythema  No effusion.   No breaks in skin. Skin is euthermic.  Neutral alignment on standing  Normal gait  NTTP  to medial joint line, NTTP to lateral joint line  ROM  0-130 degrees  KNEE STRENGTH  Flexion: 5/5  Extension: 5/5  STABILITY  1A lachman, stable anterior drawer  Stable posterior drawer  Stable to varus and valgus stress at 0 and 30 degrees  1 quadrant of lateral patellar translation  Negative J sign, negative apprehension  PERTINENT FINDINGS  Improving  Patellar grind  SILT to all distributions with some slight alteration in the distal aspect of the saphenous/sural distribution  2+ DP/PT pulse, foot wwp     IMAGING  I independently viewed and interpreted the imaging. Radiologist interpretation is available in the imaging report.  MRI: MRI of the left knee demonstrates very mild degenerative changes of the patellofemoral cartilage with no notable large chondral lesions, meniscal pathology, or ligamentous pathology  MRI KNEE, LEFT (QYZ=64913)    Result Date: 10/10/2024  PROCEDURE: MRI KNEE, LEFT (WFL=25023)   COMPARISON: None.  INDICATIONS: S80.02XD Contusion of left knee, subsequent encounter  TECHNIQUE: A complete multi-planar, multisequence MRI of the knee was performed.   FINDINGS:   MEDIAL MENISCUS: Intact  LATERAL MENISCUS: Intact  ACL: Intact PCL: Intact MCL: Intact LCL COMPLEX: Intact  PATELLOFEMORAL EXTENSOR MECHANISM: Normal.   ARTICULAR CARTILAGE:  Small areas of full-thickness chondral fissuring seen throughout the patella.  Medial and lateral compartment cartilage is maintained.  BONE MARROW: No acute fracture, dislocation, or marrow replacing lesion.  JOINT FLUID: No synovitis or loose bodies.   OTHER:  No mass or loculated collection. The muscles have a normal signal intensity and bulk.         CONCLUSION:   Mild patellofemoral compartment osteoarthritis.  No acute meniscal or ligamentous injury    Dictated by (CST): Ron Wong MD on 10/10/2024 at 9:10 AM     Finalized by (CST): Ron Wong MD on 10/10/2024 at 9:15 AM          US ARTERIAL DUPLEX LOWER EXTREMITY BILATERAL (CPT=93925)    Result Date: 9/26/2024  PROCEDURE:  US ARTERIAL DUPLEX LOWER EXTREMITY BILATERAL (CPT=93925)  COMPARISON:  None.  INDICATIONS:  I73.9 Peripheral vascular disease, unspecified (HCC) I77.89 Popliteal artery entrapment syndrome (HCC)  TECHNIQUE:  A comprehensive color duplex Doppler ultrasound examination of the bilateral lower extremities was performed.  Color imaging, Doppler wave form analysis, and peak systolic flow measurements of the common femoral, profunda femoral, superficial  femoral, and popliteal arteries were performed.   PATIENT STATED HISTORY: (As transcribed by Technologist)     FINDINGS:  RIGHT EXTREMITY:  Short segment popliteal artery bypass graft is patent.  LEFT EXTREMITY:  Normal wave forms, no significant stenosis.  OTHER:  None.  SYSTOLIC VELOCITIES (CM/S): RIGHT COMMON FEMORAL:      98  RIGHT PROFUNDA FEMORAL:      60    RIGHT SUPERFICIAL FEMORAL PROX:    77 RIGHT SUPERFICIAL FEMORAL MID:    103  RIGHT SUPERFICIAL FEMORAL DISTAL:    77 PROXIMAL  GRAFT ANASTOMOSIS:      90.3 MID GRAFT:      77.4 DISTAL GRAFT:                  24.8 DISTAL ANASTOMOSIS:        23.7 TIBIOPER TRUNK:      27 RIGHT PTA PROX:      26 RIGHT PTA MID:      60 RIGHT PTA DISTAL:      62 RIGHT CASA PROX:      21 RIGHT CASA MID:      23  RIGHT DORSALIS PEDIS:      30 RIGHT GREAT TOE PRESSURE:      95 mmHg  SYSTOLIC VELOCITIES (CM./S): LEFT COMMON FEMORAL:      93  LEFT PROFUNDA FEMORAL:      62    LEFT SUPERFICIAL FEMORAL PROX:    95 LEFT SUPERFICIAL FEMORAL MID:      98 LEFT SUPERFICIAL FEMORAL DISTAL:    55 LEFT POPLITEAL PROX:      50 LEFT POPLITEAL DISTAL:      42 TIBIOPER TRUNK:      37 LEFT PTA PROX:      31 LEFT PTA MID:      43 LEFT PTA DISTAL:      47 LEFT CASA PROX:      44 LEFT CASA MID:      57  LEFT DORSALIS PEDIS::      27 LEFT GREAT TOE PRESSURE:      103 mmHg             CONCLUSION:  There is a patent right popliteal artery bypass graft.  There is no significant arterial stenosis detected.   LOCATION:  [place]   Dictated by (CST): Taz Fournier MD on 9/26/2024 at 9:49 AM     Finalized by (CST): Taz Fournier MD on 9/26/2024 at 9:55 AM       MRI FACE/SINUS (W+WO)(CPT=70543)    Result Date: 9/25/2024  PROCEDURE:  MRI FACE/SINUS (W+WO)(CPT=70543)  COMPARISON:  None.  INDICATIONS:  R25.2 Trismus R68.84 Jaw pain  TECHNIQUE:  A variety of imaging planes and parameters were utilized for visualization of suspected pathology.  Images were performed without and with intravenous gadolinium contrast.  PATIENT STATED HISTORY:(As transcribed by Technologist)  Patent states she has been having swelling and bruising on right side of cheekbone for one month and is unable to open her mouth fully   CONTRAST USED:  12 mL of Dotarem  FINDINGS:   On series 5 images 18 through 25, there is extensive T2 hyperintense signal and enhancement throughout the right lateral pterygoid musculature. The remainder of the muscles of mastication are  unremarkable.  Trace mucosal thickening at the maxillary and ethmoid sinuses.  The globes are symmetric in size and signal.  The intraorbital fat is preserved.  Unremarkable parotid and submandibular glands.  Accessory right-sided parotid tissue is noted.            CONCLUSION:  Extensive T2 hyperintense signal and enhancement throughout the right lateral pterygoid musculature may represent a partial thickness tear, muscular strain, or myositis.  Clinical correlation with the patient's symptoms and medical history is recommended.   LOCATION:  [facility]   Dictated by (CST): Stromberg, LeRoy, MD on 9/25/2024 at 9:06 AM     Finalized by (CST): Stromberg, LeRoy, MD on 9/25/2024 at 9:32 AM       MRI TMJ (CPT=70336)    Result Date: 9/24/2024  PROCEDURE: MRI TMJ (CPT=70336)  COMPARISON: None.  INDICATIONS: R25.2 Trismus R68.84 Jaw pain  TECHNIQUE: A variety of imaging planes and parameters were utilized for visualization of suspected pathology.  Images were performed without contrast.  FINDINGS:  RIGHT TEMPOROMANDIBULAR JOINT: EFFUSION: None.  BONES: Normal appearance with no erosions, joint space narrowing, or osteophytes.  DISC: Normal position, signal intensity, and translation.  TRANSLATION: Normal.   LEFT TEMPOROMANDIBULAR JOINT: EFFUSION: None.  BONES: Normal appearance with no erosions, joint space narrowing, or osteophytes.  DISC: Normal position, signal intensity, and translation.  TRANSLATION: Normal.   OTHER: Negative.          CONCLUSION: Normal examination.     Dictated by (CST): Bert Guy MD on 9/24/2024 at 4:03 PM     Finalized by (CST): Bert Guy MD on 9/24/2024 at 4:06 PM            ASSESSMENT/PLAN: Manjit Matt is a 49 year old female who presents to the Orthopaedic surgery clinic today for follow-up for her left knee and left ankle.  From her left ankle perspective, she is improving substantially.  She may slowly begin to transition out of her boot as she feels she is able to tolerate and has  appropriate confidence with.  She should continue to advance with physical therapy to restore strength and function.  For her left knee, she will also continue to work with therapy to strengthen the knee, but no restrictions will be needed at this time.    We discussed the diagnosis, changes, and therapies performed to date including possible treatments and their associated risks/benefits.  WEIGHT BEARING STATUS: Weightbearing as tolerated  RANGE-OF-MOTION LIMITATIONS: as tolerated  NEW PRESCRIPTIONS:  We discussed medications for this condition including patient current regimen. Based on this discussion we have added/re-ordered no additional medications  IMAGING ORDERED: none  CONSULTS PLACED: no new consultations  PROSTHESES/ORTHOTICS: the patient has appropriate walking boot and no additional prostheses/orthoses were ordered at today's visit  PROCEDURES: none    FOLLOW-UP:  3 months    RADIOGRAPHS AT NEXT VISIT: none    I have personally seen Manjit Matt and discussed in detail their plan of care. Prior to departure, they indicated agreement with and understanding of their plan of care and their follow-up as documented herein this note. Please note that this note was written in combination with voice recognition/dictation software and there is a possibility of transcription errors which were not identified at the time of note submission. If clarification is necessary, please contact the author or clinic staff.    Dwight Lomeli MD  Orthopaedic Surgery  10/11/2024         [1]   Allergies  Allergen Reactions    Levaquin [Levofloxacin] HIVES and SWELLING     Other reaction(s): LEVOFLOXACIN

## 2024-10-15 ENCOUNTER — ANTI-COAG VISIT (OUTPATIENT)
Dept: ANTICOAGULATION | Facility: CLINIC | Age: 49
End: 2024-10-15

## 2024-10-15 DIAGNOSIS — Z79.01 MONITORING FOR LONG-TERM ANTICOAGULANT USE: ICD-10-CM

## 2024-10-15 DIAGNOSIS — I37.9 PULMONIC VALVE DISEASE: ICD-10-CM

## 2024-10-15 DIAGNOSIS — Z79.01 LONG TERM (CURRENT) USE OF ANTICOAGULANTS: Primary | ICD-10-CM

## 2024-10-15 DIAGNOSIS — Z51.81 MONITORING FOR LONG-TERM ANTICOAGULANT USE: ICD-10-CM

## 2024-10-15 DIAGNOSIS — I73.9 PERIPHERAL VASCULAR DISEASE (HCC): ICD-10-CM

## 2024-10-15 LAB — INR: 2 (ref 0.8–1.2)

## 2024-10-15 PROCEDURE — 93793 ANTICOAG MGMT PT WARFARIN: CPT | Performed by: FAMILY MEDICINE

## 2024-10-16 NOTE — PROGRESS NOTES
TTR 70.2%     INR result received from [employer] home monitoring.      Per protocol, continue current dose and recheck INR in 2 weeks.      1.5 mg every Mon; 1 mg all other days

## 2024-10-17 ENCOUNTER — OFFICE VISIT (OUTPATIENT)
Dept: PHYSICAL THERAPY | Age: 49
End: 2024-10-17
Attending: ORTHOPAEDIC SURGERY
Payer: COMMERCIAL

## 2024-10-17 PROCEDURE — 97110 THERAPEUTIC EXERCISES: CPT

## 2024-10-17 PROCEDURE — 97140 MANUAL THERAPY 1/> REGIONS: CPT

## 2024-10-18 NOTE — PROGRESS NOTES
Diagnosis:   Pain in Achilles tendon (M76.60)  Achilles tendinosis of left ankle (M67.874)        Referring Provider: Yarelis  Date of Evaluation:    2/13/2024, 7/8/2024    Precautions:   Next MD visit:   none scheduled  Date of Surgery: 6/20/2024 (Left achilles debridement, peroneal tendon debridement and repair, PRP)    Insurance Primary/Secondary: [employer] [place] / N/A     # Auth Visits: 40 total    Subjective:  Mixed up her medications yesterday, so was a bit off.  Today, doing pretty good with some c/o distal/lateral calf/achilles discomfort.  Objective: See below treatment log.   Assessment: Most TTP superior to achilles incision, but improving strength.  Good tolerance for standing BAPS with 80% estimated Wb'ing.  Goals:   Pt report min/no pain or tightness L LE  Pt independent with HEP and progression  Pt able to run, jump, hop, skip, cut, sprint, backpeddle -- all without pain or sense of instability    Plan: Progress open and closed chain strengthening.  Pt out of town for conference for some of next week, then upon her return will also begin therapy for her TMJ at [facility].  Date: 9/19/2024  Tx#: 19/(36/40) Date: 9/24/2024  Tx#: 20 (37/40) Date: 9/26/2024  Tx#: 21 (38/40) Date: 10/1/2024  Tx#: 22 (39/40) Date: 10/17/2024  Tx: 23 (40/40)   Ther Ex (30')  - Upright bike 6'  - gentle AROM L ankle, foot, toes  - toe scrunches, yoga (extension) and spreading - seated with foot on floor  - B partial Standing balance board B teetering AP and side/side  - B partial Standing BAPS L2 AP, ML, CW, CCW  - Slant board calf stretch level 1 and 2  - Shuttle B leg press 4b x30  - Shuttle B calf raise 2b (only from foot plate)  - Shuttle L leg press 3b x30  - review HEP ankle TB exercises x 4 directions  - Lat walk  - braiding Ther Ex (30')  - Upright bike 6'  - gentle AROM L ankle, foot, toes  - toe scrunches, yoga (extension) and spreading - seated with foot on floor  - B partial Standing balance board B teetering AP and  side/side  - B partial Standing BAPS L2 AP, ML, CW, CCW  - Slant board calf stretch level 1 and 2  - Shuttle B leg press 4b x30  - Shuttle B calf raise 2b (only from foot plate) w/ eccentric lowering L  - Shuttle L leg press 3b x30  - review HEP ankle TB exercises x 4 directions  - Lat walk  - braiding Ther Ex (30')  - Upright bike 6'  - gentle AROM L ankle, foot, toes  - toe scrunches, yoga (extension) and spreading - seated with foot on floor  - B partial Standing balance board B teetering AP and side/side  - B partial Standing BAPS L2 AP, ML, CW, CCW  - Slant board calf stretch level 1 and 2  - Shuttle B leg press 4b x30  - Shuttle B calf raise 2b (only from foot plate) w/ eccentric lowering L  - Shuttle L leg press 3b x30  - review HEP ankle TB exercises x 4 directions  - Lat walk  - braiding Ther Ex (30')  - Upright bike 6'  - gentle AROM L ankle, foot, toes  - toe scrunches, yoga (extension) and spreading - seated with foot on floor  - B partial Standing balance board B teetering AP and side/side  - B partial Standing BAPS L2 AP, ML, CW, CCW  - Slant board calf stretch level 1 and 2  - Shuttle B leg press 6b x30  - Shuttle B calf raise 3b (only from foot plate) w/ eccentric lowering L  - Shuttle L leg press 4b x30  - review HEP ankle TB exercises x 4 directions  - Lat walk  - braiding Ther Ex (30')  - Upright bike 6'  - gentle AROM L ankle, foot, toes  - toe scrunches, yoga (extension) and spreading - seated with foot on floor  - B Standing balance board B teetering AP and side/side  - B Standing (L 80% WB, R 20% WB) BAPS L2 AP, ML, CW, CCW  - Slant board calf stretch level 1 and 2  - Shuttle B leg press 6b x30  - Shuttle B calf raise 4b (only from foot plate) w/ eccentric lowering L  - Shuttle L leg press 5b x30  - review HEP ankle TB exercises x 4 directions  - Lat walk  - braiding   Manual (15')  - PROM L ankle/foot  - gentle joint mobilization toes/mid/forefoot  - desensitization rubbing, patting and  tapping to L lower leg, ankle, foot Manual (15')  - PROM L ankle/foot  - gentle joint mobilization toes/mid/forefoot  - desensitization rubbing, patting and tapping to L lower leg, ankle, foot Manual (15')  - PROM L ankle/foot  - gentle joint mobilization toes/mid/forefoot  - desensitization rubbing, patting and tapping to L lower leg, ankle, foot Manual (15')  - PROM L ankle/foot  - gentle joint mobilization toes/mid/forefoot  - desensitization rubbing, patting and tapping to L lower leg, ankle, foot Manual (15')  - PROM L ankle/foot  - gentle joint mobilization toes/mid/forefoot  - desensitization rubbing, patting and tapping to L lower leg, ankle, foot                 HEP: AROM ankle/toes    Charges: Ther Ex x2, Manual      Total Timed Treatment: 45 min  Total Treatment Time: 45 min

## 2024-10-24 ENCOUNTER — OFFICE VISIT (OUTPATIENT)
Dept: PHYSICAL THERAPY | Age: 49
End: 2024-10-24
Attending: ORTHOPAEDIC SURGERY
Payer: COMMERCIAL

## 2024-10-24 PROCEDURE — 97110 THERAPEUTIC EXERCISES: CPT

## 2024-10-24 PROCEDURE — 97140 MANUAL THERAPY 1/> REGIONS: CPT

## 2024-10-25 NOTE — PROGRESS NOTES
Diagnosis:   Pain in Achilles tendon (M76.60)  Achilles tendinosis of left ankle (M67.874)        Referring Provider: Yarelis  Date of Evaluation:    2/13/2024, 7/8/2024    Precautions:   Next MD visit:   none scheduled  Date of Surgery: 6/20/2024 (Left achilles debridement, peroneal tendon debridement and repair, PRP)    Insurance Primary/Secondary: [employer] / N/[place]     # Auth Visits: 45 total    Subjective:  Survived the conference with a lot of walking by wearing her boot nearly fulltime.  Today, she was able to rest a bit, but is still a bit sore, more lateral ankle than achilles.  Objective: See below treatment log.   Assessment: Pt tolerated BAPS at 100% WB L, so improving strength and Wb'ing tolerance.  Goals:   Pt report min/no pain or tightness L LE  Pt independent with HEP and progression  Pt able to run, jump, hop, skip, cut, sprint, backpeddle -- all without pain or sense of instability    Plan: Progress open and closed chain strengthening.  To begin therapy for her TMJ at [facility] next week..  Date: 10/1/2024  Tx#: 22 (39/40) Date: 10/17/2024  Tx: 23 (40/40) Date: 10/24/2024  Tx: 24 (post-op)   (Total 41/45)   Ther Ex (30')  - Upright bike 6'  - gentle AROM L ankle, foot, toes  - toe scrunches, yoga (extension) and spreading - seated with foot on floor  - B partial Standing balance board B teetering AP and side/side  - B partial Standing BAPS L2 AP, ML, CW, CCW  - Slant board calf stretch level 1 and 2  - Shuttle B leg press 6b x30  - Shuttle B calf raise 3b (only from foot plate) w/ eccentric lowering L  - Shuttle L leg press 4b x30  - review HEP ankle TB exercises x 4 directions  - Lat walk  - braiding Ther Ex (30')  - Upright bike 6'  - gentle AROM L ankle, foot, toes  - toe scrunches, yoga (extension) and spreading - seated with foot on floor  - B Standing balance board B teetering AP and side/side  - B Standing (L 80% WB, R 20% WB) BAPS L2 AP, ML, CW, CCW  - Slant board calf stretch level 1 and  2  - Shuttle B leg press 6b x30  - Shuttle B calf raise 4b (only from foot plate) w/ eccentric lowering L  - Shuttle L leg press 5b x30  - review HEP ankle TB exercises x 4 directions  - Lat walk  - braiding Ther Ex (30')  - Upright bike 6'  - toe scrunches, yoga (extension) and spreading - seated with foot on floor  - B Standing balance board B teetering AP and side/side  - Standing (L 100% WB) BAPS L2 AP, ML, CW, CCW  - Slant board calf stretch level 1 and 2  - Shuttle B leg press 6b x30  - Shuttle B calf raise 4b (only from foot plate) w/ eccentric lowering L  - Shuttle L leg press 5b x30  - Lat walk  - braiding   Manual (15')  - PROM L ankle/foot  - gentle joint mobilization toes/mid/forefoot  - desensitization rubbing, patting and tapping to L lower leg, ankle, foot Manual (15')  - PROM L ankle/foot  - gentle joint mobilization toes/mid/forefoot  - desensitization rubbing, patting and tapping to L lower leg, ankle, foot Manual (15')  - PROM L ankle/foot  - gentle joint mobilization toes/mid/forefoot  - desensitization rubbing, patting and tapping to L lower leg, ankle, foot             HEP: AROM ankle/toes    Charges: Ther Ex x2, Manual      Total Timed Treatment: 45 min  Total Treatment Time: 45 min

## 2024-10-28 ENCOUNTER — OFFICE VISIT (OUTPATIENT)
Dept: PHYSICAL THERAPY | Age: 49
End: 2024-10-28
Attending: INTERNAL MEDICINE
Payer: COMMERCIAL

## 2024-10-28 ENCOUNTER — ANTI-COAG VISIT (OUTPATIENT)
Dept: ANTICOAGULATION | Facility: CLINIC | Age: 49
End: 2024-10-28

## 2024-10-28 ENCOUNTER — TELEPHONE (OUTPATIENT)
Dept: INTERNAL MEDICINE CLINIC | Facility: CLINIC | Age: 49
End: 2024-10-28

## 2024-10-28 ENCOUNTER — PATIENT MESSAGE (OUTPATIENT)
Dept: ANTICOAGULATION | Facility: CLINIC | Age: 49
End: 2024-10-28

## 2024-10-28 DIAGNOSIS — M26.622 TMJ TENDERNESS, LEFT: Primary | ICD-10-CM

## 2024-10-28 DIAGNOSIS — Z79.01 MONITORING FOR LONG-TERM ANTICOAGULANT USE: ICD-10-CM

## 2024-10-28 DIAGNOSIS — Z51.81 MONITORING FOR LONG-TERM ANTICOAGULANT USE: ICD-10-CM

## 2024-10-28 DIAGNOSIS — I37.9 PULMONIC VALVE DISEASE: ICD-10-CM

## 2024-10-28 DIAGNOSIS — Z79.01 LONG TERM (CURRENT) USE OF ANTICOAGULANTS: Primary | ICD-10-CM

## 2024-10-28 DIAGNOSIS — I73.9 PERIPHERAL VASCULAR DISEASE (HCC): ICD-10-CM

## 2024-10-28 LAB — INR: 2.3 (ref 2–3)

## 2024-10-28 PROCEDURE — 97161 PT EVAL LOW COMPLEX 20 MIN: CPT

## 2024-10-28 PROCEDURE — 93793 ANTICOAG MGMT PT WARFARIN: CPT | Performed by: FAMILY MEDICINE

## 2024-10-28 PROCEDURE — 97140 MANUAL THERAPY 1/> REGIONS: CPT

## 2024-10-28 PROCEDURE — 97530 THERAPEUTIC ACTIVITIES: CPT

## 2024-10-28 NOTE — TELEPHONE ENCOUNTER
Jose calling from [employer] calling. Said they faxed over a referral request for dr to sign on 10/24, wants to confirm it has been received, Needs it to be sent to insurance to be authorized.

## 2024-10-28 NOTE — TELEPHONE ENCOUNTER
Message sent to Manjit- confirm that [employer] is faxing the request to the correct number.     Dr. Flynn fax # [phone].

## 2024-10-28 NOTE — TELEPHONE ENCOUNTER
Reached out to Jose at [phone] to inform them fax has not been received. Provided fax number to have referral re-faxed to us again.

## 2024-10-28 NOTE — PROGRESS NOTES
TEMPORAL MANDIBULAR JOINT EVALUATION:   Referring Physician: Dr. Flynn  Diagnosis: TMJ     Date of Service: 10/28/2024     PATIENT SUMMARY   Manjit Matt is a 49 year old y/o female who presents to therapy today with complaints of right jaw pain since a dentist appointment on 8/27/24.   Pt describes pain level current 0/10, at best 0/10, at worst 7/10.  Current functional limitations include opening her mouth wide.   Pertaining Imaging: MRI positive right lateal pterygoid tear.    Manjit describes prior level of function was independent with her ADLs and IADLs. Pt goals include decreasing her pain.  Past medical history was reviewed with Manjit.     ASSESSMENT  Manjit presents to physical therapy evaluation with primary c/o tmj. The results of the objective tests and measures show decreased jaw rom, tightness and poor posture.  Functional deficits include but are not limited to mouth opening.  Signs and symptoms are consistent with diagnosis of right tmj. Pt and PT discussed evaluation findings, pathology, POC and HEP.  Pt voiced understanding and performs HEP correctly without reported pain. Skilled Physical Therapy is medically necessary to address the above impairments and reach functional goals.     Precautions:   Pulmonary Stenosis  OBJECTIVE:   Observation/Posture: Forward head and lori. Rounded shoulders.    AROM:  Cervical Spine   Mobility   Flexion 100%                 Extension 100%   R Sidebend 100%   L Sidebend 100%   R Rotation 100%   L Rotation 100%   R Sideglide 100%   L Sideglide 100%     TMJ AROM Mobility Mobility (mm) End Range/Quality of mvmt   Depression 5mm Slow   R lateral deviation 3mm WFL   L lateral deviation 3mm WFL   Protrusion 2mm WFL     TMJ Accessory Joint  Mobility  Right   Left    Anterior  +2 +3   Inferior  +2 +3   Posterior  +2 +3   Medial   +2 +3   Lateral  +2 +3              MMT STRENGTH TESTING:      Left  Right Comments   GH Flexion     4+/5   4+/5      GH Extension  4+/5   4+/5       GH Abduction 4+/5   4+/5      GH ER 4+/5   4+/5      GH IR 4+/5   4+/5      Upper Trapezius 4/5   4/5      Rhomboids 4/5   4/5      Lower Trapezius 4/5   4/5        Flexibility and Palpation:   Upper Trapezius: R Tight, L WFL  Levator Scap: R Tight, L WFL  Scalenes/SCM: R Tight, L WFL  Hyoids: R Tight, L WFL  Masseter: R Tight, L WFL  Temporalis: R Tight, L WFL  Lateral Pterygoid: R Tight, L WFL    Palpation: Right trap. And tmj pain levels.    Special Tests:   Cotton Roll Test: Negative    Today’s Treatment and Response:   Pt education was provided on exam findings, treatment diagnosis, treatment plan, expectations, and prognosis. Pt was also provided recommendations for medium   Patient was instructed in and issued a HEP for: Manual Therapy for 8'- Ice massage.          Ther. Act. For 10'- Discussed her injury, pt expecations and prognosis.    Charges: 1PT Eval Low Complexity, 1MT and 1TA      Total Timed Treatment: 18 min     Total Treatment Time: 45 min     Based on clinical rationale and outcome measures, this evaluation involved Low Complexity decision making.  PLAN OF CARE:    Goals: (to be met in 10 visits)  Pt will have improved thoracic PA mobility to WNL to improve cervical ROM as well as promote upright posturing and decreased pain with mouth openung   Pt will report decreased frequency of headaches to <1x/week  Pt will demonstrate improved cervical intrinsic strength to 5/5 to allow improved cervical stabilization with overhead reaching (10 visits)  Pt will improve postural strength (mid/low trap) to 5/5 to promote improved upright posturing and decreased pain with mouth opening   Pt will be independent and compliant with comprehensive HEP to maintain progress achieved in PT       Frequency / Duration: Patient will be seen for 2 x/week or a total of 10 visits over a 90 day period. Treatment will include: Manual Therapy, Neuromuscular Re-education, Therapeutic Activities, Therapeutic Exercise, Home  Exercise Program instruction, and Modalities to include: Electrical stimulation (unattended)    Education or treatment limitation: None  Rehab Potential:fair    Patient/Family/Caregiver was advised of these findings, precautions, and treatment options and has agreed to actively participate in planning and for this course of care.    Thank you for your referral. Please co-sign or sign and return this letter via fax as soon as possible to [phone]. If you have any questions, please contact me at Dept: [phone]    Sincerely,  Electronically signed by therapist: Stanislav Lobo PT, DPT, CSCS  Physician's certification required: Yes  I certify the need for these services furnished under this plan of treatment and while under my care.    X___________________________________________________ Date____________________    Certification From: 10/28/2024  To:1/26/2025

## 2024-10-28 NOTE — PROGRESS NOTES
[facility] / INR 2.3,  therapeutic. (Goal 2.5 ) TTR 70.2 %     Etiology: mostly stable; fluctuating some since foot injury.    PLAN: continue the current dose.    Recheck INR every 2 weeks.    WARFARIN Plan per protocol: 1.5 mg every Mon; 1 mg all other days

## 2024-10-29 ENCOUNTER — OFFICE VISIT (OUTPATIENT)
Dept: PHYSICAL THERAPY | Age: 49
End: 2024-10-29
Attending: ORTHOPAEDIC SURGERY
Payer: COMMERCIAL

## 2024-10-29 PROCEDURE — 97140 MANUAL THERAPY 1/> REGIONS: CPT

## 2024-10-29 PROCEDURE — 97110 THERAPEUTIC EXERCISES: CPT

## 2024-10-29 NOTE — PROGRESS NOTES
Diagnosis:   Pain in Achilles tendon (M76.60)  Achilles tendinosis of left ankle (M67.874)        Referring Provider: Yarelis  Date of Evaluation:    2/13/2024, 7/8/2024    Precautions:   Next MD visit:   none scheduled  Date of Surgery: 6/20/2024 (Left achilles debridement, peroneal tendon debridement and repair, PRP)    Insurance Primary/Secondary: [employer] [place] / N/A     # Auth Visits: 45 total    Subjective:  Doing alright.  Moving around a little better, but still having discomfort at both incisions with different activities.  Finishing Gabapentin later this week, and then we'll see how it goes with sensation/symptoms.  Objective: See below treatment log.   Assessment: Pt again tolerated the challenge of BAPS at 100% Wb'ing, even with increase to L3.  Goals:   Pt report min/no pain or tightness L LE  Pt independent with HEP and progression  Pt able to run, jump, hop, skip, cut, sprint, backpeddle -- all without pain or sense of instability    Plan: Progress open and closed chain strengthening.  To begin therapy for her TMJ at [facility] next week..  Date: 10/1/2024  Tx#: 22 (39/40) Date: 10/17/2024  Tx: 23 (40/40) Date: 10/24/2024  Tx: 24 (post-op)   (Total 41/45) Date: 10/29/2024  Tx: 25 (post-op)   (Total 43/45)   Ther Ex (30')  - Upright bike 6'  - gentle AROM L ankle, foot, toes  - toe scrunches, yoga (extension) and spreading - seated with foot on floor  - B partial Standing balance board B teetering AP and side/side  - B partial Standing BAPS L2 AP, ML, CW, CCW  - Slant board calf stretch level 1 and 2  - Shuttle B leg press 6b x30  - Shuttle B calf raise 3b (only from foot plate) w/ eccentric lowering L  - Shuttle L leg press 4b x30  - review HEP ankle TB exercises x 4 directions  - Lat walk  - braiding Ther Ex (30')  - Upright bike 6'  - gentle AROM L ankle, foot, toes  - toe scrunches, yoga (extension) and spreading - seated with foot on floor  - B Standing balance board B teetering AP and side/side  - B  Standing (L 80% WB, R 20% WB) BAPS L2 AP, ML, CW, CCW  - Slant board calf stretch level 1 and 2  - Shuttle B leg press 6b x30  - Shuttle B calf raise 4b (only from foot plate) w/ eccentric lowering L  - Shuttle L leg press 5b x30  - review HEP ankle TB exercises x 4 directions  - Lat walk  - braiding Ther Ex (30')  - Upright bike 6'  - toe scrunches, yoga (extension) and spreading - seated with foot on floor  - B Standing balance board B teetering AP and side/side  - Standing (L 100% WB) BAPS L2 AP, ML, CW, CCW  - Slant board calf stretch level 1 and 2  - Shuttle B leg press 6b x30  - Shuttle B calf raise 4b (only from foot plate) w/ eccentric lowering L  - Shuttle L leg press 5b x30  - Lat walk  - braiding Ther Ex (30')  - Upright bike 6'  - toe scrunches, yoga (extension) and spreading - seated with foot on floor  - B Standing balance board B teetering AP and side/side  - Standing (L 100% WB) BAPS L3 AP, ML, CW, CCW  - Slant board calf stretch level 1 and 2  - Shuttle B leg press 6b x30  - Shuttle B calf raise 4b (only from foot plate) w/ eccentric lowering L  - Shuttle L leg press 5b x30  - Lat walk  - braiding   Manual (15')  - PROM L ankle/foot  - gentle joint mobilization toes/mid/forefoot  - desensitization rubbing, patting and tapping to L lower leg, ankle, foot Manual (15')  - PROM L ankle/foot  - gentle joint mobilization toes/mid/forefoot  - desensitization rubbing, patting and tapping to L lower leg, ankle, foot Manual (15')  - PROM L ankle/foot  - gentle joint mobilization toes/mid/forefoot  - desensitization rubbing, patting and tapping to L lower leg, ankle, foot Manual (15')  - PROM L ankle/foot  - gentle joint mobilization toes/mid/forefoot  - desensitization rubbing, patting and tapping to L lower leg, ankle, foot               HEP: AROM ankle/toes    Charges: Ther Ex x2, Manual      Total Timed Treatment: 45 min  Total Treatment Time: 45 min

## 2024-11-01 ENCOUNTER — APPOINTMENT (OUTPATIENT)
Dept: PHYSICAL THERAPY | Age: 49
End: 2024-11-01
Attending: INTERNAL MEDICINE
Payer: COMMERCIAL

## 2024-11-05 ENCOUNTER — OFFICE VISIT (OUTPATIENT)
Dept: PHYSICAL THERAPY | Age: 49
End: 2024-11-05
Attending: ORTHOPAEDIC SURGERY
Payer: COMMERCIAL

## 2024-11-05 PROCEDURE — 97110 THERAPEUTIC EXERCISES: CPT

## 2024-11-05 PROCEDURE — 97140 MANUAL THERAPY 1/> REGIONS: CPT

## 2024-11-05 NOTE — PROGRESS NOTES
Diagnosis:   Pain in Achilles tendon (M76.60)  Achilles tendinosis of left ankle (M67.874)        Referring Provider: Yarelis  Date of Evaluation:    2/13/2024, 7/8/2024    Precautions:   Next MD visit:   none scheduled  Date of Surgery: 6/20/2024 (Left achilles debridement, peroneal tendon debridement and repair, PRP)    Insurance Primary/Secondary: [employer] / N/A     # Auth Visits: 50 total    Subjective:  Off of Gabapentin and doing well.  Overall, very happy with progress since coming out of the [facility].  Achilles is doing better than the lat aspect of ankle/foot.  Objective: See below treatment log.   Assessment: Pt tolerated BAPS L3 well, but had difficulty with all movements today.  Nerve discomfort at L 5th ray able to be provoked with Slump testing.  Goals:   Pt report min/no pain or tightness L LE  Pt independent with HEP and progression  Pt able to run, jump, hop, skip, cut, sprint, backpeddle -- all without pain or sense of instability    Plan: Progress open and closed chain strengthening.  Date: 10/1/2024  Tx#: 22 (39/40) Date: 10/17/2024  Tx: 23 (40/40) Date: 10/24/2024  Tx: 24 (post-op)   (Total 41/45) Date: 10/29/2024  Tx: 25 (post-op)   (Total 43/45) Date: 11/5/2024  Tx: 26 (post-op)  (Total 44/50)   Ther Ex (30')  - Upright bike 6'  - gentle AROM L ankle, foot, toes  - toe scrunches, yoga (extension) and spreading - seated with foot on floor  - B partial Standing balance board B teetering AP and side/side  - B partial Standing BAPS L2 AP, ML, CW, CCW  - Slant board calf stretch level 1 and 2  - Shuttle B leg press 6b x30  - Shuttle B calf raise 3b (only from foot plate) w/ eccentric lowering L  - Shuttle L leg press 4b x30  - review HEP ankle TB exercises x 4 directions  - Lat walk  - braiding Ther Ex (30')  - Upright bike 6'  - gentle AROM L ankle, foot, toes  - toe scrunches, yoga (extension) and spreading - seated with foot on floor  - B Standing balance board B teetering AP and  side/side  - B Standing (L 80% WB, R 20% WB) BAPS L2 AP, ML, CW, CCW  - Slant board calf stretch level 1 and 2  - Shuttle B leg press 6b x30  - Shuttle B calf raise 4b (only from foot plate) w/ eccentric lowering L  - Shuttle L leg press 5b x30  - review HEP ankle TB exercises x 4 directions  - Lat walk  - braiding Ther Ex (30')  - Upright bike 6'  - toe scrunches, yoga (extension) and spreading - seated with foot on floor  - B Standing balance board B teetering AP and side/side  - Standing (L 100% WB) BAPS L2 AP, ML, CW, CCW  - Slant board calf stretch level 1 and 2  - Shuttle B leg press 6b x30  - Shuttle B calf raise 4b (only from foot plate) w/ eccentric lowering L  - Shuttle L leg press 5b x30  - Lat walk  - braiding Ther Ex (30')  - Upright bike 6'  - toe scrunches, yoga (extension) and spreading - seated with foot on floor  - B Standing balance board B teetering AP and side/side  - Standing (L 100% WB) BAPS L3 AP, ML, CW, CCW  - Slant board calf stretch level 1 and 2  - Shuttle B leg press 6b x30  - Shuttle B calf raise 4b (only from foot plate) w/ eccentric lowering L  - Shuttle L leg press 5b x30  - Lat walk  - braiding Ther Ex (30')  - Upright bike 6'  - toe scrunches, yoga (extension) and spreading - seated with foot on floor  - B Standing balance board B teetering AP and side/side  - Standing (L 100% WB) BAPS L3 AP, ML, CW, CCW  - Slant board calf stretch level 1 and 2  - Shuttle B leg press 6b x30  - Shuttle B calf raise 4b (only from foot plate) w/ eccentric lowering L  - Shuttle L leg press 5b x30  - Lat walk  - braiding   Manual (15')  - PROM L ankle/foot  - gentle joint mobilization toes/mid/forefoot  - desensitization rubbing, patting and tapping to L lower leg, ankle, foot Manual (15')  - PROM L ankle/foot  - gentle joint mobilization toes/mid/forefoot  - desensitization rubbing, patting and tapping to L lower leg, ankle, foot Manual (15')  - PROM L ankle/foot  - gentle joint mobilization  toes/mid/forefoot  - desensitization rubbing, patting and tapping to L lower leg, ankle, foot Manual (15')  - PROM L ankle/foot  - gentle joint mobilization toes/mid/forefoot  - desensitization rubbing, patting and tapping to L lower leg, ankle, foot Manual (15')  - PROM L ankle/foot  - gentle joint mobilization toes/mid/forefoot  - desensitization rubbing, patting and tapping to L lower leg, ankle, foot                 HEP: AROM ankle/toes    Charges: Ther Ex x2, Manual      Total Timed Treatment: 45 min  Total Treatment Time: 45 min

## 2024-11-05 NOTE — TELEPHONE ENCOUNTER
Patient still has not received her test strips. She has been on the phone going back and forth with [facility] trying to get her test strips. She states they keep saying they sent a fax (also see MyChart from 10/28/24).     Please call [phone]. This is the direct number for medical personal regarding the order.     Fax number is [phone] if you would like to fax an order to them.

## 2024-11-08 ENCOUNTER — OFFICE VISIT (OUTPATIENT)
Dept: PHYSICAL THERAPY | Age: 49
End: 2024-11-08
Attending: INTERNAL MEDICINE
Payer: COMMERCIAL

## 2024-11-08 DIAGNOSIS — M26.622 TMJ TENDERNESS, LEFT: Primary | ICD-10-CM

## 2024-11-08 PROCEDURE — 97140 MANUAL THERAPY 1/> REGIONS: CPT

## 2024-11-08 PROCEDURE — 97110 THERAPEUTIC EXERCISES: CPT

## 2024-11-08 NOTE — PROGRESS NOTES
Diagnosis:   TMJ tenderness, Right      Referring Provider: Ravi Flynn  Date of Evaluation:    10/28/24    Precautions:   Pulmonary Stenosis Next MD visit:   none scheduled  Date of Surgery: n/a   Insurance Primary/Secondary: [place] / N/A     # Auth Visits: 10            Subjective: Patient reports she has no right tmj pain at rest. She has been doing her HEP and felt ok after her initial eval.    Pain: 0/10      Objective:   Observation/Posture: Forward head and lori. Rounded shoulders.     AROM:  Cervical Spine   Mobility   Flexion 100%                 Extension 100%   R Sidebend 100%   L Sidebend 100%   R Rotation 100%   L Rotation 100%   R Sideglide 100%   L Sideglide 100%      TMJ AROM Mobility Mobility (mm) End Range/Quality of mvmt   Depression 5mm Slow   R lateral deviation 3mm WFL   L lateral deviation 3mm WFL   Protrusion 2mm WFL      TMJ Accessory Joint  Mobility  Right   Left    Anterior  +2 +3   Inferior  +2 +3   Posterior  +2 +3   Medial   +2 +3   Lateral  +2 +3               MMT STRENGTH TESTING:       Left  Right Comments   GH Flexion     4+/5   4+/5       GH Extension  4+/5   4+/5       GH Abduction 4+/5   4+/5       GH ER 4+/5   4+/5       GH IR 4+/5   4+/5       Upper Trapezius 4/5   4/5       Rhomboids 4/5   4/5       Lower Trapezius 4/5   4/5          Flexibility and Palpation:   Upper Trapezius: R Tight, L WFL  Levator Scap: R Tight, L WFL  Scalenes/SCM: R Tight, L WFL  Hyoids: R Tight, L WFL  Masseter: R Tight, L WFL  Temporalis: R Tight, L WFL  Lateral Pterygoid: R Tight, L WFL     Palpation: Right trap. And tmj pain levels.     Special Tests:   Cotton Roll Test: Negative      Assessment: Patient presents with right cervical and tmj tightness and guarding during her manual therapy and manual stretching. Patient presents with pain and tenderness during manual therapy.      Goals: (to be met in 10 visits)  Pt will have improved thoracic PA mobility to WNL to improve cervical ROM as  well as promote upright posturing and decreased pain with mouth openung   Pt will report decreased frequency of headaches to <1x/week  Pt will demonstrate improved cervical intrinsic strength to 5/5 to allow improved cervical stabilization with overhead reaching (10 visits)  Pt will improve postural strength (mid/low trap) to 5/5 to promote improved upright posturing and decreased pain with mouth opening   Pt will be independent and compliant    Plan: Plan to work on rom, stretching and strengthening.  Date: 11/8/2024  TX#: 2/10 Date:                 TX#: 3/ Date:                 TX#: 4/ Date:                 TX#: 5/ Date:   Tx#: 6/   Ther. Ex.: 30'  UBE 4'/4' L1  Right trap., lev. And scalene st. 2x30\"xea.  Left Lat. Glide 10x3\"       Manual: 15'  STM at the right cervical and tmj.  Dry needling at the right tmj region       HEP: Reviewed her new HEP.    Charges: 2TE and 1MT       Total Timed Treatment: 45 min  Total Treatment Time: 45 min

## 2024-11-11 ENCOUNTER — APPOINTMENT (OUTPATIENT)
Dept: PHYSICAL THERAPY | Age: 49
End: 2024-11-11
Attending: INTERNAL MEDICINE
Payer: COMMERCIAL

## 2024-11-12 ENCOUNTER — ANTI-COAG VISIT (OUTPATIENT)
Dept: ANTICOAGULATION | Facility: CLINIC | Age: 49
End: 2024-11-12

## 2024-11-12 ENCOUNTER — OFFICE VISIT (OUTPATIENT)
Dept: PHYSICAL THERAPY | Age: 49
End: 2024-11-12
Attending: ORTHOPAEDIC SURGERY
Payer: COMMERCIAL

## 2024-11-12 DIAGNOSIS — I73.9 PERIPHERAL VASCULAR DISEASE (HCC): ICD-10-CM

## 2024-11-12 DIAGNOSIS — Z51.81 MONITORING FOR LONG-TERM ANTICOAGULANT USE: ICD-10-CM

## 2024-11-12 DIAGNOSIS — Z79.01 MONITORING FOR LONG-TERM ANTICOAGULANT USE: ICD-10-CM

## 2024-11-12 DIAGNOSIS — Z79.01 LONG TERM (CURRENT) USE OF ANTICOAGULANTS: Primary | ICD-10-CM

## 2024-11-12 DIAGNOSIS — I37.9 PULMONIC VALVE DISEASE: ICD-10-CM

## 2024-11-12 LAB — INR: 2.2 (ref 2–3)

## 2024-11-12 PROCEDURE — 97110 THERAPEUTIC EXERCISES: CPT

## 2024-11-12 PROCEDURE — 97140 MANUAL THERAPY 1/> REGIONS: CPT

## 2024-11-12 PROCEDURE — 93793 ANTICOAG MGMT PT WARFARIN: CPT | Performed by: FAMILY MEDICINE

## 2024-11-12 NOTE — PROGRESS NOTES
Diagnosis:   Pain in Achilles tendon (M76.60)  Achilles tendinosis of left ankle (M67.874)        Referring Provider: Yarelis  Date of Evaluation:    2/13/2024, 7/8/2024    Precautions:   Next MD visit:   none scheduled  Date of Surgery: 6/20/2024 (Left achilles debridement, peroneal tendon debridement and repair, PRP)    Insurance Primary/Secondary: [employer] / N/A     # Auth Visits: 50 total    Subjective:  Had one bad day with irritated L lat foot/ankle, but only lasted one day.  Generally, improving and finally turning the corner.  Objective: See below treatment log.   Assessment: Improving symptoms levels.  Warned pt not to attempt agility/impact progression yet, but to focus on conservative strengthening.  Goals:   Pt report min/no pain or tightness L LE  Pt independent with HEP and progression  Pt able to run, jump, hop, skip, cut, sprint, backpeddle -- all without pain or sense of instability    Plan: Progress open and closed chain strengthening.  Date: 10/1/2024  Tx#: 22 (39/40) Date: 10/17/2024  Tx: 23 (40/40) Date: 10/24/2024  Tx: 24 (post-op)   (Total 41/45) Date: 10/29/2024  Tx: 25 (post-op)   (Total 43/45) Date: 11/5/2024  Tx: 26 (post-op)  (Total 44/50) Date: 11/12/2024  Tx: 27 (post-op) (Total 46/50)   Ther Ex (30')  - Upright bike 6'  - gentle AROM L ankle, foot, toes  - toe scrunches, yoga (extension) and spreading - seated with foot on floor  - B partial Standing balance board B teetering AP and side/side  - B partial Standing BAPS L2 AP, ML, CW, CCW  - Slant board calf stretch level 1 and 2  - Shuttle B leg press 6b x30  - Shuttle B calf raise 3b (only from foot plate) w/ eccentric lowering L  - Shuttle L leg press 4b x30  - review HEP ankle TB exercises x 4 directions  - Lat walk  - braiding Ther Ex (30')  - Upright bike 6'  - gentle AROM L ankle, foot, toes  - toe scrunches, yoga (extension) and spreading - seated with foot on floor  - B Standing balance board B teetering AP and side/side  -  B Standing (L 80% WB, R 20% WB) BAPS L2 AP, ML, CW, CCW  - Slant board calf stretch level 1 and 2  - Shuttle B leg press 6b x30  - Shuttle B calf raise 4b (only from foot plate) w/ eccentric lowering L  - Shuttle L leg press 5b x30  - review HEP ankle TB exercises x 4 directions  - Lat walk  - braiding Ther Ex (30')  - Upright bike 6'  - toe scrunches, yoga (extension) and spreading - seated with foot on floor  - B Standing balance board B teetering AP and side/side  - Standing (L 100% WB) BAPS L2 AP, ML, CW, CCW  - Slant board calf stretch level 1 and 2  - Shuttle B leg press 6b x30  - Shuttle B calf raise 4b (only from foot plate) w/ eccentric lowering L  - Shuttle L leg press 5b x30  - Lat walk  - braiding Ther Ex (30')  - Upright bike 6'  - toe scrunches, yoga (extension) and spreading - seated with foot on floor  - B Standing balance board B teetering AP and side/side  - Standing (L 100% WB) BAPS L3 AP, ML, CW, CCW  - Slant board calf stretch level 1 and 2  - Shuttle B leg press 6b x30  - Shuttle B calf raise 4b (only from foot plate) w/ eccentric lowering L  - Shuttle L leg press 5b x30  - Lat walk  - braiding Ther Ex (30')  - Upright bike 6'  - toe scrunches, yoga (extension) and spreading - seated with foot on floor  - B Standing balance board B teetering AP and side/side  - Standing (L 100% WB) BAPS L3 AP, ML, CW, CCW  - Slant board calf stretch level 1 and 2  - Shuttle B leg press 6b x30  - Shuttle B calf raise 4b (only from foot plate) w/ eccentric lowering L  - Shuttle L leg press 5b x30  - Lat walk  - braiding Ther Ex (30')  - Upright bike 6'  - toe scrunches, yoga (extension) and spreading - seated with foot on floor  - B Standing balance board B teetering AP and side/side  - Standing (L 100% WB) BAPS L3 AP, ML, CW, CCW  - Slant board calf stretch level 1 and 2  - Shuttle B leg press 6b x30  - Shuttle B calf raise 4b (only from foot plate) w/ eccentric lowering L  - Shuttle L leg press 5b x30  - Lat  walk  - braiding   Manual (15')  - PROM L ankle/foot  - gentle joint mobilization toes/mid/forefoot  - desensitization rubbing, patting and tapping to L lower leg, ankle, foot Manual (15')  - PROM L ankle/foot  - gentle joint mobilization toes/mid/forefoot  - desensitization rubbing, patting and tapping to L lower leg, ankle, foot Manual (15')  - PROM L ankle/foot  - gentle joint mobilization toes/mid/forefoot  - desensitization rubbing, patting and tapping to L lower leg, ankle, foot Manual (15')  - PROM L ankle/foot  - gentle joint mobilization toes/mid/forefoot  - desensitization rubbing, patting and tapping to L lower leg, ankle, foot Manual (15')  - PROM L ankle/foot  - gentle joint mobilization toes/mid/forefoot  - desensitization rubbing, patting and tapping to L lower leg, ankle, foot Manual (15')  - PROM L ankle/foot  - gentle joint mobilization toes/mid/forefoot  - desensitization rubbing, patting and tapping to L lower leg, ankle, foot                   HEP: AROM ankle/toes    Charges: Ther Ex x2, Manual      Total Timed Treatment: 45 min  Total Treatment Time: 45 min

## 2024-11-14 ENCOUNTER — OFFICE VISIT (OUTPATIENT)
Dept: PHYSICAL THERAPY | Age: 49
End: 2024-11-14
Attending: ORTHOPAEDIC SURGERY
Payer: COMMERCIAL

## 2024-11-14 PROCEDURE — 97110 THERAPEUTIC EXERCISES: CPT

## 2024-11-14 PROCEDURE — 97140 MANUAL THERAPY 1/> REGIONS: CPT

## 2024-11-14 NOTE — PROGRESS NOTES
Diagnosis:   Pain in Achilles tendon (M76.60)  Achilles tendinosis of left ankle (M67.874)        Referring Provider: Yarelis  Date of Evaluation:    2/13/2024, 7/8/2024    Precautions:   Next MD visit:   none scheduled  Date of Surgery: 6/20/2024 (Left achilles debridement, peroneal tendon debridement and repair, PRP)    Insurance Primary/Secondary: [employer] / N/A     # Auth Visits: 50 total    Subjective:  Can definitely tell she is improving.  Does get a little calf soreness (not achilles) after therapy sessions.  Objective: See below treatment log.   Assessment: Continued slow improvement.  Goals:   Pt report min/no pain or tightness L LE  Pt independent with HEP and progression  Pt able to run, jump, hop, skip, cut, sprint, backpeddle -- all without pain or sense of instability    Plan: Progress open and closed chain strengthening.  Date: 10/29/2024  Tx: 25 (post-op)   (Total 43/45) Date: 11/5/2024  Tx: 26 (post-op)  (Total 44/50) Date: 11/12/2024  Tx: 27 (post-op) (Total 46/50) Date: 11/14/2024  Tx: 28 (post-op)  (Total 47/50)   Ther Ex (30')  - Upright bike 6'  - toe scrunches, yoga (extension) and spreading - seated with foot on floor  - B Standing balance board B teetering AP and side/side  - Standing (L 100% WB) BAPS L3 AP, ML, CW, CCW  - Slant board calf stretch level 1 and 2  - Shuttle B leg press 6b x30  - Shuttle B calf raise 4b (only from foot plate) w/ eccentric lowering L  - Shuttle L leg press 5b x30  - Lat walk  - braiding Ther Ex (30')  - Upright bike 6'  - toe scrunches, yoga (extension) and spreading - seated with foot on floor  - B Standing balance board B teetering AP and side/side  - Standing (L 100% WB) BAPS L3 AP, ML, CW, CCW  - Slant board calf stretch level 1 and 2  - Shuttle B leg press 6b x30  - Shuttle B calf raise 4b (only from foot plate) w/ eccentric lowering L  - Shuttle L leg press 5b x30  - Lat walk  - braiding Ther Ex (30')  - Upright bike 6'  - toe scrunches, yoga  (extension) and spreading - seated with foot on floor  - B Standing balance board B teetering AP and side/side  - Standing (L 100% WB) BAPS L3 AP, ML, CW, CCW  - Slant board calf stretch level 1 and 2  - Shuttle B leg press 6b x30  - Shuttle B calf raise 4b (only from foot plate) w/ eccentric lowering L  - Shuttle L leg press 5b x30  - Lat walk  - braiding Ther Ex (30')  - Upright bike 6'  - toe scrunches, yoga (extension) and spreading - seated with foot on floor  - B Standing balance board B teetering AP and side/side  - Standing (L 100% WB) BAPS L3 AP, ML, CW, CCW  - Slant board calf stretch level 2  - Shuttle B leg press 7b x30  - Shuttle B calf raise 7b (only from foot plate) w/ eccentric lowering L  - Shuttle L leg press 5b x30  - tandem gait fwd/bwd on level and foam  - Lat walk  - braiding   Manual (15')  - PROM L ankle/foot  - gentle joint mobilization toes/mid/forefoot  - desensitization rubbing, patting and tapping to L lower leg, ankle, foot Manual (15')  - PROM L ankle/foot  - gentle joint mobilization toes/mid/forefoot  - desensitization rubbing, patting and tapping to L lower leg, ankle, foot Manual (15')  - PROM L ankle/foot  - gentle joint mobilization toes/mid/forefoot  - desensitization rubbing, patting and tapping to L lower leg, ankle, foot Manual (15')  - PROM L ankle/foot  - gentle joint mobilization toes/mid/forefoot  - desensitization rubbing, patting and tapping to L lower leg, ankle, foot               HEP: AROM ankle/toes    Charges: Ther Ex x2, Manual      Total Timed Treatment: 45 min  Total Treatment Time: 45 min

## 2024-11-15 ENCOUNTER — OFFICE VISIT (OUTPATIENT)
Dept: PHYSICAL THERAPY | Age: 49
End: 2024-11-15
Attending: INTERNAL MEDICINE
Payer: COMMERCIAL

## 2024-11-15 DIAGNOSIS — M26.622 TMJ TENDERNESS, LEFT: Primary | ICD-10-CM

## 2024-11-15 PROCEDURE — 97110 THERAPEUTIC EXERCISES: CPT

## 2024-11-15 PROCEDURE — 97140 MANUAL THERAPY 1/> REGIONS: CPT

## 2024-11-15 PROCEDURE — 97112 NEUROMUSCULAR REEDUCATION: CPT

## 2024-11-15 NOTE — PROGRESS NOTES
Diagnosis:   TMJ tenderness, Right      Referring Provider: Ravi Flynn  Date of Evaluation:    10/28/24    Precautions:   Pulmonary Stenosis Next MD visit:   none scheduled  Date of Surgery: n/a   Insurance Primary/Secondary: [place] / N/A     # Auth Visits: 10            Subjective: Patient reports she has no right tmj pain at rest. She has been doing her HEP and felt ok after her last visit.    Pain: 0/10      Objective:   Observation/Posture: Forward head and lori. Rounded shoulders.     AROM:  Cervical Spine   Mobility   Flexion 100%                 Extension 100%   R Sidebend 100%   L Sidebend 100%   R Rotation 100%   L Rotation 100%   R Sideglide 100%   L Sideglide 100%      TMJ AROM Mobility Mobility (mm) End Range/Quality of mvmt   Depression 5mm Slow   R lateral deviation 3mm WFL   L lateral deviation 3mm WFL   Protrusion 2mm WFL      TMJ Accessory Joint  Mobility  Right   Left    Anterior  +2 +3   Inferior  +2 +3   Posterior  +2 +3   Medial   +2 +3   Lateral  +2 +3               MMT STRENGTH TESTING:       Left  Right Comments   GH Flexion     4+/5   4+/5       GH Extension  4+/5   4+/5       GH Abduction 4+/5   4+/5       GH ER 4+/5   4+/5       GH IR 4+/5   4+/5       Upper Trapezius 4/5   4/5       Rhomboids 4/5   4/5       Lower Trapezius 4/5   4/5          Flexibility and Palpation:   Upper Trapezius: R Tight, L WFL  Levator Scap: R Tight, L WFL  Scalenes/SCM: R Tight, L WFL  Hyoids: R Tight, L WFL  Masseter: R Tight, L WFL  Temporalis: R Tight, L WFL  Lateral Pterygoid: R Tight, L WFL     Palpation: Right trap. And tmj pain levels.     Special Tests:   Cotton Roll Test: Negative      Assessment: Patient presents with decreased right cervical and tmj tightness and guarding during her manual therapy and manual stretching. Added posture re-ed. Act. To her treatment.      Goals: (to be met in 10 visits)  Pt will have improved thoracic PA mobility to WNL to improve cervical ROM as well as  promote upright posturing and decreased pain with mouth openung   Pt will report decreased frequency of headaches to <1x/week  Pt will demonstrate improved cervical intrinsic strength to 5/5 to allow improved cervical stabilization with overhead reaching (10 visits)  Pt will improve postural strength (mid/low trap) to 5/5 to promote improved upright posturing and decreased pain with mouth opening   Pt will be independent and compliant    Plan: Plan to work on rom, stretching and strengthening.  Date: 11/8/2024  TX#: 2/10 Date: 11/15/24                TX#: 3/10 Date:                 TX#: 4/ Date:                 TX#: 5/ Date:   Tx#: 6/   Ther. Ex.: 30'  UBE 4'/4' L1  Right trap., lev. And scalene st. 2x30\"xea.  Left Lat. [place] 10x3\" Ther. Ex.: 30'  UBE 4'/4' L1  Right trap., lev. And scalene st. 2x30\"xea.  Left Lat. [place] 10x3\"  Doorway St. 4x30\"      Manual: 15'  STM at the right cervical and tmj.  Dry needling at the right tmj region Manual: 15'  STM at the right cervical and tmj.  Dry needling at the right tmj region       Neuro Re-ed.:  Scap. Ret. 20x3\"  Shoulder Rolls 2x20      HEP: Did not add any new exercises to her HEP.    Charges: 2TE and 1MT       Total Timed Treatment: 45 min  Total Treatment Time: 45 min

## 2024-11-18 ENCOUNTER — OFFICE VISIT (OUTPATIENT)
Dept: PHYSICAL THERAPY | Age: 49
End: 2024-11-18
Attending: INTERNAL MEDICINE
Payer: COMMERCIAL

## 2024-11-18 DIAGNOSIS — M26.621 TMJ TENDERNESS, RIGHT: Primary | ICD-10-CM

## 2024-11-18 PROCEDURE — 97110 THERAPEUTIC EXERCISES: CPT

## 2024-11-18 PROCEDURE — 97112 NEUROMUSCULAR REEDUCATION: CPT

## 2024-11-18 PROCEDURE — 97140 MANUAL THERAPY 1/> REGIONS: CPT

## 2024-11-18 NOTE — PROGRESS NOTES
Diagnosis:   TMJ tenderness, Right      Referring Provider: Tom Santoyo  Date of Evaluation:    10/28/24    Precautions:   Pulmonary Stenosis Next MD visit:   none scheduled  Date of Surgery: n/a   Insurance Primary/Secondary: [place] / N/A     # Auth Visits: 10            Subjective: Patient reports she has no right tmj pain at rest. She was 15 minutes late due to traffic. She has been doing her HEP and felt ok after her last visit.    Pain: 0/10      Objective:   Observation/Posture: Forward head and lori. Rounded shoulders.     AROM:  Cervical Spine   Mobility   Flexion 100%                 Extension 100%   R Sidebend 100%   L Sidebend 100%   R Rotation 100%   L Rotation 100%   R Sideglide 100%   L Sideglide 100%      TMJ AROM Mobility Mobility (mm) End Range/Quality of mvmt   Depression 5mm Slow   R lateral deviation 3mm WFL   L lateral deviation 3mm WFL   Protrusion 2mm WFL      TMJ Accessory Joint  Mobility  Right   Left    Anterior  +2 +3   Inferior  +2 +3   Posterior  +2 +3   Medial   +2 +3   Lateral  +2 +3               MMT STRENGTH TESTING:       Left  Right Comments   GH Flexion     4+/5   4+/5       GH Extension  4+/5   4+/5       GH Abduction 4+/5   4+/5       GH ER 4+/5   4+/5       GH IR 4+/5   4+/5       Upper Trapezius 4/5   4/5       Rhomboids 4/5   4/5       Lower Trapezius 4/5   4/5          Flexibility and Palpation:   Upper Trapezius: R Tight, L WFL  Levator Scap: R Tight, L WFL  Scalenes/SCM: R Tight, L WFL  Hyoids: R Tight, L WFL  Masseter: R Tight, L WFL  Temporalis: R Tight, L WFL  Lateral Pterygoid: R Tight, L WFL     Palpation: Right trap. And tmj pain levels.     Special Tests:   Cotton Roll Test: Negative      Assessment: Patient presents with decreased right tmj tightness and guarding during her manual therapy. Patient presents with no pain or discomfort during her treatment session.       Goals: (to be met in 10 visits)  Pt will have improved thoracic PA mobility to WNL to  improve cervical ROM as well as promote upright posturing and decreased pain with mouth openung   Pt will report decreased frequency of headaches to <1x/week  Pt will demonstrate improved cervical intrinsic strength to 5/5 to allow improved cervical stabilization with overhead reaching (10 visits)  Pt will improve postural strength (mid/low trap) to 5/5 to promote improved upright posturing and decreased pain with mouth opening   Pt will be independent and compliant    Plan: Plan to work on rom, stretching and strengthening.  Date: 11/8/2024  TX#: 2/10 Date: 11/15/24                TX#: 3/10 Date:   11/18/24              TX#: 4/10 Date:                 TX#: 5/ Date:   Tx#: 6/   Ther. Ex.: 30'  UBE 4'/4' L1  Right trap., lev. And scalene st. 2x30\"xea.  Left Lat. [place] 10x3\" Ther. Ex.: 30'  UBE 4'/4' L1  Right trap., lev. And scalene st. 2x30\"xea.  Left Lat. [place] 10x3\"  Doorway [address] 4x30\" Ther. Ex.: 12'  Nu-Step 8' L5  Doorway St. 4x30\"     Manual: 15'  STM at the right cervical and tmj.  Dry needling at the right tmj region Manual: 15'  STM at the right cervical and tmj.  Dry needling at the right tmj region Manual: 8'  Dry needling at the right tmj region      Neuro Re-ed.:  Scap. Ret. 20x3\"  Shoulder Rolls 2x20 Neuro Re-ed.: 10'  Scap. Ret. 20x3\"  Shoulder Rolls 2x20     HEP: Did not add any new exercises to her HEP.    Charges: 1TE, 1Neuro and 1MT       Total Timed Treatment: 30 min  Total Treatment Time: 30 min 15 minutes late due to traffic

## 2024-11-22 ENCOUNTER — OFFICE VISIT (OUTPATIENT)
Dept: PHYSICAL THERAPY | Age: 49
End: 2024-11-22
Payer: COMMERCIAL

## 2024-11-22 PROCEDURE — 97110 THERAPEUTIC EXERCISES: CPT

## 2024-11-22 PROCEDURE — 97140 MANUAL THERAPY 1/> REGIONS: CPT

## 2024-11-22 NOTE — PROGRESS NOTES
Diagnosis:   Pain in Achilles tendon (M76.60)  Achilles tendinosis of left ankle (M67.874)        Referring Provider: Yarelis  Date of Evaluation:    2/13/2024, 7/8/2024    Precautions:   Next MD visit:   none scheduled  Date of Surgery: 6/20/2024 (Left achilles debridement, peroneal tendon debridement and repair, PRP)    Insurance Primary/Secondary: [place] / N/A     # Auth Visits: 50 total    Subjective:  Having a bit of lat ankle discomfort today after long work day yesterday, but generally improving.  Objective: See below treatment log.   Assessment: Continued slow improvement.  Goals:   Pt report min/no pain or tightness L LE  Pt independent with HEP and progression  Pt able to run, jump, hop, skip, cut, sprint, backpeddle -- all without pain or sense of instability    Plan: Progress open and closed chain strengthening.  Date: 10/29/2024  Tx: 25 (post-op)   (Total 43/45) Date: 11/5/2024  Tx: 26 (post-op)  (Total 44/50) Date: 11/12/2024  Tx: 27 (post-op) (Total 46/50) Date: 11/14/2024  Tx: 28 (post-op)  (Total 47/50) Date: 11/22/2024  Tx: 29 (post-op)  (Total 50/50)   Ther Ex (30')  - Upright bike 6'  - toe scrunches, yoga (extension) and spreading - seated with foot on floor  - B Standing balance board B teetering AP and side/side  - Standing (L 100% WB) BAPS L3 AP, ML, CW, CCW  - Slant board calf stretch level 1 and 2  - Shuttle B leg press 6b x30  - Shuttle B calf raise 4b (only from foot plate) w/ eccentric lowering L  - Shuttle L leg press 5b x30  - Lat walk  - braiding Ther Ex (30')  - Upright bike 6'  - toe scrunches, yoga (extension) and spreading - seated with foot on floor  - B Standing balance board B teetering AP and side/side  - Standing (L 100% WB) BAPS L3 AP, ML, CW, CCW  - Slant board calf stretch level 1 and 2  - Shuttle B leg press 6b x30  - Shuttle B calf raise 4b (only from foot plate) w/ eccentric lowering L  - Shuttle L leg press 5b x30  - Lat walk  - braiding Ther Ex (30')  - Upright  bike 6'  - toe scrunches, yoga (extension) and spreading - seated with foot on floor  - B Standing balance board B teetering AP and side/side  - Standing (L 100% WB) BAPS L3 AP, ML, CW, CCW  - Slant board calf stretch level 1 and 2  - Shuttle B leg press 6b x30  - Shuttle B calf raise 4b (only from foot plate) w/ eccentric lowering L  - Shuttle L leg press 5b x30  - Lat walk  - braiding Ther Ex (30')  - Upright bike 6'  - toe scrunches, yoga (extension) and spreading - seated with foot on floor  - B Standing balance board B teetering AP and side/side  - Standing (L 100% WB) BAPS L3 AP, ML, CW, CCW  - Slant board calf stretch level 2  - Shuttle B leg press 7b x30  - Shuttle B calf raise 7b (only from foot plate) w/ eccentric lowering L  - Shuttle L leg press 5b x30  - tandem gait fwd/bwd on level and foam  - Lat walk  - braiding Ther Ex (30')  - Upright bike 6'  - toe scrunches, yoga (extension) and spreading - seated with foot on floor  - B Standing balance board B teetering AP and side/side  - Standing (L 100% WB) BAPS L3 AP, ML, CW, CCW  - Slant board calf stretch level 2  - Shuttle B leg press 7b x30  - Shuttle B calf raise 7b (only from foot plate) w/ eccentric lowering L  - Shuttle L leg press 5b x30  - tandem gait fwd/bwd on level and foam  - Lat walk  - braiding   Manual (15')  - PROM L ankle/foot  - gentle joint mobilization toes/mid/forefoot  - desensitization rubbing, patting and tapping to L lower leg, ankle, foot Manual (15')  - PROM L ankle/foot  - gentle joint mobilization toes/mid/forefoot  - desensitization rubbing, patting and tapping to L lower leg, ankle, foot Manual (15')  - PROM L ankle/foot  - gentle joint mobilization toes/mid/forefoot  - desensitization rubbing, patting and tapping to L lower leg, ankle, foot Manual (15')  - PROM L ankle/foot  - gentle joint mobilization toes/mid/forefoot  - desensitization rubbing, patting and tapping to L lower leg, ankle, foot Manual (15')  - PROM L  ankle/foot  - gentle joint mobilization toes/mid/forefoot  - desensitization rubbing, patting and tapping to L lower leg, ankle, foot                 HEP: AROM ankle/toes    Charges: Ther Ex x2, Manual      Total Timed Treatment: 45 min  Total Treatment Time: 45 min

## 2024-11-27 ENCOUNTER — TELEPHONE (OUTPATIENT)
Dept: INTERNAL MEDICINE CLINIC | Facility: CLINIC | Age: 49
End: 2024-11-27

## 2024-11-27 DIAGNOSIS — I73.9 PERIPHERAL VASCULAR DISEASE, UNSPECIFIED (HCC): Primary | ICD-10-CM

## 2024-11-27 DIAGNOSIS — Z79.01 LONG TERM (CURRENT) USE OF ANTICOAGULANTS: ICD-10-CM

## 2024-11-27 NOTE — TELEPHONE ENCOUNTER
Patient called (identified name and ),   She needs refills of test strips to run home INR tests ([employer] is company that gets the results and then route to [facility]).  Needs prior authorization and a script to get the test strips.  The actual test strips are supplied through [facility]  See encounter from 10/17/2024.  She was supposed to test INR yesterday.    Last time she had shipment was last summer, which held her through 2024.      Previous notes:    Sera Saprrow RN CR    24  2:48 PM  Note     Patient still has not received her test strips. She has been on the phone going back and forth with [facility] trying to get her test strips. She states they keep saying they sent a fax (also see MyChart from 10/28/24).      Please call [phone]. This is the direct number for medical personal regarding the order.      Fax number is [phone] if you would like to fax an order to them.                24  2:47 PM  Manjit Matt Aldina, LPN   AF    10/29/24 10:03 AM  Note      Managed care,     Office staff faxed the referral request for patient on 10/24/24 and today 10/29/24, please obtain authorization for patient ASAP.     Thank you,    Shannon Kowalski RN   [facility]    10/28/24 11:11 AM  Note      Received a message today from Manjit- she is trying te get authorization from Dr. Flynn and [facility] for her home INR meter testing strips (she has two test strips left). She reports that she spoke with [facility]- they have faxed the form to the PCP office but it has not been returned to them. She confirmed they are faxing to the correct office number- [phone].      Would someone please follow up with Kenyatta to ensure she receives her testing supplies- thank you.

## 2024-11-27 NOTE — TELEPHONE ENCOUNTER
From our experience with [facility]- patients need to first request a referral for DME from the PCP, then order supplies from [facility], then insurance approves and then patient receives their supplies.     I do not see a current referral for DME in the patient chart- last time she requested supplies was in March 2023.     The referral needs to come from the PCP.     I spoke with Manjit and we discussed the process listed above. She is going to call [employer]/insurance to see if there is a hold up on their end.     She has one testing strip left and will use it this week. Understands that she has standing orders to go to the lab or come to the clinic for INR checks after that and until she receives her testing supplies.

## 2024-11-27 NOTE — TELEPHONE ENCOUNTER
This RN called [facility], spoke with Ana Maria pruitt said patient still has an open order, but they are waiting to get approval from insurance.  They had faxed information to the insurance, but never heard back.   Ana Maria advised that patient contact her insurance to determine why the strips have not been approved.    This RN relayed this information to patient , who said she would contact [facility] to inquire about what needs to be done for prior authorization.

## 2024-12-02 NOTE — TELEPHONE ENCOUNTER
This had been submitted to [facility] before for their approval. Isabella our LPN had been sending this to managed care before. (See previous communications).Pls check with [facility]

## 2024-12-05 ENCOUNTER — TELEPHONE (OUTPATIENT)
Dept: PHYSICAL THERAPY | Facility: HOSPITAL | Age: 49
End: 2024-12-05

## 2024-12-05 NOTE — TELEPHONE ENCOUNTER
Dr Flynn I am not able to get any assistance with managed care of centralized referrals with this referral so I pended a new referral for your review and signature.

## 2024-12-06 ENCOUNTER — ANTI-COAG VISIT (OUTPATIENT)
Dept: ANTICOAGULATION | Facility: CLINIC | Age: 49
End: 2024-12-06

## 2024-12-06 DIAGNOSIS — I37.9 PULMONIC VALVE DISEASE: ICD-10-CM

## 2024-12-06 DIAGNOSIS — Z79.01 LONG TERM (CURRENT) USE OF ANTICOAGULANTS: Primary | ICD-10-CM

## 2024-12-06 DIAGNOSIS — Z51.81 MONITORING FOR LONG-TERM ANTICOAGULANT USE: ICD-10-CM

## 2024-12-06 DIAGNOSIS — I73.9 PERIPHERAL VASCULAR DISEASE (HCC): ICD-10-CM

## 2024-12-06 DIAGNOSIS — Z79.01 MONITORING FOR LONG-TERM ANTICOAGULANT USE: ICD-10-CM

## 2024-12-06 LAB — INR: 2 (ref 2–3)

## 2024-12-06 PROCEDURE — 93793 ANTICOAG MGMT PT WARFARIN: CPT | Performed by: FAMILY MEDICINE

## 2024-12-06 NOTE — TELEPHONE ENCOUNTER
New referral faxed back to [facility] at [phone], confirmation received.

## 2024-12-06 NOTE — PROGRESS NOTES
[facility]  / INR 2.0,  therapeutic. (Goal 2.5 ) TTR 72.6 %     Etiology: stable.     PLAN:  continue the current dose.     Recheck INR one week.    Pt reports:    No sign of unusual bruising or bleeding.  Any missed doses: No   Medications changes: No    Contacted  Manjit by phone  who verbalized understanding and agreement.    WARFARIN Plan per protocol: 1.5 mg every Mon; 1 mg all other days

## 2024-12-12 ENCOUNTER — OFFICE VISIT (OUTPATIENT)
Dept: PHYSICAL THERAPY | Age: 49
End: 2024-12-12
Payer: COMMERCIAL

## 2024-12-12 ENCOUNTER — ANTI-COAG VISIT (OUTPATIENT)
Dept: ANTICOAGULATION | Facility: CLINIC | Age: 49
End: 2024-12-12

## 2024-12-12 ENCOUNTER — OFFICE VISIT (OUTPATIENT)
Dept: PHYSICAL THERAPY | Age: 49
End: 2024-12-12
Attending: ORTHOPAEDIC SURGERY
Payer: COMMERCIAL

## 2024-12-12 DIAGNOSIS — I37.9 PULMONIC VALVE DISEASE: ICD-10-CM

## 2024-12-12 DIAGNOSIS — Z79.01 MONITORING FOR LONG-TERM ANTICOAGULANT USE: ICD-10-CM

## 2024-12-12 DIAGNOSIS — I73.9 PERIPHERAL VASCULAR DISEASE (HCC): ICD-10-CM

## 2024-12-12 DIAGNOSIS — M26.621 TMJ TENDERNESS, RIGHT: Primary | ICD-10-CM

## 2024-12-12 DIAGNOSIS — Z51.81 MONITORING FOR LONG-TERM ANTICOAGULANT USE: ICD-10-CM

## 2024-12-12 DIAGNOSIS — Z79.01 LONG TERM (CURRENT) USE OF ANTICOAGULANTS: Primary | ICD-10-CM

## 2024-12-12 LAB — INR: 2.5 (ref 2–3)

## 2024-12-12 PROCEDURE — 97140 MANUAL THERAPY 1/> REGIONS: CPT

## 2024-12-12 PROCEDURE — 93793 ANTICOAG MGMT PT WARFARIN: CPT | Performed by: FAMILY MEDICINE

## 2024-12-12 PROCEDURE — 97110 THERAPEUTIC EXERCISES: CPT

## 2024-12-12 PROCEDURE — 97112 NEUROMUSCULAR REEDUCATION: CPT

## 2024-12-12 NOTE — PROGRESS NOTES
Diagnosis:   TMJ tenderness, Right      Referring Provider: Tom Santoyo  Date of Evaluation:    10/28/24    Precautions:   Pulmonary Stenosis Next MD visit:   none scheduled  Date of Surgery: n/a   Insurance Primary/Secondary: [employer] / N/A     # Auth Visits: 10            Subjective: Patient reports she has no right tmj pain. She has been doing her HEP and felt ok after her last visit.    Pain: 0/10      Objective:   Observation/Posture: Forward head and lori. Rounded shoulders.     AROM:  Cervical Spine   Mobility   Flexion 100%                 Extension 100%   R Sidebend 100%   L Sidebend 100%   R Rotation 100%   L Rotation 100%   R Sideglide 100%   L Sideglide 100%      TMJ AROM Mobility Mobility (mm) End Range/Quality of mvmt   Depression 5mm Slow   R lateral deviation 3mm WFL   L lateral deviation 3mm WFL   Protrusion 2mm WFL      TMJ Accessory Joint  Mobility  Right   Left    Anterior  +2 +3   Inferior  +2 +3   Posterior  +2 +3   Medial   +2 +3   Lateral  +2 +3               MMT STRENGTH TESTING:       Left  Right Comments   GH Flexion     4+/5   4+/5       GH Extension  4+/5   4+/5       GH Abduction 4+/5   4+/5       GH ER 4+/5   4+/5       GH IR 4+/5   4+/5       Upper Trapezius 4/5   4/5       Rhomboids 4/5   4/5       Lower Trapezius 4/5   4/5          Flexibility and Palpation:   Upper Trapezius: R Tight, L WFL  Levator Scap: R Tight, L WFL  Scalenes/SCM: R Tight, L WFL  Hyoids: R Tight, L WFL  Masseter: R Tight, L WFL  Temporalis: R Tight, L WFL  Lateral Pterygoid: R Tight, L WFL     Palpation: Right trap. And tmj pain levels.     Special Tests:   Cotton Roll Test: Negative      Assessment: Patient presents with decreased right tmj tightness and guarding during her manual therapy. Patient presents with no pain or discomfort during or after her treatment session.       Goals: (to be met in 10 visits)  Pt will have improved thoracic PA mobility to WNL to improve cervical ROM as well as promote  upright posturing and decreased pain with mouth openung   Pt will report decreased frequency of headaches to <1x/week  Pt will demonstrate improved cervical intrinsic strength to 5/5 to allow improved cervical stabilization with overhead reaching (10 visits)  Pt will improve postural strength (mid/low trap) to 5/5 to promote improved upright posturing and decreased pain with mouth opening   Pt will be independent and compliant    Plan: Plan to work on rom, stretching and strengthening.  Date: 11/8/2024  TX#: 2/10 Date: 11/15/24                TX#: 3/10 Date:   11/18/24              TX#: 4/10 Date:  12/12/24               TX#: 5/10 Date:   Tx#: 6/   Ther. Ex.: 30'  UBE 4'/4' L1  Right trap., lev. And scalene st. 2x30\"xea.  Left Lat. [place] 10x3\" Ther. Ex.: 30'  UBE 4'/4' L1  Right trap., lev. And scalene st. 2x30\"xea.  Left Lat. [place] 10x3\"  Doorway St. 4x30\" Ther. Ex.: 12'  Nu-Step 8' L5  Doorway St. 4x30\" Ther. Ex.: 12'  UBE 4'/4' L1  Doorway St. 4x30\"  [place]. trap., lev. And scalene st. 2x30\"xea.  Left Lat. [place] 10x3\"    Manual: 15'  STM at the right cervical and tmj.  Dry needling at the right tmj region Manual: 15'  STM at the right cervical and tmj.  Dry needling at the right tmj region Manual: 8'  Dry needling at the right tmj region Manual: 8'  STM at the right cervical and tmj region.  Dry needling at the right tmj region     Neuro Re-ed.:  Scap. Ret. 20x3\"  Shoulder Rolls 2x20 Neuro Re-ed.: 10'  Scap. Ret. 20x3\"  Shoulder Rolls 2x20 Neuro Re-ed.: 10'  Scap. Ret. 20x3\"  Shoulder Rolls 2x20    HEP: Did not add any new exercises to her HEP.    Charges: 1TE, 1Neuro and 1MT       Total Timed Treatment: 45 min  Total Treatment Time: 45 min

## 2024-12-12 NOTE — PROGRESS NOTES
[facility] / INR 2.5,  therapeutic. (Goal 2.5 ) TTR 73.4 %     Etiology: Manjit is on triple therapy. Stable.    PLAN: continue the current dose.    Recheck INR 2 weeks.    WARFARIN Plan per protocol: 1.5 mg every Mon; 1 mg all other days

## 2024-12-12 NOTE — PROGRESS NOTES
Diagnosis:   Pain in Achilles tendon (M76.60)  Achilles tendinosis of left ankle (M67.874)        Referring Provider: Yarelis  Date of Evaluation:    2024, 2024    Precautions:   Next MD visit:   none scheduled  Date of Surgery: 2024 (Left achilles debridement, peroneal tendon debridement and repair, PRP)    Insurance Primary/Secondary: [place] / N/A     # Auth Visits: 50 total    Subjective:  Just back from trip to [place] (family ).  Pt notes increased pain, stiffness and swelling from travel, prolonged upright activities, increased salt intake.  Objective: See below treatment log.   Assessment: Improved eccentric L calf function since previous visit, but TTP L achilles.  Good A/PROM L ankle.  Still needs improved strength.  Improving strength of lower leg, but not yet ready for dynamic activities.  Goals:   Pt report min/no pain or tightness L LE  Pt independent with HEP and progression  Pt able to run, jump, hop, skip, cut, sprint, backpeddle -- all without pain or sense of instability    Plan: Progress open and closed chain strengthening.  Date: 2024  Tx: 27 (post-op) (Total 46/50) Date: 2024  Tx: 28 (post-op)  (Total 47/50) Date: 2024  Tx: 29 (post-op)  (Total 50/50) Date: 2024  Tx: 30 (post-op)  (Total 51/55)   Ther Ex (30')  - Upright bike 6'  - toe scrunches, yoga (extension) and spreading - seated with foot on floor  - B Standing balance board B teetering AP and side/side  - Standing (L 100% WB) BAPS L3 AP, ML, CW, CCW  - Slant board calf stretch level 1 and 2  - Shuttle B leg press 6b x30  - Shuttle B calf raise 4b (only from foot plate) w/ eccentric lowering L  - Shuttle L leg press 5b x30  - Lat walk  - braiding Ther Ex (30')  - Upright bike 6'  - toe scrunches, yoga (extension) and spreading - seated with foot on floor  - B Standing balance board B teetering AP and side/side  - Standing (L 100% WB) BAPS L3 AP, ML, CW, CCW  - Slant board calf stretch  level 2  - Shuttle B leg press 7b x30  - Shuttle B calf raise 7b (only from foot plate) w/ eccentric lowering L  - Shuttle L leg press 5b x30  - tandem gait fwd/bwd on level and foam  - Lat walk  - braiding Ther Ex (30')  - Upright bike 6'  - toe scrunches, yoga (extension) and spreading - seated with foot on floor  - B Standing balance board B teetering AP and side/side  - Standing (L 100% WB) BAPS L3 AP, ML, CW, CCW  - Slant board calf stretch level 2  - Shuttle B leg press 7b x30  - Shuttle B calf raise 7b (only from foot plate) w/ eccentric lowering L  - Shuttle L leg press 5b x30  - tandem gait fwd/bwd on level and foam  - Lat walk  - braiding Ther Ex (30')  - Upright bike 6'  - toe scrunches, yoga (extension) and spreading - seated with foot on floor  - B Standing balance board B teetering AP and side/side  - Standing (L 100% WB) BAPS L2 AP, ML, CW, CCW  - Slant board calf stretch level 2  - Shuttle B leg press 5b x30  - Shuttle B calf raise 5b (only from foot plate) w/ eccentric lowering L  - Shuttle L leg press 5b x30  - tandem gait fwd/bwd on level and foam  - Lat walk  - braiding   Manual (15')  - PROM L ankle/foot  - gentle joint mobilization toes/mid/forefoot  - desensitization rubbing, patting and tapping to L lower leg, ankle, foot Manual (15')  - PROM L ankle/foot  - gentle joint mobilization toes/mid/forefoot  - desensitization rubbing, patting and tapping to L lower leg, ankle, foot Manual (15')  - PROM L ankle/foot  - gentle joint mobilization toes/mid/forefoot  - desensitization rubbing, patting and tapping to L lower leg, ankle, foot Manual (15')  - PROM L ankle/foot  - gentle joint mobilization toes/mid/forefoot  - desensitization rubbing, patting and tapping to L lower leg, ankle, foot               HEP: AROM ankle/toes    Charges: Ther Ex x2, Manual      Total Timed Treatment: 45 min  Total Treatment Time: 45 min

## 2024-12-16 ENCOUNTER — OFFICE VISIT (OUTPATIENT)
Dept: PHYSICAL THERAPY | Age: 49
End: 2024-12-16
Payer: COMMERCIAL

## 2024-12-16 PROCEDURE — 97140 MANUAL THERAPY 1/> REGIONS: CPT

## 2024-12-16 PROCEDURE — 97110 THERAPEUTIC EXERCISES: CPT

## 2024-12-17 NOTE — PROGRESS NOTES
Diagnosis:   Pain in Achilles tendon (M76.60)  Achilles tendinosis of left ankle (M67.874)        Referring Provider: Yarelis  Date of Evaluation:    2/13/2024, 7/8/2024    Precautions:   Next MD visit:   none scheduled  Date of Surgery: 6/20/2024 (Left achilles debridement, peroneal tendon debridement and repair, PRP)    Insurance Primary/Secondary: [place] / N/[place]     # Auth Visits: 50 total    Subjective:  Feeling a bit better.  Has adjusted expectations too so not looking to be nearly as dynamic/active.  Objective: See below treatment log.   Assessment: Improving L achilles/calf strength in closed chain.  Improving balance.  Goals:   Pt report min/no pain or tightness L LE  Pt independent with HEP and progression  Pt able to run, jump, hop, skip, cut, sprint, backpeddle -- all without pain or sense of instability    Plan: Progress open and closed chain strengthening.  Date: 11/12/2024  Tx: 27 (post-op) (Total 46/50) Date: 11/14/2024  Tx: 28 (post-op)  (Total 47/50) Date: 11/22/2024  Tx: 29 (post-op)  (Total 50/50) Date: 12/12/2024  Tx: 30 (post-op)  (Total 51/55) Date: 12/16/2024  Tx: 31 (post-op)  (Total 52/55)   Ther Ex (30')  - Upright bike 6'  - toe scrunches, yoga (extension) and spreading - seated with foot on floor  - B Standing balance board B teetering AP and side/side  - Standing (L 100% WB) BAPS L3 AP, ML, CW, CCW  - Slant board calf stretch level 1 and 2  - Shuttle B leg press 6b x30  - Shuttle B calf raise 4b (only from foot plate) w/ eccentric lowering L  - Shuttle L leg press 5b x30  - Lat walk  - braiding Ther Ex (30')  - Upright bike 6'  - toe scrunches, yoga (extension) and spreading - seated with foot on floor  - B Standing balance board B teetering AP and side/side  - Standing (L 100% WB) BAPS L3 AP, ML, CW, CCW  - Slant board calf stretch level 2  - Shuttle B leg press 7b x30  - Shuttle B calf raise 7b (only from foot plate) w/ eccentric lowering L  - Shuttle L leg press 5b x30  - tandem  gait fwd/bwd on level and foam  - Lat walk  - braiding Ther Ex (30')  - Upright bike 6'  - toe scrunches, yoga (extension) and spreading - seated with foot on floor  - B Standing balance board B teetering AP and side/side  - Standing (L 100% WB) BAPS L3 AP, ML, CW, CCW  - Slant board calf stretch level 2  - Shuttle B leg press 7b x30  - Shuttle B calf raise 7b (only from foot plate) w/ eccentric lowering L  - Shuttle L leg press 5b x30  - tandem gait fwd/bwd on level and foam  - Lat walk  - braiding Ther Ex (30')  - Upright bike 6'  - toe scrunches, yoga (extension) and spreading - seated with foot on floor  - B Standing balance board B teetering AP and side/side  - Standing (L 100% WB) BAPS L2 AP, ML, CW, CCW  - Slant board calf stretch level 2  - Shuttle B leg press 5b x30  - Shuttle B calf raise 5b (only from foot plate) w/ eccentric lowering L  - Shuttle L leg press 5b x30  - tandem gait fwd/bwd on level and foam  - Lat walk  - braiding Ther Ex (30')  - Upright bike 6'  - toe scrunches, yoga (extension) and spreading - seated with foot on floor  - B Standing balance board B teetering AP and side/side  - Standing (L 100% WB) BAPS L3 AP, ML, CW, CCW  - Slant board calf stretch level 2  - Shuttle B leg press 5b x30  - Shuttle B calf raise 5b (only from foot plate) w/ eccentric lowering L  - Shuttle L leg press 5b x30  - tandem gait fwd/bwd on level and foam  - Lat walk  - braiding   Manual (15')  - PROM L ankle/foot  - gentle joint mobilization toes/mid/forefoot  - desensitization rubbing, patting and tapping to L lower leg, ankle, foot Manual (15')  - PROM L ankle/foot  - gentle joint mobilization toes/mid/forefoot  - desensitization rubbing, patting and tapping to L lower leg, ankle, foot Manual (15')  - PROM L ankle/foot  - gentle joint mobilization toes/mid/forefoot  - desensitization rubbing, patting and tapping to L lower leg, ankle, foot Manual (15')  - PROM L ankle/foot  - gentle joint mobilization  toes/mid/forefoot  - desensitization rubbing, patting and tapping to L lower leg, ankle, foot Manual (15')  - PROM L ankle/foot  - gentle joint mobilization toes/mid/forefoot  - desensitization rubbing, patting and tapping to L lower leg, ankle, foot                 HEP: AROM ankle/toes    Charges: Ther Ex x2, Manual      Total Timed Treatment: 45 min  Total Treatment Time: 45 min

## 2024-12-18 ENCOUNTER — MED REC SCAN ONLY (OUTPATIENT)
Dept: INTERNAL MEDICINE CLINIC | Facility: CLINIC | Age: 49
End: 2024-12-18

## 2024-12-20 ENCOUNTER — OFFICE VISIT (OUTPATIENT)
Dept: PHYSICAL THERAPY | Age: 49
End: 2024-12-20
Payer: COMMERCIAL

## 2024-12-20 PROCEDURE — 97110 THERAPEUTIC EXERCISES: CPT

## 2024-12-20 PROCEDURE — 97140 MANUAL THERAPY 1/> REGIONS: CPT

## 2024-12-23 NOTE — PROGRESS NOTES
Diagnosis:   Pain in Achilles tendon (M76.60)  Achilles tendinosis of left ankle (M67.874)        Referring Provider: Yarelis  Date of Evaluation:    2/13/2024, 7/8/2024    Precautions:   Next MD visit:   none scheduled  Date of Surgery: 6/20/2024 (Left achilles debridement, peroneal tendon debridement and repair, PRP)    Ins[place] Primary/Secondary[place] / N[place]     # Auth Visits: 50 total    Subjective:  Achilles has only minor tenderness when pinched.  Lat foot/ankle a bit more sensitive and still the electric zip along the 5th ray.  Objective: See below treatment log.   Assessment: Improving strength and balance.  Goals:   Pt report min/no pain or tightness L LE  Pt independent with HEP and progression  Pt able to run, jump, hop, skip, cut, sprint, backpeddle -- all without pain or sense of instability    Plan: Progress open and closed chain strengthening.  Date: 11/12/2024  Tx: 27 (post-op) (Total 46/50) Date: 11/14/2024  Tx: 28 (post-op)  (Total 47/50) Date: 11/22/2024  Tx: 29 (post-op)  (Total 50/50) Date: 12/12/2024  Tx: 30 (post-op)  (Total 51/55) Date: 12/16/2024  Tx: 31 (post-op)  (Total 52/55) Date: 12/20/2024  Tx:32 (post op)  (Total 53/55)   Ther Ex (30')  - Upright bike 6'  - toe scrunches, yoga (extension) and spreading - seated with foot on floor  - B Standing balance board B teetering AP and side/side  - Standing (L 100% WB) BAPS L3 AP, ML, CW, CCW  - Slant board calf stretch level 1 and 2  - Shuttle B leg press 6b x30  - Shuttle B calf raise 4b (only from foot plate) w/ eccentric lowering L  - Shuttle L leg press 5b x30  - Lat walk  - braiding Ther Ex (30')  - Upright bike 6'  - toe scrunches, yoga (extension) and spreading - seated with foot on floor  - B Standing balance board B teetering AP and side/side  - Standing (L 100% WB) BAPS L3 AP, ML, CW, CCW  - Slant board calf stretch level 2  - Shuttle B leg press 7b x30  - Shuttle B calf raise 7b (only from foot plate) w/ eccentric lowering  L  - Shuttle L leg press 5b x30  - tandem gait fwd/bwd on level and foam  - Lat walk  - braiding Ther Ex (30')  - Upright bike 6'  - toe scrunches, yoga (extension) and spreading - seated with foot on floor  - B Standing balance board B teetering AP and side/side  - Standing (L 100% WB) BAPS L3 AP, ML, CW, CCW  - Slant board calf stretch level 2  - Shuttle B leg press 7b x30  - Shuttle B calf raise 7b (only from foot plate) w/ eccentric lowering L  - Shuttle L leg press 5b x30  - tandem gait fwd/bwd on level and foam  - Lat walk  - braiding Ther Ex (30')  - Upright bike 6'  - toe scrunches, yoga (extension) and spreading - seated with foot on floor  - B Standing balance board B teetering AP and side/side  - Standing (L 100% WB) BAPS L2 AP, ML, CW, CCW  - Slant board calf stretch level 2  - Shuttle B leg press 5b x30  - Shuttle B calf raise 5b (only from foot plate) w/ eccentric lowering L  - Shuttle L leg press 5b x30  - tandem gait fwd/bwd on level and foam  - Lat walk  - braiding Ther Ex (30')  - Upright bike 6'  - toe scrunches, yoga (extension) and spreading - seated with foot on floor  - B Standing balance board B teetering AP and side/side  - Standing (L 100% WB) BAPS L3 AP, ML, CW, CCW  - Slant board calf stretch level 2  - Shuttle B leg press 5b x30  - Shuttle B calf raise 5b (only from foot plate) w/ eccentric lowering L  - Shuttle L leg press 5b x30  - tandem gait fwd/bwd on level and foam  - Lat walk  - braiding Ther Ex (30')  - Upright bike 6'  - toe scrunches, yoga (extension) and spreading - seated with foot on floor  - B Standing balance board B teetering AP and side/side  - Standing (L 100% WB) BAPS L3 AP, ML, CW, CCW  - Slant board calf stretch level 2  - Shuttle B leg press 5b x30  - Standing B calf raise on floor w/ eccentric lowering L; B calf raises x25  - Shuttle L leg press 5b x30  - tandem gait fwd/bwd on level and foam  - Lat walk  - braiding   Manual (15')  - PROM L ankle/foot  - gentle  joint mobilization toes/mid/forefoot  - desensitization rubbing, patting and tapping to L lower leg, ankle, foot Manual (15')  - PROM L ankle/foot  - gentle joint mobilization toes/mid/forefoot  - desensitization rubbing, patting and tapping to L lower leg, ankle, foot Manual (15')  - PROM L ankle/foot  - gentle joint mobilization toes/mid/forefoot  - desensitization rubbing, patting and tapping to L lower leg, ankle, foot Manual (15')  - PROM L ankle/foot  - gentle joint mobilization toes/mid/forefoot  - desensitization rubbing, patting and tapping to L lower leg, ankle, foot Manual (15')  - PROM L ankle/foot  - gentle joint mobilization toes/mid/forefoot  - desensitization rubbing, patting and tapping to L lower leg, ankle, foot Manual (15')  - PROM L ankle/foot  - gentle joint mobilization toes/mid/forefoot  - desensitization rubbing, patting and tapping to L lower leg, ankle, foot                   HEP: AROM ankle/toes    Charges: Ther Ex x2, Manual      Total Timed Treatment: 45 min  Total Treatment Time: 45 min

## 2024-12-27 ENCOUNTER — ANTI-COAG VISIT (OUTPATIENT)
Dept: ANTICOAGULATION | Facility: CLINIC | Age: 49
End: 2024-12-27

## 2024-12-27 DIAGNOSIS — Z51.81 MONITORING FOR LONG-TERM ANTICOAGULANT USE: ICD-10-CM

## 2024-12-27 DIAGNOSIS — I37.9 PULMONIC VALVE DISEASE: ICD-10-CM

## 2024-12-27 DIAGNOSIS — I73.9 PERIPHERAL VASCULAR DISEASE (HCC): ICD-10-CM

## 2024-12-27 DIAGNOSIS — Z79.01 LONG TERM (CURRENT) USE OF ANTICOAGULANTS: Primary | ICD-10-CM

## 2024-12-27 DIAGNOSIS — Z79.01 MONITORING FOR LONG-TERM ANTICOAGULANT USE: ICD-10-CM

## 2024-12-27 LAB — INR: 1.7 (ref 2–3)

## 2024-12-27 PROCEDURE — 93793 ANTICOAG MGMT PT WARFARIN: CPT | Performed by: FAMILY MEDICINE

## 2024-12-27 NOTE — PROGRESS NOTES
[facility] / INR 1.7, sub therapeutic. (Goal  2.5 ) TTR 73.1 %     Etiology: she was late in taking her warfarin but doesn't think she missed a whole day.     PLAN: take 1.5mg today once then resume the usual dose.    Recheck INR  1 week if able.    Pt reports:    No sign of unusual bruising or bleeding.  Any missed doses: No   Medications changes: No  Diet changes:  No    Contacted  Manjit by phone  who verbalized understanding and agreement.    WARFARIN Plan per protocol: 12/27: 1.5 mg; Otherwise 1.5 mg every Mon; 1 mg all other days                Patient of Tova Rich calling about her Wegovy.  Patient received reminder from insurance that her PA will  on 10/25/24. Patient will be needing new PA.    Advised patient to check pharmacy to see if any refills remaining on her Wegovy 1.7mg at this time.  Patient will return call with additional questions.    Patient is continuing on Wegovy 1.7mg but will need new PA after 10/25/24

## 2024-12-30 ENCOUNTER — OFFICE VISIT (OUTPATIENT)
Dept: PHYSICAL THERAPY | Age: 49
End: 2024-12-30
Attending: ORTHOPAEDIC SURGERY
Payer: COMMERCIAL

## 2024-12-30 DIAGNOSIS — M26.621 TMJ TENDERNESS, RIGHT: Primary | ICD-10-CM

## 2024-12-30 PROCEDURE — 97112 NEUROMUSCULAR REEDUCATION: CPT

## 2024-12-30 PROCEDURE — 97110 THERAPEUTIC EXERCISES: CPT

## 2024-12-30 NOTE — PROGRESS NOTES
Diagnosis:   TMJ tenderness, Right      Referring Provider: Tom Santoyo  Date of Evaluation:    10/28/24    Precautions:   Pulmonary Stenosis Next MD visit:   none scheduled  Date of Surgery: n/a   Insurance Primary/Secondary: [employer] / N/A     # Auth Visits: 10            Subjective: Patient reports she has no right tmj pain. She has been doing her HEP and felt ok after her last visit.    Pain: 0/10      Objective:   Observation/Posture: Forward head and lori. Rounded shoulders.     AROM:  Cervical Spine   Mobility   Flexion 100%                 Extension 100%   R Sidebend 100%   L Sidebend 100%   R Rotation 100%   L Rotation 100%   R Sideglide 100%   L Sideglide 100%      TMJ AROM Mobility Mobility (mm) End Range/Quality of mvmt   Depression 5mm Slow   R lateral deviation 3mm WFL   L lateral deviation 3mm WFL   Protrusion 2mm WFL      TMJ Accessory Joint  Mobility  Right   Left    Anterior  +2 +3   Inferior  +2 +3   Posterior  +2 +3   Medial   +2 +3   Lateral  +2 +3               MMT STRENGTH TESTING:       Left  Right Comments   GH Flexion     4+/5   4+/5       GH Extension  4+/5   4+/5       GH Abduction 4+/5   4+/5       GH ER 4+/5   4+/5       GH IR 4+/5   4+/5       Upper Trapezius 4/5   4/5       Rhomboids 4/5   4/5       Lower Trapezius 4/5   4/5          Flexibility and Palpation:   Upper Trapezius: R Tight, L WFL  Levator Scap: R Tight, L WFL  Scalenes/SCM: R Tight, L WFL  Hyoids: R Tight, L WFL  Masseter: R Tight, L WFL  Temporalis: R Tight, L WFL  Lateral Pterygoid: R Tight, L WFL     Palpation: Right trap. And tmj pain levels.     Special Tests:   Cotton Roll Test: Negative      Assessment: Patient presents with decreased right tmj and cervical tightness and guarding during her self stretching. Patient presents with no pain or discomfort during or after her treatment session.       Goals: (to be met in 10 visits)  Pt will have improved thoracic PA mobility to WNL to improve cervical ROM as well  as promote upright posturing and decreased pain with mouth openung   Pt will report decreased frequency of headaches to <1x/week  Pt will demonstrate improved cervical intrinsic strength to 5/5 to allow improved cervical stabilization with overhead reaching (10 visits)  Pt will improve postural strength (mid/low trap) to 5/5 to promote improved upright posturing and decreased pain with mouth opening   Pt will be independent and compliant    Plan: Plan to work on rom, stretching and strengthening.  Date: 11/8/2024  TX#: 2/10 Date: 11/15/24                TX#: 3/10 Date:   11/18/24              TX#: 4/10 Date:  12/12/24               TX#: 5/10 Date: 12/30/24  Tx#: 6/10   Ther. Ex.: 30'  UBE 4'/4' L1  Right trap., lev. And scalene st. 2x30\"xea.  Left Lat. Glide 10x3\" Ther. Ex.: 30'  UBE 4'/4' L1  Right trap., lev. And scalene st. 2x30\"xea.  Left Lat. [place] 10x3\"  Doorway St. 4x30\" Ther. Ex.: 12'  Nu-Step 8' L5  Doorway St. 4x30\" Ther. Ex.: 12'  UBE 4'/4' L1  Doorway St. 4x30\"  Rafael. trap., lev. And scalene st. 2x30\"xea.  Left Lat. Glide 10x3\" Ther. Ex.: 30'  UBE 4'/4' L1  Doorway St. 4x30\"  Rafael. trap., lev. And scalene st. 2x30\"xea.  Left Lat. Glide 10x3\"  Supine Foam Roll St. 3'  Chair Back Ext. 10x5\"  Rafael. Tricep  St. 4x30\"   Manual: 15'  STM at the right cervical and tmj.  Dry needling at the right tmj region Manual: 15'  STM at the right cervical and tmj.  Dry needling at the right tmj region Manual: 8'  Dry needling at the right tmj region Manual: 8'  STM at the right cervical and tmj region.  Dry needling at the right tmj region     Neuro Re-ed.:  Scap. Ret. 20x3\"  Shoulder Rolls 2x20 Neuro Re-ed.: 10'  Scap. Ret. 20x3\"  Shoulder Rolls 2x20 Neuro Re-ed.: 10'  Scap. Ret. 20x3\"  Shoulder Rolls 2x20 Neuro Re-ed.: 15'  GTB Scap. Ret. 2x15  2# Shoulder Rolls 2x20  Foam Roll [place] 20xea.   HEP: Did not add any new exercises to her HEP.    Charges: 2TE and 1Neuro      Total Timed Treatment: 45 min  Total  Treatment Time: 45 min

## 2025-01-03 ENCOUNTER — OFFICE VISIT (OUTPATIENT)
Dept: PHYSICAL THERAPY | Age: 50
End: 2025-01-03
Attending: ORTHOPAEDIC SURGERY
Payer: COMMERCIAL

## 2025-01-03 DIAGNOSIS — M26.621 TMJ TENDERNESS, RIGHT: Primary | ICD-10-CM

## 2025-01-03 PROCEDURE — 97110 THERAPEUTIC EXERCISES: CPT

## 2025-01-03 PROCEDURE — 97140 MANUAL THERAPY 1/> REGIONS: CPT

## 2025-01-03 PROCEDURE — 97112 NEUROMUSCULAR REEDUCATION: CPT

## 2025-01-03 NOTE — PROGRESS NOTES
Diagnosis:   TMJ tenderness, Right      Referring Provider: Tom Santoyo  Date of Evaluation:    10/28/24    Precautions:   Pulmonary Stenosis Next MD visit:   none scheduled  Date of Surgery: n/a   Insurance Primary/Secondary: [place] / N/[place]     # Auth Visits: 10            Subjective: Patient reports she has no right tmj pain except with yawning. She has been doing her HEP and felt ok after her last visit.    Pain: 0/10      Objective:   Observation/Posture: Forward head and lori. Rounded shoulders.     AROM:  Cervical Spine   Mobility   Flexion 100%                 Extension 100%   R Sidebend 100%   L Sidebend 100%   R Rotation 100%   L Rotation 100%   R Sideglide 100%   L Sideglide 100%      TMJ AROM Mobility Mobility (mm) End Range/Quality of mvmt   Depression 5mm Slow   R lateral deviation 3mm WFL   L lateral deviation 3mm WFL   Protrusion 2mm WFL      TMJ Accessory Joint  Mobility  Right   Left    Anterior  +2 +3   Inferior  +2 +3   Posterior  +2 +3   Medial   +2 +3   Lateral  +2 +3               MMT STRENGTH TESTING:       Left  Right Comments   GH Flexion     4+/5   4+/5       GH Extension  4+/5   4+/5       GH Abduction 4+/5   4+/5       GH ER 4+/5   4+/5       GH IR 4+/5   4+/5       Upper Trapezius 4/5   4/5       Rhomboids 4/5   4/5       Lower Trapezius 4/5   4/5          Flexibility and Palpation:   Upper Trapezius: R Tight, L WFL  Levator Scap: R Tight, L WFL  Scalenes/SCM: R Tight, L WFL  Hyoids: R Tight, L WFL  Masseter: R Tight, L WFL  Temporalis: R Tight, L WFL  Lateral Pterygoid: R Tight, L WFL     Palpation: Right trap. And tmj pain levels.     Special Tests:   Cotton Roll Test: Negative      Assessment: Patient presents with decreased right tmj and cervical tightness and guarding during her self stretching and stm. Patient presents with no pain or discomfort during or after her treatment session.       Goals: (to be met in 10 visits)  Pt will have improved thoracic PA mobility to WNL to  improve cervical ROM as well as promote upright posturing and decreased pain with mouth openung   Pt will report decreased frequency of headaches to <1x/week  Pt will demonstrate improved cervical intrinsic strength to 5/5 to allow improved cervical stabilization with overhead reaching (10 visits)  Pt will improve postural strength (mid/low trap) to 5/5 to promote improved upright posturing and decreased pain with mouth opening   Pt will be independent and compliant    Plan: Plan to work on rom, stretching and strengthening.  Date: 11/8/2024  TX#: 2/10 Date: 11/15/24                TX#: 3/10 Date:   11/18/24              TX#: 4/10 Date:  12/12/24               TX#: 5/10 Date: 12/30/24  Tx#: 6/10 Date: 1/3/25  Tx#: 7/10   Ther. Ex.: 30'  UBE 4'/4' L1  Right trap., lev. And scalene st. 2x30\"xea.  Left Lat. Glide 10x3\" Ther. Ex.: 30'  UBE 4'/4' L1  Right trap., lev. And scalene st. 2x30\"xea.  Left Lat. [place] 10x3\"  Doorway St. 4x30\" Ther. Ex.: 12'  Nu-Step 8' L5  Doorway St. 4x30\" Ther. Ex.: 12'  UBE 4'/4' L1  Doorway St. 4x30\"  Rafael. trap., lev. And scalene st. 2x30\"xea.  Left Lat. Glide 10x3\" Ther. Ex.: 30'  UBE 4'/4' L1  Doorway St. 4x30\"  Rafael. trap., lev. And scalene st. 2x30\"xea.  Left Lat. Glide 10x3\"  Supine Foam Roll St. 3'  Chair Back Ext. 10x5\"  Rafael. Tricep  St. 4x30\" Ther. Ex.: 25'  UBE 4'/4' L2.0  Doorway St. 4x30\"  Rafael. trap., lev. And scalene st. 2x30\"xea.  Left Lat. Glide 10x3\"  Supine Foam Roll St. 3'  Chair Back Ext. 10x5\"  Rafael. Tricep  St. 4x30\"  Right Self Mob. 3'   Manual: 15'  STM at the right cervical and tmj.  Dry needling at the right tmj region Manual: 15'  STM at the right cervical and tmj.  Dry needling at the right tmj region Manual: 8'  Dry needling at the right tmj region Manual: 8'  STM at the right cervical and tmj region.  Dry needling at the right tmj region  Manual: 8'  STM at the right cervical and tmj region.    Neuro Re-ed.:  Scap. Ret. 20x3\"  Shoulder Rolls 2x20 Neuro Re-ed.:  10'  Scap. Ret. 20x3\"  Shoulder Rolls 2x20 Neuro Re-ed.: 10'  Scap. Ret. 20x3\"  Shoulder Rolls 2x20 Neuro Re-ed.: 15'  GTB Scap. Ret. 2x15  2# Shoulder Rolls 2x20  Foam Roll [place] 20xea. Neuro Re-ed.: 15'  GTB Scap. Ret. 2x15  2# Shoulder Rolls 2x20  Foam Roll [place] 20xea.   HEP: Did not add any new exercises to her HEP.    Charges: 2TE, 1MT and 1Neuro      Total Timed Treatment: 48 min  Total Treatment Time: 48 min

## 2025-01-09 ENCOUNTER — TELEPHONE (OUTPATIENT)
Dept: INTERNAL MEDICINE CLINIC | Facility: CLINIC | Age: 50
End: 2025-01-09

## 2025-01-09 DIAGNOSIS — S86.312D PERONEAL TENDON TEAR, LEFT, SUBSEQUENT ENCOUNTER: ICD-10-CM

## 2025-01-09 DIAGNOSIS — M25.562 ACUTE PAIN OF LEFT KNEE: Primary | ICD-10-CM

## 2025-01-09 NOTE — TELEPHONE ENCOUNTER
I received a call from patient and she stated that she is going to see Dr. Lomeli for a follow -up on her ankle and knee but she needs a referral. She has the appointment for tomorrow. I have pended the referral for your review and approval.    Future Appointments   Date Time Provider Department Center   1/10/2025  9:00 AM [facility]

## 2025-01-10 ENCOUNTER — ANTI-COAG VISIT (OUTPATIENT)
Dept: ANTICOAGULATION | Facility: CLINIC | Age: 50
End: 2025-01-10

## 2025-01-10 ENCOUNTER — OFFICE VISIT (OUTPATIENT)
Dept: INTERNAL MEDICINE CLINIC | Facility: CLINIC | Age: 50
End: 2025-01-10

## 2025-01-10 ENCOUNTER — OFFICE VISIT (OUTPATIENT)
Dept: ORTHOPEDICS CLINIC | Facility: CLINIC | Age: 50
End: 2025-01-10

## 2025-01-10 ENCOUNTER — OFFICE VISIT (OUTPATIENT)
Dept: PHYSICAL THERAPY | Age: 50
End: 2025-01-10
Attending: ORTHOPAEDIC SURGERY
Payer: COMMERCIAL

## 2025-01-10 VITALS
WEIGHT: 130 LBS | HEART RATE: 66 BPM | SYSTOLIC BLOOD PRESSURE: 113 MMHG | DIASTOLIC BLOOD PRESSURE: 74 MMHG | BODY MASS INDEX: 26.21 KG/M2 | HEIGHT: 59 IN

## 2025-01-10 VITALS
BODY MASS INDEX: 28 KG/M2 | WEIGHT: 137.19 LBS | SYSTOLIC BLOOD PRESSURE: 113 MMHG | DIASTOLIC BLOOD PRESSURE: 76 MMHG | HEART RATE: 75 BPM

## 2025-01-10 DIAGNOSIS — Z12.11 COLON CANCER SCREENING: ICD-10-CM

## 2025-01-10 DIAGNOSIS — Z00.00 ANNUAL PHYSICAL EXAM: Primary | ICD-10-CM

## 2025-01-10 DIAGNOSIS — Z51.81 MONITORING FOR LONG-TERM ANTICOAGULANT USE: ICD-10-CM

## 2025-01-10 DIAGNOSIS — E78.2 MIXED HYPERLIPIDEMIA: ICD-10-CM

## 2025-01-10 DIAGNOSIS — Z79.01 MONITORING FOR LONG-TERM ANTICOAGULANT USE: ICD-10-CM

## 2025-01-10 DIAGNOSIS — I73.9 PERIPHERAL VASCULAR DISEASE (HCC): ICD-10-CM

## 2025-01-10 DIAGNOSIS — Z83.6 FAMILY HISTORY OF PULMONARY FIBROSIS: ICD-10-CM

## 2025-01-10 DIAGNOSIS — I37.9 PULMONIC VALVE DISEASE: ICD-10-CM

## 2025-01-10 DIAGNOSIS — M26.621 TMJ TENDERNESS, RIGHT: Primary | ICD-10-CM

## 2025-01-10 DIAGNOSIS — Z79.01 LONG TERM (CURRENT) USE OF ANTICOAGULANTS: Primary | ICD-10-CM

## 2025-01-10 DIAGNOSIS — Z12.31 ENCOUNTER FOR SCREENING MAMMOGRAM FOR BREAST CANCER: ICD-10-CM

## 2025-01-10 DIAGNOSIS — M22.42 PATELLOFEMORAL CHONDROSIS OF LEFT KNEE: Primary | ICD-10-CM

## 2025-01-10 LAB — INR: 2.1 (ref 2–3)

## 2025-01-10 PROCEDURE — 3008F BODY MASS INDEX DOCD: CPT | Performed by: STUDENT IN AN ORGANIZED HEALTH CARE EDUCATION/TRAINING PROGRAM

## 2025-01-10 PROCEDURE — 93793 ANTICOAG MGMT PT WARFARIN: CPT | Performed by: FAMILY MEDICINE

## 2025-01-10 PROCEDURE — 99214 OFFICE O/P EST MOD 30 MIN: CPT | Performed by: STUDENT IN AN ORGANIZED HEALTH CARE EDUCATION/TRAINING PROGRAM

## 2025-01-10 PROCEDURE — 97112 NEUROMUSCULAR REEDUCATION: CPT

## 2025-01-10 PROCEDURE — 3074F SYST BP LT 130 MM HG: CPT | Performed by: STUDENT IN AN ORGANIZED HEALTH CARE EDUCATION/TRAINING PROGRAM

## 2025-01-10 PROCEDURE — 97140 MANUAL THERAPY 1/> REGIONS: CPT

## 2025-01-10 PROCEDURE — 97110 THERAPEUTIC EXERCISES: CPT

## 2025-01-10 PROCEDURE — 20610 DRAIN/INJ JOINT/BURSA W/O US: CPT | Performed by: STUDENT IN AN ORGANIZED HEALTH CARE EDUCATION/TRAINING PROGRAM

## 2025-01-10 PROCEDURE — 3078F DIAST BP <80 MM HG: CPT | Performed by: STUDENT IN AN ORGANIZED HEALTH CARE EDUCATION/TRAINING PROGRAM

## 2025-01-10 RX ORDER — TRIAMCINOLONE ACETONIDE 40 MG/ML
40 INJECTION, SUSPENSION INTRA-ARTICULAR; INTRAMUSCULAR ONCE
Status: COMPLETED | OUTPATIENT
Start: 2025-01-10 | End: 2025-01-10

## 2025-01-10 NOTE — PROGRESS NOTES
Diagnosis:   TMJ tenderness, Right      Referring Provider: Tom Santoyo  Date of Evaluation:    10/28/24    Precautions:   Pulmonary Stenosis Next MD visit:   none scheduled  Date of Surgery: n/a   Insurance Primary/Secondary: [employer] [place] / N/A     # Auth Visits: 10            Subjective: Patient reports she has no right tmj pain just some tightness. She has been doing her HEP and felt ok after her last visit.    Pain: 0/10      Objective:   Observation/Posture: Forward head and lori. Rounded shoulders.     AROM:  Cervical Spine   Mobility   Flexion 100%                 Extension 100%   R Sidebend 100%   L Sidebend 100%   R Rotation 100%   L Rotation 100%   R Sideglide 100%   L Sideglide 100%      TMJ AROM Mobility Mobility (mm) End Range/Quality of mvmt   Depression 5mm Slow   R lateral deviation 3mm WFL   L lateral deviation 3mm WFL   Protrusion 2mm WFL      TMJ Accessory Joint  Mobility  Right   Left    Anterior  +2 +3   Inferior  +2 +3   Posterior  +2 +3   Medial   +2 +3   Lateral  +2 +3               MMT STRENGTH TESTING:       Left  Right Comments   GH Flexion     4+/5   4+/5       GH Extension  4+/5   4+/5       GH Abduction 4+/5   4+/5       GH ER 4+/5   4+/5       GH IR 4+/5   4+/5       Upper Trapezius 4/5   4/5       Rhomboids 4/5   4/5       Lower Trapezius 4/5   4/5          Flexibility and Palpation:   Upper Trapezius: R Tight, L WFL  Levator Scap: R Tight, L WFL  Scalenes/SCM: R Tight, L WFL  Hyoids: R Tight, L WFL  Masseter: R Tight, L WFL  Temporalis: R Tight, L WFL  Lateral Pterygoid: R Tight, L WFL     Palpation: Right trap. And tmj pain levels.     Special Tests:   Cotton Roll Test: Negative      Assessment: Patient presents with continued cervical tightness and guarding during her self stretching and stm. Patient presents with no pain or discomfort during or after her treatment session. She has improved scap. Stab. During her exercises.      Goals: (to be met in 10 visits)  Pt will have  improved thoracic PA mobility to WNL to improve cervical ROM as well as promote upright posturing and decreased pain with mouth openung   Pt will report decreased frequency of headaches to <1x/week  Pt will demonstrate improved cervical intrinsic strength to 5/5 to allow improved cervical stabilization with overhead reaching (10 visits)  Pt will improve postural strength (mid/low trap) to 5/5 to promote improved upright posturing and decreased pain with mouth opening   Pt will be independent and compliant    Plan: Plan to work on rom, stretching and strengthening.  Date: 11/8/2024  TX#: 2/10 Date: 11/15/24                TX#: 3/10 Date:   11/18/24              TX#: 4/10 Date:  12/12/24               TX#: 5/10 Date: 12/30/24  Tx#: 6/10 Date: 1/3/25  Tx#: 7/10 Date: 1/10/25  Tx#: 8/10   Ther. Ex.: 30'  UBE 4'/4' L1  Right trap., lev. And scalene st. 2x30\"xea.  Left Lat. Glide 10x3\" Ther. Ex.: 30'  UBE 4'/4' L1  Right trap., lev. And scalene st. 2x30\"xea.  Left Lat. [place] 10x3\"  Doorway St. 4x30\" Ther. Ex.: 12'  Nu-Step 8' L5  Doorway St. 4x30\" Ther. Ex.: 12'  UBE 4'/4' L1  Doorway St. 4x30\"  Rafael. trap., lev. And scalene st. 2x30\"xea.  Left Lat. Glide 10x3\" Ther. Ex.: 30'  UBE 4'/4' L1  Doorway St. 4x30\"  Rafael. trap., lev. And scalene st. 2x30\"xea.  Left Lat. Glide 10x3\"  Supine Foam Roll St. 3'  Chair Back Ext. 10x5\"  Rafael. Tricep  St. 4x30\" Ther. Ex.: 25'  UBE 4'/4' L2.0  Doorway St. 4x30\"  Rafael. trap., lev. And scalene st. 2x30\"xea.  Left Lat. Glide 10x3\"  Supine Foam Roll St. 3'  Chair Back Ext. 10x5\"  Rafael. Tricep  St. 4x30\"  Right Self Mob. 3' Ther. Ex.: 20'  UBE 4'/4' L2.0  Doorway St. 4x30\"  Rafael. trap., lev. And scalene st. 2x30\"xea.  Supine Foam Roll St. 3'  Chair Back Ext. 10x5\"  Cane Cervical Self Mob. 3'  Right TMJ Self Mob. 3'  Jaw Opening 20x   Manual: 15'  STM at the right cervical and tmj.  Dry needling at the right tmj region Manual: 15'  STM at the right cervical and tmj.  Dry needling at the right tmj  region Manual: 8'  Dry needling at the right tmj region Manual: 8'  STM at the right cervical and tmj region.  Dry needling at the right tmj region  Manual: 8'  STM at the right cervical and tmj region. Manual: 8'  STM at the right cervical and tmj region.    Neuro Re-ed.:  Scap. Ret. 20x3\"  Shoulder Rolls 2x20 Neuro Re-ed.: 10'  Scap. Ret. 20x3\"  Shoulder Rolls 2x20 Neuro Re-ed.: 10'  Scap. Ret. 20x3\"  Shoulder Rolls 2x20 Neuro Re-ed.: 15'  GTB Scap. Ret. 2x15  2# Shoulder Rolls 2x20  Foam Roll [place] 20xea. Neuro Re-ed.: 15'  GTB Scap. Ret. 2x15  2# Shoulder Rolls 2x20  Foam Roll [place] 20xea. Neuro Re-ed.: 15'  GTB Scap. Ret. And St. Elbow Rows 2x15ea.  2# Shoulder Rolls 2x20  Foam Roll [place] 20xea.   HEP: Reviewed her HEP.    Charges: 1TE, 1MT and 1Neuro      Total Timed Treatment: 43 min  Total Treatment Time: 43 min

## 2025-01-10 NOTE — PROGRESS NOTES
Subjective:     Patient ID: Manjit Matt is a 49 year old female.    Pt presents today for her annual physical         History/Other:   Review of Systems   Constitutional: Negative.    HENT: Negative.     Eyes: Negative.    Respiratory: Negative.  Negative for cough, shortness of breath and wheezing.    Cardiovascular:  Negative for chest pain and palpitations.   Gastrointestinal: Negative.    Genitourinary: Negative.    Hematological: Negative.      Current Outpatient Medications   Medication Sig Dispense Refill    Coenzyme Q10 (CO Q 10) 100 MG Oral Cap Take by mouth daily.      warfarin 1 MG Oral Tab TAKE 1& 1/2 TABLETS BY MOUTH ON MONDAY, WEDNESDAY, FRIDAY, AND 1 TABLET BY MOUTH ALL OTHER DAYS. 120 tablet 1    Acetaminophen 500 MG Oral Cap Take 2 capsules (1,000 mg total) by mouth every 8 (eight) hours as needed for Pain. Never exceed 3000 mg in a 24-hour period.  Many over-the-counter medications also have acetaminophen in them. 180 capsule 0    acidophilus-pectin Oral Cap Take 1 capsule by mouth daily.      CILOSTAZOL 100 MG Oral Tab TAKE 1 TABLET(100 MG) BY MOUTH TWICE DAILY 60 tablet 11    ASPIRIN EC LOW DOSE 81 MG Oral Tab EC Take 1 tablet (81 mg total) by mouth daily.  5     Allergies:Allergies[1]    Past Medical History:    Congenital pulmonary stenosis (HCC)    congenital pulmonary aa stenosis    Congenital pulmonary stenosis (HCC)    SX at 20 y/o / valvuloplasty 1995    Deep vein thrombosis (HCC)    Disease of vein    vein/artery disease    Heart murmur    since birth    Hematologic disorder    Thromboembolic Event    History of blood clots    History of blood transfusion    Peripheral vascular disease (HCC)    Right leg arterial bypass 2001,2012    PONV (postoperative nausea and vomiting)    Popliteal artery thrombosis (HCC)    Rt popliteal artery thrombosis - 15cm; Right Lower Extremity thrombosis; was on coumadin for 6 months than on plavix and aggrenox until 2007. w/u negative for coagulopathy but was  anticoagulated during pregnancies     Tendinitis    discontinue insole / rx ibuprofen    Valvular disease    Pulmonic stenosis    Visual impairment    glasses      Past Surgical History:   Procedure Laterality Date    Appendectomy      Appendectomy            Cath bare metal stent (bms)      3 stents right leg    Colonoscopy N/A 2024    Procedure: COLONOSCOPY;  Surgeon: Sandrita Saab MD;  Location: [facility] ENDO    Cyst aspiration left Left 2023    US bx    Endometrial ablation      Hysterectomy  10/2018    Laparoscopic hysterectomy with Dr. Dueñas.  Had post op vaginal bleeding and repeat suturing of vaginal cuff.    Leg/ankle surgery proc unlisted      right leg arterial bypass / (fasciotomy)    Needle biopsy left Left 2023    US bx    Other surgical history      SX at 20 y/o / valvuloplasty (congenital Pulmonic stenosis)      Family History   Problem Relation Age of Onset    No Known Problems Father     No Known Problems Mother     Other (Other) Maternal Grandmother         Fibrocystic Breast Disease    Cancer Paternal Grandfather         lung    No Known Problems Sister     Bipolar Disorder Brother     Breast Cancer Maternal Aunt         40's,  BRCA negative    Breast Cancer Maternal Aunt         mat great great aunt breast ca unknown age    Ovarian Cancer Neg     Uterine Cancer Neg     Colon Cancer Neg       Social History:   Social History     Socioeconomic History    Marital status:    Tobacco Use    Smoking status: Never     Passive exposure: Never    Smokeless tobacco: Never   Vaping Use    Vaping status: Never Used   Substance and Sexual Activity    Alcohol use: Yes     Comment: rare    Drug use: No    Sexual activity: Yes     Partners: Male   Other Topics Concern    Caffeine Concern Yes     Comment: coffee, 1-2 cups daily    Pt has a pacemaker No    Pt has a defibrillator No    Reaction to local anesthetic No        Objective:   Physical  Exam  Constitutional:       General: She is not in acute distress.     Appearance: She is well-developed. She is not ill-appearing, toxic-appearing or diaphoretic.   HENT:      Head: Normocephalic and atraumatic.      Right Ear: Tympanic membrane, ear canal and external ear normal.      Left Ear: Tympanic membrane, ear canal and external ear normal.      Nose: Nose normal.      Mouth/Throat:      Pharynx: No oropharyngeal exudate.   Eyes:      General:         Right eye: No discharge.         Left eye: No discharge.      Conjunctiva/sclera: Conjunctivae normal.      Pupils: Pupils are equal, round, and reactive to light.   Neck:      Vascular: No JVD.   Cardiovascular:      Rate and Rhythm: Normal rate and regular rhythm.      Heart sounds: Murmur heard.   Pulmonary:      Effort: Pulmonary effort is normal. No respiratory distress.      Breath sounds: Normal breath sounds. No wheezing or rales.   Abdominal:      General: Bowel sounds are normal. There is no distension.      Palpations: Abdomen is soft. There is no mass.      Tenderness: There is no abdominal tenderness. There is no guarding or rebound.   Musculoskeletal:         General: No tenderness. Normal range of motion.      Cervical back: Normal range of motion and neck supple. No rigidity or tenderness.      Right lower leg: No edema.      Left lower leg: No edema.   Lymphadenopathy:      Cervical: No cervical adenopathy.   Skin:     General: Skin is warm and dry.      Findings: No rash.   Neurological:      Mental Status: She is alert and oriented to person, place, and time.         Assessment & Plan:   (Z00.00) Annual physical exam  (primary encounter diagnosis)  Plan: CBC With Differential With Platelet, Comp         Metabolic Panel (14), Hemoglobin A1C, Lipid         Panel, TSH W Reflex To Free T4        Routine labs ordered; pt already had her flu shot.     (I37.9) Pulmonic valve disease  Plan: pt had hx of valvulopasty remotely; had been ff by cardio  and asymptomatic,     (E78.2) Mixed hyperlipidemia  Plan: check lipid panel; ff low fat low chol diet    (I73.9) Peripheral vascular disease (HCC)  Plan: hx of bypass before; continues to ffup with vascular surgeon .    (Z83.6) Family history of pulmonary fibrosis  Plan: Pulmonary Referral - In Network        Pt's father recently diagnosed with IPF, and strong fhx of IPF (siblings of father). Will refer pt to pulmonologist.     (Z12.31) Encounter for screening mammogram for breast cancer  Plan: [facility] 2D+3D SCREENING BILAT         (CPT=77067/68382)        Mammogram ordered.     (Z12.11) Colon cancer screening  Plan: pt current with her colonoscpopy       Orders Placed This Encounter   Procedures    CBC With Differential With Platelet    Comp Metabolic Panel (14)    Hemoglobin A1C    Lipid Panel    TSH W Reflex To Free T4       Meds This Visit:  Requested Prescriptions      No prescriptions requested or ordered in this encounter       Imaging & Referrals:  [facility] 2D+3D SCREENING BILAT (CPT=77067/10980)            [1]   Allergies  Allergen Reactions    Levaquin [Levofloxacin] HIVES and SWELLING     Other reaction(s): LEVOFLOXACIN

## 2025-01-10 NOTE — PROGRESS NOTES
[facility] / INR 2.1,  therapeutic. (Goal 2.5 ) TTR 71.9 %     Etiology: no changes    PLAN: continue the current dose.    Recheck INR every 2 weeks.      WARFARIN Plan per protocol: 1.5 mg every Mon; 1 mg all other days

## 2025-01-11 NOTE — PROCEDURES
Per verbal order from Dr. Lomeli draw up 3ml of 0.5% Marcaine & 2ml 1% lidocaine and 1ml of Kenalog 40 for cortisone injection to left knee Janet FRANCO CMA    Patient provided education handout for cortisone injection.     
Procedure Note Corticosteroid Injection  _______________________________________________________________________________________________________________  Patient Name: Manjit Matt   Date: 1/10/2025     Based on history, physical exam, and available diagnostic testing, the patient diagnosis appears consistent with intraarticular pathology. We discussed the use of a corticosteroid injection for therapeutic/confirmatory diagnostic purposes. The risks, benefits, and potential complications of corticosteroid injection(s) were discussed in detail. The risks include, but are not limited to: pain, infection, bleeding/hematoma, swelling, flare response, incomplete resolution of symptoms, possible need for further injections or subsequent surgical intervention, subcutaneous fat atrophy, skin pigmentation changes, and elevation of blood sugar. The patient verbalized an understanding and agrees to the injection(s).     Under sterile prep, approximately:  1 mL Kenalog 40mg/ml, 3 mL marcaine plain, 2 mL lidocaine plain    was injected into: Left knee joint     This procedure was performed without ultrasound guidance    This procedure was well tolerated. The patient understands that the injection could take several days to take effect.    The patient was not sedated and fully conscious for the injection.  Prior to the injection, verification followed the Universal Protocol in the following manner:  [X] A \"Time Out\" was performed prior to the procedure to confirm patient identification, injection site, and preparation of equipment.  [X] The patient was identified using two patient identifiers.  [X] The procedure site was appropriately prepped    Dwight Lomeli MD  Orthopaedic Surgery  1/10/2025     
yes

## 2025-01-11 NOTE — PROGRESS NOTES
Orthopaedic Surgery Follow-up Patient Visit  _______________________________________________________________________________________________________________  _______________________________________________________________________________________________________________    DATE OF VISIT: 1/10/2025     CHIEF COMPLAINT: Follow-up left knee and left ankle    Chief Complaint   Patient presents with    Follow - Up     L ankle/L knee patient states no pain today 0/10        HISTORY OF PRESENT ILLNESS: Manjit Matt is a 49 year old female who presents to the clinic for follow-up for her left knee and her left ankle. Since last visit, she continues to work on exercises to improve the function of her left knee and her left ankle.  She reports that she has continued to make some good progress with her left ankle.  She does endorse some continued stiffness and pulling sensations in the ankle with inversion and eversion of the foot, but otherwise is making continued progress.  For her knee, she does continue to have pain primarily about the anterior aspect of the knee, particularly with kneeling and with increased activity.  She has a trip coming up during which she will have to be fairly active and would benefit from some pain relief.  She continues to work on her therapeutic exercises for the knee and, while she has improved to a certain degree, she is not yet at the point of full resolution.  She denies any new injuries    SOCIAL HISTORY  Social History     Socioeconomic History    Marital status:      Spouse name: Not on file    Number of children: Not on file    Years of education: Not on file    Highest education level: Not on file   Occupational History    Not on file   Tobacco Use    Smoking status: Never     Passive exposure: Never    Smokeless tobacco: Never   Vaping Use    Vaping status: Never Used   Substance and Sexual Activity    Alcohol use: Yes     Comment: rare    Drug use: No    Sexual activity: Yes      Partners: Male   Other Topics Concern     Service Not Asked    Blood Transfusions Not Asked    Caffeine Concern Yes     Comment: coffee, 1-2 cups daily    Occupational Exposure Not Asked    Hobby Hazards Not Asked    Sleep Concern Not Asked    Stress Concern Not Asked    Weight Concern Not Asked    Special Diet Not Asked    Back Care Not Asked    Exercise Not Asked    Bike Helmet Not Asked    Seat Belt Not Asked    Self-Exams Not Asked    Grew up on a farm Not Asked    History of tanning Not Asked    Outdoor occupation Not Asked    Pt has a pacemaker No    Pt has a defibrillator No    Breast feeding Not Asked    Reaction to local anesthetic No   Social History Narrative    Not on file     Social Drivers of Health     Financial Resource Strain: Not on file   Food Insecurity: Not on file   Transportation Needs: Not on file   Physical Activity: Not on file   Stress: Not on file   Social Connections: Not on file   Housing Stability: Not on file        PAST MEDICAL HISTORY  Past Medical History:    Congenital pulmonary stenosis (HCC)    congenital pulmonary aa stenosis    Congenital pulmonary stenosis (HCC)    SX at 20 y/o / valvuloplasty 1995    Deep vein thrombosis (HCC)    Disease of vein    vein/artery disease    Heart murmur    since birth    Hematologic disorder    Thromboembolic Event    History of blood clots    History of blood transfusion    Peripheral vascular disease (HCC)    Right leg arterial bypass 2001,2012    PONV (postoperative nausea and vomiting)    Popliteal artery thrombosis (HCC)    Rt popliteal artery thrombosis - 15cm; Right Lower Extremity thrombosis; was on coumadin for 6 months than on plavix and aggrenox until 2007. w/u negative for coagulopathy but was anticoagulated during pregnancies     Tendinitis    discontinue insole / rx ibuprofen    Valvular disease    Pulmonic stenosis    Visual impairment    glasses        PAST SURGICAL HISTORY  Past Surgical History:   Procedure Laterality  Date    Appendectomy      Appendectomy            Cath bare metal stent (bms)      3 stents right leg    Colonoscopy N/A 2024    Procedure: COLONOSCOPY;  Surgeon: Sandrita Saab MD;  [facility] ENDO    Cyst aspiration left Left 2023    US bx    Endometrial ablation      Hysterectomy  10/2018    Laparoscopic hysterectomy with Dr. Dueñas.  Had post op vaginal bleeding and repeat suturing of vaginal cuff.    Leg/ankle surgery proc unlisted      right leg arterial bypass / (fasciotomy)    Needle biopsy left Left 2023    US bx    Other surgical history      SX at 20 y/o / valvuloplasty (congenital Pulmonic stenosis)        MEDICATIONS  Reviewed   warfarin 1 MG Oral Tab TAKE 1& 1/2 TABLETS BY MOUTH ON MONDAY, WEDNESDAY, FRIDAY, AND 1 TABLET BY MOUTH ALL OTHER DAYS. 120 tablet 1    Acetaminophen 500 MG Oral Cap Take 2 capsules (1,000 mg total) by mouth every 8 (eight) hours as needed for Pain. Never exceed 3000 mg in a 24-hour period.  Many over-the-counter medications also have acetaminophen in them. 180 capsule 0    [DISCONTINUED] Vitamin C 500 MG Oral Tab Take 1 tablet (500 mg total) by mouth in the morning and 1 tablet (500 mg total) before bedtime. (Patient not taking: Reported on 1/10/2025) 84 tablet 0    [DISCONTINUED] Multiple Vitamins-Minerals (HAIR SKIN AND NAILS FORMULA) Oral Tab Take 1 tablet by mouth daily.      ASPIRIN EC LOW DOSE 81 MG Oral Tab EC Take 1 tablet (81 mg total) by mouth daily.  5        ALLERGIES  Allergies[1]     FAMILY HISTORY  Family History   Problem Relation Age of Onset    Other (Other) Father         ipf    No Known Problems Mother     Other (Other) Maternal Grandmother         Fibrocystic Breast Disease    Cancer Paternal Grandfather         lung    No Known Problems Sister     Bipolar Disorder Brother     Breast Cancer Maternal Aunt         40's,  BRCA negative    Breast Cancer Maternal Aunt         mat great great aunt  breast ca unknown age    Ovarian Cancer Neg     Uterine Cancer Neg     Colon Cancer Neg         REVIEW OF SYSTEMS  A 14 point review of systems was performed. Pertinent positives and negatives noted in the HPI.    PHYSICAL EXAM  /74   Pulse 66   Ht 4' 11\" (1.499 m)   Wt 130 lb (59 kg)   LMP 09/27/2018   BMI 26.26 kg/m²      Constitutional: The patient is well-developed, well-nourished, in no acute distress.  Neurological: Alert and oriented to person, place, and time.  Psychiatric: Mood and affect normal.  Head: Normocephalic and atraumatic.  Cardiovascular: regular rate by palpation  Pulmonary/Chest: Effort normal. No respiratory distress. Breathing non-labored  Abdominal: Abdomen exhibits no distension.   Left  FOOT/ANKLE  INSPECTION/PALPATION  Well-healed incisions about the ankle  Scar tissue about the lateral aspect of the ankle and persistent swelling about the posterior aspect  No breaks in skin. Skin is euthermic.  Neutral alignment on standing  TTP over lateral aspect of ankle  ROM  Dorsiflexion: 30 degrees  Plantarflexion: 50 degrees  Inversion: 15 degrees  Eversion: 15 degrees  MOTOR/STRENGTH  Dorsiflexion: 5/5  Plantarflexion: 5/5  Inversion: 4+/5  Eversion: 4+/5  Fires EHL/FHL, able to move all toes actively  SILT Maame/Saph/SPN/DPN/T  2+ DP/PT pulse, brisk capillary refill, foot warm & well perfused   Left  KNEE  INSPECTION/PALPATION  No readily apparent visual abnormalities of the knee.   No ecchymosis or erythema  No effusion.   No breaks in skin. Skin is euthermic.  Neutral alignment on standing  Normal gait  NTTP  to medial joint line, NTTP to lateral joint line  ROM  0-130 degrees  KNEE STRENGTH  Flexion: 5/5  Extension: 5/5  STABILITY  1A lachman, stable anterior drawer  Stable posterior drawer  Stable to varus and valgus stress at 0 and 30 degrees  1 quadrant of lateral patellar translation  Negative J sign, negative apprehension  PERTINENT FINDINGS  Negative Alfred   Positive  Patellar grind    ASSESSMENT/PLAN: Manjit Matt is a 49 year old female who presents to the Orthopaedic surgery clinic today for follow-up.  She continues to see improvement from her left ankle procedure.  From her left knee, while she is seeing improvement from her patellofemoral syndrome, based on her increased requirements in the next few weeks and persistent of intermittent pain, is reasonable to plan to proceed with an injection for the knee.  This will allow some improvement in the swelling and potentially the pain may not return after this wears off.  She will continue to work on therapeutic exercises in order to improve kinematics of the knee.    We discussed the diagnosis, changes, and therapies performed to date including possible treatments and their associated risks/benefits.  WEIGHT BEARING STATUS: Weightbearing as tolerated  RANGE-OF-MOTION LIMITATIONS: as tolerated  NEW PRESCRIPTIONS:  We discussed medications for this condition including patient current regimen. Based on this discussion we have added/re-ordered no additional medications  IMAGING ORDERED: none  CONSULTS PLACED: no new consultations  PROSTHESES/ORTHOTICS: the patient does not need prostheses/orthoses for the current condition  PROCEDURES: steroid injection left knee    FOLLOW-UP: as needed    RADIOGRAPHS AT NEXT VISIT: none    I have personally seen Manjit Matt and discussed in detail their plan of care. Prior to departure, they indicated agreement with and understanding of their plan of care and their follow-up as documented herein this note. Please note that this note was written in combination with voice recognition/dictation software and there is a possibility of transcription errors which were not identified at the time of note submission. If clarification is necessary, please contact the author or clinic staff.    Dwight Lomeli MD  Orthopaedic Surgery  1/10/2025         [1]   Allergies  Allergen Reactions    Levaquin  [Levofloxacin] HIVES and SWELLING     Other reaction(s): LEVOFLOXACIN

## 2025-01-21 ENCOUNTER — OFFICE VISIT (OUTPATIENT)
Dept: PHYSICAL THERAPY | Age: 50
End: 2025-01-21
Attending: STUDENT IN AN ORGANIZED HEALTH CARE EDUCATION/TRAINING PROGRAM
Payer: COMMERCIAL

## 2025-01-21 ENCOUNTER — TELEPHONE (OUTPATIENT)
Dept: PHYSICAL THERAPY | Age: 50
End: 2025-01-21

## 2025-01-21 DIAGNOSIS — M26.621 TMJ TENDERNESS, RIGHT: Primary | ICD-10-CM

## 2025-01-21 PROCEDURE — 97112 NEUROMUSCULAR REEDUCATION: CPT

## 2025-01-21 PROCEDURE — 97110 THERAPEUTIC EXERCISES: CPT

## 2025-01-21 NOTE — PROGRESS NOTES
Diagnosis:   TMJ tenderness, Right      Referring Provider: Tom Santoyo  Date of Evaluation:    10/28/24    Precautions:   Pulmonary Stenosis Next MD visit:   none scheduled  Date of Surgery: n/a   Insurance Primary/Secondary: [facility] HMO / N/A     # Auth Visits: 10           Discharge Summary  Pt has attended 9 visits in Physical Therapy.        Subjective: Patient reports she has no right tmj pain just some tightness this afternoon. She has been doing her HEP and felt ok after her last visit.    Pain: 0/10      Objective:   Observation/Posture: Forward head and lori. Rounded shoulders.     AROM:  Cervical Spine   Mobility   Flexion 100%                 Extension 100%   R Sidebend 100%   L Sidebend 100%   R Rotation 100%   L Rotation 100%   R Sideglide 100%   L Sideglide 100%      TMJ AROM Mobility Mobility (mm) End Range/Quality of mvmt   Depression 5mm Slow   R lateral deviation 3mm WFL   L lateral deviation 3mm WFL   Protrusion 2mm WFL      TMJ Accessory Joint  Mobility  Right   Left    Anterior  +2 +3   Inferior  +2 +3   Posterior  +2 +3   Medial   +2 +3   Lateral  +2 +3               MMT STRENGTH TESTING:       Left  Right Comments   GH Flexion     4+/5   4+/5       GH Extension  4+/5   4+/5       GH Abduction 4+/5   4+/5       GH ER 4+/5   4+/5       GH IR 4+/5   4+/5       Upper Trapezius 4/5   4/5       Rhomboids 4/5   4/5       Lower Trapezius 4/5   4/5          Flexibility and Palpation:   Upper Trapezius: R Tight, L WFL  Levator Scap: R Tight, L WFL  Scalenes/SCM: R Tight, L WFL  Hyoids: R Tight, L WFL  Masseter: R Tight, L WFL  Temporalis: R Tight, L WFL  Lateral Pterygoid: R Tight, L WFL     Palpation: Right trap. And tmj pain levels.     Special Tests:   Cotton Roll Test: Negative      Assessment: Patient presents with continued cervical tightness and guarding during her self stretching and stm. Patient presents with no pain or discomfort during or after her treatment session. She has improved  scap. Stab. During her exercises.      Goals: (to be met in 10 visits)  Pt will have improved thoracic PA mobility to WNL to improve cervical ROM as well as promote upright posturing and decreased pain with mouth openung   Pt will report decreased frequency of headaches to <1x/week  Pt will demonstrate improved cervical intrinsic strength to 5/5 to allow improved cervical stabilization with overhead reaching (10 visits)  Pt will improve postural strength (mid/low trap) to 5/5 to promote improved upright posturing and decreased pain with mouth opening   Pt will be independent and compliant    Plan: Plan to work on rom, stretching and strengthening.  Date: 11/8/2024  TX#: 2/10 Date: 11/15/24                TX#: 3/10 Date:   11/18/24              TX#: 4/10 Date:  12/12/24               TX#: 5/10 Date: 12/30/24  Tx#: 6/10 Date: 1/3/25  Tx#: 7/10 Date: 1/10/25  Tx#: 8/10 Date: 1/21/25  Tx#: 9/10   Ther. Ex.: 30'  UBE 4'/4' L1  Right trap., lev. And scalene st. 2x30\"xea.  Left Lat. Glide 10x3\" Ther. Ex.: 30'  UBE 4'/4' L1  Right trap., lev. And scalene st. 2x30\"xea.  Left Lat. [place] 10x3\"  Doorway St. 4x30\" Ther. Ex.: 12'  Nu-Step 8' L5  Doorway St. 4x30\" Ther. Ex.: 12'  UBE 4'/4' L1  Doorway St. 4x30\"  [place]. trap., lev. And scalene st. 2x30\"xea.  Left Lat. Glide 10x3\" Ther. Ex.: 30'  UBE 4'/4' L1  Doorway St. 4x30\"  [place]. trap., lev. And scalene st. 2x30\"xea.  Left Lat. Glide 10x3\"  Supine Foam Roll St. 3'  Chair Back Ext. 10x5\"  [place]afael. Tricep  St. 4x30\" Ther. Ex.: 25'  UBE 4'/4' L2.0  Doorway St. 4x30\"  [place]afael. trap., lev. And scalene st. 2x30\"xea.  Left Lat. Glide 10x3\"  Supine Foam Roll St. 3'  Chair Back Ext. 10x5\"  Rafael. Tricep  St. 4x30\"  Right Self Mob. 3' Ther. Ex.: 20'  UBE 4'/4' L2.0  Doorway St. 4x30\"  [place]afael. trap., lev. And scalene st. 2x30\"xea.  Supine Foam Roll St. 3'  Chair Back Ext. 10x5\"  Cane Cervical Self Mob. 3'  Right TMJ Self Mob. 3'  Jaw Opening 20x Ther. Ex.: 30'  UBE 4'/4' L2.0  Doorway St. 4x30\"  Rafael.  trap., lev. And scalene st. 2x30\"xea.  Supine Foam Roll St. 3'  Chair Back Ext. 10x5\"  Right TMJ Self Mob. 3'  Jaw Opening 20x   Manual: 15'  STM at the right cervical and tmj.  Dry needling at the right tmj region Manual: 15'  STM at the right cervical and tmj.  Dry needling at the right tmj region Manual: 8'  Dry needling at the right tmj region Manual: 8'  STM at the right cervical and tmj region.  Dry needling at the right tmj region  Manual: 8'  STM at the right cervical and tmj region. Manual: 8'  STM at the right cervical and tmj region.     Neuro Re-ed.:  Scap. Ret. 20x3\"  Shoulder Rolls 2x20 Neuro Re-ed.: 10'  Scap. Ret. 20x3\"  Shoulder Rolls 2x20 Neuro Re-ed.: 10'  Scap. Ret. 20x3\"  Shoulder Rolls 2x20 Neuro Re-ed.: 15'  GTB Scap. Ret. 2x15  2# Shoulder Rolls 2x20  Foam Roll [place] 20xea. Neuro Re-ed.: 15'  GTB Scap. Ret. 2x15  2# Shoulder Rolls 2x20  Foam Roll [place] and [place] 20xea. Neuro Re-ed.: 15'  GTB Scap. Ret. And St. [place]bow [place] 2x15ea.  2# Shoulder Rolls 2x20  Foam Roll [place] 20xea. Neuro Re-ed.: 15'  GTB Scap. Ret. And St. Elbow Rows 2x15ea.  2# Shoulder Rolls 2x20  Foam Roll [place] and [place] 20xea.   HEP: Reviewed final  her HEP.    Charges: 1TE, 1MT and 1Neuro      Total Timed Treatment: 43 min  Total Treatment Time: 43 min    .Post Neck Disability Index Score  Post Score: 0 % (1/21/2025  3:14 PM)    0 % improvement    Plan: Plan to discharge secondary to not having any pain or tightness.    Patient/Family/Caregiver was advised of these findings, precautions, and treatment options and has agreed to actively participate in planning and for this course of care.    Thank you for your referral. If you have any questions, please contact me at Dept: [phone].    Sincerely,  Electronically signed by therapist: Stanislav Lobo, PT , DPT, CSCS    Physician's certification required:  No  Please co-sign or sign and return this letter via fax as soon as possible to  [phone].   I certify the need for these services furnished under this plan of treatment and while under my care.    X___________________________________________________ Date____________________    Certification From: 1/21/2025  To:4/21/2025

## 2025-01-27 ENCOUNTER — ANTI-COAG VISIT (OUTPATIENT)
Dept: ANTICOAGULATION | Facility: CLINIC | Age: 50
End: 2025-01-27

## 2025-01-27 DIAGNOSIS — Z79.01 MONITORING FOR LONG-TERM ANTICOAGULANT USE: ICD-10-CM

## 2025-01-27 DIAGNOSIS — Z51.81 MONITORING FOR LONG-TERM ANTICOAGULANT USE: ICD-10-CM

## 2025-01-27 DIAGNOSIS — Z79.01 LONG TERM (CURRENT) USE OF ANTICOAGULANTS: Primary | ICD-10-CM

## 2025-01-27 DIAGNOSIS — I73.9 PERIPHERAL VASCULAR DISEASE: ICD-10-CM

## 2025-01-27 DIAGNOSIS — I37.9 PULMONIC VALVE DISEASE: ICD-10-CM

## 2025-01-27 LAB — INR: 1.9 (ref 2–3)

## 2025-01-27 PROCEDURE — 93793 ANTICOAG MGMT PT WARFARIN: CPT | Performed by: FAMILY MEDICINE

## 2025-01-27 NOTE — PROGRESS NOTES
[facility] / INR 1.9, sub therapeutic. (Goal 2.5 ) TTR 71.3 %     Etiology: she is on triple therapy. No missed doses or changes.    PLAN: continue the current dose.    Recheck INR 2 weeks.    Pt reports:    No sign of unusual bruising or bleeding.  Any missed doses: No   Medications changes: No  Diet changes:  No    Contacted  Manjit by phone  who verbalized understanding and agreement.    WARFARIN Plan per protocol: 1.5 mg every Mon; 1 mg all other days

## 2025-01-31 ENCOUNTER — LAB ENCOUNTER (OUTPATIENT)
Dept: LAB | Age: 50
End: 2025-01-31
Attending: INTERNAL MEDICINE
Payer: COMMERCIAL

## 2025-01-31 ENCOUNTER — OFFICE VISIT (OUTPATIENT)
Dept: PULMONOLOGY | Facility: CLINIC | Age: 50
End: 2025-01-31

## 2025-01-31 ENCOUNTER — OFFICE VISIT (OUTPATIENT)
Dept: PHYSICAL THERAPY | Age: 50
End: 2025-01-31
Attending: ORTHOPAEDIC SURGERY
Payer: COMMERCIAL

## 2025-01-31 VITALS
RESPIRATION RATE: 16 BRPM | SYSTOLIC BLOOD PRESSURE: 100 MMHG | DIASTOLIC BLOOD PRESSURE: 70 MMHG | OXYGEN SATURATION: 98 % | HEIGHT: 59 IN | HEART RATE: 81 BPM | WEIGHT: 137.19 LBS | BODY MASS INDEX: 27.66 KG/M2

## 2025-01-31 DIAGNOSIS — Z00.00 ANNUAL PHYSICAL EXAM: ICD-10-CM

## 2025-01-31 DIAGNOSIS — R05.3 CHRONIC COUGH: Primary | ICD-10-CM

## 2025-01-31 LAB
ALBUMIN SERPL-MCNC: 4.4 G/DL (ref 3.2–4.8)
ALBUMIN/GLOB SERPL: 1.6 {RATIO} (ref 1–2)
ALP LIVER SERPL-CCNC: 75 U/L
ALT SERPL-CCNC: 13 U/L
ANION GAP SERPL CALC-SCNC: 7 MMOL/L (ref 0–18)
AST SERPL-CCNC: 24 U/L (ref ?–34)
BASOPHILS # BLD AUTO: 0.05 X10(3) UL (ref 0–0.2)
BASOPHILS NFR BLD AUTO: 1.1 %
BILIRUB SERPL-MCNC: 0.5 MG/DL (ref 0.3–1.2)
BUN BLD-MCNC: 10 MG/DL (ref 9–23)
BUN/CREAT SERPL: 11.5 (ref 10–20)
CALCIUM BLD-MCNC: 9.7 MG/DL (ref 8.7–10.4)
CHLORIDE SERPL-SCNC: 107 MMOL/L (ref 98–112)
CHOLEST SERPL-MCNC: 239 MG/DL (ref ?–200)
CO2 SERPL-SCNC: 27 MMOL/L (ref 21–32)
CREAT BLD-MCNC: 0.87 MG/DL
DEPRECATED RDW RBC AUTO: 45.1 FL (ref 35.1–46.3)
EGFRCR SERPLBLD CKD-EPI 2021: 82 ML/MIN/1.73M2 (ref 60–?)
EOSINOPHIL # BLD AUTO: 0.13 X10(3) UL (ref 0–0.7)
EOSINOPHIL NFR BLD AUTO: 2.8 %
ERYTHROCYTE [DISTWIDTH] IN BLOOD BY AUTOMATED COUNT: 12.4 % (ref 11–15)
EST. AVERAGE GLUCOSE BLD GHB EST-MCNC: 111 MG/DL (ref 68–126)
FASTING PATIENT LIPID ANSWER: YES
FASTING STATUS PATIENT QL REPORTED: YES
GLOBULIN PLAS-MCNC: 2.8 G/DL (ref 2–3.5)
GLUCOSE BLD-MCNC: 87 MG/DL (ref 70–99)
HBA1C MFR BLD: 5.5 % (ref ?–5.7)
HCT VFR BLD AUTO: 38.7 %
HDLC SERPL-MCNC: 67 MG/DL (ref 40–59)
HGB BLD-MCNC: 13.3 G/DL
IMM GRANULOCYTES # BLD AUTO: 0.01 X10(3) UL (ref 0–1)
IMM GRANULOCYTES NFR BLD: 0.2 %
LDLC SERPL CALC-MCNC: 164 MG/DL (ref ?–100)
LYMPHOCYTES # BLD AUTO: 1.67 X10(3) UL (ref 1–4)
LYMPHOCYTES NFR BLD AUTO: 35.5 %
MCH RBC QN AUTO: 33.7 PG (ref 26–34)
MCHC RBC AUTO-ENTMCNC: 34.4 G/DL (ref 31–37)
MCV RBC AUTO: 98 FL
MONOCYTES # BLD AUTO: 0.47 X10(3) UL (ref 0.1–1)
MONOCYTES NFR BLD AUTO: 10 %
NEUTROPHILS # BLD AUTO: 2.38 X10 (3) UL (ref 1.5–7.7)
NEUTROPHILS # BLD AUTO: 2.38 X10(3) UL (ref 1.5–7.7)
NEUTROPHILS NFR BLD AUTO: 50.4 %
NONHDLC SERPL-MCNC: 172 MG/DL (ref ?–130)
OSMOLALITY SERPL CALC.SUM OF ELEC: 290 MOSM/KG (ref 275–295)
PLATELET # BLD AUTO: 321 10(3)UL (ref 150–450)
POTASSIUM SERPL-SCNC: 4.2 MMOL/L (ref 3.5–5.1)
PROT SERPL-MCNC: 7.2 G/DL (ref 5.7–8.2)
RBC # BLD AUTO: 3.95 X10(6)UL
SODIUM SERPL-SCNC: 141 MMOL/L (ref 136–145)
TRIGL SERPL-MCNC: 51 MG/DL (ref 30–149)
TSI SER-ACNC: 1.84 UIU/ML (ref 0.55–4.78)
VLDLC SERPL CALC-MCNC: 10 MG/DL (ref 0–30)
WBC # BLD AUTO: 4.7 X10(3) UL (ref 4–11)

## 2025-01-31 PROCEDURE — 36415 COLL VENOUS BLD VENIPUNCTURE: CPT

## 2025-01-31 PROCEDURE — 84443 ASSAY THYROID STIM HORMONE: CPT

## 2025-01-31 PROCEDURE — 97110 THERAPEUTIC EXERCISES: CPT

## 2025-01-31 PROCEDURE — 3008F BODY MASS INDEX DOCD: CPT | Performed by: INTERNAL MEDICINE

## 2025-01-31 PROCEDURE — 3078F DIAST BP <80 MM HG: CPT | Performed by: INTERNAL MEDICINE

## 2025-01-31 PROCEDURE — 80061 LIPID PANEL: CPT

## 2025-01-31 PROCEDURE — 99204 OFFICE O/P NEW MOD 45 MIN: CPT | Performed by: INTERNAL MEDICINE

## 2025-01-31 PROCEDURE — 85025 COMPLETE CBC W/AUTO DIFF WBC: CPT

## 2025-01-31 PROCEDURE — 3074F SYST BP LT 130 MM HG: CPT | Performed by: INTERNAL MEDICINE

## 2025-01-31 PROCEDURE — 83036 HEMOGLOBIN GLYCOSYLATED A1C: CPT

## 2025-01-31 PROCEDURE — 80053 COMPREHEN METABOLIC PANEL: CPT

## 2025-01-31 NOTE — H&P
[facility], [address], [place]    History and Physical    Manjit Matt Patient Status:  Specimen    1975 MRN [id number]   Location [facility], [address], [place] Attending No att. providers found   Hosp Day # 0 PCP Ravi Flynn MD     Date:  2025    History provided by:patient  HPI:     Chief Complaint   Patient presents with    Consult     HPI    This is a very pleasant 49-year-old female with history of congenital pulmonary stenosis who underwent valvuloplasty in  and also she has a history of DVT on Coumadin and history of severe PVD required right leg bypass surgery in .    No history of smoking or vaping with no occupational exposure with no autoimmune disease  Family history strong for IPF with her father and uncle and aunt who lung transplant .    Patient had chronic dry cough  No dyspnea or dyspnea upon exertion  No history of TB or exposure  No chest pain orthopnea PND  No chest pain or lower extremity edema or pain or calf tenderness    History     Past Medical History:    Congenital pulmonary stenosis (HCC)    congenital pulmonary aa stenosis    Congenital pulmonary stenosis (HCC)    SX at 20 y/o / valvuloplasty     Deep vein thrombosis (HCC)    Disease of vein    vein/artery disease    Heart murmur    since birth    Hematologic disorder    Thromboembolic Event    History of blood clots    History of blood transfusion    Peripheral vascular disease    Right leg arterial bypass ,    PONV (postoperative nausea and vomiting)    Popliteal artery thrombosis (HCC)    Rt popliteal artery thrombosis - 15cm; Right Lower Extremity thrombosis; was on coumadin for 6 months than on plavix and aggrenox until . w/u negative for coagulopathy but was anticoagulated during pregnancies     Tendinitis    discontinue insole / rx ibuprofen    Valvular disease    Pulmonic stenosis    Visual impairment    glasses     Past Surgical History:    Procedure Laterality Date    Appendectomy      Appendectomy            Cath bare metal stent (bms)      3 stents right leg    Colonoscopy N/A 2024    Procedure: COLONOSCOPY;  Surgeon: Sandrita Saab MD;  Location: [facility]    Cyst aspiration left Left 2023    US bx    Endometrial ablation      Hysterectomy  10/2018    Laparoscopic hysterectomy with Dr. Dueñas.  Had post op vaginal bleeding and repeat suturing of vaginal cuff.    Leg/ankle surgery proc unlisted      right leg arterial bypass / (fasciotomy)    Needle biopsy left Left 2023    US bx    Other surgical history      SX at 20 y/o / valvuloplasty (congenital Pulmonic stenosis)    Other surgical history Left 2024    ankle     Family History   Problem Relation Age of Onset    Other (Other) Father         ipf    No Known Problems Mother     Other (Other) Maternal Grandmother         Fibrocystic Breast Disease    Cancer Paternal Grandfather         lung    No Known Problems Sister     Bipolar Disorder Brother     Breast Cancer Maternal Aunt         40's,  BRCA negative    Breast Cancer Maternal Aunt         mat great great aunt breast ca unknown age    Ovarian Cancer Neg     Uterine Cancer Neg     Colon Cancer Neg      Social History:  Social History     Socioeconomic History    Marital status:    Tobacco Use    Smoking status: Never     Passive exposure: Never    Smokeless tobacco: Never   Vaping Use    Vaping status: Never Used   Substance and Sexual Activity    Alcohol use: Yes     Comment: rare    Drug use: No    Sexual activity: Yes     Partners: Male   Other Topics Concern    Caffeine Concern Yes     Comment: coffee, 1-2 cups daily    Pt has a pacemaker No    Pt has a defibrillator No    Reaction to local anesthetic No     Allergies/Medications:   Allergies: Allergies[1]  (Not in a hospital admission)      Review of Systems:     Constitutional: Negative.    HENT:  Negative for  congestion.    Respiratory:  Positive for cough. Negative for shortness of breath and wheezing.    Cardiovascular: Negative.    Gastrointestinal: Negative.    Musculoskeletal: Negative.    Skin: Negative.    Neurological: Negative.    Hematological: Negative.    Psychiatric/Behavioral: Negative.         Physical Exam:   Vital Signs:  Blood pressure 100/70, pulse 81, resp. rate 16, height 4' 11\" (1.499 m), weight 137 lb 3 oz (62.2 kg), last menstrual period 09/27/2018, SpO2 98%.  Physical Exam  Constitutional:       General: She is not in acute distress.     Appearance: Normal appearance. She is not ill-appearing.   HENT:      Head: Normocephalic and atraumatic.      Nose: Nose normal.      Mouth/Throat:      Mouth: Mucous membranes are dry.   Eyes:      General: No scleral icterus.     Pupils: Pupils are equal, round, and reactive to light.   Cardiovascular:      Rate and Rhythm: Normal rate.      Heart sounds: No murmur heard.     No gallop.   Pulmonary:      Effort: No respiratory distress.      Breath sounds: No stridor. No wheezing, rhonchi or rales.   Chest:      Chest wall: No tenderness.   Abdominal:      General: Abdomen is flat. Bowel sounds are normal. There is no distension.      Palpations: Abdomen is soft. There is no mass.      Tenderness: There is no guarding.   Musculoskeletal:      Cervical back: No rigidity.      Right lower leg: No edema.      Left lower leg: No edema.   Lymphadenopathy:      Cervical: No cervical adenopathy.   Skin:     General: Skin is dry.   Neurological:      Mental Status: She is oriented to person, place, and time.           Results:     Lab Results   Component Value Date    WBC 4.7 01/31/2025    HGB 13.3 01/31/2025    HCT 38.7 01/31/2025    .0 01/31/2025    CREATSERUM 0.87 01/31/2025    BUN 10 01/31/2025     01/31/2025    K 4.2 01/31/2025     01/31/2025    CO2 27.0 01/31/2025    GLU 87 01/31/2025    CA 9.7 01/31/2025    ALB 4.4 01/31/2025    ALKPHO 75  01/31/2025    BILT 0.5 01/31/2025    TP 7.2 01/31/2025    AST 24 01/31/2025    ALT 13 01/31/2025    PTT 41.1 (H) 10/27/2018    INR 1.9 (A) 01/25/2025    PT 23.2 (H) 04/01/2016    TSH 1.845 01/31/2025    ESRML 14 07/15/2022    CRP 0.38 (H) 07/26/2022         Assessment/Plan:     1-chronic dry cough   extensive family history of IPF ( Father and uncle and aunt who had lung transplant )  No history of smoking or vaping  No occupational exposures  Normal lung exam with normal O2 sat on room air  Doubt ILD .    Plan :  HRCT chest CT  Reassurance  Follow-up in 6-months    2-history of congenital pulmonary valve stenosis  S/p angioplasty in 1995    3-prior history of PVD  S/p bypass surgery in 2001  Patient on Coumadin and followed by vascular surgery    HRCT   F/u in 6 months                   Alana Padilla MD  1/31/2025         [1]   Allergies  Allergen Reactions    Levaquin [Levofloxacin] HIVES and SWELLING     Other reaction(s): LEVOFLOXACIN

## 2025-02-02 ENCOUNTER — TELEPHONE (OUTPATIENT)
Dept: INTERNAL MEDICINE CLINIC | Facility: CLINIC | Age: 50
End: 2025-02-02

## 2025-02-02 ENCOUNTER — PATIENT MESSAGE (OUTPATIENT)
Dept: INTERNAL MEDICINE CLINIC | Facility: CLINIC | Age: 50
End: 2025-02-02

## 2025-02-02 DIAGNOSIS — E78.00 HYPERCHOLESTEREMIA: Primary | ICD-10-CM

## 2025-02-02 NOTE — PROGRESS NOTES
Diagnosis:   Pain in Achilles tendon (M76.60)  Achilles tendinosis of left ankle (M67.874)        Referring Provider: Yarelis  Date of Evaluation:    2/13/2024, 7/8/2024    Precautions:   Next MD visit:   none scheduled  Date of Surgery: 6/20/2024 (Left achilles debridement, peroneal tendon debridement and repair, PRP)    Insurance Primary/Secondary: [employer] [place] H[place] / N/A     # Auth Visits: -    Subjective:  Has continued to progress ankle.  Did a lot of walking and some dancing on vacation, and had only temporary soreness L lat ankle (1-2 days).  Eager to progress, but realistically considering what her goals are at this time.  Also, has been seeing other therapist for TMJ, for which symptoms are resolved and she is Dc'd for her TMJ.  Objective: See below treatment log.   Assessment: Good ROM and strength/activity tolerance for basic community walking, work tasks.  Pt has not yet attempted impact/agility activities.  Goals:   Pt report min/no pain or tightness L LE  Pt independent with HEP and progression  Pt able to run, jump, hop, skip, cut, sprint, backpeddle -- all without pain or sense of instability    Plan: Progress open and closed chain strengthening.  Date: 12/16/2024  Tx: 31 (post-op)  (Total 52/55) Date: 12/20/2024  Tx:32 (post op)  (Total 53/55) Date: 1/31/2025  Tx #: 4/10 (2025)   Ther Ex (30')  - Upright bike 6'  - toe scrunches, yoga (extension) and spreading - seated with foot on floor  - B Standing balance board B teetering AP and side/side  - Standing (L 100% WB) BAPS L3 AP, ML, CW, CCW  - Slant board calf stretch level 2  - Shuttle B leg press 5b x30  - Shuttle B calf raise 5b (only from foot plate) w/ eccentric lowering L  - Shuttle L leg press 5b x30  - tandem gait fwd/bwd on level and foam  - Lat walk  - braiding Ther Ex (30')  - Upright bike 6'  - toe scrunches, yoga (extension) and spreading - seated with foot on floor  - B Standing balance board B teetering AP and side/side  - Standing (L 100%  WB) BAPS L3 AP, ML, CW, CCW  - Slant board calf stretch level 2  - Shuttle B leg press 5b x30  - Standing B calf raise on floor w/ eccentric lowering L; B calf raises x25  - Shuttle L leg press 5b x30  - tandem gait fwd/bwd on level and foam  - Lat walk  - braiding Ther Ex (45')  - Upright bike 6'  - toe scrunches, yoga (extension) and spreading - seated with foot on floor  - B Standing balance board B teetering AP and side/side  - Standing (L 100% WB) BAPS L3 AP, ML, CW, CCW  - Slant board calf stretch level 2  - Shuttle B leg press 5b x30; L leg press 5b x30; B leg jump 2b x30;  - Standing B calf raise on floor w/ eccentric lowering L; B calf raises x25  - tandem gait fwd/bwd on level and foam  - Lat walk  - braiding   Manual (15')  - PROM L ankle/foot  - gentle joint mobilization toes/mid/forefoot  - desensitization rubbing, patting and tapping to L lower leg, ankle, foot Manual (15')  - PROM L ankle/foot  - gentle joint mobilization toes/mid/forefoot  - desensitization rubbing, patting and tapping to L lower leg, ankle, foot -             HEP: AROM ankle/toes    Charges: Ther Ex x3    Total Timed Treatment: 45 min  Total Treatment Time: 45 min

## 2025-02-03 NOTE — TELEPHONE ENCOUNTER
Dr. Flynn, please see update from patient and advise. Thanks.    Please respond directly to the patient if no additional staff support is required.     Per 1/31/25 results:       Clive Saravia     Your cbc, chem panel, tsh and A1c were all good.  Your total cholesterol and LDL bad cholesterol went up and almost double of last year.  Make sure to ff low fat low cholesterol diet. Lets recheck again in 3mos; if still high then will need to consider cholesterol statin med .   Written by Ravi Flynn MD on 2/2/2025  9:40 AM CST  Seen by patient Manjit Matt on 2/2/2025  1:16 PM

## 2025-02-06 ENCOUNTER — HOSPITAL ENCOUNTER (OUTPATIENT)
Dept: MAMMOGRAPHY | Facility: HOSPITAL | Age: 50
Discharge: HOME | End: 2025-02-06
Attending: INTERNAL MEDICINE
Payer: COMMERCIAL

## 2025-02-06 DIAGNOSIS — Z12.31 ENCOUNTER FOR SCREENING MAMMOGRAM FOR BREAST CANCER: ICD-10-CM

## 2025-02-06 PROCEDURE — 77067 SCR MAMMO BI INCL CAD: CPT | Performed by: INTERNAL MEDICINE

## 2025-02-06 PROCEDURE — 77063 BREAST TOMOSYNTHESIS BI: CPT | Performed by: INTERNAL MEDICINE

## 2025-02-06 RX ORDER — EZETIMIBE 10 MG/1
10 TABLET ORAL NIGHTLY
Qty: 90 TABLET | Refills: 0 | Status: SHIPPED | OUTPATIENT
Start: 2025-02-06

## 2025-02-07 ENCOUNTER — OFFICE VISIT (OUTPATIENT)
Dept: PHYSICAL THERAPY | Age: 50
End: 2025-02-07
Attending: ORTHOPAEDIC SURGERY
Payer: COMMERCIAL

## 2025-02-07 PROCEDURE — 97110 THERAPEUTIC EXERCISES: CPT

## 2025-02-09 ENCOUNTER — TELEPHONE (OUTPATIENT)
Dept: INTERNAL MEDICINE CLINIC | Facility: CLINIC | Age: 50
End: 2025-02-09

## 2025-02-09 DIAGNOSIS — R92.30 DENSE BREASTS: Primary | ICD-10-CM

## 2025-02-09 NOTE — PROGRESS NOTES
Diagnosis:   Pain in Achilles tendon (M76.60)  Achilles tendinosis of left ankle (M67.874)        Referring Provider: Yarelis  Date of Evaluation:    2/13/2024, 7/8/2024    Precautions:   Next MD visit:   none scheduled  Date of Surgery: 6/20/2024 (Left achilles debridement, peroneal tendon debridement and repair, PRP)    Insurance Primary/Secondary: [employer] [place] / N/A     # Auth Visits: -    Subjective:  Doing alright.  Has had some occasional discomfort L ankle, and to a lesser extent the achilles.  Eager to progress.  Objective: See below treatment log.   Assessment: Good tolerance for Wb'ing challenge, but some apprehension and limited tolerance for early impact.  Goals:   Pt report min/no pain or tightness L LE  Pt independent with HEP and progression  Pt able to run, jump, hop, skip, cut, sprint, backpeddle -- all without pain or sense of instability    Plan: Progress open and closed chain strengthening.  Date: 12/16/2024  Tx: 31 (post-op)  (Total 52/55) Date: 12/20/2024  Tx:32 (post op)  (Total 53/55) Date: 1/31/2025  Tx #: 4/10 (2025) Date: 2/7/2025  Tx#: 5/10 (2025)   Ther Ex (30')  - Upright bike 6'  - toe scrunches, yoga (extension) and spreading - seated with foot on floor  - B Standing balance board B teetering AP and side/side  - Standing (L 100% WB) BAPS L3 AP, ML, CW, CCW  - Slant board calf stretch level 2  - Shuttle B leg press 5b x30  - Shuttle B calf raise 5b (only from foot plate) w/ eccentric lowering L  - Shuttle L leg press 5b x30  - tandem gait fwd/bwd on level and foam  - Lat walk  - braiding Ther Ex (30')  - Upright bike 6'  - toe scrunches, yoga (extension) and spreading - seated with foot on floor  - B Standing balance board B teetering AP and side/side  - Standing (L 100% WB) BAPS L3 AP, ML, CW, CCW  - Slant board calf stretch level 2  - Shuttle B leg press 5b x30  - Standing B calf raise on floor w/ eccentric lowering L; B calf raises x25  - Shuttle L leg press 5b x30  - tandem gait  fwd/bwd on level and foam  - Lat walk  - braiding Ther Ex (45')  - Upright bike 6'  - toe scrunches, yoga (extension) and spreading - seated with foot on floor  - B Standing balance board B teetering AP and side/side  - Standing (L 100% WB) BAPS L3 AP, ML, CW, CCW  - Slant board calf stretch level 2  - Shuttle B leg press 5b x30; L leg press 5b x30; B leg jump 2b x30;  - Standing B calf raise on floor w/ eccentric lowering L; B calf raises x25  - tandem gait fwd/bwd on level and foam  - Lat walk  - braiding Ther Ex (45')  - Upright bike 6'  - toe scrunches, yoga (extension) and spreading - seated with foot on floor  - B Standing balance board B teetering AP and side/side  - Standing (L 100% WB) BAPS L3 AP, ML, CW, CCW  - Slant board calf stretch level 2  - Shuttle B leg press 5b x30; L leg press 5b x30; B leg jump 2b x30;  - Trampoline B bounce/jump 30\"  - Standing B calf raise on floor w/ eccentric lowering L; B calf raises x25  - tandem gait fwd/bwd on level and foam  - Lat walk  - braiding   Manual (15')  - PROM L ankle/foot  - gentle joint mobilization toes/mid/forefoot  - desensitization rubbing, patting and tapping to L lower leg, ankle, foot Manual (15')  - PROM L ankle/foot  - gentle joint mobilization toes/mid/forefoot  - desensitization rubbing, patting and tapping to L lower leg, ankle, foot -                HEP: AROM ankle/toes    Charges: Ther Ex x3    Total Timed Treatment: 45 min  Total Treatment Time: 45 min

## 2025-02-11 ENCOUNTER — ANTI-COAG VISIT (OUTPATIENT)
Dept: ANTICOAGULATION | Facility: CLINIC | Age: 50
End: 2025-02-11

## 2025-02-11 ENCOUNTER — OFFICE VISIT (OUTPATIENT)
Dept: PHYSICAL THERAPY | Age: 50
End: 2025-02-11
Attending: ORTHOPAEDIC SURGERY
Payer: COMMERCIAL

## 2025-02-11 DIAGNOSIS — Z51.81 MONITORING FOR LONG-TERM ANTICOAGULANT USE: ICD-10-CM

## 2025-02-11 DIAGNOSIS — Z79.01 LONG TERM (CURRENT) USE OF ANTICOAGULANTS: Primary | ICD-10-CM

## 2025-02-11 DIAGNOSIS — I73.9 PERIPHERAL VASCULAR DISEASE: ICD-10-CM

## 2025-02-11 DIAGNOSIS — Z79.01 MONITORING FOR LONG-TERM ANTICOAGULANT USE: ICD-10-CM

## 2025-02-11 DIAGNOSIS — I37.9 PULMONIC VALVE DISEASE: ICD-10-CM

## 2025-02-11 LAB — INR: 1.8 (ref 2–3)

## 2025-02-11 PROCEDURE — 97112 NEUROMUSCULAR REEDUCATION: CPT

## 2025-02-11 PROCEDURE — 97110 THERAPEUTIC EXERCISES: CPT

## 2025-02-11 PROCEDURE — 93793 ANTICOAG MGMT PT WARFARIN: CPT | Performed by: FAMILY MEDICINE

## 2025-02-11 NOTE — PROGRESS NOTES
[facility] / INR 1.8, sub therapeutic. (Goal 2.5 ) TTR 69.3 %     Etiology: running low x 2 so we agreed to increase. She stopped statin in Sept last year so a dose increase makes sense.  Aslo newly on zetia.    PLAN: 1.5mg Fridays too.    Recheck INR 2 weeks.    Pt reports:    No sign of unusual bruising or bleeding.  Any missed doses: No   Medications changes: No  Diet changes:  No    Contacted  Manjit by phone  who verbalized understanding and agreement.    WARFARIN Plan per protocol: 1.5 mg every Mon, Fri; 1 mg all other days

## 2025-02-11 NOTE — PROGRESS NOTES
Diagnosis:   Pain in Achilles tendon (M76.60)  Achilles tendinosis of left ankle (M67.874)        Referring Provider: Yarelis  Date of Evaluation:    2/13/2024, 7/8/2024    Precautions:   Next MD visit:   none scheduled  Date of Surgery: 6/20/2024 (Left achilles debridement, peroneal tendon debridement and repair, PRP)    Insurance Primary/Secondary[place] / N/[place]     # Auth Visits: -    Subjective:  Happy that when she exercises, she feels more limited by weakness/fatigue, not by pain.  Objective: See below treatment log.   Assessment: Fatigue and weakness noted especially during PF in Wb'ing, but also with all balance integrated challenges.  Goals:   Pt report min/no pain or tightness L LE  Pt independent with HEP and progression  Pt able to run, jump, hop, skip, cut, sprint, backpeddle -- all without pain or sense of instability    Plan: Progress open and closed chain strengthening.  Date: 12/16/2024  Tx: 31 (post-op)  (Total 52/55) Date: 12/20/2024  Tx:32 (post op)  (Total 53/55) Date: 1/31/2025  Tx #: 4/10 (2025) Date: 2/7/2025  Tx#: 5/10 (2025) Date: 2/11/2025  Tx#: 6/10 (2025)   Ther Ex (30')  - Upright bike 6'  - toe scrunches, yoga (extension) and spreading - seated with foot on floor  - B Standing balance board B teetering AP and side/side  - Standing (L 100% WB) BAPS L3 AP, ML, CW, CCW  - Slant board calf stretch level 2  - Shuttle B leg press 5b x30  - Shuttle B calf raise 5b (only from foot plate) w/ eccentric lowering L  - Shuttle L leg press 5b x30  - tandem gait fwd/bwd on level and foam  - Lat walk  - braiding Ther Ex (30')  - Upright bike 6'  - toe scrunches, yoga (extension) and spreading - seated with foot on floor  - B Standing balance board B teetering AP and side/side  - Standing (L 100% WB) BAPS L3 AP, ML, CW, CCW  - Slant board calf stretch level 2  - Shuttle B leg press 5b x30  - Standing B calf raise on floor w/ eccentric lowering L; B calf raises x25  - Shuttle L leg press 5b x30  -  tandem gait fwd/bwd on level and foam  - Lat walk  - braiding Ther Ex (45')  - Upright bike 6'  - toe scrunches, yoga (extension) and spreading - seated with foot on floor  - B Standing balance board B teetering AP and side/side  - Standing (L 100% WB) BAPS L3 AP, ML, CW, CCW  - Slant board calf stretch level 2  - Shuttle B leg press 5b x30; L leg press 5b x30; B leg jump 2b x30;  - Standing B calf raise on floor w/ eccentric lowering L; B calf raises x25  - tandem gait fwd/bwd on level and foam  - Lat walk  - braiding Ther Ex (45')  - Upright bike 6'  - toe scrunches, yoga (extension) and spreading - seated with foot on floor  - B Standing balance board B teetering AP and side/side  - Standing (L 100% WB) BAPS L3 AP, ML, CW, CCW  - Slant board calf stretch level 2  - Shuttle B leg press 5b x30; L leg press 5b x30; B leg jump 2b x30;  - Trampoline B bounce/jump 30\"  - Standing B calf raise on floor w/ eccentric lowering L; B calf raises x25  - tandem gait fwd/bwd on level and foam  - Lat walk  - braiding Ther Ex (30')  - Upright bike 6'  - toe scrunches, yoga (extension) and spreading - seated with foot on floor  - B Standing balance board B teetering AP and side/side  - Standing (L 100% WB) BAPS L3 AP, ML, CW, CCW  - Slant board calf stretch level 2  - Shuttle B leg press 5b x30; L leg press 5b x30; B leg jump 2b x30;  - Standing B calf raise on floor w/ eccentric lowering L; B calf raises x25  - tandem gait fwd/bwd on level and foam  - Lat walk  - braiding   Manual (15')  - PROM L ankle/foot  - gentle joint mobilization toes/mid/forefoot  - desensitization rubbing, patting and tapping to L lower leg, ankle, foot Manual (15')  - PROM L ankle/foot  - gentle joint mobilization toes/mid/forefoot  - desensitization rubbing, patting and tapping to L lower leg, ankle, foot -  Neuro Re-Ed (15')  - BOSU B squat  - ROSA ELENA SLS w/ opp LE 3 way kicks  - ROSA ELENA lat step up/overs  - ROSA ELENA fwd step up with opp hip/knee flex                  HEP: AROM ankle/toes    Charges: Ther Ex x2, Neuro Re-Ed    Total Timed Treatment: 45 min  Total Treatment Time: 45 min   Melolabial Transposition Flap Text: The defect edges were debeveled with a #15 scalpel blade. Given the location of the defect and the proximity to free margins a melolabial flap was deemed most appropriate. Using a sterile surgical marker, an appropriate melolabial transposition flap was drawn incorporating the defect. The area thus outlined was incised deep to adipose tissue with a #15 scalpel blade. The skin margins were undermined to an appropriate distance in all directions utilizing iris scissors. Following this, the designed flap was carried over into the primary defect and sutured into place.

## 2025-02-14 ENCOUNTER — APPOINTMENT (OUTPATIENT)
Dept: PHYSICAL THERAPY | Age: 50
End: 2025-02-14
Attending: ORTHOPAEDIC SURGERY
Payer: COMMERCIAL

## 2025-02-18 ENCOUNTER — OFFICE VISIT (OUTPATIENT)
Dept: OBGYN CLINIC | Facility: CLINIC | Age: 50
End: 2025-02-18

## 2025-02-18 VITALS
WEIGHT: 133.94 LBS | DIASTOLIC BLOOD PRESSURE: 75 MMHG | BODY MASS INDEX: 27 KG/M2 | HEIGHT: 59 IN | SYSTOLIC BLOOD PRESSURE: 116 MMHG

## 2025-02-18 DIAGNOSIS — E78.5 DYSLIPIDEMIA: ICD-10-CM

## 2025-02-18 DIAGNOSIS — N95.2 VAGINAL ATROPHY: ICD-10-CM

## 2025-02-18 DIAGNOSIS — Z01.419 ENCOUNTER FOR WELL WOMAN EXAM WITH ROUTINE GYNECOLOGICAL EXAM: Primary | ICD-10-CM

## 2025-02-18 PROCEDURE — 3074F SYST BP LT 130 MM HG: CPT | Performed by: OBSTETRICS & GYNECOLOGY

## 2025-02-18 PROCEDURE — 3008F BODY MASS INDEX DOCD: CPT | Performed by: OBSTETRICS & GYNECOLOGY

## 2025-02-18 PROCEDURE — 3078F DIAST BP <80 MM HG: CPT | Performed by: OBSTETRICS & GYNECOLOGY

## 2025-02-18 PROCEDURE — 99396 PREV VISIT EST AGE 40-64: CPT | Performed by: OBSTETRICS & GYNECOLOGY

## 2025-02-18 RX ORDER — ESTRADIOL 0.1 MG/G
1 CREAM VAGINAL AS DIRECTED
Qty: 42.5 G | Refills: 4 | Status: SHIPPED | OUTPATIENT
Start: 2025-02-18

## 2025-02-18 NOTE — PROGRESS NOTES
[facility]  Obstetrics and Gynecology  Gynecology Visit    Chief Complaint   Patient presents with    Annual     Reviewed Preventative/Wellness form with patient.        Manjit Matt is a 49 year old female who presents for annual.    LMP: hysterectomy .    Menses regular: none .    Menstrual flow normal: none .    Birth control or HRT:  none .   Refill none   Last Pap Smear: hysterectomy .  Any history of abnormal paps: none    Last MM2025   Any Medication Refills needed today?:  none  Sleep: 7-8 hours .    Diet: none.    Exercise:  every day walking .   Screening labs/Blood work today: none .     Colonoscopy (if over 46 y/o): 24 .   Gardasil:(age 9-46 y/o)  n/a   Genetic Cancer screen (if indicated): none .   Flu (Aug-April): up to date .TDAP (every 10 years) up to date .      Additional Problems/concerns: no other concerns .      Next Appt: 26     Immunization History   Administered Date(s) Administered    Covid-19 Vaccine Moderna 100 mcg/0.5 ml 2021, 2021, 2021    Covid-19 Vaccine Moderna Bivalent 50mcg/0.5mL 12+ years 2023    FLU VAC QIV SPLIT 3 YRS AND OLDER (57221) 10/01/2016, 10/01/2017, 10/01/2018, 10/01/2019, 10/05/2020, 11/15/2021    FLULAVAL 6 months & older 0.5 ml Prefilled syringe (87367) 10/05/2018, 2022    FLUZONE 6 months and older PFS 0.5 ml (12570) 10/05/2018    Influenza 10/14/2016, 10/26/2017, 2023, 10/25/2024    TDAP 2023         Current Outpatient Medications:     ezetimibe 10 MG Oral Tab, Take 1 tablet (10 mg total) by mouth nightly., Disp: 90 tablet, Rfl: 0    Coenzyme Q10 (CO Q 10) 100 MG Oral Cap, Take by mouth daily., Disp: , Rfl:     warfarin 1 MG Oral Tab, TAKE 1& 1/2 TABLETS BY MOUTH ON MONDAY, WEDNESDAY, FRIDAY, AND 1 TABLET BY MOUTH ALL OTHER DAYS., Disp: 120 tablet, Rfl: 1    acidophilus-pectin Oral Cap, Take 1 capsule by mouth daily., Disp: , Rfl:     CILOSTAZOL 100 MG Oral Tab, TAKE 1 TABLET(100 MG) BY MOUTH TWICE  DAILY, Disp: 60 tablet, Rfl: 11    ASPIRIN EC LOW DOSE 81 MG Oral Tab EC, Take 1 tablet (81 mg total) by mouth daily., Disp: , Rfl: 5    Acetaminophen 500 MG Oral Cap, Take 2 capsules (1,000 mg total) by mouth every 8 (eight) hours as needed for Pain. Never exceed 3000 mg in a 24-hour period.  Many over-the-counter medications also have acetaminophen in them., Disp: 180 capsule, Rfl: 0    Allergies[1]    OB History    Para Term  AB Living   3 2 2 0 1 2   SAB IAB Ectopic Multiple Live Births   1 0 0 0 2      # Outcome Date GA Lbr Enrique/2nd Weight Sex Type Anes PTL Lv   3 Term 2006    M Caesarean   KEITH   2 SAB 2004     SAB   FD   1 Term  38w0d   F Caesarean   KEITH       Gyn History       No data recorded       No data to display                    Past Medical History:    Congenital pulmonary stenosis (HCC)    congenital pulmonary aa stenosis    Congenital pulmonary stenosis (HCC)    SX at 18 y/o / valvuloplasty     Deep vein thrombosis (HCC)    Disease of vein    vein/artery disease    Dysmenorrhea    Dyspareunia    Sometimes    Heart murmur    since birth    Hematologic disorder    Thromboembolic Event    History of blood clots    History of blood transfusion    Hyperlipidemia    Starting meds    Peripheral vascular disease    Right leg arterial bypass ,    PONV (postoperative nausea and vomiting)    Popliteal artery thrombosis (HCC)    Rt popliteal artery thrombosis - 15cm; Right Lower Extremity thrombosis; was on coumadin for 6 months than on plavix and aggrenox until . w/u negative for coagulopathy but was anticoagulated during pregnancies     Tendinitis    discontinue insole / rx ibuprofen    Valvular disease    Pulmonic stenosis    Visual impairment    glasses       Past Surgical History:   Procedure Laterality Date    Appendectomy      Appendectomy            Cath bare metal stent (bms)      3 stents right leg    Colonoscopy N/A 2024    Procedure:  COLONOSCOPY;  Surgeon: Sandrita Saab MD;  Location: [facility]    Cyst aspiration left Left 09/26/2023    US bx    Endometrial ablation  2015    Hysterectomy  10/2018    Laparoscopic hysterectomy with Dr. Dueñas.  Had post op vaginal bleeding and repeat suturing of vaginal cuff.    Leg/ankle surgery proc unlisted  2001    right leg arterial bypass / (fasciotomy)    Needle biopsy left Left 09/26/2023    US bx    Other surgical history  1995    SX at 20 y/o / valvuloplasty (congenital Pulmonic stenosis)    Other surgical history Left 06/2024    ankle    Tubal ligation  2006       Family History   Problem Relation Age of Onset    Other (Other) Father         ipf    No Known Problems Mother     Other (Other) Maternal Grandmother         Fibrocystic Breast Disease    Cancer Paternal Grandfather         lung    No Known Problems Sister     Bipolar Disorder Brother     Breast Cancer Maternal Aunt         40's,  BRCA negative    Breast Cancer Maternal Aunt         mat great great aunt breast ca unknown age    Ovarian Cancer Neg     Uterine Cancer Neg     Colon Cancer Neg         Tobacco  Meds  Soc Hx        Social History     Socioeconomic History    Marital status:      Spouse name: Not on file    Number of children: Not on file    Years of education: Not on file    Highest education level: Not on file   Occupational History    Not on file   Tobacco Use    Smoking status: Never     Passive exposure: Never    Smokeless tobacco: Never   Vaping Use    Vaping status: Never Used   Substance and Sexual Activity    Alcohol use: Yes     Alcohol/week: 1.0 standard drink of alcohol     Types: 1 Glasses of wine per week     Comment: Social    Drug use: No    Sexual activity: Yes     Partners: Male   Other Topics Concern     Service Not Asked    Blood Transfusions Not Asked    Caffeine Concern Yes     Comment: coffee, 1-2 cups daily    Occupational Exposure Not Asked    Hobby Hazards Not Asked    Sleep  Concern Not Asked    Stress Concern Not Asked    Weight Concern Not Asked    Special Diet Not Asked    Back Care Not Asked    Exercise Not Asked    Bike Helmet Not Asked    Seat Belt Not Asked    Self-Exams Not Asked    Grew up on a farm Not Asked    History of tanning Not Asked    Outdoor occupation Not Asked    Pt has a pacemaker No    Pt has a defibrillator No    Breast feeding Not Asked    Reaction to local anesthetic No   Social History Narrative    Not on file     Social Drivers of Health     Food Insecurity: Not on file   Transportation Needs: Not on file   Stress: Not on file   Housing Stability: Not on file         /75   Ht 4' 11\" (1.499 m)   Wt 133 lb 14.9 oz   LMP 09/27/2018   BMI 27.05 kg/m²     Wt Readings from Last 3 Encounters:   02/18/25 133 lb 14.9 oz   01/31/25 137 lb 3 oz   01/10/25 137 lb 3 oz         Health Maintenance   Topic Date Due    Influenza Vaccine (1) 08/01/2021    Screen for Cervical Cancer 11/05/2021    DTaP,Tdap and Td Vaccines (3 - Td or Tdap) 03/18/2025    Hepatitis C Screening Completed    HIV Screening Completed    COVID-19 Vaccine Completed     Review of Systems   General: Present- Feeling well. Not Present- Chills, Fever, Weight Gain and Weight Loss.  HEENT: Not Present- Headache and Sore Throat.  Respiratory: Not Present- Cough, Difficulty Breathing, Hemoptysis and Sputum Production.  Cardiovascular: Not Present- Chest Pain, Elevated Blood Pressure, Fainting / Blacking Out and Shortness of Breath.  Gastrointestinal: Not Present- Constipation, Diarrhea, Nausea and Vomiting.  Female Genitourinary: Not Present- Discharge, Dysuria and Frequency.  Musculoskeletal: Not Present- Leg Cramps and Swelling of Extremities.  Neurological: Not Present- Dizziness and Headaches.  Psychiatric: Not Present- Anxiety and Depression.  Endocrine: Not Present- Appetite Changes, Hair Changes and Thyroid Problems.  Hematology: Not Present- Easy Bruising and Excessive bleeding.  All other  systems negative     Physical Exam The physical exam findings are as follows:     General   Mental Status - Alert. General Appearance - Cooperative. Orientation - Oriented X4. Build & Nutrition - Well nourished.    Head and Neck  Thyroid   Gland Characteristics - normal size and consistency.    Chest and Lung Exam   Inspection:   Chest Wall: - Normal.  Percussion:   Quality and Intensity: - Percussion normal.  Palpation: - Palpation normal.  Auscultation:   Breath sounds: - Normal.  Adventitious sounds: - No Adventitious sounds.    Breast   Nipples: Characteristics - Bilateral - Normal. Discharge - Bilateral - None.  Breast - Bilateral - Symmetric.    Cardiovascular   Auscultation: Rhythm - Regular. Heart Sounds - Normal heart sounds.  Murmurs & Other Heart Sounds: Auscultation of the heart reveals - No Murmurs.    Abdomen   Inspection: Inspection of the abdomen reveals - No Hernias. Incisional scars - no incisional scars.  Palpation/Percussion: Palpation and Percussion of the abdomen reveal - Non Tender and No Palpable abdominal masses.  Liver: - Normal.  Auscultation: Auscultation of the abdomen reveals - Bowel sounds normal.    Female Genitourinary     External Genitalia   Perineum - Normal. Bartholin's Gland - Bilateral - Normal. Clitoris - Normal.  Introitus: Characteristics - No Cystocele, Enterocele or Rectocele. Discharge - None.  Labia Majora: Lesions - Bilateral - None. Characteristics - Bilateral - Normal.  Labia Minora: Lesions - Bilateral - None. Characteristics - Bilateral - Normal.  Urethra: Characteristics - Normal. Discharge - None.  [place] Gland - Bilateral - Normal.  Vulva: Characteristics - Normal. Lesions - None.    Bimanual   Vagina:   Vaginal Wall: - Normal.  Vaginal Lesions - None. Vaginal Mucosa - Normal.  Cervix: Characteristics - No Motion tenderness. Discharge - None.  Uterus: Characteristics - Normal. Position - Midposition.  Adnexa: Characteristics - Bilateral - Normal. Masses - No  Adnexal Masses.  Vaginal discharge - Clear  and Thin. Amine Odor - Absent.     Rectal   Anorectal Exam: External - normal external exam.    Peripheral Vascular   Upper Extremity:   Palpation: - Pulses bilaterally normal.  Lower Extremity: Inspection - Bilateral - Inspection Normal.  Palpation: Edema - Bilateral - No edema.    Neurologic   Mental Status: - Normal.    Lymphatic  General Lymphatics   Description - Normal .       1. Encounter for well woman exam with routine gynecological exam    2. Dyslipidemia    3. Vaginal atrophy     __  Pt had questions regarding starting HRT due to perimenopausal symptoms.  Pt has  (H), Cholesterol 239 (H).  Discussed that pt must address lipids first before getting on HRT.  Recommended Apo B labs.   Discussed risks/benefits of vaginal estrogen cream.  Menopause H/O given.    Pt expressed understanding re:above and elects to proceed with vaginal estrogen cream.    Cherri TAMEZ-S      Patient seen and examined, Agree with assessment and plan of care documented by PA student.     Sherry Irby MD                    [1]   Allergies  Allergen Reactions    Levaquin [Levofloxacin] HIVES and SWELLING     Other reaction(s): LEVOFLOXACIN

## 2025-02-26 ENCOUNTER — ANTI-COAG VISIT (OUTPATIENT)
Dept: ANTICOAGULATION | Facility: CLINIC | Age: 50
End: 2025-02-26

## 2025-02-26 DIAGNOSIS — Z79.01 LONG TERM (CURRENT) USE OF ANTICOAGULANTS: Primary | ICD-10-CM

## 2025-02-26 DIAGNOSIS — I37.9 PULMONIC VALVE DISEASE: ICD-10-CM

## 2025-02-26 DIAGNOSIS — I73.9 PERIPHERAL VASCULAR DISEASE: ICD-10-CM

## 2025-02-26 DIAGNOSIS — Z51.81 MONITORING FOR LONG-TERM ANTICOAGULANT USE: ICD-10-CM

## 2025-02-26 DIAGNOSIS — Z79.01 MONITORING FOR LONG-TERM ANTICOAGULANT USE: ICD-10-CM

## 2025-02-26 LAB — INR: 2.3 (ref 2–3)

## 2025-02-26 PROCEDURE — 93793 ANTICOAG MGMT PT WARFARIN: CPT | Performed by: FAMILY MEDICINE

## 2025-02-26 NOTE — PROGRESS NOTES
[facility] / INR 2.3,  therapeutic. (Goal 2.5 ) TTR 69.1 %     Etiology: mostly stable.    PLAN: continue the current dose.    Recheck INR 2 weeks    WARFARIN Plan per protocol: 1.5 mg every Mon, Fri; 1 mg all other days

## 2025-02-27 ENCOUNTER — OFFICE VISIT (OUTPATIENT)
Dept: PHYSICAL THERAPY | Age: 50
End: 2025-02-27
Attending: ORTHOPAEDIC SURGERY
Payer: COMMERCIAL

## 2025-02-27 PROCEDURE — 97110 THERAPEUTIC EXERCISES: CPT

## 2025-02-27 PROCEDURE — 97112 NEUROMUSCULAR REEDUCATION: CPT

## 2025-02-27 NOTE — PROGRESS NOTES
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  Outside of foot was more irritable with beach and walking, but both outside and achilles were swollen just from the air travel too.  Objective: See below treatment log.   Assessment: Good movement control especially improved balance on BOSU ball, but with c/o fatigue/discomfort.  Goals:   Pt report min/no pain or tightness L LE  Pt independent with HEP and progression  Pt able to run, jump, hop, skip, cut, sprint, backpeddle -- all without pain or sense of instability    Plan: Progress open and closed chain strengthening.  Date: 2/7/2025  Tx#: 5/10 (2025) Date: 2/11/2025  Tx#: 6/10 (2025) Date: 2/27/2025  Tx#: 7/10 (2025)   Ther Ex (45')  - Upright bike 6'  - toe scrunches, yoga (extension) and spreading - seated with foot on floor  - B Standing balance board B teetering AP and side/side  - Standing (L 100% WB) BAPS L3 AP, ML, CW, CCW  - Slant board calf stretch level 2  - Shuttle B leg press 5b x30; L leg press 5b x30; B leg jump 2b x30;  - Trampoline B bounce/jump 30\"  - Standing B calf raise on floor w/ eccentric lowering L; B calf raises x25  - tandem gait fwd/bwd on level and foam  - Lat walk  - braiding Ther Ex (30')  - Upright bike 6'  - toe scrunches, yoga (extension) and spreading - seated with foot on floor  - B Standing balance board B teetering AP and side/side  - Standing (L 100% WB) BAPS L3 AP, ML, CW, CCW  - Slant board calf stretch level 2  - Shuttle B leg press 5b x30; L leg press 5b x30; B leg jump 2b x30;  - Standing B calf raise on floor w/ eccentric lowering L; B calf  raises x25  - tandem gait fwd/bwd on level and foam  - Lat walk  - braiding Ther Ex (30')  - Upright bike 6'  - toe scrunches, yoga (extension) and spreading - seated with foot on floor  - B Standing balance board B teetering AP and side/side  - Standing (L 100% WB) BAPS L3 AP, ML, CW, CCW  - Slant board calf stretch level 2  - Shuttle B leg press 5b x30; L leg press 5b x30; B leg jump 2b x30;  - Standing B calf raise on floor w/ eccentric lowering L; B calf raises x25  - tandem gait fwd/bwd on level and foam  - Lat walk  - braiding    Neuro Re-Ed (15')  - BOSU B squat  - BOSU SLS w/ opp LE 3 way kicks  - BOSU lat step up/overs  - BOSU fwd step up with opp hip/knee flex Neuro Re-Ed (15')  - BOSU B squat  - BOSU SLS w/ opp LE 3 way kicks  - BOSU lat step up/overs  - BOSU fwd step up with opp hip/knee flex             HEP: AROM ankle/toes    Charges: Ther Ex x2, Neuro Re-Ed    Total Timed Treatment: 45 min  Total Treatment Time: 45 min

## 2025-03-03 ENCOUNTER — HOSPITAL ENCOUNTER (OUTPATIENT)
Dept: CT IMAGING | Age: 50
Discharge: HOME | End: 2025-03-03
Attending: INTERNAL MEDICINE
Payer: COMMERCIAL

## 2025-03-03 ENCOUNTER — LAB ENCOUNTER (OUTPATIENT)
Dept: LAB | Age: 50
End: 2025-03-03
Attending: INTERNAL MEDICINE
Payer: COMMERCIAL

## 2025-03-03 DIAGNOSIS — E78.5 DYSLIPIDEMIA: ICD-10-CM

## 2025-03-03 DIAGNOSIS — R05.3 CHRONIC COUGH: ICD-10-CM

## 2025-03-03 PROCEDURE — 71250 CT THORAX DX C-: CPT | Performed by: INTERNAL MEDICINE

## 2025-03-03 PROCEDURE — 82172 ASSAY OF APOLIPOPROTEIN: CPT

## 2025-03-03 PROCEDURE — 36415 COLL VENOUS BLD VENIPUNCTURE: CPT

## 2025-03-06 ENCOUNTER — OFFICE VISIT (OUTPATIENT)
Dept: PHYSICAL THERAPY | Age: 50
End: 2025-03-06
Attending: ORTHOPAEDIC SURGERY
Payer: COMMERCIAL

## 2025-03-06 PROCEDURE — 97110 THERAPEUTIC EXERCISES: CPT

## 2025-03-06 PROCEDURE — 97112 NEUROMUSCULAR REEDUCATION: CPT

## 2025-03-07 LAB — APOLIPOPROTEIN B: 105 MG/DL

## 2025-03-07 NOTE — PROGRESS NOTES
Patient: Majnit Matt (49 year old, female) Referring Provider:  Insurance:   Diagnosis:   Tom Santoyo  [employer]   Date of Surgery: No data recorded Next MD visit:  N/A   Precautions:    No data recorded Referral Information:    Date of Evaluation: Req: 10, Auth: 10, Exp: 4/30/2025    No data recorded POC Auth Visits:  10       Today's Date   3/6/2025    Subjective  Tolerated aggressive LE workout (without impact) so has sore hips and thighs, but calf/achilles/ankle did fine, but again, avoided impact challenges.       Pain: 0/10     Objective  See below treatment grid.        Assessment  Improving activity tolerance.  Mild discomfort with stretch into PF/Inv for peroneals.     Plan  Progress LE strengthening and agility as able.    Treatment Last 4 Visits       3/7/2025   PT Treatment   Treatment Day 8          3/6/2025 3/7/2025   LE Treatment   Treatment Day  8   Therapeutic Exercise - Upright bike 6'  - toe scrunches, yoga (extension) and spreading - seated with foot on floor  - B Standing balance board B teetering AP and side/side  - Standing (L 100% WB) BAPS L3 AP, ML, CW, CCW  - Slant board calf stretch level 2  - Shuttle B leg press 5b x30; L leg press 5b x30; B leg jump 2b x30;  - Standing B calf raise on floor w/ eccentric lowering L; B calf raises x25  - tandem gait fwd/bwd on level and foam  - Lat walk  - braiding  - peroneal stretch into PF/Inv      Neuro Re-Education - BOSU B squat  - BOSU SLS w/ opp LE 3 way kicks  - BOSU lat step up/overs  - BOSU fwd step up with opp hip/knee flex      Therapeutic Exercise Minutes 30    Neuro Re-Educ Minutes 15    Total Time Of Timed Procedures 45    Total Time Of Service-Based Procedures 0    Total Treatment Time 45         HEP       Charges     THer Ex x2, Neuro Re-Ed

## 2025-03-10 ENCOUNTER — TELEPHONE (OUTPATIENT)
Dept: ORTHOPEDICS CLINIC | Facility: CLINIC | Age: 50
End: 2025-03-10

## 2025-03-10 DIAGNOSIS — M76.60 PAIN IN ACHILLES TENDON: Primary | ICD-10-CM

## 2025-03-10 DIAGNOSIS — M22.42 PATELLOFEMORAL CHONDROSIS OF LEFT KNEE: Primary | ICD-10-CM

## 2025-03-10 RX ORDER — GABAPENTIN 300 MG/1
300 CAPSULE ORAL 3 TIMES DAILY
Qty: 90 CAPSULE | Refills: 2 | Status: SHIPPED | OUTPATIENT
Start: 2025-03-10 | End: 2025-06-08

## 2025-03-10 NOTE — PROGRESS NOTES
[id number] chol 239,   CT heart score 0 in 2021 (repeat in 2026)   See pcp about lipid lowering medication     Sherry Irby MD

## 2025-03-10 NOTE — TELEPHONE ENCOUNTER
Refill request received via fax in the [facility] office.    Please advise, thanks!    Current Outpatient Medications:       Acetaminophen 500 MG Oral Cap, Take 2 capsules (1,000 mg total) by mouth every 8 (eight) hours as needed for Pain. Never exceed 3000 mg in a 24-hour period.  Many over-the-counter medications also have acetaminophen in them., Disp: 180 capsule, Rfl: 0

## 2025-03-10 NOTE — TELEPHONE ENCOUNTER
Refill request received via fax in the [facility] office.    Please advise, thanks.    Gabapentin 300mg capsules

## 2025-03-14 ENCOUNTER — ANTI-COAG VISIT (OUTPATIENT)
Dept: ANTICOAGULATION | Facility: CLINIC | Age: 50
End: 2025-03-14

## 2025-03-14 DIAGNOSIS — Z79.01 LONG TERM (CURRENT) USE OF ANTICOAGULANTS: Primary | ICD-10-CM

## 2025-03-14 DIAGNOSIS — I73.9 PERIPHERAL VASCULAR DISEASE: ICD-10-CM

## 2025-03-14 DIAGNOSIS — I37.9 PULMONIC VALVE DISEASE: ICD-10-CM

## 2025-03-14 DIAGNOSIS — Z51.81 MONITORING FOR LONG-TERM ANTICOAGULANT USE: ICD-10-CM

## 2025-03-14 DIAGNOSIS — Z79.01 MONITORING FOR LONG-TERM ANTICOAGULANT USE: ICD-10-CM

## 2025-03-14 LAB — INR: 2.2 (ref 2–3)

## 2025-03-14 PROCEDURE — 93793 ANTICOAG MGMT PT WARFARIN: CPT | Performed by: FAMILY MEDICINE

## 2025-03-14 NOTE — PROGRESS NOTES
[facility] / INR 2.2,  therapeutic. (Goal 2.5 ) TTR 69.9 %     Etiology: stable    PLAN: continue the current dose.    Recheck INR 2 weeks.    WARFARIN Plan per protocol: 1.5 mg every Mon, Fri; 1 mg all other days

## 2025-04-01 ENCOUNTER — ANTI-COAG VISIT (OUTPATIENT)
Dept: ANTICOAGULATION | Facility: CLINIC | Age: 50
End: 2025-04-01

## 2025-04-01 DIAGNOSIS — Z79.01 MONITORING FOR LONG-TERM ANTICOAGULANT USE: ICD-10-CM

## 2025-04-01 DIAGNOSIS — Z79.01 LONG TERM (CURRENT) USE OF ANTICOAGULANTS: Primary | ICD-10-CM

## 2025-04-01 DIAGNOSIS — Z51.81 MONITORING FOR LONG-TERM ANTICOAGULANT USE: ICD-10-CM

## 2025-04-01 DIAGNOSIS — I73.9 PERIPHERAL VASCULAR DISEASE: ICD-10-CM

## 2025-04-01 DIAGNOSIS — I37.9 PULMONIC VALVE DISEASE: ICD-10-CM

## 2025-04-01 LAB — INR: 2.3 (ref 2–3)

## 2025-04-01 PROCEDURE — 93793 ANTICOAG MGMT PT WARFARIN: CPT | Performed by: FAMILY MEDICINE

## 2025-04-01 NOTE — PROGRESS NOTES
[facility] / INR 2.3,  therapeutic. (Goal 2.5 ) TTR 70.7 %     Etiology: stable.    PLAN: continue the current dose.    Recheck INR 2 weeks.    WARFARIN Plan per protocol: 1.5 mg every Mon, Fri; 1 mg all other days

## 2025-04-12 NOTE — TELEPHONE ENCOUNTER
Refill Request:    Current Outpatient Medications   Medication Sig Dispense Refill    warfarin 1 MG Oral Tab TAKE 1& 1/2 TABLETS BY MOUTH ON MONDAY, WEDNESDAY, FRIDAY, AND 1 TABLET BY MOUTH ALL OTHER DAYS. 120 tablet 1     Please advise

## 2025-04-14 ENCOUNTER — ANTI-COAG VISIT (OUTPATIENT)
Dept: ANTICOAGULATION | Facility: CLINIC | Age: 50
End: 2025-04-14

## 2025-04-14 DIAGNOSIS — Z79.01 MONITORING FOR LONG-TERM ANTICOAGULANT USE: ICD-10-CM

## 2025-04-14 DIAGNOSIS — Z79.01 LONG TERM (CURRENT) USE OF ANTICOAGULANTS: Primary | ICD-10-CM

## 2025-04-14 DIAGNOSIS — I37.9 PULMONIC VALVE DISEASE: ICD-10-CM

## 2025-04-14 DIAGNOSIS — Z51.81 MONITORING FOR LONG-TERM ANTICOAGULANT USE: ICD-10-CM

## 2025-04-14 DIAGNOSIS — I73.9 PERIPHERAL VASCULAR DISEASE: ICD-10-CM

## 2025-04-14 LAB — INR: 2.2 (ref 2–3)

## 2025-04-14 PROCEDURE — 93793 ANTICOAG MGMT PT WARFARIN: CPT | Performed by: FAMILY MEDICINE

## 2025-04-14 RX ORDER — WARFARIN SODIUM 1 MG/1
TABLET ORAL
Qty: 120 TABLET | Refills: 1 | Status: SHIPPED | OUTPATIENT
Start: 2025-04-14

## 2025-04-14 NOTE — TELEPHONE ENCOUNTER
Health  Wellness Visit Note    Name: Magdalena Mane  Clinic Number: [id number]  Physician: No ref. provider found  Diagnosis: No diagnosis found.  Past Medical History:   Diagnosis Date    Anemia     Atrial fibrillation     Basal cell carcinoma     DJD (degenerative joint disease) 12/12/2012    Obesity (BMI 30-39.9) 11/27/2015    Vaginal atrophy 4/1/2016     Visit Number: 36  Precautions: Atrial Fibrillation      1st PT visit: 5/17/2022  Year of care end date: 5/17/2023  6 Month test  Performed: Nov 2022  12 Month test performed: May 2023  Mind body plan: Plan A 9 months  Patient level: B    Time In: 10:40 AM  Time Out: 11:23 AM  Total Treatment Time:  43 minutes    Wellness [employer] 2022  Handout on this week's wellness topic Get Outdoors was provided along with a discussion on what it means, the benefits, and suggestions for practice.  Reviewed last week's topic Gratitude.    Subjective:   Patient reports she has no back pain today. Over the week she did have some pain in her lower back but stretching helped it subside. She went for walks with her grandson at the park, did her stretches and HEP consistently.She stays busy with errands and working her garden. Patient hasn't been icing her back.     Objective:   Magdalena completed therapeutic stretches (EIL, ABRAHAM) and the following MedX exercise machines: core lumbar, torso rotation l/r, leg extension, leg curl, upright row, chest press, biceps curl, triceps extension, leg press    See exercise log in patient folder for rate of exertion and repetitions completed.       Fitness Machine Education Key:  E=education on equipment initiated and further follow up and education needed  I=independent with  and exercise.  The patient:  Adjusts machines to his/her settings  Uses equipment levers, pins, weights safely  Maintains safe and correct posture while exercising  Moves through exercise with correct pace and control  Gets on and off equipment  Refill passed per [facility] protocol.    Requested Prescriptions   Pending Prescriptions Disp Refills    warfarin 1 MG Oral Tab 120 tablet 1     Sig: TAKE 1& 1/2 TABLETS BY MOUTH ON MONDAY, WEDNESDAY, FRIDAY, AND 1 TABLET BY MOUTH ALL OTHER DAYS.       Rx Eemg Warfarin Protocol Passed - 4/14/2025  9:49 AM        Passed - Appointment in past 12 or next 3 months     Recent Outpatient Visits              1 month ago     [facility] [place] Shuert, Jeffrey, PT    Office Visit    1 month ago     [facility] [place] Shuert, Jeffrey, PT    Office Visit    1 month ago Encounter for well woman exam with routine gynecological exam    [address] Sherry Irby MD    Office Visit    2 months ago     [facility] [place] Shuert, Jeffrey, PT    Office Visit    2 months ago     [facility] [place] Shuert, Jeffrey, PT    Office Visit          Future Appointments         Provider Department Appt Notes    In 1 week [facility] US RM4 VAS [facility] Ultrasound Order in Epic    In 3 months Alana Padilla MD [facility] 6 mo f/u    In 10 months Sherry Irby MD [facility], [address] [place] [address] annual                    Passed - INR 3.9 or less past 6 weeks     INR   Date Value Ref Range Status   03/31/2025 2.3 (A) 2.0 - 3.0 Final                   Passed - Medication is active on med list        Passed - CMP within past 12 months     Recent Results (from the past 4380 hours)   Comp Metabolic Panel (14)    Collection Time: 01/31/25  9:19 AM   Result Value Ref Range    Glucose 87 70 - 99 mg/dL    Sodium 141 136 - 145 mmol/L    Potassium 4.2 3.5 - 5.1 mmol/L    Chloride 107 98 - 112 mmol/L    CO2 27.0 21.0 - 32.0 mmol/L    Anion Gap 7 0 - 18 mmol/L    BUN 10 9 - 23 mg/dL    Creatinine 0.87 0.55 - 1.02 mg/dL    BUN/CREA Ratio 11.5 10.0 - 20.0     Calcium, Total 9.7 8.7 - 10.4 mg/dL    Calculated Osmolality 290 275 - 295 mOsm/kg    eGFR-Cr 82 >=60 mL/min/1.73m2    ALT 13 10 - 49 U/L    AST 24 <34 U/L    Alkaline Phosphatase 75 39 - 100 U/L    Bilirubin, Total 0.5 0.3 - 1.2 mg/dL    Total Protein 7.2 5.7 - 8.2 g/dL    Albumin 4.4 3.2 - 4.8 g/dL    Globulin  2.8 2.0 - 3.5 g/dL    A/G Ratio 1.6 1.0 - 2.0    Patient Fasting for CMP? Yes                    Passed - AST < 111 (less than 3 Xs the upper limit)     Lab Results   Component Value Date    AST 24 01/31/2025                     Passed - ALT < 168 (less than 3Xs the upper limit)     Lab Results   Component Value Date    ALT 13 01/31/2025                     Passed - CBC within the past 12 months     Recent Results (from the past 8760 hours)   CBC With Differential With Platelet    Collection Time: 01/31/25  9:19 AM   Result Value Ref Range    WBC 4.7 4.0 - 11.0 x10(3) uL    RBC 3.95 3.80 - 5.30 x10(6)uL    HGB 13.3 12.0 - 16.0 g/dL    HCT 38.7 35.0 - 48.0 %    MCV 98.0 80.0 - 100.0 fL    MCH 33.7 26.0 - 34.0 pg    MCHC 34.4 31.0 - 37.0 g/dL    RDW-SD 45.1 35.1 - 46.3 fL    RDW 12.4 11.0 - 15.0 %    .0 150.0 - 450.0 10(3)uL    Neutrophil Absolute Prelim 2.38 1.50 - 7.70 x10 (3) uL    Neutrophil Absolute 2.38 1.50 - 7.70 x10(3) uL    Lymphocyte Absolute 1.67 1.00 - 4.00 x10(3) uL    Monocyte Absolute 0.47 0.10 - 1.00 x10(3) uL    Eosinophil Absolute 0.13 0.00 - 0.70 x10(3) uL    Basophil Absolute 0.05 0.00 - 0.20 x10(3) uL    Immature Granulocyte Absolute 0.01 0.00 - 1.00 x10(3) uL    Neutrophil % 50.4 %    Lymphocyte % 35.5 %    Monocyte % 10.0 %    Eosinophil % 2.8 %    Basophil % 1.1 %    Immature Granulocyte % 0.2 %     *Note: Due to a large number of results and/or encounters for the requested time period, some results have not been displayed. A complete set of results can be found in Results Review.                 Passed - Hgb >/= 10g/dl within past 12 months     HGB   Date Value Ref Range  safely      Core Lumbar Strength E Torso Rotation E Leg Press E   Leg Extension E Seated Leg Curl E Chest Press E   Seated Row E Hip ADD X Hip ABD E   Triceps Extension E Bicep Curl E Other: X       Assessment:   Patient tolerated Patient tolerated MedX Core Lumbar Strength and all other peripheral exercises without an increase in symptoms. Patient warmed up on treadmill for 5 minutes, stretched, and iced low back and knees for 5 minutes after the workout.    Plan:  Continue with established plan of care towards wellness goals.     Health  : Chasidy Yoon  1/13/2023                                 Status   01/31/2025 13.3 12.0 - 16.0 g/dL Final                   Passed - Platelets >/= 151 past 12 months     PLT   Date Value Ref Range Status   01/31/2025 321.0 150.0 - 450.0 10(3)uL Final                        Future Appointments         Provider Department Appt Notes    In 1 week [facility] Ultrasound Order in [facility]    In 3 months Alana Padilla MD [facility] 6 mo f/u    In 10 months Sherry Irby MD [facility], [address] [place] [address] annual          Recent Outpatient Visits              1 month ago     [facility] in [place] ShIsaiah turcios, PT    Office Visit    1 month ago     [facility] [place]    Office Visit    1 month ago Encounter for well woman exam with routine gynecological exam    [address] Sherry Irby MD    Office Visit    2 months ago     [facility] [place] Shuert, Jeffrey, PT    Office Visit    2 months ago     [facility] [place] Isaiah Romano, PT    Office Visit

## 2025-04-14 NOTE — PROGRESS NOTES
[facility] / INR 2.2,  therapeutic. (Goal 2.5 ) TTR 71.3 %     Etiology: stable.     PLAN: continue the current dose.    Recheck INR 2 weeks.    WARFARIN Plan per protocol: 1.5 mg every Mon, Fri; 1 mg all other days

## 2025-04-16 ENCOUNTER — TELEPHONE (OUTPATIENT)
Dept: ANTICOAGULATION | Facility: CLINIC | Age: 50
End: 2025-04-16

## 2025-04-16 DIAGNOSIS — Z51.81 MONITORING FOR LONG-TERM ANTICOAGULANT USE: ICD-10-CM

## 2025-04-16 DIAGNOSIS — I73.9 PERIPHERAL VASCULAR DISEASE: Primary | ICD-10-CM

## 2025-04-16 DIAGNOSIS — Z79.01 MONITORING FOR LONG-TERM ANTICOAGULANT USE: ICD-10-CM

## 2025-04-16 NOTE — TELEPHONE ENCOUNTER
Anticoag referral expires soon. Order pended. Please sign, thank you.      Dx: Peripheral vascular disease (I73.9)  Monitoring for long-term anticoagulant use (Z51.81,Z79.01

## 2025-04-22 ENCOUNTER — HOSPITAL ENCOUNTER (OUTPATIENT)
Dept: ULTRASOUND IMAGING | Facility: HOSPITAL | Age: 50
Discharge: HOME | End: 2025-04-22
Attending: SURGERY
Payer: COMMERCIAL

## 2025-04-22 DIAGNOSIS — I73.9 PERIPHERAL VASCULAR DISEASE, UNSPECIFIED: ICD-10-CM

## 2025-04-22 DIAGNOSIS — I72.4 POPLITEAL ARTERY ANEURYSM: ICD-10-CM

## 2025-04-22 PROCEDURE — 93925 LOWER EXTREMITY STUDY: CPT | Performed by: SURGERY

## 2025-04-30 ENCOUNTER — ANTI-COAG VISIT (OUTPATIENT)
Dept: ANTICOAGULATION | Facility: CLINIC | Age: 50
End: 2025-04-30

## 2025-04-30 DIAGNOSIS — Z51.81 MONITORING FOR LONG-TERM ANTICOAGULANT USE: ICD-10-CM

## 2025-04-30 DIAGNOSIS — I73.9 PERIPHERAL VASCULAR DISEASE: ICD-10-CM

## 2025-04-30 DIAGNOSIS — Z79.01 LONG TERM (CURRENT) USE OF ANTICOAGULANTS: Primary | ICD-10-CM

## 2025-04-30 DIAGNOSIS — Z79.01 MONITORING FOR LONG-TERM ANTICOAGULANT USE: ICD-10-CM

## 2025-04-30 DIAGNOSIS — I37.9 PULMONIC VALVE DISEASE: ICD-10-CM

## 2025-04-30 LAB — INR: 1.9 (ref 2–3)

## 2025-04-30 PROCEDURE — 93793 ANTICOAG MGMT PT WARFARIN: CPT | Performed by: FAMILY MEDICINE

## 2025-04-30 NOTE — PROGRESS NOTES
[facility] / INR 1.9, sub therapeutic. (Goal 2.5 ) TTR 71.2 %     Etiology: Manjit's dose is so low it would be hard to increase it slightly. I mentioned to her we COULD use a 2.5mg tablet and HALF it for a 1.25mg option. Not today. (Also-she mentioned no etoh)    PLAN: continue the current dose.    Recheck INR 2 weeks.     Pt reports:    No sign of unusual bruising or bleeding.  Any missed doses: No   Medications changes: No  Diet changes:  No    Contacted  Manjit by phone  who verbalized understanding and agreement.    WARFARIN Plan per protocol: 1.5 mg every Mon, Fri; 1 mg all other days

## 2025-05-06 RX ORDER — EZETIMIBE 10 MG/1
10 TABLET ORAL NIGHTLY
Qty: 90 TABLET | Refills: 1 | Status: SHIPPED | OUTPATIENT
Start: 2025-05-06

## 2025-05-06 NOTE — TELEPHONE ENCOUNTER
Refill passed per Clinic protocol.    New medication for patient short supply given please advise if refill is appropriate     Requested Prescriptions   Pending Prescriptions Disp Refills    EZETIMIBE 10 MG Oral Tab [Pharmacy Med Name: EZETIMIBE 10MG TABLETS] 90 tablet 0     Sig: TAKE 1 TABLET(10 MG) BY MOUTH EVERY NIGHT       Cholesterol Medication Protocol Passed - 5/6/2025  8:19 AM        Passed - ALT < 80     Lab Results   Component Value Date    ALT 13 01/31/2025             Passed - ALT resulted within past year        Passed - Lipid panel within past 12 months     Lab Results   Component Value Date    CHOLEST 239 (H) 01/31/2025    TRIG 51 01/31/2025    HDL 67 (H) 01/31/2025     (H) 01/31/2025    VLDL 10 01/31/2025    NONHDLC 172 (H) 01/31/2025             Passed - In person appointment or virtual visit in the past 12 mos or appointment in next 3 mos     Recent Outpatient Visits              6 days ago     [facility] [place] Mark Alex MD    Office Visit    2 months ago     [facility] [place] Shuert, Jeffrey, PT    Office Visit    2 months ago     [facility] [place] Shuert, Jeffrey, PT    Office Visit    2 months ago Encounter for well woman exam with routine gynecological exam    [address] Sherry Irby MD    Office Visit    2 months ago     [facility] [place] Shuert, Jeffrey, PT    Office Visit          Future Appointments         Provider Department Appt Notes    In 2 months Alana Padilla MD [facility], [facility] 6 mo f/u    In 9 months Sherry Irby MD [address] annual                    Passed - Medication is active on med list

## 2025-05-13 ENCOUNTER — ANTI-COAG VISIT (OUTPATIENT)
Dept: ANTICOAGULATION | Facility: CLINIC | Age: 50
End: 2025-05-13

## 2025-05-13 DIAGNOSIS — Z79.01 MONITORING FOR LONG-TERM ANTICOAGULANT USE: ICD-10-CM

## 2025-05-13 DIAGNOSIS — I37.9 PULMONIC VALVE DISEASE: ICD-10-CM

## 2025-05-13 DIAGNOSIS — Z51.81 MONITORING FOR LONG-TERM ANTICOAGULANT USE: ICD-10-CM

## 2025-05-13 DIAGNOSIS — Z79.01 LONG TERM (CURRENT) USE OF ANTICOAGULANTS: Primary | ICD-10-CM

## 2025-05-13 DIAGNOSIS — I73.9 PERIPHERAL VASCULAR DISEASE: ICD-10-CM

## 2025-05-13 LAB — INR: 2 (ref 2–3)

## 2025-05-13 PROCEDURE — 93793 ANTICOAG MGMT PT WARFARIN: CPT | Performed by: FAMILY MEDICINE

## 2025-05-13 NOTE — PROGRESS NOTES
[facility] / INR 2.0,  therapeutic. (Goal 2.5 ) TTR 69.7 %     Etiology: stable. At the low end. (Triple therapy)    PLAN: continue the current dose.    Recheck INR 2 weeks.    WARFARIN Plan per protocol: 1.5 mg every Mon, Fri; 1 mg all other days

## 2025-05-28 ENCOUNTER — ANTI-COAG VISIT (OUTPATIENT)
Dept: ANTICOAGULATION | Facility: CLINIC | Age: 50
End: 2025-05-28

## 2025-05-28 DIAGNOSIS — Z79.01 LONG TERM (CURRENT) USE OF ANTICOAGULANTS: Primary | ICD-10-CM

## 2025-05-28 DIAGNOSIS — Z79.01 MONITORING FOR LONG-TERM ANTICOAGULANT USE: ICD-10-CM

## 2025-05-28 DIAGNOSIS — Z51.81 MONITORING FOR LONG-TERM ANTICOAGULANT USE: ICD-10-CM

## 2025-05-28 DIAGNOSIS — I37.9 PULMONIC VALVE DISEASE: ICD-10-CM

## 2025-05-28 DIAGNOSIS — I73.9 PERIPHERAL VASCULAR DISEASE: ICD-10-CM

## 2025-05-28 LAB — INR: 2.1 (ref 2–3)

## 2025-05-28 PROCEDURE — 93793 ANTICOAG MGMT PT WARFARIN: CPT | Performed by: FAMILY MEDICINE

## 2025-06-16 ENCOUNTER — HOSPITAL ENCOUNTER (OUTPATIENT)
Dept: ULTRASOUND IMAGING | Facility: HOSPITAL | Age: 50
Discharge: HOME | End: 2025-06-16
Attending: INTERNAL MEDICINE
Payer: COMMERCIAL

## 2025-06-16 ENCOUNTER — LAB ENCOUNTER (OUTPATIENT)
Dept: LAB | Facility: HOSPITAL | Age: 50
End: 2025-06-16
Attending: INTERNAL MEDICINE
Payer: COMMERCIAL

## 2025-06-16 DIAGNOSIS — E78.00 HYPERCHOLESTEREMIA: ICD-10-CM

## 2025-06-16 DIAGNOSIS — R92.30 DENSE BREASTS: ICD-10-CM

## 2025-06-16 LAB
CHOLEST SERPL-MCNC: 200 MG/DL (ref ?–200)
FASTING PATIENT LIPID ANSWER: YES
HDLC SERPL-MCNC: 76 MG/DL (ref 40–59)
LDLC SERPL CALC-MCNC: 115 MG/DL (ref ?–100)
NONHDLC SERPL-MCNC: 124 MG/DL (ref ?–130)
TRIGL SERPL-MCNC: 47 MG/DL (ref 30–149)
VLDLC SERPL CALC-MCNC: 8 MG/DL (ref 0–30)

## 2025-06-16 PROCEDURE — 36415 COLL VENOUS BLD VENIPUNCTURE: CPT

## 2025-06-16 PROCEDURE — 80061 LIPID PANEL: CPT

## 2025-06-16 PROCEDURE — 76641 ULTRASOUND BREAST COMPLETE: CPT | Performed by: INTERNAL MEDICINE

## 2025-06-24 ENCOUNTER — ANTI-COAG VISIT (OUTPATIENT)
Dept: ANTICOAGULATION | Facility: CLINIC | Age: 50
End: 2025-06-24

## 2025-06-24 DIAGNOSIS — Z51.81 MONITORING FOR LONG-TERM ANTICOAGULANT USE: ICD-10-CM

## 2025-06-24 DIAGNOSIS — I73.9 PERIPHERAL VASCULAR DISEASE: ICD-10-CM

## 2025-06-24 DIAGNOSIS — Z79.01 MONITORING FOR LONG-TERM ANTICOAGULANT USE: ICD-10-CM

## 2025-06-24 DIAGNOSIS — Z79.01 LONG TERM (CURRENT) USE OF ANTICOAGULANTS: Primary | ICD-10-CM

## 2025-06-24 DIAGNOSIS — I37.9 PULMONIC VALVE DISEASE: ICD-10-CM

## 2025-06-24 LAB — INR: 1.9 (ref 2–3)

## 2025-06-24 PROCEDURE — 93793 ANTICOAG MGMT PT WARFARIN: CPT | Performed by: FAMILY MEDICINE

## 2025-06-24 NOTE — PROGRESS NOTES
[facility]  / INR 1.9, sub therapeutic. (Goal 2.0 ) TTR 69.9 %     Etiology: triple therapy. Late reporting.    PLAN: continue the current dose.    Recheck INR actual in a few days.     WARFARIN Plan per protocol: 1.5 mg every Mon, Fri; 1 mg all other days

## 2025-06-25 ENCOUNTER — ANTI-COAG VISIT (OUTPATIENT)
Dept: ANTICOAGULATION | Facility: CLINIC | Age: 50
End: 2025-06-25

## 2025-06-25 DIAGNOSIS — Z51.81 MONITORING FOR LONG-TERM ANTICOAGULANT USE: ICD-10-CM

## 2025-06-25 DIAGNOSIS — Z79.01 LONG TERM (CURRENT) USE OF ANTICOAGULANTS: Primary | ICD-10-CM

## 2025-06-25 DIAGNOSIS — Z79.01 MONITORING FOR LONG-TERM ANTICOAGULANT USE: ICD-10-CM

## 2025-06-25 DIAGNOSIS — I73.9 PERIPHERAL VASCULAR DISEASE: ICD-10-CM

## 2025-06-25 DIAGNOSIS — I37.9 PULMONIC VALVE DISEASE: ICD-10-CM

## 2025-06-25 LAB — INR: 2.5 (ref 2–3)

## 2025-06-25 PROCEDURE — 93793 ANTICOAG MGMT PT WARFARIN: CPT | Performed by: FAMILY MEDICINE

## 2025-06-25 NOTE — PROGRESS NOTES
[facility]  / INR 2.5,  therapeutic. (Goal 2.5 ) TTR 70.2 %     Etiology: stable.    PLAN: continue the current dose.    Recheck INR 2 weeks.    WARFARIN Plan per protocol: 1.5 mg every Mon, Fri; 1 mg all other days

## 2025-07-05 ENCOUNTER — PATIENT MESSAGE (OUTPATIENT)
Dept: INTERNAL MEDICINE CLINIC | Facility: CLINIC | Age: 50
End: 2025-07-05

## 2025-07-05 DIAGNOSIS — I37.9 PULMONIC VALVE DISEASE: Primary | ICD-10-CM

## 2025-07-07 NOTE — TELEPHONE ENCOUNTER
[Last office visit was on 1/10/2025 for physical]    Dr. Flynn - please review [employer] message and advise. Please respond directly to the patient if no additional staff support is required. Referral to [facility] pended, sign if appropriate [otherwise cancel pended referral and advise]

## 2025-07-23 ENCOUNTER — ANTI-COAG VISIT (OUTPATIENT)
Dept: ANTICOAGULATION | Facility: CLINIC | Age: 50
End: 2025-07-23

## 2025-07-23 DIAGNOSIS — Z79.01 LONG TERM (CURRENT) USE OF ANTICOAGULANTS: Primary | ICD-10-CM

## 2025-07-23 DIAGNOSIS — I73.9 PERIPHERAL VASCULAR DISEASE: ICD-10-CM

## 2025-07-23 DIAGNOSIS — Z51.81 MONITORING FOR LONG-TERM ANTICOAGULANT USE: ICD-10-CM

## 2025-07-23 DIAGNOSIS — I37.9 PULMONIC VALVE DISEASE: ICD-10-CM

## 2025-07-23 DIAGNOSIS — Z79.01 MONITORING FOR LONG-TERM ANTICOAGULANT USE: ICD-10-CM

## 2025-07-23 LAB — INR: 2.6 (ref 0.8–1.2)

## 2025-07-23 PROCEDURE — 93793 ANTICOAG MGMT PT WARFARIN: CPT | Performed by: FAMILY MEDICINE

## 2025-07-23 NOTE — PROGRESS NOTES
TTR 70.7%    INR result received from [facility] patient portal- faxed result was not received to the coumadin clinic office but patient reported result on 7/9/25.     Per protocol, continue current dose and recheck INR in 2 weeks.     1.5 mg every Mon, Fri; 1 mg all other days

## 2025-07-24 ENCOUNTER — ANTI-COAG VISIT (OUTPATIENT)
Dept: ANTICOAGULATION | Facility: CLINIC | Age: 50
End: 2025-07-24

## 2025-07-24 DIAGNOSIS — I73.9 PERIPHERAL VASCULAR DISEASE: ICD-10-CM

## 2025-07-24 DIAGNOSIS — Z79.01 LONG TERM (CURRENT) USE OF ANTICOAGULANTS: Primary | ICD-10-CM

## 2025-07-24 DIAGNOSIS — Z79.01 MONITORING FOR LONG-TERM ANTICOAGULANT USE: ICD-10-CM

## 2025-07-24 DIAGNOSIS — I37.9 PULMONIC VALVE DISEASE: ICD-10-CM

## 2025-07-24 DIAGNOSIS — Z51.81 MONITORING FOR LONG-TERM ANTICOAGULANT USE: ICD-10-CM

## 2025-07-24 LAB — INR: 2.3 (ref 2–3)

## 2025-07-24 PROCEDURE — 93793 ANTICOAG MGMT PT WARFARIN: CPT | Performed by: FAMILY MEDICINE

## 2025-07-24 NOTE — PROGRESS NOTES
[facility] / INR 2.3,  therapeutic. (Goal 2.5 ) TTR 71.3 %     Etiology: stable    PLAN: continue the current dose    Recheck INR 2 weeks    WARFARIN Plan per protocol: 1.5 mg every Mon, Fri; 1 mg all other days

## 2025-07-30 ENCOUNTER — OFFICE VISIT (OUTPATIENT)
Dept: PULMONOLOGY | Facility: CLINIC | Age: 50
End: 2025-07-30

## 2025-07-30 VITALS
WEIGHT: 127 LBS | DIASTOLIC BLOOD PRESSURE: 60 MMHG | HEART RATE: 71 BPM | OXYGEN SATURATION: 99 % | HEIGHT: 59 IN | RESPIRATION RATE: 16 BRPM | SYSTOLIC BLOOD PRESSURE: 95 MMHG | BODY MASS INDEX: 25.6 KG/M2

## 2025-07-30 DIAGNOSIS — R05.3 CHRONIC COUGH: Primary | ICD-10-CM

## 2025-07-30 PROCEDURE — 3074F SYST BP LT 130 MM HG: CPT | Performed by: INTERNAL MEDICINE

## 2025-07-30 PROCEDURE — 3008F BODY MASS INDEX DOCD: CPT | Performed by: INTERNAL MEDICINE

## 2025-07-30 PROCEDURE — 99214 OFFICE O/P EST MOD 30 MIN: CPT | Performed by: INTERNAL MEDICINE

## 2025-07-30 PROCEDURE — 3078F DIAST BP <80 MM HG: CPT | Performed by: INTERNAL MEDICINE

## 2025-08-11 ENCOUNTER — ANTI-COAG VISIT (OUTPATIENT)
Dept: ANTICOAGULATION | Facility: CLINIC | Age: 50
End: 2025-08-11

## 2025-08-11 DIAGNOSIS — Z51.81 MONITORING FOR LONG-TERM ANTICOAGULANT USE: ICD-10-CM

## 2025-08-11 DIAGNOSIS — I37.9 PULMONIC VALVE DISEASE: ICD-10-CM

## 2025-08-11 DIAGNOSIS — Z79.01 LONG TERM (CURRENT) USE OF ANTICOAGULANTS: Primary | ICD-10-CM

## 2025-08-11 DIAGNOSIS — I73.9 PERIPHERAL VASCULAR DISEASE: ICD-10-CM

## 2025-08-11 DIAGNOSIS — Z79.01 MONITORING FOR LONG-TERM ANTICOAGULANT USE: ICD-10-CM

## 2025-08-11 LAB — INR: 3 (ref 2–3)

## 2025-08-11 PROCEDURE — 93793 ANTICOAG MGMT PT WARFARIN: CPT | Performed by: FAMILY MEDICINE

## 2025-08-25 ENCOUNTER — ANTI-COAG VISIT (OUTPATIENT)
Dept: ANTICOAGULATION | Facility: CLINIC | Age: 50
End: 2025-08-25

## 2025-08-25 DIAGNOSIS — Z51.81 MONITORING FOR LONG-TERM ANTICOAGULANT USE: ICD-10-CM

## 2025-08-25 DIAGNOSIS — Z79.01 MONITORING FOR LONG-TERM ANTICOAGULANT USE: ICD-10-CM

## 2025-08-25 DIAGNOSIS — I37.9 PULMONIC VALVE DISEASE: ICD-10-CM

## 2025-08-25 DIAGNOSIS — I73.9 PERIPHERAL VASCULAR DISEASE: ICD-10-CM

## 2025-08-25 DIAGNOSIS — Z79.01 LONG TERM (CURRENT) USE OF ANTICOAGULANTS: Primary | ICD-10-CM

## 2025-08-25 LAB — INR: 2.7 (ref 0.8–1.2)

## 2025-08-25 PROCEDURE — 93793 ANTICOAG MGMT PT WARFARIN: CPT | Performed by: FAMILY MEDICINE

## (undated) DIAGNOSIS — Z51.81 ENCOUNTER FOR THERAPEUTIC DRUG MONITORING: ICD-10-CM

## (undated) DIAGNOSIS — I73.9 PERIPHERAL VASCULAR DISEASE, UNSPECIFIED (HCC): ICD-10-CM

## (undated) DIAGNOSIS — I73.9 PERIPHERAL VASCULAR DISEASE (HCC): ICD-10-CM

## (undated) DIAGNOSIS — Z79.01 LONG TERM (CURRENT) USE OF ANTICOAGULANTS: ICD-10-CM

## (undated) DEVICE — GAMMEX® PI HYBRID SIZE 8, STERILE POWDER-FREE SURGICAL GLOVE, POLYISOPRENE AND NEOPRENE BLEND: Brand: GAMMEX

## (undated) DEVICE — ANTIBACTERIAL UNDYED BRAIDED (POLYGLACTIN 910), SYNTHETIC ABSORBABLE SUTURE: Brand: COATED VICRYL

## (undated) DEVICE — SOL  .9 1000ML BTL

## (undated) DEVICE — DISPOSABLE TOURNIQUET CUFF SINGLE BLADDER, DUAL PORT AND QUICK CONNECT CONNECTOR: Brand: COLOR CUFF

## (undated) DEVICE — LAPAROTOMY: Brand: MEDLINE INDUSTRIES, INC.

## (undated) DEVICE — CONMED SCOPE SAVER BITE BLOCK, 20X27 MM: Brand: SCOPE SAVER

## (undated) DEVICE — OCCLUSIVE GAUZE STRIP,3% BISMUTH TRIBROMOPHENATE IN PETROLATUM BLEND: Brand: XEROFORM

## (undated) DEVICE — INSUFFLATION NEEDLE TO ESTABLISH PNEUMOPERITONEUM.: Brand: INSUFFLATION NEEDLE

## (undated) DEVICE — UNDYED BRAIDED (POLYGLACTIN 910), SYNTHETIC ABSORBABLE SUTURE: Brand: COATED VICRYL

## (undated) DEVICE — TROCAR: Brand: KII SLEEVE

## (undated) DEVICE — DRAPE SHEET LG

## (undated) DEVICE — SUTURE VICRYL 0 J340H

## (undated) DEVICE — [HIGH FLOW INSUFFLATOR,  DO NOT USE IF PACKAGE IS DAMAGED,  KEEP DRY,  KEEP AWAY FROM SUNLIGHT,  PROTECT FROM HEAT AND RADIOACTIVE SOURCES.]: Brand: PNEUMOSURE

## (undated) DEVICE — SOL  .9 3000ML

## (undated) DEVICE — WEBRIL COTTON UNDERCAST PADDING: Brand: WEBRIL

## (undated) DEVICE — ARISTA AH FLEXITIP XL APPLICATOR: Brand: ARISTA™ AH FLEXITIP™ XL

## (undated) DEVICE — DERMABOND LIQUID ADHESIVE

## (undated) DEVICE — TRAY FOLEY BDX 16F STATLOCK

## (undated) DEVICE — KIT CLEAN ENDOKIT 1.1OZ GOWNX2

## (undated) DEVICE — SUTURE VICRYL 0 CT-1

## (undated) DEVICE — NEEDLE HYPO 18GA L1.5IN PNK SS PVT SHLD BVL

## (undated) DEVICE — STERILE LATEX POWDER-FREE SURGICAL GLOVESWITH NITRILE COATING: Brand: PROTEXIS

## (undated) DEVICE — X-RAY DETECTABLE SPONGES,16 PLY: Brand: VISTEC

## (undated) DEVICE — DRAPE SHEET LAVH 124X112X30

## (undated) DEVICE — TOWEL OR BLU 16X26 STRL

## (undated) DEVICE — FLOSEAL HEMOSTATIC MATRIX, 5ML: Brand: FLOSEAL HEMOSTATIC MATRIX

## (undated) DEVICE — GOWN SURG AERO BLUE PERF LG

## (undated) DEVICE — TUBING CYSTO TUR DUAL

## (undated) DEVICE — SUT MCRYL 3-0 18IN PS-2 ABSRB UD 19MM 3/8 CIR

## (undated) DEVICE — TETRA-FLEX CF WOVEN LATEX FREE ELASTIC BANDAGE 6" X 5.5 YD: Brand: TETRA-FLEX™CF

## (undated) DEVICE — Device

## (undated) DEVICE — HYSTEROSCOPY: Brand: MEDLINE INDUSTRIES, INC.

## (undated) DEVICE — SUT ETHBND XL 3-0 36IN SH NABSRB GRN 26MM 1/2

## (undated) DEVICE — POOLE SUCTION INSTRUMENT WITH REMOVABLE SHEATH: Brand: POOLE

## (undated) DEVICE — INTENDED FOR TISSUE SEPARATION, AND OTHER PROCEDURES THAT REQUIRE A SHARP SURGICAL BLADE TO PUNCTURE OR CUT.: Brand: BARD-PARKER ® STAINLESS STEEL BLADES

## (undated) DEVICE — DISPOSABLE SUCTION/IRRIGATOR TUBE SET: Brand: AHTO

## (undated) DEVICE — ENCORE® LATEX MICRO SIZE 8, STERILE LATEX POWDER-FREE SURGICAL GLOVE: Brand: ENCORE

## (undated) DEVICE — 3M™ IOBAN™ 2 ANTIMICROBIAL INCISE DRAPE 6650EZ: Brand: IOBAN™ 2

## (undated) DEVICE — DRAPE UNDER BUTTOCKS

## (undated) DEVICE — STERILE POLYISOPRENE POWDER-FREE SURGICAL GLOVES: Brand: PROTEXIS

## (undated) DEVICE — SPONGE: SPECIALTY PEANUT XR 100/CS: Brand: MEDICAL ACTION INDUSTRIES

## (undated) DEVICE — TROCAR: Brand: KII FIOS FIRST ENTRY

## (undated) DEVICE — SOLUTION IRRIG 1000ML 0.9% NACL USP BTL

## (undated) DEVICE — PACK CDS LOWER EXTREMITY

## (undated) DEVICE — APPLICATOR PREP 26ML CHG 2% ISO ALC 70%

## (undated) DEVICE — Device: Brand: JELCO

## (undated) DEVICE — PAD ALC 43.5X24IN PREP  LF

## (undated) DEVICE — WOLF INFLOW HYSTER

## (undated) DEVICE — YANKAUER,BULB TIP,W/O VENT,RIGID,STERILE: Brand: MEDLINE

## (undated) DEVICE — SUTURE VICRYL 2-0 CT-1

## (undated) DEVICE — MATERIAL SPLNT 5X30IN POLY CMFRT PRE CUT

## (undated) DEVICE — LAP LIGASURE 5MM BLUNT

## (undated) DEVICE — GAUZE,PACKING STRIP,IODOFORM,1"X5YD,STRL: Brand: CURAD

## (undated) DEVICE — BNDG,ELSTC,MATRIX,STRL,6"X5YD,LF,HOOK&LP: Brand: MEDLINE

## (undated) DEVICE — MANIPULATOR CATH ESCP KRNR

## (undated) DEVICE — ADHESIVE SKIN TOP FOR WND CLSR DERMBND ADV

## (undated) DEVICE — 60 ML SYRINGE REGULAR TIP: Brand: MONOJECT

## (undated) DEVICE — DRAPE SRG 131X112X63IN GYN URO

## (undated) DEVICE — LAPAROSCOPY: Brand: MEDLINE INDUSTRIES, INC.

## (undated) DEVICE — GIJAW SINGLE-USE BIOPSY FORCEPS WITH NEEDLE: Brand: GIJAW

## (undated) DEVICE — 6 ML SYRINGE LUER-LOCK TIP: Brand: MONOJECT

## (undated) DEVICE — SUTURE VICRYL 3-0 SH

## (undated) DEVICE — 3M™ STERI-STRIP™ REINFORCED ADHESIVE SKIN CLOSURES, R1547, 1/2 IN X 4 IN (12 MM X 100 MM), 6 STRIPS/ENVELOPE: Brand: 3M™ STERI-STRIP™

## (undated) DEVICE — SUCTION CANISTER, 3000CC,SAFELINER: Brand: DEROYAL

## (undated) DEVICE — ARISTA 1 GRAM

## (undated) DEVICE — KIT ENDO ORCAPOD 160/180/190

## (undated) DEVICE — Device: Brand: DUAL NARE NASAL CANNULAE FEMALE LUER CON 7FT O2 TUBE

## (undated) NOTE — LETTER
AUTHORIZATION FOR SURGICAL OPERATION OR OTHER PROCEDURE    1.  I hereby [facility] Dr. Tanmay Burkett  [facility] assigned to my case to perform the following operation and/or procedure at the [facility]:    Wart removal from Right dyan Time:  ________ A. M.  P.M.        Patient Name:  ______________________________________________________  (please print)      Patient signature:  ___________________________________________________             Relationship to Patient:

## (undated) NOTE — LETTER
[place] [address], [place], [place]    Authorization for Surgical Operation and Procedure                               I hereby authorize Sandrita Saab MD, my physician and his/her assistants (if applicable), which may include medical students, residents, and/or fellows, to perform the following surgical operation/ procedure and administer such anesthesia as may be determined necessary by my physician: Operation/Procedure name (s) COLONOSCOPY/ ESOPHAGOGASTRODUODENOSCOPY (EGD) on Manjit Matt   2.   I recognize that during the surgical operation/procedure, unforeseen conditions may necessitate additional or different procedures than those listed above.  I, therefore, further authorize and request that the above-named surgeon, assistants, or designees perform such procedures as are, in their judgment, necessary and desirable.    3.   My surgeon/physician has discussed prior to my surgery the potential benefits, risks and side effects of this procedure; the likelihood of achieving goals; and potential problems that might occur during recuperation.  They also discussed reasonable alternatives to the procedure, including risks, benefits, and side effects related to the alternatives and risks related to not receiving this procedure.  I have had all my questions answered and I acknowledge that no guarantee has been made as to the result that may be obtained.    4.   Should the need arise during my operation/procedure, which includes change of level of care prior to discharge, I also consent to the administration of blood and/or blood products.  Further, I understand that despite careful testing and screening of blood or blood products by collecting agencies, I may still be subject to ill effects as a result of receiving a blood transfusion and/or blood products.  The following are some, but not all, of the potential risks that can occur: fever and allergic reactions, hemolytic  reactions, transmission of diseases such as Hepatitis, AIDS and Cytomegalovirus (CMV) and fluid overload.  In the event that I wish to have an autologous transfusion of my own blood, or a directed donor transfusion, I will discuss this with my physician.  Check only if Refusing Blood or Blood Products  I understand refusal of blood or blood products as deemed necessary by my physician may have serious consequences to my condition to include possible death. I hereby assume responsibility for my refusal and release the hospital, its personnel, and my physicians from any responsibility for the consequences of my refusal.    o  Refuse   5.   I authorize the use of any specimen, organs, tissues, body parts or foreign objects that may be removed from my body during the operation/procedure for diagnosis, research or teaching purposes and their subsequent disposal by hospital authorities.  I also authorize the release of specimen test results and/or written reports to my treating physician on the hospital medical staff or other referring or consulting physicians involved in my care, at the discretion of the Pathologist or my treating physician.    6.   I consent to the photographing or videotaping of the operations or procedures to be performed, including appropriate portions of my body for medical, scientific, or educational purposes, provided my identity is not revealed by the pictures or by descriptive texts accompanying them.  If the procedure has been photographed/videotaped, the surgeon will obtain the original picture, image, videotape or CD.  The hospital will not be responsible for storage, release or maintenance of the picture, image, tape or CD.    7.   I consent to the presence of a  or observers in the operating room as deemed necessary by my physician or their designees.    8.   I recognize that in the event my procedure results in extended X-Ray/fluoroscopy time, I may develop a skin  reaction.    9. If I have a Do Not Attempt Resuscitation (DNAR) order in place, that status will be suspended while in the operating room, procedural suite, and during the recovery period unless otherwise explicitly stated by me (or a person authorized to consent on my behalf). The surgeon or my attending physician will determine when the applicable recovery period ends for purposes of reinstating the DNAR order.  10. Patients having a sterilization procedure: I understand that if the procedure is successful the results will be permanent and it will therefore be impossible for me to inseminate, conceive, or bear children.  I also understand that the procedure is intended to result in sterility, although the result has not been guaranteed.   11. I acknowledge that my physician has explained sedation/analgesia administration to me including the risk and benefits I consent to the administration of sedation/analgesia as may be necessary or desirable in the judgment of my physician.    I CERTIFY THAT I HAVE READ AND FULLY UNDERSTAND THE ABOVE CONSENT TO OPERATION and/or OTHER PROCEDURE.     ____________________________________  _________________________________        ______________________________  Signature of Patient    Signature of Responsible Person                Printed Name of Responsible Person                                      ____________________________________  _____________________________                ________________________________  Signature of Witness        Date  Time         Relationship to Patient    STATEMENT OF PHYSICIAN My signature below affirms that prior to the time of the procedure; I have explained to the patient and/or his/her legal representative, the risks and benefits involved in the proposed treatment and any reasonable alternative to the proposed treatment. I have also explained the risks and benefits involved in refusal of the proposed treatment and alternatives to the proposed  treatment and have answered the patient's questions. If I have a significant financial interest in a co-management agreement or a significant financial interest in any product or implant, or other significant relationship used in this procedure/surgery, I have disclosed this and had a discussion with my patient.     _____________________________________________________              _____________________________  (Signature of Physician)                                                                                         (Date)                                   (Time)  Patient Name: Manjit Matt      : 1975      Printed: 2024     Medical Record #: [id number]                                      Page 1 of 1

## (undated) NOTE — LETTER
AUTHORIZATION FOR SURGICAL OPERATION OR OTHER PROCEDURE    1. I hereby authorize Dr. Jc Diaz , and [facility], [facility] staff assigned to my case to perform the following operation and/or procedure at the [facility], [facility]:     Cortisone injection in Left foot   _______________________________________________________________________________________________      _______________________________________________________________________________________________    2. My physician has explained the nature and purpose of the operation or other procedure, possible alternative methods of treatment, the risks involved, and the possibility of complication to me. I acknowledge that no guarantee has been made as to the result that may be obtained. 3.  I recognize that, during the course of this operation, or other procedure, unforseen conditions may necessitate additional or different procedure than those listed above. I, therefore, further authorize and request that the above named physician, his/her physician assistants or designees perform such procedures as are, in his/her professional opinion, necessary and desirable. 4.  Any tissue or organs removed in the operation or other procedure may be disposed of by and at the discretion of the [facility], [facility] and [facility]. 5.  I understand that in the event of a medical emergency, I will be transported by local paramedics to [facility] or other hospital emergency department. 6.  I certify that I have read and fully understand the above consent to operation and/or other procedure. 7.  I acknowledge that my physician has explained sedation/analgesia administration to me including the risks and benefits. I consent to the administration of sedation/analgesia as may be necessary or desirable in the judgement of my physician.     Witness signature: ___________________________________________________ Date:  ______/______/_____ Time:  ________ A. M.  P.M. Patient Name:  ______________________________________________________  (please print)      Patient signature:  ___________________________________________________             Relationship to Patient:           []  Parent    Responsible person                          []  Spouse  In case of minor or                    [] Other  _____________   Incompetent name:  __________________________________________________                               (please print)      _____________      Responsible person  In case of minor or  Incompetent signature:  _______________________________________________    Statement of Physician  My signature below affirms that prior to the time of the procedure, I have explained to the patient and/or his/her guardian, the risks and benefits involved in the proposed treatment and any reasonable alternative to the proposed treatment. I have also explained the risks and benefits involved in the refusal of the proposed treatment and have answered the patient's questions.                         Date:  ______/______/_______  Provider                      Signature:  __________________________________________________________       Time:  ___________ A.M    P.M.

## (undated) NOTE — LETTER
Pablito Zuleta  9/16/1975    A patient of your clinic was recently seen by the hospitalists at [facility], and will be following up with you on 10/24/18 per Dr Leonie Bee. Pt has machine at home and was reminded to call in results on 10/24/18.

## (undated) NOTE — LETTER
[facility]  Administration of Anesthesia  IManjit agree to be cared for by a physician anesthesiologist alone and/or with a nurse anesthetist, who is specially trained to monitor me and give me medicine to put me to sleep or keep me comfortable during my procedure    I understand that my anesthesiologist and/or anesthetist is not an employee or agent of [facility] or [employer]. He or she works for [facility]    As the patient asking for anesthesia services, I agree to:  Allow the anesthesiologist (anesthesia doctor) to give me medicine and do additional procedures as necessary. Some examples are: Starting or using an “IV” to give me medicine, fluids or blood during my procedure, and having a breathing tube placed to help me breathe when I’m asleep (intubation). In the event that my heart stops working properly, I understand that my anesthesiologist will make every effort to sustain my life, unless otherwise directed by [facility] Do Not Resuscitate documents.  Tell my anesthesia doctor before my procedure:  If I am pregnant.  The last time that I ate or drank.  iii. All of the medicines I take (including prescriptions, herbal supplements, and pills I can buy without a prescription (including street drugs/illegal medications). Failure to inform my anesthesiologist about these medicines may increase my risk of anesthetic complications.  iv.If I am allergic to anything or have had a reaction to anesthesia before.  I understand how the anesthesia medicine will help me (benefits).  I understand that with any type of anesthesia medicine there are risks:  The most common risks are: nausea, vomiting, sore throat, muscle soreness, damage to my eyes, mouth, or teeth (from breathing tube placement).  Rare risks include: remembering what happened during my procedure, allergic reactions to medications, injury to my airway, heart, lungs, vision, nerves, or muscles  and in extremely rare instances death.  My doctor has explained to me other choices available to me for my care (alternatives).  Pregnant Patients (“epidural”):  I understand that the risks of having an epidural (medicine given into my back to help control pain during labor), include itching, low blood pressure, difficulty urinating, headache or slowing of the baby’s heart. Very rare risks include infection, bleeding, seizure, irregular heart rhythms and nerve injury.  Regional Anesthesia (“spinal”, “epidural”, & “nerve blocks”):  I understand that rare but potential complications include headache, bleeding, infection, seizure, irregular heart rhythms, and nerve injury.    _____________________________________________________________________________  Patient (or Representative) Signature/Relationship to Patient  Date   Time    _____________________________________________________________________________   Name (if used)    Language/Organization   Time    _____________________________________________________________________________  Nurse Anesthetist Signature     Date   Time  _____________________________________________________________________________  Anesthesiologist Signature     Date   Time  I have discussed the procedure and information above with the patient (or patient’s representative) and answered their questions. The patient or their representative has agreed to have anesthesia services.    _____________________________________________________________________________  Witness        Date   Time  I have verified that the signature is that of the patient or patient’s representative, and that it was signed before the procedure  Patient Name: Manjit Matt     : 1975                 Printed: 2024 at 6:30 AM    Medical Record #: [id number]                                            Page 1 of 1  ----------ANESTHESIA CONSENT----------

## (undated) NOTE — LETTER
[place] [address]  [facility]  [facility] [id number]  Authorization for Imaging Procedure  Date of Procedure:     I hereby authorize Dr. Marc Yin , my physician and his/her assistants (if applicable), which may include medical students, residents, and/or fellows, to perform the following procedure and administer such anesthesia as may be determined necessary by my physician: ULTRASOUND GUIDED LEFT BREAST CYST ASPIRATION WITH CLIP PLACEMENT on [employer]. 2.  I recognize that during the procedure, unforeseen conditions may necessitate additional or different procedures than those listed above. I, therefore, further authorize and request that the above-named physician, assistants, or designees perform such procedures as are, in their judgment, necessary and desirable. 3.  My physician has discussed prior to my procedure the potential benefits, risks and side effects of this procedure; the likelihood of achieving goals; and potential problems that might occur during recuperation. They also discussed reasonable alternatives to the procedure, including risks, benefits, and side effects related to the alternatives and risks related to not receiving this procedure. I have had all my questions answered and I acknowledge that no guarantee has been made as to the result that may be obtained. 4.  Should the need arise during my procedure, which includes change of level of care prior to discharge, I also consent to the administration of blood and/or blood products. Further, I understand that despite careful testing and screening of blood or blood products by collecting agencies, I may still be subject to ill effects as a result of receiving a blood transfusion and/or blood products.  The following are some, but not all, of the potential risks that can occur: fever and allergic reactions, hemolytic reactions, transmission of diseases such as Hepatitis, AIDS and Cytomegalovirus (CMV) and fluid overload. In the event that I wish to have an autologous transfusion of my own blood, or a directed donor transfusion, I will discuss this with my physician. Check only if Refusing Blood or Blood Products  I understand refusal of blood or blood products as deemed necessary by my physician may have serious consequences to my condition to include possible death. I hereby assume responsibility for my refusal and release the hospital, its personnel, and my physicians from any responsibility for the consequences of my refusal.   [  ] Patient Refuses Blood      5. I authorize the use of any specimen, organs, tissues, body parts or foreign objects that may be removed from my body during the procedure for diagnosis, research or teaching purposes and their subsequent disposal by hospital authorities. I also authorize the release of specimen test results and/or written reports to my treating physician on the hospital medical staff or other referring or consulting physicians involved in my care, at the discretion of the Pathologist or my treating physician. 6.  I consent to the photographing or videotaping of the procedures to be performed, including appropriate portions of my body for medical, scientific, or educational purposes, provided my identity is not revealed by the pictures or by descriptive texts accompanying them. If the procedure has been photographed/videotaped, the physician will obtain the original picture, image, videotape or CD. The hospital will not be responsible for storage, release or maintenance of the picture, image, tape or CD.   7.  I consent to the presence of a  or observers in the operating room as deemed necessary by my physician or their designees. 8.  I recognize that in the event my procedure results in extended X-Ray/fluoroscopy time, I may develop a skin reaction. 9.   If I have a Do Not Attempt Resuscitation (DNAR) order in place, that status will be suspended while in the operating room, procedural suite, and during the recovery period unless otherwise explicitly stated by me (or a person authorized to consent on my behalf). The performing physician or my attending physician will determine when the applicable recovery period ends for purposes of reinstating the DNAR order. 10.  I acknowledge that my physician has explained sedation/analgesia administration to me including the risk and benefits I consent to the administration of sedation/analgesia as may be necessary or desirable in the judgment of my physician. I CERTIFY THAT I HAVE READ AND FULLY UNDERSTAND THE ABOVE CONSENT FOR THE PROCEDURE. Signature of Patient: _____________________________________________________________  Responsible person in case of minor, unconscious: ____________________________________  Relationship to patient:  __________________________________________________________  Signature of Witness: _______________________________Date: _________Time: __________    Statement of Physician: My signature below affirms that prior to the time of the procedure, I have explained to the patient and/or her guardian, the risks and benefits involved in the proposed treatment and any reasonable alternative to the proposed treatment. I have also explained the risks and benefits involved in the refusal of the proposed treatment and have answered the patient's questions. If I have a significant financial interest in a co-management agreement or a significant financial interest in any product or implant, or other significant relationship used in the procedure/surgery, I have disclosed this and had a discussion with my patient.   Signature of Physician:   _________________________________Date:_____________Time:________    Patient Name: Sujit Ministerio : 1975  Printed: 2023   Medical Record #: [id number]

## (undated) NOTE — LETTER
[facility]  Administration of Anesthesia  1. I, Sienna Mann, or _________________________________ acting on her behalf, (Patient) (Dependent/Representative) request to receive anesthesia for my pending procedure/operation/treatment.   A trinay infections, high spinal block, spinal bleeding, seizure, cardiac arrest and death. 7. AWARENESS: I understand that it is possible (but unlikely) to have explicit memory of events from the operating room while under general anesthesia.   8. ELECTROCONVULSIV unconscious pt /Relationship    My signature below affirms that prior to the time of the procedure, I have explained to the patient and/or his/her guardian, the risks and benefits of undergoing anesthesia, as well as any reasonable alternatives.     _______

## (undated) NOTE — LETTER
[address] [place] [address] Rd, [place], [place]     AUTHORIZATION FOR SURGICAL OPERATION OR PROCEDURE    I hereby authorize Dr. Urmila So MD, my Physician(s) and whomever may be designated as the doctor's Assistant, to perform the follow 4. I consent to the photographing of procedure(s) to be performed for the purposes of advancing medicine, science and/or education, provided my identity is not revealed.  If the procedure has been videotaped, the physician/surgeon will obtain the original v (Witness signature)                                                                                                  (Date)                                (Time)  STATEMENT OF PHYSICIAN My signature below affirms that prior to the time of the procedure;  I

## (undated) NOTE — LETTER
AUTHORIZATION FOR SURGICAL OPERATION OR OTHER PROCEDURE    1. I hereby authorize Dr. Los Yanez, and [facility] staff assigned to my case to perform the following operation and/or procedure at the [facility]3], [facility]:    _______________________________________________________________________________________________    Cortisone injection Left foot   _______________________________________________________________________________________________    2. My physician has explained the nature and purpose of the operation or other procedure, possible alternative methods of treatment, the risks involved, and the possibility of complication to me. I acknowledge that no guarantee has been made as to the result that may be obtained. 3.  I recognize that, during the course of this operation, or other procedure, unforseen conditions may necessitate additional or different procedure than those listed above. I, therefore, further authorize and request that the above named physician, his/her physician assistants or designees perform such procedures as are, in his/her professional opinion, necessary and desirable. 4.  Any tissue or organs removed in the operation or other procedure may be disposed of by and at the discretion of the [facility]3], [facility] and [facility] AT [facility]. 5.  I understand that in the event of a medical emergency, I will be transported by local paramedics to [facility] or other hospital emergency department. 6.  I certify that I have read and fully understand the above consent to operation and/or other procedure. 7.  I acknowledge that my physician has explained sedation/analgesia administration to me including the risks and benefits. I consent to the administration of sedation/analgesia as may be necessary or desirable in the judgement of my physician.     Witness signature: ___________________________________________________ Date:  ______/______/_____                    Time: ________ A. M.  P.M. Patient Name:  ______________________________________________________  (please print)      Patient signature:  ___________________________________________________             Relationship to Patient:           []  Parent    Responsible person                          []  Spouse  In case of minor or                    [] Other  _____________   Incompetent name:  __________________________________________________                               (please print)      _____________      Responsible person  In case of minor or  Incompetent signature:  _______________________________________________    Statement of Physician  My signature below affirms that prior to the time of the procedure, I have explained to the patient and/or his/her guardian, the risks and benefits involved in the proposed treatment and any reasonable alternative to the proposed treatment. I have also explained the risks and benefits involved in the refusal of the proposed treatment and have answered the patient's questions.                         Date:  ______/______/_______  Provider                      Signature:  __________________________________________________________       Time:  ___________ A.M    P.M.

## (undated) NOTE — LETTER
AUTHORIZATION FOR SURGICAL OPERATION OR OTHER PROCEDURE    1. I hereby authorize Dr. Lomeli, and [facility] staff assigned to my case to perform the following operation and/or procedure at the [facility] site:    _______________________________________________________________________________________________      ________________________Left Knee Cortisone Injection _______________________________________________________________________    2.  My physician has explained the nature and purpose of the operation or other procedure, possible alternative methods of treatment, the risks involved, and the possibility of complication to me.  I acknowledge that no guarantee has been made as to the result that may be obtained.  3.  I recognize that, during the course of this operation, or other procedure, unforseen conditions may necessitate additional or different procedure than those listed above.  I, therefore, further authorize and request that the above named physician, his/her physician assistants or designees perform such procedures as are, in his/her professional opinion, necessary and desirable.  4.  Any tissue or organs removed in the operation or other procedure may be disposed of by and at the discretion of the [facility] and [facility].  5.  I understand that in the event of a medical emergency, I will be transported by local paramedics to [facility] or other hospital emergency department.  6.  I certify that I have read and fully understand the above consent to operation and/or other procedure.    7.  I acknowledge that my physician has explained sedation/analgesia administration to me including the risks and benefits.  I consent to the administration of sedation/analgesia as may be necessary or desirable in the judgement of my physician.    Witness signature: ___________________________________________________ Date:  ______/______/_____                     Time:  ________ A.M.  P.M.       Patient Name:  ______________________________________________________  (please print)      Patient signature:  ___________________________________________________             Relationship to Patient:           []  Parent    Responsible person                          []  Spouse  In case of minor or                    [] Other  _____________   Incompetent name:  __________________________________________________                               (please print)      _____________      Responsible person  In case of minor or  Incompetent signature:  _______________________________________________    Statement of Physician  My signature below affirms that prior to the time of the procedure, I have explained to the patient and/or his/her guardian, the risks and benefits involved in the proposed treatment and any reasonable alternative to the proposed treatment.  I have also explained the risks and benefits involved in the refusal of the proposed treatment and have answered the patient's questions.                        Date:  ______/______/_______  Provider                      Signature:  __________________________________________________________       Time:  ___________ A.M    P.M.

## (undated) NOTE — LETTER
AUTHORIZATION FOR SURGICAL OPERATION OR OTHER PROCEDURE    1. I hereby authorize Dr. Santoyo, and [facility] staff assigned to my case to perform the following operation and/or procedure at the [facility]:    _______________________________________________________________________________________________    Cortisone Injection Left Achilles Tendon  _______________________________________________________________________________________________    2.  My physician has explained the nature and purpose of the operation or other procedure, possible alternative methods of treatment, the risks involved, and the possibility of complication to me.  I acknowledge that no guarantee has been made as to the result that may be obtained.  3.  I recognize that, during the course of this operation, or other procedure, unforseen conditions may necessitate additional or different procedure than those listed above.  I, therefore, further authorize and request that the above named physician, his/her physician assistants or designees perform such procedures as are, in his/her professional opinion, necessary and desirable.  4.  Any tissue or organs removed in the operation or other procedure may be disposed of by and at the discretion of the [facility] and [facility].  5.  I understand that in the event of a medical emergency, I will be transported by local paramedics to [facility] or other hospital emergency department.  6.  I certify that I have read and fully understand the above consent to operation and/or other procedure.    7.  I acknowledge that my physician has explained sedation/analgesia administration to me including the risks and benefits.  I consent to the administration of sedation/analgesia as may be necessary or desirable in the judgement of my physician.    Witness signature: ___________________________________________________ Date:  ______/______/_____                     Time:  ________ A.M.  P.M.       Patient Name:  ______________________________________________________  (please print)      Patient signature:  ___________________________________________________             Relationship to Patient:           []  Parent    Responsible person                          []  Spouse  In case of minor or                    [] Other  _____________   Incompetent name:  __________________________________________________                               (please print)      _____________      Responsible person  In case of minor or  Incompetent signature:  _______________________________________________    Statement of Physician  My signature below affirms that prior to the time of the procedure, I have explained to the patient and/or his/her guardian, the risks and benefits involved in the proposed treatment and any reasonable alternative to the proposed treatment.  I have also explained the risks and benefits involved in the refusal of the proposed treatment and have answered the patient's questions.                        Date:  ______/______/_______  Provider                      Signature:  __________________________________________________________       Time:  ___________ A.M    P.M.